# Patient Record
Sex: MALE | Race: BLACK OR AFRICAN AMERICAN | NOT HISPANIC OR LATINO | Employment: UNEMPLOYED | URBAN - METROPOLITAN AREA
[De-identification: names, ages, dates, MRNs, and addresses within clinical notes are randomized per-mention and may not be internally consistent; named-entity substitution may affect disease eponyms.]

---

## 2018-11-25 ENCOUNTER — HOSPITAL ENCOUNTER (EMERGENCY)
Facility: OTHER | Age: 68
Discharge: HOME | End: 2018-11-26
Attending: EMERGENCY MEDICINE
Payer: MEDICARE

## 2018-11-25 DIAGNOSIS — G40.109 PARTIAL SEIZURE, MOTOR: Primary | ICD-10-CM

## 2018-11-25 LAB
BASOPHILS # BLD AUTO: 0.02 K/UL
BASOPHILS NFR BLD: 0.2 %
DIFFERENTIAL METHOD: ABNORMAL
EOSINOPHIL # BLD AUTO: 0.2 K/UL
EOSINOPHIL NFR BLD: 1.5 %
ERYTHROCYTE [DISTWIDTH] IN BLOOD BY AUTOMATED COUNT: 13.2 %
HCT VFR BLD AUTO: 37.7 %
HGB BLD-MCNC: 11.6 G/DL
LYMPHOCYTES # BLD AUTO: 3.6 K/UL
LYMPHOCYTES NFR BLD: 35.6 %
MCH RBC QN AUTO: 25.6 PG
MCHC RBC AUTO-ENTMCNC: 30.8 G/DL
MCV RBC AUTO: 83 FL
MONOCYTES # BLD AUTO: 0.8 K/UL
MONOCYTES NFR BLD: 7.8 %
NEUTROPHILS # BLD AUTO: 5.6 K/UL
NEUTROPHILS NFR BLD: 54.8 %
PLATELET # BLD AUTO: 209 K/UL
PMV BLD AUTO: 10 FL
RBC # BLD AUTO: 4.54 M/UL
WBC # BLD AUTO: 10.17 K/UL

## 2018-11-25 PROCEDURE — 80053 COMPREHEN METABOLIC PANEL: CPT

## 2018-11-25 PROCEDURE — 84100 ASSAY OF PHOSPHORUS: CPT

## 2018-11-25 PROCEDURE — 85025 COMPLETE CBC W/AUTO DIFF WBC: CPT

## 2018-11-25 PROCEDURE — 83735 ASSAY OF MAGNESIUM: CPT

## 2018-11-25 PROCEDURE — 63600175 PHARM REV CODE 636 W HCPCS: Performed by: PHYSICIAN ASSISTANT

## 2018-11-25 PROCEDURE — 80177 DRUG SCRN QUAN LEVETIRACETAM: CPT

## 2018-11-25 PROCEDURE — 96374 THER/PROPH/DIAG INJ IV PUSH: CPT

## 2018-11-25 PROCEDURE — 99284 EMERGENCY DEPT VISIT MOD MDM: CPT | Mod: 25

## 2018-11-25 RX ORDER — LEVETIRACETAM 1000 MG/1
1000 TABLET ORAL 2 TIMES DAILY
Status: ON HOLD | COMMUNITY
End: 2019-09-04 | Stop reason: SDUPTHER

## 2018-11-25 RX ORDER — METFORMIN HYDROCHLORIDE 500 MG/1
500 TABLET, EXTENDED RELEASE ORAL 2 TIMES DAILY WITH MEALS
COMMUNITY

## 2018-11-25 RX ORDER — LOSARTAN POTASSIUM 25 MG/1
25 TABLET ORAL DAILY
COMMUNITY

## 2018-11-25 RX ORDER — ASPIRIN 81 MG/1
81 TABLET ORAL DAILY
COMMUNITY

## 2018-11-25 RX ADMIN — LORAZEPAM 1 MG: 2 INJECTION INTRAMUSCULAR; INTRAVENOUS at 11:11

## 2018-11-26 VITALS
TEMPERATURE: 98 F | SYSTOLIC BLOOD PRESSURE: 161 MMHG | HEART RATE: 64 BPM | WEIGHT: 164 LBS | OXYGEN SATURATION: 100 % | BODY MASS INDEX: 24.29 KG/M2 | HEIGHT: 69 IN | DIASTOLIC BLOOD PRESSURE: 85 MMHG | RESPIRATION RATE: 16 BRPM

## 2018-11-26 LAB
ALBUMIN SERPL BCP-MCNC: 3.8 G/DL
ALP SERPL-CCNC: 69 U/L
ALT SERPL W/O P-5'-P-CCNC: 27 U/L
ANION GAP SERPL CALC-SCNC: 13 MMOL/L
AST SERPL-CCNC: 21 U/L
BILIRUB SERPL-MCNC: 1 MG/DL
BUN SERPL-MCNC: 15 MG/DL
CALCIUM SERPL-MCNC: 9 MG/DL
CHLORIDE SERPL-SCNC: 107 MMOL/L
CO2 SERPL-SCNC: 20 MMOL/L
CREAT SERPL-MCNC: 1.2 MG/DL
EST. GFR  (AFRICAN AMERICAN): >60 ML/MIN/1.73 M^2
EST. GFR  (NON AFRICAN AMERICAN): >60 ML/MIN/1.73 M^2
GLUCOSE SERPL-MCNC: 134 MG/DL
MAGNESIUM SERPL-MCNC: 1.8 MG/DL
PHOSPHATE SERPL-MCNC: 2.5 MG/DL
POCT GLUCOSE: 77 MG/DL (ref 70–110)
POTASSIUM SERPL-SCNC: 4 MMOL/L
PROT SERPL-MCNC: 7.5 G/DL
SODIUM SERPL-SCNC: 140 MMOL/L

## 2018-11-26 PROCEDURE — 63600175 PHARM REV CODE 636 W HCPCS: Performed by: PHYSICIAN ASSISTANT

## 2018-11-26 PROCEDURE — 25000003 PHARM REV CODE 250: Performed by: PHYSICIAN ASSISTANT

## 2018-11-26 PROCEDURE — 96376 TX/PRO/DX INJ SAME DRUG ADON: CPT

## 2018-11-26 PROCEDURE — 82962 GLUCOSE BLOOD TEST: CPT

## 2018-11-26 RX ORDER — SODIUM,POTASSIUM PHOSPHATES 280-250MG
1 POWDER IN PACKET (EA) ORAL
Status: COMPLETED | OUTPATIENT
Start: 2018-11-26 | End: 2018-11-26

## 2018-11-26 RX ADMIN — POTASSIUM & SODIUM PHOSPHATES POWDER PACK 280-160-250 MG 1 PACKET: 280-160-250 PACK at 12:11

## 2018-11-26 RX ADMIN — LORAZEPAM 1 MG: 2 INJECTION INTRAMUSCULAR; INTRAVENOUS at 12:11

## 2018-11-26 NOTE — ED TRIAGE NOTES
Pt to ED via EMS. EMS reports muscle spasm to right side of body. Pt states his symptoms began when he woke up aprox 1 hour PTA. Pt is a poor historian, and unsure of last hospitalization, and previous treatment for symptoms. Convulsions noted to right arm, leg and neck. Pt denies pain, and respirations are even and unlabored.

## 2018-11-26 NOTE — ED NOTES
Pt attempting to get out of bed, assisted to standing position to use bathroom, pt very unsteady on feet and had been incontinent of urine in ED bed. Pt able to use urinal at bedside but was unsteady while standing. Pt assisted to remove soiled personal clothing, changed into gown and brought paper scrub bottoms to put on, fresh chux pad placed on bed. Pt assisted back into ED bed, fresh warm blankets provided. Pt requesting warm food. Pt's phone brought from charging unit at nurses' station, pt now talking to someone on phone trying to arrange . Will notify MD of pt status and food request.

## 2018-11-26 NOTE — ED NOTES
Pt symptoms resolved sitting up in bed with eyes closed arouses to light tactile stimulation, but unable to stay awake without stimulation, respirations even and unlabored, and maintaining airway, bed locked and low rails up Xs 2, call bell in reach , will continue to monitor.

## 2018-11-26 NOTE — ED PROVIDER NOTES
Encounter Date: 11/25/2018       History     Chief Complaint   Patient presents with    Spasms     generalized muscle spasms.      68-year-old male with history of diabetes, closed head injury, CVA, dementia, reported hypertension and seizure/convulsions presents to the emergency department with complaints of shaking.  He states that it woke him up out of his sleep.  He states that this has happened in the past however unable to tell me when the last time it occurred.  He reports that he is compliant with his medications at home.  He denies any pain, chest pain, shortness of breath, numbness or weakness.  He denies any urinary symptoms or loss of bowel bladder function. Patient did arrive via EMS.  No intervention provided in route.  Patient does report that when the symptoms occur, he has difficulty explaining certain things.      The history is provided by the patient and the EMS personnel.     Review of patient's allergies indicates:  No Known Allergies  Past Medical History:   Diagnosis Date    CHI (closed head injury)     Dementia     Diabetes mellitus     Seizures      History reviewed. No pertinent surgical history.  History reviewed. No pertinent family history.  Social History     Tobacco Use    Smoking status: Never Smoker    Smokeless tobacco: Never Used   Substance Use Topics    Alcohol use: No     Frequency: Never    Drug use: No     Review of Systems   Constitutional: Negative for chills and fever.   HENT: Negative for trouble swallowing.    Eyes: Negative for visual disturbance.   Respiratory: Negative for shortness of breath.    Cardiovascular: Negative for chest pain.   Gastrointestinal: Negative for nausea and vomiting.   Genitourinary: Negative for difficulty urinating and dysuria.   Musculoskeletal: Negative for back pain.   Skin: Negative for rash.   Neurological: Positive for tremors and seizures. Negative for weakness, numbness and headaches.   Hematological: Does not bruise/bleed  easily.       Physical Exam     Initial Vitals [11/25/18 2251]   BP Pulse Resp Temp SpO2   (!) 145/97 70 16 98.4 °F (36.9 °C) 99 %      MAP       --         Physical Exam    Nursing note and vitals reviewed.  Constitutional: Vital signs are normal. He appears well-developed and well-nourished.  Non-toxic appearance. No distress.   HENT:   Head: Normocephalic and atraumatic.   Right Ear: External ear normal.   Left Ear: External ear normal.   Nose: Nose normal.   Mouth/Throat: Oropharynx is clear and moist.   Eyes: Conjunctivae, EOM and lids are normal. Pupils are equal, round, and reactive to light. No scleral icterus.   Neck: Normal range of motion and phonation normal. Neck supple.   Cardiovascular: Normal rate, regular rhythm, normal heart sounds and intact distal pulses. Exam reveals no gallop and no friction rub.    No murmur heard.  Pulmonary/Chest: Breath sounds normal. No respiratory distress. He has no wheezes. He has no rhonchi. He has no rales.   Abdominal: Normal appearance.   Musculoskeletal: Normal range of motion.   No obvious deformities, moving all extremities   Neurological: He is alert and oriented to person, place, and time. He has normal strength. He displays no atrophy. No sensory deficit. He exhibits normal muscle tone. GCS eye subscore is 4. GCS verbal subscore is 5. GCS motor subscore is 6.   Involuntary rhythmic truncal movements as well as movements of the right LE. Patient is able to voluntarily move all extremities but unable to stop the involuntary movements of the right LE on exam. Truncal movements have subsided after initial Ativan administration   Skin: Skin is warm, dry and intact. Capillary refill takes less than 2 seconds. No abrasion, no bruising, no ecchymosis, no laceration, no lesion and no rash noted. No erythema.   Psychiatric: He has a normal mood and affect. His speech is normal and behavior is normal. Judgment normal. Cognition and memory are normal.         ED Course    Procedures  Labs Reviewed   COMPREHENSIVE METABOLIC PANEL - Abnormal; Notable for the following components:       Result Value    CO2 20 (*)     Glucose 134 (*)     All other components within normal limits   PHOSPHORUS - Abnormal; Notable for the following components:    Phosphorus 2.5 (*)     All other components within normal limits   CBC W/ AUTO DIFFERENTIAL - Abnormal; Notable for the following components:    RBC 4.54 (*)     Hemoglobin 11.6 (*)     Hematocrit 37.7 (*)     MCH 25.6 (*)     MCHC 30.8 (*)     All other components within normal limits   MAGNESIUM   LEVETIRACETAM  (KEPPRA) LEVEL          Imaging Results    None          Medical Decision Making:   History:   I obtained history from: EMS provider.  Old Medical Records: I decided to obtain old medical records.  Initial Assessment:   68-year-old male with complaints concerning for partial seizure.  Vital signs stable, afebrile, neurovascularly intact.  He is alert and nontoxic appearing.  Patient has involuntary movements of the trunk and right extremities.  Patient has not been seen here in the past.  He does admit that his care is at [facility].  He is alert and oriented and able to answer most questions.  He is unable to answer when was the last time he had similar symptoms. He admits that this happens sometimes when he has these episodes.  He is able to voluntarily move the extremities however unable to stop the involuntary tremor.  He has good strength which is equal bilaterally. He denies any pain or discomfort.  He is resting comfortably.  No loss of bowel bladder function.  Clinical Tests:   Lab Tests: Ordered and Reviewed  ED Management:  After review of patient's chart there is concern that this may be consistent with partial seizure as patient has had these in the past.  There is notes from his neurologist that he does not always lose consciousness when he has the symptoms. Basic labs including magnesium and phosphorus as well as Keppra level  obtained.  He was administered IV Ativan in the emergency department.  12:00 AM patient with improvement in his symptoms. Still with minor movement on the right. Will administer 2nd dose of IV ativan. 12:32 AM patient now resting comfortably. No longer with any signs of tremor/involintary movement. Will replete patient's Phosphorus. H/H stable at 11.6 and 37.7. Electrolytes otherwise benign. I do feel that patient's symptoms are most likely consistent with partial seizure. Patient has history of the same. Keppra level pending.  Will monitor and plan for discharge home with close follow-up with his neurologist.  Discussed with the attending physician who also evaluated him and will resume care from this point.  Other:   I have discussed this case with another health care provider.       <> Summary of the Discussion: Romulo  This note was created using [employer] dictation.  There may be typographical errors secondary to dictation.                        Clinical Impression:     1. Partial seizure, motor                                   Kristen Paul PA-C  11/26/18 0039

## 2018-11-26 NOTE — ED NOTES
Pt ambulated to waiting room to check out. Pt in view of ramp where friend will arrive in white truck to pick pt up. Pt ambulatory with steady gait, no nystagmus or slurred speech present, AAOx4, calm, cooperative, responding appropriately to questions, speaking in full sentences, respirations even and unlabored, skin warm and dry. PT has all personal belongings with him.

## 2018-11-26 NOTE — ED NOTES
Pt arouses to voice, but remains very drowsy, and falls asleep almost immediately. Respirations evenand unlabored, NAD noted, bed locked and low, rails up Xs 2, call bell in reach. Will continue to monitor.

## 2018-11-26 NOTE — ED NOTES
Pt unable to ambulate, gate unsteady, bed sheets changed and pt given warm blankets, respirations even and unlabored NAD noted, bed locked and low rails up Xs 2, will continue to monitor.

## 2018-11-26 NOTE — ED NOTES
Pt states he spoke with someone on the phone and that he is going to catch the bus. Pt encouraged to arrange for someone to pick him up from the hospital. Pt attempting to call someone now.

## 2018-11-26 NOTE — DISCHARGE INSTRUCTIONS
Take your Medication as prescribed. Make sure you are taking your Keppra for your seizures. Follow up with your Neurologist, Dr Torres in the next 48 hours. Return to the ER for any worsening symptoms.

## 2018-11-26 NOTE — ED NOTES
"Pt ambulated in [place] with mostly steady gait, states "I feel like a champ". MD states pt OK for DC. Pt spoke with friend who is coming to pick him up. Pt counseled on need to take prescribed seizure medications, medication reviewed with pt. Pt requested that his care be discussed over the phone with a friend, handed the phone to RN, friend updated on DC instructions per pt request.  "

## 2018-11-26 NOTE — ED NOTES
"Assumed care of pt, received report from ABHINAV Peralta. Pt resting comfortably in stretcher, awakens to voice, states "yeh, I'm feeling OK" then falls back asleep, respirations even and unlabored with equal bilateral chest expansion, skin warm and dry. Resting heart rate 37, elevates to 40s when pt awakens, MD aware. Pt connected to continuous pulse ox and BP monitoring. Bed locked and in lowest position, side rails x 2, call light in reach. WCTM.  "

## 2018-11-27 LAB — LEVETIRACETAM SERPL-MCNC: 11.3 UG/ML (ref 3–60)

## 2019-08-27 ENCOUNTER — HOSPITAL ENCOUNTER (INPATIENT)
Facility: HOSPITAL | Age: 69
LOS: 8 days | Discharge: NURSING, REHAB OR LONG-TERM CARE | DRG: 871 | End: 2019-09-04
Attending: EMERGENCY MEDICINE | Admitting: HOSPITALIST
Payer: MEDICARE

## 2019-08-27 DIAGNOSIS — R41.82 ALTERED MENTAL STATUS: ICD-10-CM

## 2019-08-27 DIAGNOSIS — M25.511 SHOULDER PAIN, RIGHT: ICD-10-CM

## 2019-08-27 DIAGNOSIS — M62.82 NON-TRAUMATIC RHABDOMYOLYSIS: Primary | ICD-10-CM

## 2019-08-27 DIAGNOSIS — N17.9 AKI (ACUTE KIDNEY INJURY): ICD-10-CM

## 2019-08-27 DIAGNOSIS — G40.909 SEIZURE DISORDER: ICD-10-CM

## 2019-08-27 DIAGNOSIS — A41.9 SEPSIS: ICD-10-CM

## 2019-08-27 PROBLEM — R65.20 SEVERE SEPSIS: Status: ACTIVE | Noted: 2019-08-27

## 2019-08-27 PROBLEM — I10 HYPERTENSION: Status: ACTIVE | Noted: 2019-08-27

## 2019-08-27 PROBLEM — E11.9 DM (DIABETES MELLITUS): Status: ACTIVE | Noted: 2019-08-27

## 2019-08-27 PROBLEM — Z91.81 STATUS POST FALL: Status: ACTIVE | Noted: 2019-08-27

## 2019-08-27 PROBLEM — Z86.73 HISTORY OF CVA IN ADULTHOOD: Status: ACTIVE | Noted: 2019-08-27

## 2019-08-27 LAB
ALBUMIN SERPL BCP-MCNC: 4.1 G/DL (ref 3.5–5.2)
ALLENS TEST: ABNORMAL
ALLENS TEST: ABNORMAL
ALP SERPL-CCNC: 83 U/L (ref 55–135)
ALT SERPL W/O P-5'-P-CCNC: 82 U/L (ref 10–44)
AMMONIA PLAS-SCNC: 44 UMOL/L (ref 10–50)
AMPHET+METHAMPHET UR QL: NEGATIVE
ANION GAP SERPL CALC-SCNC: 15 MMOL/L (ref 8–16)
ANION GAP SERPL CALC-SCNC: 16 MMOL/L (ref 8–16)
AST SERPL-CCNC: 275 U/L (ref 10–40)
BACTERIA #/AREA URNS AUTO: ABNORMAL /HPF
BARBITURATES UR QL SCN>200 NG/ML: NEGATIVE
BASOPHILS # BLD AUTO: 0 K/UL (ref 0–0.2)
BASOPHILS # BLD AUTO: 0.02 K/UL (ref 0–0.2)
BASOPHILS NFR BLD: 0 % (ref 0–1.9)
BASOPHILS NFR BLD: 0.2 % (ref 0–1.9)
BENZODIAZ UR QL SCN>200 NG/ML: NEGATIVE
BILIRUB SERPL-MCNC: 1.4 MG/DL (ref 0.1–1)
BILIRUB UR QL STRIP: NEGATIVE
BUN SERPL-MCNC: 25 MG/DL (ref 8–23)
BUN SERPL-MCNC: 27 MG/DL (ref 8–23)
BZE UR QL SCN: NEGATIVE
CALCIUM SERPL-MCNC: 9.4 MG/DL (ref 8.7–10.5)
CALCIUM SERPL-MCNC: 9.5 MG/DL (ref 8.7–10.5)
CANNABINOIDS UR QL SCN: NEGATIVE
CHLORIDE SERPL-SCNC: 109 MMOL/L (ref 95–110)
CHLORIDE SERPL-SCNC: 113 MMOL/L (ref 95–110)
CK SERPL-CCNC: ABNORMAL U/L (ref 20–200)
CLARITY UR REFRACT.AUTO: ABNORMAL
CO2 SERPL-SCNC: 16 MMOL/L (ref 23–29)
CO2 SERPL-SCNC: 21 MMOL/L (ref 23–29)
COLOR UR AUTO: YELLOW
CREAT SERPL-MCNC: 1.4 MG/DL (ref 0.5–1.4)
CREAT SERPL-MCNC: 1.6 MG/DL (ref 0.5–1.4)
CREAT UR-MCNC: 189 MG/DL (ref 23–375)
DELSYS: ABNORMAL
DELSYS: ABNORMAL
DIFFERENTIAL METHOD: ABNORMAL
DIFFERENTIAL METHOD: ABNORMAL
EOSINOPHIL # BLD AUTO: 0 K/UL (ref 0–0.5)
EOSINOPHIL # BLD AUTO: 0 K/UL (ref 0–0.5)
EOSINOPHIL NFR BLD: 0 % (ref 0–8)
EOSINOPHIL NFR BLD: 0 % (ref 0–8)
ERYTHROCYTE [DISTWIDTH] IN BLOOD BY AUTOMATED COUNT: 13.3 % (ref 11.5–14.5)
ERYTHROCYTE [DISTWIDTH] IN BLOOD BY AUTOMATED COUNT: 13.4 % (ref 11.5–14.5)
EST. GFR  (AFRICAN AMERICAN): 50.1 ML/MIN/1.73 M^2
EST. GFR  (AFRICAN AMERICAN): 58.8 ML/MIN/1.73 M^2
EST. GFR  (NON AFRICAN AMERICAN): 43.3 ML/MIN/1.73 M^2
EST. GFR  (NON AFRICAN AMERICAN): 50.9 ML/MIN/1.73 M^2
ESTIMATED AVG GLUCOSE: 137 MG/DL (ref 68–131)
ETHANOL UR-MCNC: <10 MG/DL
FLOW: 2
GLUCOSE SERPL-MCNC: 121 MG/DL (ref 70–110)
GLUCOSE SERPL-MCNC: 128 MG/DL (ref 70–110)
GLUCOSE UR QL STRIP: NEGATIVE
HBA1C MFR BLD HPLC: 6.4 % (ref 4–5.6)
HCO3 UR-SCNC: 17.9 MMOL/L (ref 24–28)
HCO3 UR-SCNC: 18.3 MMOL/L (ref 24–28)
HCT VFR BLD AUTO: 48.9 % (ref 40–54)
HCT VFR BLD AUTO: 49.5 % (ref 40–54)
HGB BLD-MCNC: 15 G/DL (ref 14–18)
HGB BLD-MCNC: 15.1 G/DL (ref 14–18)
HGB UR QL STRIP: ABNORMAL
HIV1+2 IGG SERPL QL IA.RAPID: NEGATIVE
HYALINE CASTS UR QL AUTO: 0 /LPF
IMM GRANULOCYTES # BLD AUTO: 0.01 K/UL (ref 0–0.04)
IMM GRANULOCYTES # BLD AUTO: 0.05 K/UL (ref 0–0.04)
IMM GRANULOCYTES NFR BLD AUTO: 0.3 % (ref 0–0.5)
IMM GRANULOCYTES NFR BLD AUTO: 0.5 % (ref 0–0.5)
KETONES UR QL STRIP: ABNORMAL
LACTATE SERPL-SCNC: 1.6 MMOL/L (ref 0.5–2.2)
LACTATE SERPL-SCNC: 2.1 MMOL/L (ref 0.5–2.2)
LEUKOCYTE ESTERASE UR QL STRIP: ABNORMAL
LYMPHOCYTES # BLD AUTO: 0.5 K/UL (ref 1–4.8)
LYMPHOCYTES # BLD AUTO: 1 K/UL (ref 1–4.8)
LYMPHOCYTES NFR BLD: 15.6 % (ref 18–48)
LYMPHOCYTES NFR BLD: 9.5 % (ref 18–48)
MAGNESIUM SERPL-MCNC: 2 MG/DL (ref 1.6–2.6)
MCH RBC QN AUTO: 25.4 PG (ref 27–31)
MCH RBC QN AUTO: 25.7 PG (ref 27–31)
MCHC RBC AUTO-ENTMCNC: 30.5 G/DL (ref 32–36)
MCHC RBC AUTO-ENTMCNC: 30.7 G/DL (ref 32–36)
MCV RBC AUTO: 83 FL (ref 82–98)
MCV RBC AUTO: 84 FL (ref 82–98)
METHADONE UR QL SCN>300 NG/ML: NEGATIVE
MICROSCOPIC COMMENT: ABNORMAL
MODE: ABNORMAL
MONOCYTES # BLD AUTO: 0.2 K/UL (ref 0.3–1)
MONOCYTES # BLD AUTO: 0.8 K/UL (ref 0.3–1)
MONOCYTES NFR BLD: 5.5 % (ref 4–15)
MONOCYTES NFR BLD: 7.6 % (ref 4–15)
NEUTROPHILS # BLD AUTO: 2.6 K/UL (ref 1.8–7.7)
NEUTROPHILS # BLD AUTO: 8.7 K/UL (ref 1.8–7.7)
NEUTROPHILS NFR BLD: 78.6 % (ref 38–73)
NEUTROPHILS NFR BLD: 82.2 % (ref 38–73)
NITRITE UR QL STRIP: NEGATIVE
NRBC BLD-RTO: 0 /100 WBC
NRBC BLD-RTO: 0 /100 WBC
OPIATES UR QL SCN: NEGATIVE
PCO2 BLDA: 26.4 MMHG (ref 35–45)
PCO2 BLDA: 30.2 MMHG (ref 35–45)
PCP UR QL SCN>25 NG/ML: NEGATIVE
PH SMN: 7.39 [PH] (ref 7.35–7.45)
PH SMN: 7.44 [PH] (ref 7.35–7.45)
PH UR STRIP: 5 [PH] (ref 5–8)
PHOSPHATE SERPL-MCNC: 3.8 MG/DL (ref 2.7–4.5)
PLATELET # BLD AUTO: 186 K/UL (ref 150–350)
PLATELET # BLD AUTO: 219 K/UL (ref 150–350)
PLATELET BLD QL SMEAR: ABNORMAL
PMV BLD AUTO: 11 FL (ref 9.2–12.9)
PMV BLD AUTO: 11.2 FL (ref 9.2–12.9)
PO2 BLDA: 20 MMHG (ref 40–60)
PO2 BLDA: 47 MMHG (ref 40–60)
POC BE: -6 MMOL/L
POC BE: -7 MMOL/L
POC SATURATED O2: 32 % (ref 95–100)
POC SATURATED O2: 85 % (ref 95–100)
POC TCO2: 19 MMOL/L (ref 24–29)
POC TCO2: 19 MMOL/L (ref 24–29)
POCT GLUCOSE: 122 MG/DL (ref 70–110)
POTASSIUM SERPL-SCNC: 4.1 MMOL/L (ref 3.5–5.1)
POTASSIUM SERPL-SCNC: 4.1 MMOL/L (ref 3.5–5.1)
PROCALCITONIN SERPL IA-MCNC: 11.79 NG/ML
PROT SERPL-MCNC: 8.3 G/DL (ref 6–8.4)
PROT UR QL STRIP: ABNORMAL
RBC # BLD AUTO: 5.88 M/UL (ref 4.6–6.2)
RBC # BLD AUTO: 5.91 M/UL (ref 4.6–6.2)
RBC #/AREA URNS AUTO: 19 /HPF (ref 0–4)
SAMPLE: ABNORMAL
SAMPLE: ABNORMAL
SITE: ABNORMAL
SITE: ABNORMAL
SODIUM SERPL-SCNC: 145 MMOL/L (ref 136–145)
SODIUM SERPL-SCNC: 145 MMOL/L (ref 136–145)
SP GR UR STRIP: 1.02 (ref 1–1.03)
TOXICOLOGY INFORMATION: NORMAL
TSH SERPL DL<=0.005 MIU/L-ACNC: 0.88 UIU/ML (ref 0.4–4)
URN SPEC COLLECT METH UR: ABNORMAL
WBC # BLD AUTO: 10.58 K/UL (ref 3.9–12.7)
WBC # BLD AUTO: 3.26 K/UL (ref 3.9–12.7)
WBC #/AREA URNS AUTO: 22 /HPF (ref 0–5)

## 2019-08-27 PROCEDURE — 87088 URINE BACTERIA CULTURE: CPT

## 2019-08-27 PROCEDURE — 80048 BASIC METABOLIC PNL TOTAL CA: CPT

## 2019-08-27 PROCEDURE — 99285 PR EMERGENCY DEPT VISIT,LEVEL V: ICD-10-PCS | Mod: ,,, | Performed by: PHYSICIAN ASSISTANT

## 2019-08-27 PROCEDURE — 80074 ACUTE HEPATITIS PANEL: CPT

## 2019-08-27 PROCEDURE — 99900035 HC TECH TIME PER 15 MIN (STAT)

## 2019-08-27 PROCEDURE — 63600175 PHARM REV CODE 636 W HCPCS: Performed by: STUDENT IN AN ORGANIZED HEALTH CARE EDUCATION/TRAINING PROGRAM

## 2019-08-27 PROCEDURE — 96360 HYDRATION IV INFUSION INIT: CPT

## 2019-08-27 PROCEDURE — 84100 ASSAY OF PHOSPHORUS: CPT

## 2019-08-27 PROCEDURE — 84145 PROCALCITONIN (PCT): CPT

## 2019-08-27 PROCEDURE — 12000002 HC ACUTE/MED SURGE SEMI-PRIVATE ROOM

## 2019-08-27 PROCEDURE — 87077 CULTURE AEROBIC IDENTIFY: CPT | Mod: 59

## 2019-08-27 PROCEDURE — 87086 URINE CULTURE/COLONY COUNT: CPT

## 2019-08-27 PROCEDURE — 25000003 PHARM REV CODE 250: Performed by: PHYSICIAN ASSISTANT

## 2019-08-27 PROCEDURE — 99223 1ST HOSP IP/OBS HIGH 75: CPT | Mod: AI,GC,, | Performed by: HOSPITALIST

## 2019-08-27 PROCEDURE — 83036 HEMOGLOBIN GLYCOSYLATED A1C: CPT

## 2019-08-27 PROCEDURE — 87186 SC STD MICRODIL/AGAR DIL: CPT

## 2019-08-27 PROCEDURE — 93010 ELECTROCARDIOGRAM REPORT: CPT | Mod: ,,, | Performed by: INTERNAL MEDICINE

## 2019-08-27 PROCEDURE — 99223 PR INITIAL HOSPITAL CARE,LEVL III: ICD-10-PCS | Mod: AI,GC,, | Performed by: HOSPITALIST

## 2019-08-27 PROCEDURE — 83605 ASSAY OF LACTIC ACID: CPT

## 2019-08-27 PROCEDURE — 80053 COMPREHEN METABOLIC PANEL: CPT

## 2019-08-27 PROCEDURE — 51702 INSERT TEMP BLADDER CATH: CPT

## 2019-08-27 PROCEDURE — 82550 ASSAY OF CK (CPK): CPT

## 2019-08-27 PROCEDURE — 80307 DRUG TEST PRSMV CHEM ANLYZR: CPT

## 2019-08-27 PROCEDURE — 82140 ASSAY OF AMMONIA: CPT

## 2019-08-27 PROCEDURE — 99285 EMERGENCY DEPT VISIT HI MDM: CPT | Mod: 25

## 2019-08-27 PROCEDURE — 63600175 PHARM REV CODE 636 W HCPCS: Performed by: PHYSICIAN ASSISTANT

## 2019-08-27 PROCEDURE — 81001 URINALYSIS AUTO W/SCOPE: CPT

## 2019-08-27 PROCEDURE — 86703 HIV-1/HIV-2 1 RESULT ANTBDY: CPT

## 2019-08-27 PROCEDURE — 85025 COMPLETE CBC W/AUTO DIFF WBC: CPT | Mod: 91

## 2019-08-27 PROCEDURE — 93010 EKG 12-LEAD: ICD-10-PCS | Mod: ,,, | Performed by: INTERNAL MEDICINE

## 2019-08-27 PROCEDURE — 93005 ELECTROCARDIOGRAM TRACING: CPT

## 2019-08-27 PROCEDURE — 87040 BLOOD CULTURE FOR BACTERIA: CPT

## 2019-08-27 PROCEDURE — 99285 EMERGENCY DEPT VISIT HI MDM: CPT | Mod: ,,, | Performed by: PHYSICIAN ASSISTANT

## 2019-08-27 PROCEDURE — 84443 ASSAY THYROID STIM HORMONE: CPT

## 2019-08-27 PROCEDURE — 63600175 PHARM REV CODE 636 W HCPCS: Performed by: INTERNAL MEDICINE

## 2019-08-27 PROCEDURE — 83605 ASSAY OF LACTIC ACID: CPT | Mod: 91

## 2019-08-27 PROCEDURE — 83735 ASSAY OF MAGNESIUM: CPT

## 2019-08-27 PROCEDURE — 82803 BLOOD GASES ANY COMBINATION: CPT

## 2019-08-27 RX ORDER — LEVETIRACETAM 750 MG/1
1500 TABLET ORAL 2 TIMES DAILY
Status: DISCONTINUED | OUTPATIENT
Start: 2019-08-28 | End: 2019-08-28

## 2019-08-27 RX ORDER — ACETAMINOPHEN 10 MG/ML
1000 INJECTION, SOLUTION INTRAVENOUS
Status: COMPLETED | OUTPATIENT
Start: 2019-08-27 | End: 2019-08-27

## 2019-08-27 RX ORDER — SODIUM CHLORIDE, SODIUM LACTATE, POTASSIUM CHLORIDE, CALCIUM CHLORIDE 600; 310; 30; 20 MG/100ML; MG/100ML; MG/100ML; MG/100ML
INJECTION, SOLUTION INTRAVENOUS CONTINUOUS
Status: DISCONTINUED | OUTPATIENT
Start: 2019-08-27 | End: 2019-08-28

## 2019-08-27 RX ORDER — LOSARTAN POTASSIUM 25 MG/1
25 TABLET ORAL
Status: COMPLETED | OUTPATIENT
Start: 2019-08-27 | End: 2019-08-27

## 2019-08-27 RX ORDER — LORAZEPAM 2 MG/ML
4 INJECTION INTRAMUSCULAR EVERY 10 MIN PRN
Status: DISCONTINUED | OUTPATIENT
Start: 2019-08-27 | End: 2019-08-28

## 2019-08-27 RX ORDER — ONDANSETRON 2 MG/ML
4 INJECTION INTRAMUSCULAR; INTRAVENOUS EVERY 6 HOURS PRN
Status: DISCONTINUED | OUTPATIENT
Start: 2019-08-27 | End: 2019-09-04 | Stop reason: HOSPADM

## 2019-08-27 RX ORDER — HYDRALAZINE HYDROCHLORIDE 25 MG/1
25 TABLET, FILM COATED ORAL EVERY 8 HOURS PRN
Status: DISCONTINUED | OUTPATIENT
Start: 2019-08-27 | End: 2019-08-27

## 2019-08-27 RX ORDER — VANCOMYCIN HCL IN 5 % DEXTROSE 1G/250ML
1000 PLASTIC BAG, INJECTION (ML) INTRAVENOUS
Status: DISCONTINUED | OUTPATIENT
Start: 2019-08-28 | End: 2019-08-28

## 2019-08-27 RX ORDER — ACETAMINOPHEN 325 MG/1
650 TABLET ORAL EVERY 6 HOURS PRN
Status: DISCONTINUED | OUTPATIENT
Start: 2019-08-27 | End: 2019-08-28

## 2019-08-27 RX ORDER — INSULIN ASPART 100 [IU]/ML
0-5 INJECTION, SOLUTION INTRAVENOUS; SUBCUTANEOUS EVERY 6 HOURS PRN
Status: DISCONTINUED | OUTPATIENT
Start: 2019-08-27 | End: 2019-08-29

## 2019-08-27 RX ORDER — LOSARTAN POTASSIUM 25 MG/1
25 TABLET ORAL DAILY
Status: DISCONTINUED | OUTPATIENT
Start: 2019-08-28 | End: 2019-08-27

## 2019-08-27 RX ORDER — LEVETIRACETAM 15 MG/ML
1500 INJECTION INTRAVASCULAR ONCE
Status: COMPLETED | OUTPATIENT
Start: 2019-08-27 | End: 2019-08-27

## 2019-08-27 RX ORDER — HEPARIN SODIUM 5000 [USP'U]/ML
5000 INJECTION, SOLUTION INTRAVENOUS; SUBCUTANEOUS EVERY 8 HOURS
Status: DISCONTINUED | OUTPATIENT
Start: 2019-08-27 | End: 2019-08-29

## 2019-08-27 RX ORDER — GLUCAGON 1 MG
1 KIT INJECTION
Status: DISCONTINUED | OUTPATIENT
Start: 2019-08-27 | End: 2019-08-29

## 2019-08-27 RX ORDER — ASPIRIN 81 MG/1
81 TABLET ORAL DAILY
Status: DISCONTINUED | OUTPATIENT
Start: 2019-08-28 | End: 2019-08-27

## 2019-08-27 RX ADMIN — SODIUM CHLORIDE 1000 ML: 0.9 INJECTION, SOLUTION INTRAVENOUS at 02:08

## 2019-08-27 RX ADMIN — SODIUM CHLORIDE 1000 ML: 0.9 INJECTION, SOLUTION INTRAVENOUS at 12:08

## 2019-08-27 RX ADMIN — ACETAMINOPHEN 1000 MG: 10 INJECTION, SOLUTION INTRAVENOUS at 07:08

## 2019-08-27 RX ADMIN — HEPARIN SODIUM 5000 UNITS: 5000 INJECTION INTRAVENOUS; SUBCUTANEOUS at 11:08

## 2019-08-27 RX ADMIN — SODIUM CHLORIDE, SODIUM LACTATE, POTASSIUM CHLORIDE, AND CALCIUM CHLORIDE 500 ML: .6; .31; .03; .02 INJECTION, SOLUTION INTRAVENOUS at 09:08

## 2019-08-27 RX ADMIN — LOSARTAN POTASSIUM 25 MG: 25 TABLET, FILM COATED ORAL at 03:08

## 2019-08-27 RX ADMIN — PIPERACILLIN AND TAZOBACTAM 4.5 G: 4; .5 INJECTION, POWDER, LYOPHILIZED, FOR SOLUTION INTRAVENOUS; PARENTERAL at 06:08

## 2019-08-27 RX ADMIN — PIPERACILLIN AND TAZOBACTAM 4.5 G: 4; .5 INJECTION, POWDER, LYOPHILIZED, FOR SOLUTION INTRAVENOUS; PARENTERAL at 11:08

## 2019-08-27 RX ADMIN — VANCOMYCIN HYDROCHLORIDE 1250 MG: 1.25 INJECTION, POWDER, LYOPHILIZED, FOR SOLUTION INTRAVENOUS at 06:08

## 2019-08-27 RX ADMIN — LEVETIRACETAM INJECTION 1500 MG: 15 INJECTION INTRAVENOUS at 09:08

## 2019-08-27 RX ADMIN — SODIUM CHLORIDE, SODIUM LACTATE, POTASSIUM CHLORIDE, AND CALCIUM CHLORIDE: .6; .31; .03; .02 INJECTION, SOLUTION INTRAVENOUS at 07:08

## 2019-08-27 NOTE — ED PROVIDER NOTES
Encounter Date: 8/27/2019       History     Chief Complaint   Patient presents with    Weakness     Pt brought in by EMS-found in bathtub-pt reports falling in tub and has been unable to get up x 2 days.  Pt c/o pain to his right side.      69-year-old male with a history of stroke in 2012, seizure disorder, DM, dementia presents to the ED for evaluation of weakness. Per EMS, patient was found in the bathtub.  Patient states that he fell about 3 days ago and has been unable to get up since then due to lack of strength.  History from the patient is limited due to his acute condition and history of dementia.  Patient complains of right-sided shoulder pain which he believes he may have injured in the fall.  Denies chest pain, shortness of breath.  Patient currently nauseated.  Denies neck pain.        Review of patient's allergies indicates:  No Known Allergies  Past Medical History:   Diagnosis Date    CHI (closed head injury)     Dementia     Diabetes mellitus     Hyperlipidemia     Seizures      History reviewed. No pertinent surgical history.  History reviewed. No pertinent family history.  Social History     Tobacco Use    Smoking status: Never Smoker    Smokeless tobacco: Never Used   Substance Use Topics    Alcohol use: No     Frequency: Never    Drug use: No     Review of Systems   Unable to perform ROS: Acuity of condition   Respiratory: Negative for shortness of breath.    Cardiovascular: Negative for chest pain.   Musculoskeletal: Positive for arthralgias (Right shoulder pain).   Neurological: Positive for weakness.       Physical Exam     Initial Vitals [08/27/19 1019]   BP Pulse Resp Temp SpO2   (!) 141/90 90 18 97.7 °F (36.5 °C) 97 %      MAP       --         Physical Exam    Nursing note and vitals reviewed.  Constitutional: He appears well-developed. He appears lethargic. He appears ill. No distress.   HENT:   Head: Normocephalic and atraumatic.   Eyes: Pupils are equal, round, and reactive to  light.   Neck: Normal range of motion. Neck supple. No spinous process tenderness present.   Cardiovascular: Normal rate and regular rhythm. Exam reveals no gallop, no distant heart sounds and no friction rub.    Murmur heard.  No peripheral edema   Pulmonary/Chest: Effort normal and breath sounds normal. No accessory muscle usage. No tachypnea. No respiratory distress. He has no decreased breath sounds. He has no wheezes. He has no rhonchi. He has no rales.   Abdominal: Soft. He exhibits no distension. There is no tenderness.   Musculoskeletal:   Pain with ROM of the R shoulder. No gross deformity, ecchymosis. Radial pulse intact.    Neurological: He appears lethargic.   Patient is significantly generally weak, worse on the right   Skin: No rash noted.         ED Course   Procedures  Labs Reviewed   CBC W/ AUTO DIFFERENTIAL - Abnormal; Notable for the following components:       Result Value    Mean Corpuscular Hemoglobin 25.4 (*)     Mean Corpuscular Hemoglobin Conc 30.7 (*)     Gran # (ANC) 8.7 (*)     Immature Grans (Abs) 0.05 (*)     Gran% 82.2 (*)     Lymph% 9.5 (*)     All other components within normal limits   COMPREHENSIVE METABOLIC PANEL - Abnormal; Notable for the following components:    CO2 21 (*)     Glucose 121 (*)     BUN, Bld 27 (*)     Creatinine 1.6 (*)     Total Bilirubin 1.4 (*)      (*)     ALT 82 (*)     eGFR if  50.1 (*)     eGFR if non  43.3 (*)     All other components within normal limits   CK - Abnormal; Notable for the following components:    CPK 00721 (*)     All other components within normal limits   ISTAT PROCEDURE - Abnormal; Notable for the following components:    POC PCO2 30.2 (*)     POC PO2 20 (*)     POC HCO3 18.3 (*)     POC SATURATED O2 32 (*)     POC TCO2 19 (*)     All other components within normal limits   MAGNESIUM   PHOSPHORUS   LACTIC ACID, PLASMA   LEVETIRACETAM  (KEPPRA) LEVEL   URINALYSIS, REFLEX TO URINE CULTURE           Imaging Results          X-Ray Chest 1 View (Final result)  Result time 08/27/19 15:42:31    Final result by Wilfredo Varma MD (08/27/19 15:42:31)                 Impression:      See above      Electronically signed by: Wilfredo Varma MD  Date:    08/27/2019  Time:    15:42             Narrative:    EXAMINATION:  XR CHEST 1 VIEW    CLINICAL HISTORY:  SOB;    TECHNIQUE:  Single frontal view of the chest was performed.    COMPARISON:  None    FINDINGS:  Heart size normal.  The lungs are clear.  No pleural effusion                               CT Head Without Contrast (Final result)  Result time 08/27/19 13:33:24    Final result by Joss Brooke MD (08/27/19 13:33:24)                 Impression:      No acute large vascular territory infarct or intracranial hemorrhage identified.  Further evaluation/follow-up as warranted.    Parafalcine right frontal lobe encephalomalacia suggesting sequela of remote right RO territory infarct.    Right corona radiata suspected lacunar type infarct, age indeterminate.    Generalized cerebral volume loss and supratentorial white matter chronic small vessel ischemic change.    Partial empty sella configuration, nonspecific but can be seen with benign intracranial hypertension.      Electronically signed by: Joss Brooke MD  Date:    08/27/2019  Time:    13:33             Narrative:    EXAMINATION:  CT HEAD WITHOUT CONTRAST    CLINICAL HISTORY:  Stroke;Focal neuro deficit, new, fixed or worsening, >6 hours;    TECHNIQUE:  Low dose axial CT images obtained throughout the head without intravenous contrast. Sagittal and coronal reconstructions were performed.    COMPARISON:  None.    FINDINGS:  Patient is rotated and tilted within the scanner.    Intracranial compartment:    Mild generalized cerebral volume loss.  Ventricles are midline without distortion by mass effect or acute hydrocephalus.  No extra-axial blood or fluid collections.  Partial empty sella configuration.    Brain  appears normally formed.  Parafalcine right frontal lobe mild encephalomalacia, likely sequela of remote infarction, with superimposed acute ischemia not excluded.  Mild degree of patchy hypoattenuation of the bilateral subcortical and periventricular white matter, likely sequela of chronic small vessel ischemic change in this age group.  More focal area of hypoattenuation at the right corona radiata suggestive of a lacunar type infarct.  No parenchymal mass, hemorrhage, edema or acute major vascular distribution infarct.  Skull base atherosclerotic vascular calcifications noted.    Skull/extracranial contents (limited evaluation): Soft tissue prominence overlying the left frontal calvarium and right parieto-occipital calvarium suggesting scalp swelling/hematoma in the setting of recent trauma.  No fracture. Mastoid air cells and paranasal sinuses are essentially clear.  Imaged portions of the orbits are within normal limits.                               X-Ray Shoulder Complete 2 View Right (Final result)  Result time 08/27/19 12:43:50    Final result by Wilfredo Ortez MD (08/27/19 12:43:50)                 Impression:      As above      Electronically signed by: Wilfredo Ortez MD  Date:    08/27/2019  Time:    12:43             Narrative:    EXAMINATION:  XR SHOULDER COMPLETE 2 OR MORE VIEWS RIGHT    TECHNIQUE:  Three views of the right shoulder were obtained.    COMPARISON:  No relevant comparison examinations are currently available.  Clinical information available in the electronic medical record indicates a history of trauma 2 days previously.    FINDINGS:  Visualized osseous structures appear intact, with no definite evidence of recent fracture or other significant abnormality identified.  No glenohumeral dislocation.  No abnormal soft tissue calcifications.  Some spurring about the right acromioclavicular joint level is observed.                                 Medical Decision Making:   History:   Old Medical  Records: I decided to obtain old medical records.  Differential Diagnosis:   My differential diagnosis includes but is not limited to:  Dehydration, rhabdomyolysis, LISHA, electrolyte derangement, , seizure  Clinical Tests:   Lab Tests: Ordered  Radiological Study: Ordered  ED Management:  69-year-old male presents to the ED for weakness and inability to get out of the bathtub for 3 days.  Vitals on arrival are within normal limits. Patient afebrile.  Patient is lethargic, weak with dry mucous membranes.   Lab studies remarkable for CPK elevated to 18,050. Mild LISHA noted with creatinine of 1.6 elevated from 1.2.  Liver enzymes are also elevated with AST at 275 an ALT at 82.  T bili elevated at 1.4.  Lactic acid within normal limits. Patient given 2 L of IV fluids in the ED.  He will be admitted to Hospital Medicine for further treatment and management of rhabdomyolysis.   I have reviewed the patient's records and discussed this case with my supervising physician.    Other:   I have discussed this case with another health care provider.       <> Summary of the Discussion: Hospital Medicine                      Clinical Impression:       ICD-10-CM ICD-9-CM   1. Non-traumatic rhabdomyolysis M62.82 728.88   2. Shoulder pain, right M25.511 719.41   3. LISHA (acute kidney injury) N17.9 584.9         Disposition:   Disposition: Admitted  Condition: Fair                       Tracy Torres PA-C  08/27/19 0069

## 2019-08-27 NOTE — ED NOTES
Pt placed on cardiac monitor  And continuous pulse ox , remains on O2 3l/nc.Pulse ox 88%  Turned and skin feels very warm/hot. Will take rectal temp.

## 2019-08-27 NOTE — ED TRIAGE NOTES
Pt arrives from his apartment per EMS-  placed EMS call. Pt was recently homeless and -placed in an apartment   And last spoken to by  2 days ago. Maintenance   allowed  Soc  Service in  a found pt lying beside bath tub on restroom floor. . Told her  he fell  two days ago and was unable to get up. . C/O pain in rt shoulder. Mucous membranes are dry , pt is very groggy,  and weak. Frail.

## 2019-08-27 NOTE — ED NOTES
LOC: The patient is awake and groggy   APPEARANCE: Patient resting comfortably, patient was found soiled in a bath tub  SKIN: warm and dry, normal skin turgor & moist mucus membranes, skin intact, no breakdown noted.  MUSCULOSKELETAL: Patient moving all extremities well, no obvious swelling or deformities noted, very weak  RESPIRATORY: Airway is open and patent, ; respirations are spontaneous, normal effort and rate  CARDIAC: Patient has a normal rate, no peripheral edema noted, capillary refill < 3 seconds; No complaints of chest pain   ABDOMEN: Soft and non tender to palpation, no distention noted. Frail.

## 2019-08-27 NOTE — ED NOTES
DR Michelle answers page- informed of rectal temp, pulse ox on 3l/nc and  Respiratory rate. States will be over to see pt soon.

## 2019-08-27 NOTE — ED NOTES
Admit MD at bedside- informed of pt's inability to swallow water and losartan held. And current BP.

## 2019-08-27 NOTE — ED NOTES
Page placed for  IM team admitting pt to inform of rectal temp 103.4, respirations 40/minute , and pulse ox 88% on 3l/nc

## 2019-08-28 LAB
ALBUMIN SERPL BCP-MCNC: 2.7 G/DL (ref 3.5–5.2)
ALBUMIN SERPL BCP-MCNC: 2.7 G/DL (ref 3.5–5.2)
ALLENS TEST: ABNORMAL
ALP SERPL-CCNC: 57 U/L (ref 55–135)
ALP SERPL-CCNC: 57 U/L (ref 55–135)
ALT SERPL W/O P-5'-P-CCNC: 62 U/L (ref 10–44)
ALT SERPL W/O P-5'-P-CCNC: 62 U/L (ref 10–44)
ANION GAP SERPL CALC-SCNC: 14 MMOL/L (ref 8–16)
ANION GAP SERPL CALC-SCNC: 9 MMOL/L (ref 8–16)
ANION GAP SERPL CALC-SCNC: 9 MMOL/L (ref 8–16)
ANISOCYTOSIS BLD QL SMEAR: SLIGHT
AST SERPL-CCNC: 172 U/L (ref 10–40)
AST SERPL-CCNC: 172 U/L (ref 10–40)
BASOPHILS # BLD AUTO: 0.04 K/UL (ref 0–0.2)
BASOPHILS NFR BLD: 0.5 % (ref 0–1.9)
BILIRUB DIRECT SERPL-MCNC: 0.7 MG/DL (ref 0.1–0.3)
BILIRUB SERPL-MCNC: 1.7 MG/DL (ref 0.1–1)
BILIRUB SERPL-MCNC: 1.7 MG/DL (ref 0.1–1)
BUN SERPL-MCNC: 21 MG/DL (ref 8–23)
BUN SERPL-MCNC: 21 MG/DL (ref 8–23)
BUN SERPL-MCNC: 23 MG/DL (ref 8–23)
BURR CELLS BLD QL SMEAR: ABNORMAL
CALCIUM SERPL-MCNC: 8.2 MG/DL (ref 8.7–10.5)
CALCIUM SERPL-MCNC: 8.2 MG/DL (ref 8.7–10.5)
CALCIUM SERPL-MCNC: 8.6 MG/DL (ref 8.7–10.5)
CHLORIDE SERPL-SCNC: 113 MMOL/L (ref 95–110)
CHLORIDE SERPL-SCNC: 113 MMOL/L (ref 95–110)
CHLORIDE SERPL-SCNC: 117 MMOL/L (ref 95–110)
CK SERPL-CCNC: 9070 U/L (ref 20–200)
CO2 SERPL-SCNC: 14 MMOL/L (ref 23–29)
CO2 SERPL-SCNC: 21 MMOL/L (ref 23–29)
CO2 SERPL-SCNC: 21 MMOL/L (ref 23–29)
CREAT SERPL-MCNC: 1.2 MG/DL (ref 0.5–1.4)
CREAT SERPL-MCNC: 1.2 MG/DL (ref 0.5–1.4)
CREAT SERPL-MCNC: 1.4 MG/DL (ref 0.5–1.4)
DELSYS: ABNORMAL
DIFFERENTIAL METHOD: ABNORMAL
EOSINOPHIL # BLD AUTO: 0 K/UL (ref 0–0.5)
EOSINOPHIL NFR BLD: 0 % (ref 0–8)
ERYTHROCYTE [DISTWIDTH] IN BLOOD BY AUTOMATED COUNT: 14.2 % (ref 11.5–14.5)
EST. GFR  (AFRICAN AMERICAN): 58.8 ML/MIN/1.73 M^2
EST. GFR  (AFRICAN AMERICAN): >60 ML/MIN/1.73 M^2
EST. GFR  (AFRICAN AMERICAN): >60 ML/MIN/1.73 M^2
EST. GFR  (NON AFRICAN AMERICAN): 50.9 ML/MIN/1.73 M^2
EST. GFR  (NON AFRICAN AMERICAN): >60 ML/MIN/1.73 M^2
EST. GFR  (NON AFRICAN AMERICAN): >60 ML/MIN/1.73 M^2
GLUCOSE SERPL-MCNC: 123 MG/DL (ref 70–110)
GLUCOSE SERPL-MCNC: 187 MG/DL (ref 70–110)
GLUCOSE SERPL-MCNC: 187 MG/DL (ref 70–110)
HAV IGM SERPL QL IA: NEGATIVE
HBV CORE IGM SERPL QL IA: NEGATIVE
HBV SURFACE AG SERPL QL IA: NEGATIVE
HCO3 UR-SCNC: 22.3 MMOL/L (ref 24–28)
HCT VFR BLD AUTO: 51.4 % (ref 40–54)
HCV AB SERPL QL IA: NEGATIVE
HGB BLD-MCNC: 15.9 G/DL (ref 14–18)
HYPOCHROMIA BLD QL SMEAR: ABNORMAL
IMM GRANULOCYTES # BLD AUTO: 0.04 K/UL (ref 0–0.04)
IMM GRANULOCYTES NFR BLD AUTO: 0.5 % (ref 0–0.5)
LACTATE SERPL-SCNC: 1.5 MMOL/L (ref 0.5–2.2)
LYMPHOCYTES # BLD AUTO: 1.1 K/UL (ref 1–4.8)
LYMPHOCYTES NFR BLD: 14.1 % (ref 18–48)
MAGNESIUM SERPL-MCNC: 1.7 MG/DL (ref 1.6–2.6)
MCH RBC QN AUTO: 25.6 PG (ref 27–31)
MCHC RBC AUTO-ENTMCNC: 30.9 G/DL (ref 32–36)
MCV RBC AUTO: 83 FL (ref 82–98)
MONOCYTES # BLD AUTO: 0.6 K/UL (ref 0.3–1)
MONOCYTES NFR BLD: 7.6 % (ref 4–15)
NEUTROPHILS # BLD AUTO: 6.3 K/UL (ref 1.8–7.7)
NEUTROPHILS NFR BLD: 77.3 % (ref 38–73)
NRBC BLD-RTO: 0 /100 WBC
OVALOCYTES BLD QL SMEAR: ABNORMAL
PCO2 BLDA: 37.2 MMHG (ref 35–45)
PH SMN: 7.38 [PH] (ref 7.35–7.45)
PHOSPHATE SERPL-MCNC: 2.5 MG/DL (ref 2.7–4.5)
PLATELET # BLD AUTO: 132 K/UL (ref 150–350)
PMV BLD AUTO: 11.6 FL (ref 9.2–12.9)
PO2 BLDA: 22 MMHG (ref 40–60)
POC BE: -3 MMOL/L
POC SATURATED O2: 38 % (ref 95–100)
POC TCO2: 23 MMOL/L (ref 24–29)
POCT GLUCOSE: 111 MG/DL (ref 70–110)
POCT GLUCOSE: 120 MG/DL (ref 70–110)
POCT GLUCOSE: 156 MG/DL (ref 70–110)
POIKILOCYTOSIS BLD QL SMEAR: SLIGHT
POLYCHROMASIA BLD QL SMEAR: ABNORMAL
POTASSIUM SERPL-SCNC: 4.2 MMOL/L (ref 3.5–5.1)
POTASSIUM SERPL-SCNC: 4.2 MMOL/L (ref 3.5–5.1)
POTASSIUM SERPL-SCNC: 4.7 MMOL/L (ref 3.5–5.1)
PROT SERPL-MCNC: 6 G/DL (ref 6–8.4)
PROT SERPL-MCNC: 6 G/DL (ref 6–8.4)
RBC # BLD AUTO: 6.2 M/UL (ref 4.6–6.2)
SAMPLE: ABNORMAL
SITE: ABNORMAL
SODIUM SERPL-SCNC: 143 MMOL/L (ref 136–145)
SODIUM SERPL-SCNC: 143 MMOL/L (ref 136–145)
SODIUM SERPL-SCNC: 145 MMOL/L (ref 136–145)
VANCOMYCIN SERPL-MCNC: 8.6 UG/ML
WBC # BLD AUTO: 8.11 K/UL (ref 3.9–12.7)

## 2019-08-28 PROCEDURE — 63600175 PHARM REV CODE 636 W HCPCS: Performed by: STUDENT IN AN ORGANIZED HEALTH CARE EDUCATION/TRAINING PROGRAM

## 2019-08-28 PROCEDURE — 80202 ASSAY OF VANCOMYCIN: CPT

## 2019-08-28 PROCEDURE — 80053 COMPREHEN METABOLIC PANEL: CPT

## 2019-08-28 PROCEDURE — 85025 COMPLETE CBC W/AUTO DIFF WBC: CPT

## 2019-08-28 PROCEDURE — 94761 N-INVAS EAR/PLS OXIMETRY MLT: CPT

## 2019-08-28 PROCEDURE — 63600175 PHARM REV CODE 636 W HCPCS: Performed by: INTERNAL MEDICINE

## 2019-08-28 PROCEDURE — 84100 ASSAY OF PHOSPHORUS: CPT

## 2019-08-28 PROCEDURE — 25000003 PHARM REV CODE 250: Performed by: STUDENT IN AN ORGANIZED HEALTH CARE EDUCATION/TRAINING PROGRAM

## 2019-08-28 PROCEDURE — 95813 PR EEG, EXTENDED, 61-119 MINS: ICD-10-PCS | Mod: 26,,, | Performed by: PSYCHIATRY & NEUROLOGY

## 2019-08-28 PROCEDURE — 82550 ASSAY OF CK (CPK): CPT

## 2019-08-28 PROCEDURE — 99223 PR INITIAL HOSPITAL CARE,LEVL III: ICD-10-PCS | Mod: ,,, | Performed by: INTERNAL MEDICINE

## 2019-08-28 PROCEDURE — 95813 EEG EXTND MNTR 61-119 MIN: CPT | Mod: 26,,, | Performed by: PSYCHIATRY & NEUROLOGY

## 2019-08-28 PROCEDURE — 95813 EEG EXTND MNTR 61-119 MIN: CPT

## 2019-08-28 PROCEDURE — 83605 ASSAY OF LACTIC ACID: CPT

## 2019-08-28 PROCEDURE — 99223 1ST HOSP IP/OBS HIGH 75: CPT | Mod: ,,, | Performed by: INTERNAL MEDICINE

## 2019-08-28 PROCEDURE — 82803 BLOOD GASES ANY COMBINATION: CPT

## 2019-08-28 PROCEDURE — 25000003 PHARM REV CODE 250

## 2019-08-28 PROCEDURE — 83735 ASSAY OF MAGNESIUM: CPT

## 2019-08-28 PROCEDURE — 92610 EVALUATE SWALLOWING FUNCTION: CPT

## 2019-08-28 PROCEDURE — 99900035 HC TECH TIME PER 15 MIN (STAT)

## 2019-08-28 PROCEDURE — 27000221 HC OXYGEN, UP TO 24 HOURS

## 2019-08-28 PROCEDURE — 80048 BASIC METABOLIC PNL TOTAL CA: CPT

## 2019-08-28 PROCEDURE — 11000001 HC ACUTE MED/SURG PRIVATE ROOM

## 2019-08-28 RX ORDER — DEXTROSE MONOHYDRATE 50 MG/ML
INJECTION, SOLUTION INTRAVENOUS CONTINUOUS
Status: ACTIVE | OUTPATIENT
Start: 2019-08-28 | End: 2019-08-29

## 2019-08-28 RX ORDER — NOREPINEPHRINE BITARTRATE/D5W 4MG/250ML
PLASTIC BAG, INJECTION (ML) INTRAVENOUS
Status: COMPLETED
Start: 2019-08-28 | End: 2019-08-28

## 2019-08-28 RX ORDER — ACETAMINOPHEN 650 MG/20.3ML
650 LIQUID ORAL EVERY 6 HOURS PRN
Status: DISCONTINUED | OUTPATIENT
Start: 2019-08-28 | End: 2019-09-03

## 2019-08-28 RX ORDER — ACETAMINOPHEN 650 MG/1
650 SUPPOSITORY RECTAL EVERY 6 HOURS PRN
Status: DISCONTINUED | OUTPATIENT
Start: 2019-08-28 | End: 2019-08-28

## 2019-08-28 RX ORDER — VANCOMYCIN HCL IN 5 % DEXTROSE 1G/250ML
1000 PLASTIC BAG, INJECTION (ML) INTRAVENOUS
Status: DISCONTINUED | OUTPATIENT
Start: 2019-08-29 | End: 2019-08-28

## 2019-08-28 RX ORDER — ACETAMINOPHEN 650 MG/1
SUPPOSITORY RECTAL
Status: COMPLETED
Start: 2019-08-28 | End: 2019-08-28

## 2019-08-28 RX ORDER — HYDRALAZINE HYDROCHLORIDE 20 MG/ML
10 INJECTION INTRAMUSCULAR; INTRAVENOUS EVERY 6 HOURS PRN
Status: DISCONTINUED | OUTPATIENT
Start: 2019-08-28 | End: 2019-08-29

## 2019-08-28 RX ADMIN — SODIUM CHLORIDE, SODIUM LACTATE, POTASSIUM CHLORIDE, AND CALCIUM CHLORIDE: .6; .31; .03; .02 INJECTION, SOLUTION INTRAVENOUS at 05:08

## 2019-08-28 RX ADMIN — ACETAMINOPHEN 650 MG: 650 SOLUTION ORAL at 06:08

## 2019-08-28 RX ADMIN — HEPARIN SODIUM 5000 UNITS: 5000 INJECTION INTRAVENOUS; SUBCUTANEOUS at 06:08

## 2019-08-28 RX ADMIN — PIPERACILLIN AND TAZOBACTAM 4.5 G: 4; .5 INJECTION, POWDER, LYOPHILIZED, FOR SOLUTION INTRAVENOUS; PARENTERAL at 11:08

## 2019-08-28 RX ADMIN — SODIUM CHLORIDE, SODIUM LACTATE, POTASSIUM CHLORIDE, AND CALCIUM CHLORIDE: .6; .31; .03; .02 INJECTION, SOLUTION INTRAVENOUS at 12:08

## 2019-08-28 RX ADMIN — LEVETIRACETAM 750 MG: 100 INJECTION, SOLUTION, CONCENTRATE INTRAVENOUS at 10:08

## 2019-08-28 RX ADMIN — ACETAMINOPHEN 650 MG: 650 SUPPOSITORY RECTAL at 05:08

## 2019-08-28 RX ADMIN — PIPERACILLIN AND TAZOBACTAM 4.5 G: 4; .5 INJECTION, POWDER, LYOPHILIZED, FOR SOLUTION INTRAVENOUS; PARENTERAL at 08:08

## 2019-08-28 RX ADMIN — LEVETIRACETAM 750 MG: 100 INJECTION, SOLUTION, CONCENTRATE INTRAVENOUS at 09:08

## 2019-08-28 RX ADMIN — DEXTROSE: 5 SOLUTION INTRAVENOUS at 09:08

## 2019-08-28 RX ADMIN — PIPERACILLIN AND TAZOBACTAM 4.5 G: 4; .5 INJECTION, POWDER, LYOPHILIZED, FOR SOLUTION INTRAVENOUS; PARENTERAL at 03:08

## 2019-08-28 RX ADMIN — VANCOMYCIN HYDROCHLORIDE 1000 MG: 1 INJECTION, POWDER, LYOPHILIZED, FOR SOLUTION INTRAVENOUS at 06:08

## 2019-08-28 RX ADMIN — HEPARIN SODIUM 5000 UNITS: 5000 INJECTION INTRAVENOUS; SUBCUTANEOUS at 09:08

## 2019-08-28 RX ADMIN — HEPARIN SODIUM 5000 UNITS: 5000 INJECTION INTRAVENOUS; SUBCUTANEOUS at 02:08

## 2019-08-28 NOTE — SUBJECTIVE & OBJECTIVE
Past Medical History:   Diagnosis Date    CHI (closed head injury)     Dementia     Diabetes mellitus     Hyperlipidemia     Seizures        History reviewed. No pertinent surgical history.    Review of patient's allergies indicates:  No Known Allergies    No current facility-administered medications on file prior to encounter.      Current Outpatient Medications on File Prior to Encounter   Medication Sig    aspirin (ECOTRIN) 81 MG EC tablet Take 81 mg by mouth once daily.    levETIRAcetam (KEPPRA) 500 MG Tab Take 1,500 mg by mouth 2 (two) times daily.    losartan (COZAAR) 25 MG tablet Take 25 mg by mouth once daily.    metFORMIN (GLUCOPHAGE-XR) 500 MG 24 hr tablet Take 500 mg by mouth 2 (two) times daily with meals.     Family History     None        Tobacco Use    Smoking status: Never Smoker    Smokeless tobacco: Never Used   Substance and Sexual Activity    Alcohol use: No     Frequency: Never    Drug use: No    Sexual activity: Never     Review of Systems   Constitutional: Negative for chills and fever.   Eyes: Negative for photophobia and visual disturbance.   Respiratory: Negative for cough, chest tightness and shortness of breath.    Cardiovascular: Negative for chest pain and palpitations.   Gastrointestinal: Negative for abdominal pain, constipation and diarrhea.   Genitourinary: Negative for flank pain and hematuria.   Musculoskeletal: Negative for back pain, neck pain and neck stiffness.   Neurological: Negative for seizures, light-headedness and headaches.     Objective:     Vital Signs (Most Recent):  Temp: (!) 100.8 °F (38.2 °C) (08/27/19 2150)  Pulse: 100 (08/27/19 2212)  Resp: (!) 26 (08/27/19 2212)  BP: 111/65 (08/27/19 2131)  SpO2: 95 % (08/27/19 2212) Vital Signs (24h Range):  Temp:  [97.7 °F (36.5 °C)-103.4 °F (39.7 °C)] 100.8 °F (38.2 °C)  Pulse:  [] 100  Resp:  [18-40] 26  SpO2:  [88 %-100 %] 95 %  BP: ()/() 111/65     Weight: 68 kg (150 lb)  Body mass index is  22.81 kg/m².    Physical Exam   Constitutional: He appears cachectic. He appears ill.   HENT:   Head: Normocephalic and atraumatic.   Neck: Full passive range of motion without pain. No muscular tenderness present. No erythema present.   Cardiovascular: Normal rate and regular rhythm.   Pulses:       Radial pulses are 1+ on the right side, and 1+ on the left side.   Pulmonary/Chest: Tachypnea noted. He is in respiratory distress (appears mildly distressed although he denies dyspnea). He has no wheezes. He has rhonchi in the right upper field and the left upper field.   Abdominal: Soft. Bowel sounds are normal. He exhibits no distension. There is no tenderness. There is no rigidity, no rebound, no tenderness at McBurney's point and negative Man's sign.   Musculoskeletal: He exhibits edema. He exhibits no tenderness.   Neurological: He is disoriented.   Patient sleeping but arousable. Strength 1-2/5 in upper and lower extremities bilaterally. Unable to assess cranial nerves because patient was not following commands.    Skin: Skin is warm. No rash noted. He is not diaphoretic. No cyanosis or erythema.           Significant Labs:   CBC:   Recent Labs   Lab 08/27/19  1145 08/27/19  1755   WBC 10.58 3.26*   HGB 15.0 15.1   HCT 48.9 49.5    186     Lactic Acid:   Recent Labs   Lab 08/27/19  1145 08/27/19  1755   LACTATE 1.6 2.1     Urine Studies:   Recent Labs   Lab 08/27/19  1515   COLORU Yellow   APPEARANCEUA Cloudy*   PHUR 5.0   SPECGRAV 1.020   PROTEINUA 1+*   GLUCUA Negative   KETONESU 1+*   BILIRUBINUA Negative   OCCULTUA 3+*   NITRITE Negative   LEUKOCYTESUR 2+*   RBCUA 19*   WBCUA 22*   BACTERIA Many*   HYALINECASTS 0       Significant Imaging: I have reviewed all pertinent imaging results/findings within the past 24 hours.

## 2019-08-28 NOTE — PROGRESS NOTES
Pharmacokinetic Assessment Follow Up: IV Vancomycin    Vancomycin serum concentration assessment(s):    Vancomycin random level resulted at 8.6 mcg/mL approximately 12 hours after the loading dose. Pharmacy reconsulted for bacteremia.    Vancomycin Regimen Plan:    Restart vancomycin 1250 mg IV every 24 hours with next serum trough concentration measured with morning labs on 8/30.    Drug levels (last 3 results):  Recent Labs   Lab Result Units 08/28/19  0606   Vancomycin, Random ug/mL 8.6       Pharmacy will continue to follow and monitor vancomycin.    Please contact pharmacy at extension [phone] for questions regarding this assessment.    Thank you for the consult,   Jennifer Benjamin, PharmD, BCCCP             Patient brief summary:  Edwin Lopez is a 69 y.o. male initiated on antimicrobial therapy with IV vancomycin for treatment of bacteremia    Drug Allergies:   Review of patient's allergies indicates:  No Known Allergies    Actual Body Weight:   68 kg     Renal Function:   Estimated Creatinine Clearance: 55.9 mL/min (based on SCr of 1.2 mg/dL).    CBC (last 72 hours):  Recent Labs   Lab Result Units 08/27/19  1145 08/27/19  1755 08/27/19  2155 08/28/19  0509   WBC K/uL 10.58 3.26*  --  8.11   Hemoglobin g/dL 15.0 15.1  --  15.9   Hemoglobin A1C %  --   --  6.4*  --    Hematocrit % 48.9 49.5  --  51.4   Platelets K/uL 219 186  --  132*   Gran% % 82.2* 78.6*  --  77.3*   Lymph% % 9.5* 15.6*  --  14.1*   Mono% % 7.6 5.5  --  7.6   Eosinophil% % 0.0 0.0  --  0.0   Basophil% % 0.2 0.0  --  0.5   Differential Method  Automated Automated  --  Automated       Metabolic Panel (last 72 hours):  Recent Labs   Lab Result Units 08/27/19  1145 08/27/19  1515 08/27/19  1755 08/28/19  0140 08/28/19  0815   Sodium mmol/L 145  --  145 145 143  143   Potassium mmol/L 4.1  --  4.1 4.7 4.2  4.2   Chloride mmol/L 109  --  113* 117* 113*  113*   CO2 mmol/L 21*  --  16* 14* 21*  21*   Glucose mg/dL 121*  --  128* 123* 187*  187*    Glucose, UA   --  Negative  --   --   --    BUN, Bld mg/dL 27*  --  25* 23 21  21   Creatinine mg/dL 1.6*  --  1.4 1.4 1.2  1.2   Creatinine, Random Ur mg/dL  --  189.0  --   --   --    Albumin g/dL 4.1  --   --   --  2.7*  2.7*   Total Bilirubin mg/dL 1.4*  --   --   --  1.7*  1.7*   Alkaline Phosphatase U/L 83  --   --   --  57  57   AST U/L 275*  --   --   --  172*  172*   ALT U/L 82*  --   --   --  62*  62*   Magnesium mg/dL 2.0  --   --   --  1.7   Phosphorus mg/dL 3.8  --   --   --  2.5*       Vancomycin Administrations:  vancomycin given in the last 96 hours                   vancomycin in dextrose 5 % 1 gram/250 mL IVPB 1,000 mg (mg) 1,000 mg New Bag 08/28/19 0637    vancomycin 1.25 g in dextrose 5% 250 mL IVPB (ready to mix) (mg) 1,250 mg New Bag 08/27/19 1840                Microbiologic Results:  Microbiology Results (last 7 days)     Procedure Component Value Units Date/Time    Blood culture [[id number]] Collected:  08/27/19 2017    Order Status:  Completed Specimen:  Blood from Peripheral, Hand, Right Updated:  08/28/19 0939     Blood Culture, Routine Gram stain aer bottle: Gram positive cocci in chains resembling Strep       Gram stain aer bottle: Gram positive cocci in clusters resembling Staph       Gram stain isael bottle: Gram positive cocci in chains resembling Strep       Results called to and read back by:Joanne Rogel RN  08/28/2019  09:38    Blood culture [[id number]] Collected:  08/27/19 1756    Order Status:  Completed Specimen:  Blood from Peripheral, Forearm, Left Updated:  08/28/19 0515     Blood Culture, Routine No Growth to date    Urine culture [[id number]] Collected:  08/27/19 1515    Order Status:  No result Specimen:  Urine Updated:  08/27/19 1642

## 2019-08-28 NOTE — ASSESSMENT & PLAN NOTE
Since admission, patient has been sleepy but arousable. Niece and sister present at bedside and state that this is not his baseline status. According to them as of last week he was functional and oriented but has acutely declined. Unclear etiology of ams, especially with unclear history of patient's dementia, which has been noted on hospital records but family members have no knowledge of it. Will monitor closely.   Plan:  -neuro checks q4hrs

## 2019-08-28 NOTE — ASSESSMENT & PLAN NOTE
On admission the patient was afebrile and tachypneic. Workup for LISHA and rhabdo was initiated. Additionally, CXR was obtained given that he was tachypneic; it revealed no acute processes or findings suggestive of pneumonia. He was hypertensive initially but improved after administration of home losartan dose. Several hours after arrival, oral temp was normal but a rectal temperature of 103 was obtained. Patient pancultured. U/a with leuk +2, wbc 22, and many bacteria. Patient started on vancomycin and pip-tazo for empiric coverage. Urine and blood cultures ordered and pending. Patient remains tachypneic (RR ranging between 25-34). Will monitor closely for improvement of fever and tachypnea; can consider consulting MICU if he becomes hemodynamically unstable.   Plan:  -follow-up blood and urine cultures  -continue broad spectrum antibiotics and tailor once cultures result or speciate  -MICU consult if the patient becomes hemodynamically unstable

## 2019-08-28 NOTE — PLAN OF CARE
Harini Montenegro MD  [address] / [place] [id number]    [id number] - [place], [place] - [address]  [address]  [place] [address]  Phone: [phone] Fax: [phone]    Payor: [employer] MEDICARE / Plan: [employer] MEDICARE HMO / Product Type: Capitation /     Extended Emergency Contact Information  Primary Emergency Contact: Mariann Valentino   [facility] Phone: [phone]  Relation: Other    No future appointments.       08/28/19 1557   Discharge Assessment   Assessment Type Discharge Planning Assessment   Confirmed/corrected address and phone number on facesheet? Yes   Assessment information obtained from? Caregiver;Medical Record   Communicated expected length of stay with patient/caregiver no   Prior to hospitilization cognitive status: Alert/Oriented   Prior to hospitalization functional status: Needs Assistance   Current cognitive status: Alert/Oriented  (mild confusion noted on assessment of patient )   Current Functional Status: Needs Assistance   Facility Arrived From: ED via EMS   Lives With alone   Able to Return to Prior Arrangements other (see comments)  (Needs TBD)   Is patient able to care for self after discharge? No   Who are your caregiver(s) and their phone number(s)? Harriet (sister) [phone], Garrick (niece) [phone],  Mariann Valentino (/) [phone]   Patient's perception of discharge disposition home or selfcare   Readmission Within the Last 30 Days no previous admission in last 30 days   Patient currently being followed by outpatient case management? No   Patient currently receives any other outside agency services? No   Equipment Currently Used at Home none   Do you have any problems affording any of your prescribed medications? No   Is the patient taking medications as prescribed? yes   Does the patient have transportation home? Yes   Transportation Anticipated family or friend will provide   Does the patient receive  services at the [facility]? No   Discharge Plan A Skilled Nursing Facility   Discharge Plan B Home Health;Home with family   DME Needed Upon Discharge  other (see comments)  (TBD)   Patient/Family in Agreement with Plan yes   Does the patient have transportation to healthcare appointments? Yes     CM met with patient at the bedside to discuss D/C POC needs. Patient is mildly confused and a poor historian. CM noted that the patient has a past medical history of CVA. CM placed call to patient's sister Harriet who states that the patient demographic information is correct and that the patient does not have Home Health services or DME in the home setting.  Per patient's chart, patient has been followed by Mariann Valentino ([facility]) who helped the patient with housing (current apartment) as the patient was homeless prior to her involvement. CM placed call to Mariann and awaiting call back for further information. CM remains available for any further needs or concerns.     Griselda Hunter RN, [facility]  Case Management-Critical Care     [phone]

## 2019-08-28 NOTE — ASSESSMENT & PLAN NOTE
- febrile, tachycardic, and WBC 3.2   - UA: 2+ leukocytes and 22 WBC   - CXR unremarkable   - received 2.5 L bolus in ED  - continue fluids at 200/hr for rhabdo   - f/u blood and urine cx, likely urosepsis    - continue Vancomycin and Zosyn

## 2019-08-28 NOTE — PLAN OF CARE
Problem: Adult Inpatient Plan of Care  Goal: Plan of Care Review  Outcome: Ongoing (interventions implemented as appropriate)  A: Awake    RASS: Goal - 0  alert and calm Actual - -1  awakes to voice (eye opening/contact) > 10 seconds   Restraint necessity: no  B: Breath   SBT: Pass  C: Coordinate A & B, analgesics/sedatives   Pain: managed   SAT: Not intubated  D: Delirium   CAM-ICU: positive  E: Early Mobility   MOVE Screen: Fail   Activity order: bedrest  FAS: Feeding/Nutrition   Diet order: NPO Fluid restriction: None  T: Thrombus   DVT prophylaxis: pharmacologic  H: HOB Elevation   30 degrees: Yes   U: Ulcer Prophylaxis   GI: yes  G: Glucose control   managed  S: Skin   Bundle compliance: yes  B: Bowel Function   no issues  I: Indwelling Catheters   Bess necessity: Yes   CVC necessity: No   IPAD offered: Not appropriate  D: De-escalation Antibx   No  Plan for the day   Seizure precaution; Neurocheck; Maintain normal temperature  Family/Goals of care/Code Status   Code Status: Full Code   GOC: Keep hemodynamically stable    Admitted pt in CMICU at 0315am; Pt is lethargic but able to follow commands; he is disorientedx4; MAURO; Pt is continuously febrile; cooling measures rendered; Tylenol suppository given; latest temperature is 99.5*F; HR 60-90s,NSR; SBP130-160; no complaints of pain; no bowel movement within the shift; UO of 30-50cc/hr via bess catheter; POC discussed with Mr. Edwin Lopez; no relatives available at bedside; will continue to monitor.

## 2019-08-28 NOTE — ASSESSMENT & PLAN NOTE
- hx of seizures on Keppra  - bolus Keppra and check level   - patient likely had seizure at home and may be post-ictal

## 2019-08-28 NOTE — ASSESSMENT & PLAN NOTE
Edwin Lopez is a 69-year-old male with a history of stroke in 2012, seizure disorder, DM, dementia, HTN presents to the ED after being found down and who is now altered.   - hx of seizures on Keppra, bolus Keppra and check level   - CT head unremarkable  - urine tox negative   - AMS could be multifactoral due to sepsis, dementia and sundowning, and/or post-ictal state   - Q4H neuro checks  - consider MRI brain if still altered

## 2019-08-28 NOTE — HPI
Mr. Waters is a 69 year-old male with type 2 DM, seizure disorder, dementia, and a history of CVA in 2012 who was brought in from his home after he was found on the floor two days after he fell. The patient recalls that he went to turn a bathtub faucet on when he fell to the ground and could not get back up. Denies headaches, lightheadedness, chest pain, palpitations, or tremors prior to the fall. Was asked about LOC prior to and following the fall but he was unable to express whether he experienced it or not. Currently denies headaches, shortness of breath, chest pain, palpitations, abdominal pain, or any discomfort.     He was unable to provide much history. His niece was present during the evaluation and was asked regarding his medical conditions, medication use, and family history but she was unsure of these. The last time he was seen in person by his family members was on Friday when, according to them, he was functional and oriented. His niece spoke with him over the phone on Saturday afternoon but did not hear from him since them despite calling several times. She did mention that he lives by himself and is functional, stating that this is not near his baseline. He lives by himself and an aide visits him regularly. He does not drive but is an avid walker and gets around by foot. According to her, he does not consume alcoholic beverages and has never smoked.

## 2019-08-28 NOTE — HPI
Edwin Lopez is a 69-year-old male with a history of stroke in 2012, seizure disorder, DM, dementia, HTN presents to the ED for evaluation of weakness. Per EMS, patient was found in the bathtub.  Per ED, patient states that he fell about 3 days ago and has been unable to get up since then due to lack of strength.  History from the patient is limited due to his acute condition and history of dementia.  Patient complains of right-sided shoulder pain which he believes he may have injured in the fall.  Denies chest pain, shortness of breath.  Patient currently nauseated.  Denies neck pain.    Critical Care Medicine was consulted for change in mental status and drop in blood pressure. Unable to attain history from patient due to altered mental status.

## 2019-08-28 NOTE — ED NOTES
Pt loosely follows commands, pt will squeeze finger with LUE and wiggle LLE toes when asked to do so, will not follow finger with eyes. Pt does not attempt to squeeze finger with RUE, did not move toes on RLE, wiggle LLE toes but will not push against RN hands when told to do so. Pt appear more lethargic, alert to voice. Respirations 36 rate, breathing through mouth, abdominal muscle use. Pt trending to hypotensive BP. Dr. Garcia at bedside for assessment. Dr. Martin with [facility] at bedside assessing pt. Dr. Garcia made aware of pt's BP trending down and pt becoming more lethargic and pursed lip breathing. Plan of care to include critical care consult.

## 2019-08-28 NOTE — RESIDENT HANDOFF
ICU Transfer of Care Note  Critical Care Medicine    Admit Date: 2019  LOS: 1    CC: Altered mental status    Code Status: Full Code     HPI and Hospital Course:     HPI:  Edwin Lopez is a 69-year-old male with a history of stroke in , seizure disorder, DM, dementia, HTN presents to the ED for evaluation of weakness. Per EMS, patient was found in the bathtub.   Per ED, patient states that he fell about 3 days ago and has been unable to get up since then due to lack of strength.  History from the patient is limited due to his acute condition and history of dementia.  Patient complains of right-sided shoulder pain which he believes he may have injured in the fall.  Denies chest pain, shortness of breath.  Patient currently nauseated.  Denies neck pain.    Critical Care Medicine was consulted for change in mental status and drop in blood pressure. Unable to attain history from patient due to altered mental status.     Hospital/ICU Course:  No notes on file      To Follow Up:       Seizures: continue home Keppra  Sepsis: source UTI; follow up urine cultures  Rhabdo: continue to trend CPK 18,000 on admit, continue D5 until  and reevaluate  LISHA: sCr improving continue to trend  HTN: holding home losartan, restart when appropriate      Discharge Plan:     Home     Call with questions.

## 2019-08-28 NOTE — ASSESSMENT & PLAN NOTE
Unclear status of patient's seizure disorder. However, he has presented several times in 2018 and 2019 at [facility] following seizure activity which is described as shaking and muscle spasms. The patient's niece provided his home medications; he is currently on keppra 500mg 3 tablets BID.   Plan:  -continue home dose of keppra

## 2019-08-28 NOTE — H&P
[facility] Hospital Medicine  History & Physical    Patient Name: Edwin Lopez  MRN: [id number]  Admission Date: 8/27/2019  Attending Physician: Dre Engel MD   Primary Care Provider: Harini Montenegro MD    [facility] Medicine Team: [facility] HOSP MED 4 Melissa Sim MD     Patient information was obtained from patient, relative(s), past medical records and ER records.     Subjective:     Principal Problem:Altered mental status    Chief Complaint:   Chief Complaint   Patient presents with    Weakness     Pt brought in by EMS-found in bathtub-pt reports falling in tub and has been unable to get up x 2 days.  Pt c/o pain to his right side.         HPI: Mr. Waters is a 69 year-old male with type 2 DM, seizure disorder, dementia, and a history of CVA in 2012 who was brought in from his home after he was found on the floor two days after he fell. The patient recalls that he went to turn a bathtub faucet on when he fell to the ground and could not get back up. Denies headaches, lightheadedness, chest pain, palpitations, or tremors prior to the fall. Was asked about LOC prior to and following the fall but he was unable to express whether he experienced it or not. Currently denies headaches, shortness of breath, chest pain, palpitations, abdominal pain, or any discomfort.     The patient was unable to provide much history. His niece was present during the evaluation and was asked regarding his medical conditions, medication use, and family history but she was unsure of these. The last time he was seen in person by his family members was on Friday when, according to them, he was functional and oriented. His niece spoke with him over the phone on Saturday afternoon but did not hear from him since them despite calling several times. She did mention that he lives by himself and is functional, stating that this is not near his baseline. He lives by himself and an aide visits him regularly. He does not  drive but is an avid walker and gets around by foot. According to her, he does not consume alcoholic beverages and has never smoked.     Past Medical History:   Diagnosis Date    CHI (closed head injury)     Dementia     Diabetes mellitus     Hyperlipidemia     Seizures        History reviewed. No pertinent surgical history.    Review of patient's allergies indicates:  No Known Allergies    No current facility-administered medications on file prior to encounter.      Current Outpatient Medications on File Prior to Encounter   Medication Sig    aspirin (ECOTRIN) 81 MG EC tablet Take 81 mg by mouth once daily.    levETIRAcetam (KEPPRA) 500 MG Tab Take 1,500 mg by mouth 2 (two) times daily.    losartan (COZAAR) 25 MG tablet Take 25 mg by mouth once daily.    metFORMIN (GLUCOPHAGE-XR) 500 MG 24 hr tablet Take 500 mg by mouth 2 (two) times daily with meals.     Family History     None        Tobacco Use    Smoking status: Never Smoker    Smokeless tobacco: Never Used   Substance and Sexual Activity    Alcohol use: No     Frequency: Never    Drug use: No    Sexual activity: Never     Review of Systems   Constitutional: Negative for chills and fever.   Eyes: Negative for photophobia and visual disturbance.   Respiratory: Negative for cough, chest tightness and shortness of breath.    Cardiovascular: Negative for chest pain and palpitations.   Gastrointestinal: Negative for abdominal pain, constipation and diarrhea.   Genitourinary: Negative for flank pain and hematuria.   Musculoskeletal: Negative for back pain, neck pain and neck stiffness.   Neurological: Negative for seizures, light-headedness and headaches.     Objective:     Vital Signs (Most Recent):  Temp: (!) 100.8 °F (38.2 °C) (08/27/19 2150)  Pulse: 100 (08/27/19 2212)  Resp: (!) 26 (08/27/19 2212)  BP: 111/65 (08/27/19 2131)  SpO2: 95 % (08/27/19 2212) Vital Signs (24h Range):  Temp:  [97.7 °F (36.5 °C)-103.4 °F (39.7 °C)] 100.8 °F (38.2  °C)  Pulse:  [] 100  Resp:  [18-40] 26  SpO2:  [88 %-100 %] 95 %  BP: ()/() 111/65     Weight: 68 kg (150 lb)  Body mass index is 22.81 kg/m².    Physical Exam   Constitutional: He appears cachectic. He appears ill.   HENT:   Head: Normocephalic and atraumatic.   Neck: Full passive range of motion without pain. No muscular tenderness present. No erythema present.   Cardiovascular: Normal rate and regular rhythm.   Pulses:       Radial pulses are 1+ on the right side, and 1+ on the left side.   Pulmonary/Chest: Tachypnea noted. He is in respiratory distress (appears mildly distressed although he denies dyspnea). He has no wheezes. He has rhonchi in the right upper field and the left upper field.   Abdominal: Soft. Bowel sounds are normal. He exhibits no distension. There is no tenderness. There is no rigidity, no rebound, no tenderness at McBurney's point and negative Man's sign.   Musculoskeletal: He exhibits edema. He exhibits no tenderness.   Neurological: He is disoriented.   Patient sleeping but arousable. Strength 1-2/5 in upper and lower extremities bilaterally. Unable to assess cranial nerves because patient was not following commands.    Skin: Skin is warm. No rash noted. He is not diaphoretic. No cyanosis or erythema.           Significant Labs:   CBC:   Recent Labs   Lab 08/27/19  1145 08/27/19  1755   WBC 10.58 3.26*   HGB 15.0 15.1   HCT 48.9 49.5    186     Lactic Acid:   Recent Labs   Lab 08/27/19  1145 08/27/19  1755   LACTATE 1.6 2.1     Urine Studies:   Recent Labs   Lab 08/27/19  1515   COLORU Yellow   APPEARANCEUA Cloudy*   PHUR 5.0   SPECGRAV 1.020   PROTEINUA 1+*   GLUCUA Negative   KETONESU 1+*   BILIRUBINUA Negative   OCCULTUA 3+*   NITRITE Negative   LEUKOCYTESUR 2+*   RBCUA 19*   WBCUA 22*   BACTERIA Many*   HYALINECASTS 0       Significant Imaging: I have reviewed all pertinent imaging results/findings within the past 24 hours.    Assessment/Plan:      Altered  mental status  Since admission, patient has been sleepy but arousable. Niece and sister present at bedside and state that this is not his baseline status. According to them as of last week he was functional and oriented but has acutely declined. Unclear etiology of ams, especially with unclear history of patient's dementia, which has been noted on hospital records but family members have no knowledge of it. Will monitor closely.   Plan:  -neuro checks q4hrs    History of CVA in adulthood  Patient and family members unclear of event, but medical records mention hx of CVA in 2012. At the time he was started on DAPT with aspirin and clopidogrel. However, clopidogrel has been discontinued since.  Plan:  -continue home aspirin    Status post fall  Patient fell around two days ago while attempting to turn a bathtub faucet on. Unclear whether he lost consciousness or not. CT head was performed on arrival to ED. It revealed several findings of suspected old infarcts, generalized cerebral volume loss, and supratentorial white matter chronic small vessel ischemic change, as well as no acute large vascular territory infarct or intracranial hemorrhage identified (but noted that further evaluation/follow-up as warranted).     Additionally a shoulder xray was performed and it revealed intact osseous structures w/o evidence of fracture nor GH joint dislocation.     Severe sepsis  On admission the patient was afebrile and tachypneic. Workup for LISHA and rhabdo was initiated. Additionally, CXR was obtained given that he was tachypneic; it revealed no acute processes or findings suggestive of pneumonia. He was hypertensive initially but improved after administration of home losartan dose. Several hours after arrival, oral temp was normal but a rectal temperature of 103 was obtained. Patient pancultured. U/a with leuk +2, wbc 22, and many bacteria. Patient started on vancomycin and pip-tazo for empiric coverage. Urine and blood  "cultures ordered and pending. Patient remains tachypneic (RR ranging between 25-34). Will monitor closely for improvement of fever and tachypnea; can consider consulting MICU if he becomes hemodynamically unstable.   Plan:  -follow-up blood and urine cultures  -continue broad spectrum antibiotics and tailor once cultures result or speciate  -MICU consult if the patient becomes hemodynamically unstable     DM (diabetes mellitus)  Patient on home metformin 500mg daily. Uncertain whether he was compliant with medication and when was the last time he took it.     Hypertension  Continue home losartan 25mg daily    LISHA (acute kidney injury)  See "non-traumatic rhabdomyolysis"     Non-traumatic rhabdomyolysis  Patient admitted after being found two days after a non-traumatic fall at his home. Labs on admission significant for CPK 88471. LISHA with creatinine at 1.6 (changed from February where it was 0.8). K, Phos, and Mag were wnl. He was bolused with 2L of 0.9 NS at the ED.   Plan  -continue IVF administration at 150ccs/hr   -monitor BMP q8hrs    Seizure disorder  Unclear status of patient's seizure disorder. However, he has [place] in 2018 [place] [facility] [place] which is described as shaking and muscle spasms. The patient's niece provided his home medications; he is currently on keppra 500mg 3 tablets BID.   Plan:  -continue home dose of keppra         VTE Risk Mitigation (From admission, onward)        Ordered     heparin (porcine) injection 5,000 Units  Every 8 hours      08/27/19 2153     IP VTE LOW RISK PATIENT  Once      08/27/19 2153             Melissa Sim MD  Department of Hospital Medicine   [facility]  "

## 2019-08-28 NOTE — PT/OT/SLP PROGRESS
Occupational Therapy      Patient Name:  Edwin Lopez   MRN:  [id number]    Patient not seen today secondary to pt being prepped up to EEG upon OT arrival  . Will follow-up at a later time.    NOHEMI Barnard  8/28/2019

## 2019-08-28 NOTE — ED NOTES
Dr. Catherine with HOSP MED at bedside assessing pt. Plan of care to include administering IV tylenol and Dr. Catherine will reassess pt. Dr. Catherine made aware of tachypnea and change in mental status, pt more lethargic, follows commands with poor execution, and will not move RUE or BLE.

## 2019-08-28 NOTE — PROGRESS NOTES
Pharmacokinetic Initial Assessment: IV Vancomycin    Assessment/Plan:    Intravenous vancomycin 1250 mg was given in the ED.  Continue with a maintenance dose of vancomycin 1000 mg IV every 12 hours  Draw vancomycin trough level 30 min prior to fourth dose on 08/29 at approximately 0615 . A random was also ordered to further guide dosing.   To be drawn on 08/28 @ 0600  Desired empiric serum trough concentration is 15 to 20 mcg/mL    Pharmacy will continue to follow and monitor vancomycin.    Please contact pharmacy at extension [phone] with any questions regarding this assessment.     Thank you for the consult,   Holly Green       Patient brief summary:  Edwin Lopez is a 69 y.o. male initiated on antimicrobial therapy with IV Vancomycin for treatment of suspected urinary tract infection (urosepsis)    Drug Allergies:   Review of patient's allergies indicates:  No Known Allergies    Actual Body Weight:   68 kg    Renal Function:   Estimated Creatinine Clearance: 47.9 mL/min (based on SCr of 1.4 mg/dL).,       CBC (last 72 hours):  Recent Labs   Lab Result Units 08/27/19  1145 08/27/19  1755 08/27/19  2155   WBC K/uL 10.58 3.26*  --    Hemoglobin g/dL 15.0 15.1  --    Hemoglobin A1C %  --   --  6.4*   Hematocrit % 48.9 49.5  --    Platelets K/uL 219 186  --    Gran% % 82.2* 78.6*  --    Lymph% % 9.5* 15.6*  --    Mono% % 7.6 5.5  --    Eosinophil% % 0.0 0.0  --    Basophil% % 0.2 0.0  --    Differential Method  Automated Automated  --        Metabolic Panel (last 72 hours):  Recent Labs   Lab Result Units 08/27/19  1145 08/27/19  1515 08/27/19  1755   Sodium mmol/L 145  --  145   Potassium mmol/L 4.1  --  4.1   Chloride mmol/L 109  --  113*   CO2 mmol/L 21*  --  16*   Glucose mg/dL 121*  --  128*   Glucose, UA   --  Negative  --    BUN, Bld mg/dL 27*  --  25*   Creatinine mg/dL 1.6*  --  1.4   Creatinine, Random Ur mg/dL  --  189.0  --    Albumin g/dL 4.1  --   --    Total Bilirubin mg/dL 1.4*  --   --    Alkaline  Phosphatase U/L 83  --   --    AST U/L 275*  --   --    ALT U/L 82*  --   --    Magnesium mg/dL 2.0  --   --    Phosphorus mg/dL 3.8  --   --        Drug levels (last 3 results):  No results for input(s): VANCOMYCINRA, VANCOMYCINPE, VANCOMYCINTR in the last 72 hours.    Microbiologic Results:  Microbiology Results (last 7 days)     Procedure Component Value Units Date/Time    Blood culture [[id number]] Collected:  08/27/19 1756    Order Status:  Sent Specimen:  Blood from Peripheral, Forearm, Left Updated:  08/27/19 2033    Blood culture [[id number]] Collected:  08/27/19 2017    Order Status:  Sent Specimen:  Blood from Peripheral, Hand, Right Updated:  08/27/19 2032    Urine culture [[id number]] Collected:  08/27/19 1515    Order Status:  No result Specimen:  Urine Updated:  08/27/19 1642

## 2019-08-28 NOTE — PROCEDURES
Extended Routine EEG Report      Edwin Lopez  [id number]  1950    DATE OF SERVICE: 8/28/2019  REASON FOR CONSULT:  69-year-old man with previous stroke, dementia, history of seizures with an episode of decreased responsiveness.  Evaluate for evidence of epileptiform activity.    METHODOLOGY   Electroencephalographic (EEG) recording is with electrodes placed according to the International 10-20 placement system.  Thirty two (32) channels of digital signal (sampling rate of 512/sec) including T1 and T2 was simultaneously recorded from the scalp and may include  EKG, EMG, and/or eye monitors.  Recording band pass was 0.1 to 512 hz.  Digital video recording of the patient is simultaneously recorded with the EEG.  The patient is instructed report clinical symptoms which may occur during the recording session.  EEG and video recording is stored and archived in digital format. Activation procedures which include photic stimulation, hyperventilation and instructing patients to perform simple task are done in selected patients.    The EEG is displayed on a monitor screen and can be reviewed using different montages.  Computer assisted analysis is employed to detect spike and electrographic seizure activity.   The entire record is submitted for computer analysis.  The entire recording is visually reviewed and the times identified by computer analysis as being spikes or seizures are reviewed again.  Compresses spectral analysis (CSA) is also performed on the activity recorded from each individual channel.  This is displayed as a power display of frequencies from 0 to 30 Hz over time.   The CSA is reviewed looking for asymmetries in power between homologous areas of the scalp and then compared with the original EEG recording.     [employer] software is also utilized in the review of this study.  This software suite analyzes the EEG recording in multiple domains.  Coherence and rhythmicity is computed to identify EEG sections  which may contain organized seizures.  Each channel undergoes analysis to detect presence of spike and sharp waves which have special and morphological characteristic of epileptic activity.  The routine EEG recording is converted from spacial into frequency domain.  This is then displayed comparing homologous areas to identify areas of significant asymmetry.  Algorithm to identify non-cortically generated artifact is used to separate eye movement, EMG and other artifact from the EEG.      RECORDING TIMES  Start on 08/28/2019 at 11:28 a.m.  Stop on 08/28/2019 at 14:11 p.m.  A total of 2 hr and 34 min of EEG recording is obtained.    EEG FINDINGS  Background activity:   The waking background is predominantly low-voltage alpha/theta activity with a moderately well-formed 8.5 hz maximal posterior dominant rhythm. There are occasional episodes of diffuse muscle artifact obscuring the record.    Sleep:  The patient spends majority of the record in sleep with the appearance of sleep spindles, K complexes, and vertex waves.    Activation procedures:   Hyperventilation is not performed  Photic stimulation is not performed    Cardiac Monitor:   Heart rate occasionally appears irregular on a single lead EKG.    Impression:   The patient spends the majority of the recording in sleep, however the waking background is within normal limits.  There are no focal findings, no epileptiform discharges, and no electrographic seizures.  It is important to note that a normal EEG does not rule out seizures/epilepsy.  Clinical correlation is recommended.    Corrie Quinones MD PhD  Neurology-Epilepsy  [facility].  [facility]

## 2019-08-28 NOTE — CONSULTS
Patient seen and will be admitted to the MICU for closer monitoring         Kinjal Ma MD, MPH  Internal Medicine PGY3  MICU [id number]

## 2019-08-28 NOTE — SUBJECTIVE & OBJECTIVE
Past Medical History:   Diagnosis Date    CHI (closed head injury)     Dementia     Diabetes mellitus     Hyperlipidemia     Seizures        History reviewed. No pertinent surgical history.    Review of patient's allergies indicates:  No Known Allergies    Family History     None        Tobacco Use    Smoking status: Never Smoker    Smokeless tobacco: Never Used   Substance and Sexual Activity    Alcohol use: No     Frequency: Never    Drug use: No    Sexual activity: Never      Review of Systems   Unable to perform ROS: Mental status change     Objective:     Vital Signs (Most Recent):  Temp: (!) 101.9 °F (38.8 °C) (08/27/19 1953)  Pulse: 108 (08/27/19 2145)  Resp: (!) 31 (08/27/19 2145)  BP: 111/65 (08/27/19 2131)  SpO2: (!) 94 % (08/27/19 2145) Vital Signs (24h Range):  Temp:  [97.7 °F (36.5 °C)-103.4 °F (39.7 °C)] 101.9 °F (38.8 °C)  Pulse:  [] 108  Resp:  [18-40] 31  SpO2:  [88 %-100 %] 94 %  BP: ()/() 111/65   Weight: 68 kg (150 lb)  Body mass index is 22.81 kg/m².      Intake/Output Summary (Last 24 hours) at 8/27/2019 2159  Last data filed at 8/27/2019 2017  Gross per 24 hour   Intake 350 ml   Output 75 ml   Net 275 ml       Physical Exam   Constitutional: He appears listless. No distress.   HENT:   Head: Normocephalic and atraumatic.   Eyes: Pupils are equal, round, and reactive to light. Right eye exhibits no discharge. Left eye exhibits no discharge. No scleral icterus.   Neck: Neck supple. No thyromegaly present.   Cardiovascular: Regular rhythm and normal heart sounds. Tachycardia present.   Pulmonary/Chest: Effort normal and breath sounds normal. No respiratory distress.   Abdominal: Soft. Bowel sounds are normal. He exhibits no distension. There is no tenderness.   Musculoskeletal: He exhibits no edema.   Neurological: He appears listless. GCS eye subscore is 4. GCS verbal subscore is 1. GCS motor subscore is 4.   Withdraws to pain, squeezes left hand only.    Skin: Skin is  warm and dry.   Nursing note and vitals reviewed.      Vents:     Lines/Drains/Airways     Peripheral Intravenous Line                 Peripheral IV - Single Lumen 08/27/19 1755 22 G Left Forearm less than 1 day         Peripheral IV - Single Lumen 08/27/19 20 G Left Antecubital less than 1 day              Significant Labs:    CBC/Anemia Profile:  Recent Labs   Lab 08/27/19  1145 08/27/19  1755   WBC 10.58 3.26*   HGB 15.0 15.1   HCT 48.9 49.5    186   MCV 83 84   RDW 13.3 13.4        Chemistries:  Recent Labs   Lab 08/27/19  1145 08/27/19  1755    145   K 4.1 4.1    113*   CO2 21* 16*   BUN 27* 25*   CREATININE 1.6* 1.4   CALCIUM 9.5 9.4   ALBUMIN 4.1  --    PROT 8.3  --    BILITOT 1.4*  --    ALKPHOS 83  --    ALT 82*  --    *  --    MG 2.0  --    PHOS 3.8  --        All pertinent labs within the past 24 hours have been reviewed.    Significant Imaging: I have reviewed all pertinent imaging results/findings within the past 24 hours.

## 2019-08-28 NOTE — ASSESSMENT & PLAN NOTE
Patient and family members unclear of event, but medical records mention hx of CVA in 2012. At the time he was started on DAPT with aspirin and clopidogrel. However, clopidogrel has been discontinued since.  Plan:  -continue home aspirin

## 2019-08-28 NOTE — PT/OT/SLP EVAL
Speech Language Pathology Evaluation  Bedside Swallow    Patient Name:  Edwin Lopez   MRN:  [id number]  Admitting Diagnosis: Altered mental status    Recommendations:                 General Recommendations:  Dysphagia therapy  Diet recommendations:  Puree, Nectar Thick   Aspiration Precautions: 1 bite/sip at a time, Assistance with meals and Assistance with thickening liquids, Check for pocketing/oral residue, Eliminate distractions, Frequent oral care, Meds crushed in puree and Standard aspiration precautions   General Precautions: Standard,    Communication strategies:  none    History:     Past Medical History:   Diagnosis Date    CHI (closed head injury)     Dementia     Diabetes mellitus     Hyperlipidemia     Seizures        History reviewed. No pertinent surgical history.    Social History: Patient lives alone. Pt poor historian and additional background information     Prior Intubation HX:  N/A    Modified Barium Swallow: none documented at our facility     Prior diet: unknown; suspect regular solids and thin liquids, pt reports he might have had thick liquids following his stroke given pt unreliable historian difficult to truly determine diet and baseline level of swallow function     Subjective     Pt lethargic but did wake with verbal and tactile stimulation     Pain/Comfort:  · Pain Rating 1: (pt did not report )  · Pain Rating Post-Intervention 1: (pt did not report )    Objective:     Oral Musculature Evaluation  · Oral Musculature: facial asymmetry present, left weakness, unable to assess due to poor participation/comprehension(from prior CVA in 2012 )  · Dentition: scattered dentition, teeth in poor condition  · Oral Labial Strength and Mobility: (unable to truly assess)  · Lingual Strength and Mobility: (unable to truly assess )  · Voice Prior to PO Intake: weak and reduced in intensity     Bedside Swallow Eval:   Consistencies Assessed:  · Thin liquids 2oz via open cup and single straw sip  x1  · Nectar thick liquids 3oz via open cup   · Puree 3oz via full tsp      Oral Phase:   · Anterior loss  · Decreased closure around utensil  · Slow oral transit time    Pharyngeal Phase:   · wet vocal quality after swallow appreciated with thin liquids  · Overall pt with delayed swallow initiation and diminished hyolaryngeal rise  · Status improved and pt without overt clinical signs of aspiration with purees and nectar thick liquids.     Compensatory Strategies  · thickened liqiuds and restricted bite size     Treatment:  Education provided to Pt re: SLP role in acute care setting, overall impressions and therapeutic goals. Whiteboard updated.    Assessment:     Edwin Lopez is a 69 y.o. male with an SLP diagnosis of Dysphagia further complicated by underlying dysarthria from prior CVA and lethargy.       Goals:   Multidisciplinary Problems     SLP Goals        Problem: SLP Goal    Goal Priority Disciplines Outcome   SLP Goal     SLP    Description:  Speech Language Pathology Goals  Goals expected to be met by 9/4    1. Pt will tolerate diet of nectar thick liquids and  purees without overt clinical signs of aspiration   2. Pt will participate in trials of  thin liquids and soft solids within speech therapy sessions to help determine least restrictive diet                           Plan:     · Patient to be seen:  4 x/week   · Plan of Care expires:     · Plan of Care reviewed with:      · SLP Follow-Up:  Yes       Discharge recommendations:  (TBD)   Barriers to Discharge:  None    Time Tracking:     SLP Treatment Date:   08/28/19  Speech Start Time:  0959  Speech Stop Time:  1012     Speech Total Time (min):  13 min    Billable Minutes: Eval 13     Rachana Coats CCC-SLP  08/28/2019

## 2019-08-28 NOTE — ASSESSMENT & PLAN NOTE
Patient fell around two days ago while attempting to turn a bathtub faucet on. Unclear whether he lost consciousness or not. CT head was performed on arrival to ED. It revealed several findings of suspected old infarcts, generalized cerebral volume loss, and supratentorial white matter chronic small vessel ischemic change, as well as no acute large vascular territory infarct or intracranial hemorrhage identified (but noted that further evaluation/follow-up as warranted).     Additionally a shoulder xray was performed and it revealed intact osseous structures w/o evidence of fracture nor GH joint dislocation.

## 2019-08-28 NOTE — ED NOTES
Pt care assumed. Report received by ABHINAV Craig. Pt in NAD, stretcher in low and locked position,calllight in reach, family members x2 at bedside, continuous cardiac monitoring, pulse oximetry, and BP in place.

## 2019-08-28 NOTE — H&P
[facility]  Critical Care Medicine  History & Physical    Patient Name: Edwin Lopez  MRN: [id number]  Admission Date: 8/27/2019  Hospital Length of Stay: 1 days  Code Status: Full Code  Attending Physician: Dre Engel MD   Primary Care Provider: Harini Montenegro MD   Principal Problem: Altered mental status    Subjective:     HPI:  Edwin Lopez is a 69-year-old male with a history of stroke in 2012, seizure disorder, DM, dementia, HTN presents to the ED for evaluation of weakness. Per EMS, patient was found in the bathtub.   Per ED, patient states that he fell about 3 days ago and has been unable to get up since then due to lack of strength.  History from the patient is limited due to his acute condition and history of dementia.  Patient complains of right-sided shoulder pain which he believes he may have injured in the fall.  Denies chest pain, shortness of breath.  Patient currently nauseated.  Denies neck pain.    Critical Care Medicine was consulted for change in mental status and drop in blood pressure. Unable to attain history from patient due to altered mental status.     Hospital/ICU Course:  No notes on file     Past Medical History:   Diagnosis Date    CHI (closed head injury)     Dementia     Diabetes mellitus     Hyperlipidemia     Seizures        History reviewed. No pertinent surgical history.    Review of patient's allergies indicates:  No Known Allergies    Family History     None        Tobacco Use    Smoking status: Never Smoker    Smokeless tobacco: Never Used   Substance and Sexual Activity    Alcohol use: No     Frequency: Never    Drug use: No    Sexual activity: Never      Review of Systems   Unable to perform ROS: Mental status change     Objective:     Vital Signs (Most Recent):  Temp: (!) 101.9 °F (38.8 °C) (08/27/19 1953)  Pulse: 108 (08/27/19 2145)  Resp: (!) 31 (08/27/19 2145)  BP: 111/65 (08/27/19 2131)  SpO2: (!) 94 % (08/27/19 2145) Vital Signs  (24h Range):  Temp:  [97.7 °F (36.5 °C)-103.4 °F (39.7 °C)] 101.9 °F (38.8 °C)  Pulse:  [] 108  Resp:  [18-40] 31  SpO2:  [88 %-100 %] 94 %  BP: ()/() 111/65   Weight: 68 kg (150 lb)  Body mass index is 22.81 kg/m².      Intake/Output Summary (Last 24 hours) at 8/27/2019 2159  Last data filed at 8/27/2019 2017  Gross per 24 hour   Intake 350 ml   Output 75 ml   Net 275 ml       Physical Exam   Constitutional: He appears listless. No distress.   HENT:   Head: Normocephalic and atraumatic.   Eyes: Pupils are equal, round, and reactive to light. Right eye exhibits no discharge. Left eye exhibits no discharge. No scleral icterus.   Neck: Neck supple. No thyromegaly present.   Cardiovascular: Regular rhythm and normal heart sounds. Tachycardia present.   Pulmonary/Chest: Effort normal and breath sounds normal. No respiratory distress.   Abdominal: Soft. Bowel sounds are normal. He exhibits no distension. There is no tenderness.   Musculoskeletal: He exhibits no edema.   Neurological: He appears listless. GCS eye subscore is 4. GCS verbal subscore is 1. GCS motor subscore is 4.   Withdraws to pain, squeezes left hand only.    Skin: Skin is warm and dry.   Nursing note and vitals reviewed.      Vents:     Lines/Drains/Airways     Peripheral Intravenous Line                 Peripheral IV - Single Lumen 08/27/19 1755 22 G Left Forearm less than 1 day         Peripheral IV - Single Lumen 08/27/19 20 G Left Antecubital less than 1 day              Significant Labs:    CBC/Anemia Profile:  Recent Labs   Lab 08/27/19  1145 08/27/19  1755   WBC 10.58 3.26*   HGB 15.0 15.1   HCT 48.9 49.5    186   MCV 83 84   RDW 13.3 13.4        Chemistries:  Recent Labs   Lab 08/27/19  1145 08/27/19  1755    145   K 4.1 4.1    113*   CO2 21* 16*   BUN 27* 25*   CREATININE 1.6* 1.4   CALCIUM 9.5 9.4   ALBUMIN 4.1  --    PROT 8.3  --    BILITOT 1.4*  --    ALKPHOS 83  --    ALT 82*  --    *  --    MG 2.0   --    PHOS 3.8  --        All pertinent labs within the past 24 hours have been reviewed.    Significant Imaging: I have reviewed all pertinent imaging results/findings within the past 24 hours.    Assessment/Plan:     Neuro  * Altered mental status  Edwin Lopez is a 69-year-old male with a history of stroke in 2012, seizure disorder, DM, dementia, HTN presents to the ED after being found down and who is now altered.   - hx of seizures on Keppra, bolus Keppra and check level   - CT head unremarkable  - urine tox negative   - AMS could be multifactoral due to sepsis, dementia and sundowning, and/or post-ictal state   - Q4H neuro checks  - consider MRI brain if still altered     Seizure disorder  - hx of seizures on Keppra  - bolus Keppra and check level   - patient likely had seizure at home and may be post-ictal     Cardiac/Vascular  Hypertension  - received losartan in ED while hypertensive  - hold for now     Renal/  LISHA (acute kidney injury)  - Cr 1.6 on arrival and was 1.2 nine months ago  - Cr 1.4 after fluids given     ID  Severe sepsis  - febrile, tachycardic, and WBC 3.2   - UA: 2+ leukocytes and 22 WBC   - CXR unremarkable   - received 2.5 L bolus in ED  - continue fluids at 200/hr for rhabdo   - f/u blood and urine cx, likely urosepsis    - continue Vancomycin and Zosyn      Endocrine  DM (diabetes mellitus)  - On metformin  - A1c 6.4%  - LDSSI if needed     Orthopedic  Non-traumatic rhabdomyolysis  - CPK 18,000  - daily CPK  - 2.5 L IV fluids bolused   - continue fluids at 200/hr           Critical secondary to Patient has a condition that poses threat to life and bodily function: AMS.     Critical care was time spent personally by me on the following activities: development of treatment plan with patient or surrogate and bedside caregivers, discussions with consultants, evaluation of patient's response to treatment, examination of patient, ordering and performing treatments and interventions,  ordering and review of laboratory studies, ordering and review of radiographic studies, pulse oximetry, re-evaluation of patient's condition. This critical care time did not overlap with that of any other provider or involve time for any procedures.     Homero Cantu MD  Critical Care Medicine  [facility]

## 2019-08-28 NOTE — PLAN OF CARE
Problem: SLP Goal  Goal: SLP Goal  Speech Language Pathology Goals  Goals expected to be met by 9/4    1. Pt will tolerate diet of nectar thick liquids and  purees without overt clinical signs of aspiration   2. Pt will participate in trials of  thin liquids and soft solids within speech therapy sessions to help determine least restrictive diet          Pt seen for clinical swallow assessment. Please seen note for details     Rachana Coats MS, CCC-SLP  Speech Language Pathologist  Pager: [phone]  Date 8/28/2019

## 2019-08-28 NOTE — ASSESSMENT & PLAN NOTE
Patient on home metformin 500mg daily. Uncertain whether he was compliant with medication and when was the last time he took it.

## 2019-08-28 NOTE — ASSESSMENT & PLAN NOTE
Patient admitted after being found two days after a non-traumatic fall at his home. Labs on admission significant for CPK 77256. Creatinine at 1.6 (changed from February where it was 0.8). K, Phos, and Mag were wnl. He was bolused with 2L of 0.9 NS at the ED.   Plan  -continue IVF administration at 150ccs/hr   -monitor BMP q8hrs

## 2019-08-29 PROBLEM — R78.81 BACTEREMIA: Status: ACTIVE | Noted: 2019-08-29

## 2019-08-29 PROBLEM — R53.1 WEAKNESS: Status: ACTIVE | Noted: 2019-08-29

## 2019-08-29 PROBLEM — N39.0 UTI (URINARY TRACT INFECTION): Status: ACTIVE | Noted: 2019-08-29

## 2019-08-29 LAB
ALBUMIN SERPL BCP-MCNC: 2.4 G/DL (ref 3.5–5.2)
ALP SERPL-CCNC: 74 U/L (ref 55–135)
ALT SERPL W/O P-5'-P-CCNC: 60 U/L (ref 10–44)
ANION GAP SERPL CALC-SCNC: 11 MMOL/L (ref 8–16)
ANISOCYTOSIS BLD QL SMEAR: SLIGHT
AST SERPL-CCNC: 157 U/L (ref 10–40)
BACTERIA UR CULT: ABNORMAL
BASOPHILS # BLD AUTO: 0.04 K/UL (ref 0–0.2)
BASOPHILS NFR BLD: 0.3 % (ref 0–1.9)
BILIRUB DIRECT SERPL-MCNC: 0.7 MG/DL (ref 0.1–0.3)
BILIRUB SERPL-MCNC: 1.5 MG/DL (ref 0.1–1)
BUN SERPL-MCNC: 12 MG/DL (ref 8–23)
BURR CELLS BLD QL SMEAR: ABNORMAL
CALCIUM SERPL-MCNC: 8 MG/DL (ref 8.7–10.5)
CHLORIDE SERPL-SCNC: 109 MMOL/L (ref 95–110)
CK SERPL-CCNC: 6517 U/L (ref 20–200)
CO2 SERPL-SCNC: 22 MMOL/L (ref 23–29)
CREAT SERPL-MCNC: 0.9 MG/DL (ref 0.5–1.4)
DIFFERENTIAL METHOD: ABNORMAL
EOSINOPHIL # BLD AUTO: 0.1 K/UL (ref 0–0.5)
EOSINOPHIL NFR BLD: 0.5 % (ref 0–8)
ERYTHROCYTE [DISTWIDTH] IN BLOOD BY AUTOMATED COUNT: 13.3 % (ref 11.5–14.5)
EST. GFR  (AFRICAN AMERICAN): >60 ML/MIN/1.73 M^2
EST. GFR  (NON AFRICAN AMERICAN): >60 ML/MIN/1.73 M^2
GLUCOSE SERPL-MCNC: 107 MG/DL (ref 70–110)
HCT VFR BLD AUTO: 42.1 % (ref 40–54)
HGB BLD-MCNC: 12.4 G/DL (ref 14–18)
IMM GRANULOCYTES # BLD AUTO: 0.06 K/UL (ref 0–0.04)
IMM GRANULOCYTES NFR BLD AUTO: 0.5 % (ref 0–0.5)
LYMPHOCYTES # BLD AUTO: 2 K/UL (ref 1–4.8)
LYMPHOCYTES NFR BLD: 16 % (ref 18–48)
MAGNESIUM SERPL-MCNC: 1.6 MG/DL (ref 1.6–2.6)
MCH RBC QN AUTO: 25.8 PG (ref 27–31)
MCHC RBC AUTO-ENTMCNC: 29.5 G/DL (ref 32–36)
MCV RBC AUTO: 88 FL (ref 82–98)
MONOCYTES # BLD AUTO: 1 K/UL (ref 0.3–1)
MONOCYTES NFR BLD: 7.8 % (ref 4–15)
NEUTROPHILS # BLD AUTO: 9.4 K/UL (ref 1.8–7.7)
NEUTROPHILS NFR BLD: 74.9 % (ref 38–73)
NRBC BLD-RTO: 0 /100 WBC
OVALOCYTES BLD QL SMEAR: ABNORMAL
PHOSPHATE SERPL-MCNC: 1.3 MG/DL (ref 2.7–4.5)
PLATELET # BLD AUTO: 125 K/UL (ref 150–350)
PLATELET BLD QL SMEAR: ABNORMAL
PMV BLD AUTO: 11.6 FL (ref 9.2–12.9)
POCT GLUCOSE: 104 MG/DL (ref 70–110)
POCT GLUCOSE: 129 MG/DL (ref 70–110)
POCT GLUCOSE: 187 MG/DL (ref 70–110)
POCT GLUCOSE: 94 MG/DL (ref 70–110)
POIKILOCYTOSIS BLD QL SMEAR: SLIGHT
POTASSIUM SERPL-SCNC: 3.9 MMOL/L (ref 3.5–5.1)
PROT SERPL-MCNC: 5.8 G/DL (ref 6–8.4)
RBC # BLD AUTO: 4.8 M/UL (ref 4.6–6.2)
SODIUM SERPL-SCNC: 142 MMOL/L (ref 136–145)
WBC # BLD AUTO: 12.59 K/UL (ref 3.9–12.7)

## 2019-08-29 PROCEDURE — 87040 BLOOD CULTURE FOR BACTERIA: CPT

## 2019-08-29 PROCEDURE — 97161 PT EVAL LOW COMPLEX 20 MIN: CPT

## 2019-08-29 PROCEDURE — 63600175 PHARM REV CODE 636 W HCPCS: Performed by: STUDENT IN AN ORGANIZED HEALTH CARE EDUCATION/TRAINING PROGRAM

## 2019-08-29 PROCEDURE — 82550 ASSAY OF CK (CPK): CPT

## 2019-08-29 PROCEDURE — 99232 SBSQ HOSP IP/OBS MODERATE 35: CPT | Mod: GC,,, | Performed by: HOSPITALIST

## 2019-08-29 PROCEDURE — 83735 ASSAY OF MAGNESIUM: CPT

## 2019-08-29 PROCEDURE — 80076 HEPATIC FUNCTION PANEL: CPT

## 2019-08-29 PROCEDURE — 36415 COLL VENOUS BLD VENIPUNCTURE: CPT

## 2019-08-29 PROCEDURE — 25000003 PHARM REV CODE 250: Performed by: STUDENT IN AN ORGANIZED HEALTH CARE EDUCATION/TRAINING PROGRAM

## 2019-08-29 PROCEDURE — 84100 ASSAY OF PHOSPHORUS: CPT

## 2019-08-29 PROCEDURE — 63600175 PHARM REV CODE 636 W HCPCS: Performed by: INTERNAL MEDICINE

## 2019-08-29 PROCEDURE — 80048 BASIC METABOLIC PNL TOTAL CA: CPT

## 2019-08-29 PROCEDURE — 85025 COMPLETE CBC W/AUTO DIFF WBC: CPT

## 2019-08-29 PROCEDURE — 92526 ORAL FUNCTION THERAPY: CPT

## 2019-08-29 PROCEDURE — 11000001 HC ACUTE MED/SURG PRIVATE ROOM

## 2019-08-29 PROCEDURE — 99232 PR SUBSEQUENT HOSPITAL CARE,LEVL II: ICD-10-PCS | Mod: GC,,, | Performed by: HOSPITALIST

## 2019-08-29 PROCEDURE — 97166 OT EVAL MOD COMPLEX 45 MIN: CPT

## 2019-08-29 RX ORDER — GLUCAGON 1 MG
1 KIT INJECTION
Status: DISCONTINUED | OUTPATIENT
Start: 2019-08-29 | End: 2019-09-04 | Stop reason: HOSPADM

## 2019-08-29 RX ORDER — LEVETIRACETAM 500 MG/1
1000 TABLET ORAL 2 TIMES DAILY
Status: DISCONTINUED | OUTPATIENT
Start: 2019-08-29 | End: 2019-09-04 | Stop reason: HOSPADM

## 2019-08-29 RX ORDER — IBUPROFEN 200 MG
24 TABLET ORAL
Status: DISCONTINUED | OUTPATIENT
Start: 2019-08-29 | End: 2019-09-04 | Stop reason: HOSPADM

## 2019-08-29 RX ORDER — INSULIN ASPART 100 [IU]/ML
0-5 INJECTION, SOLUTION INTRAVENOUS; SUBCUTANEOUS
Status: DISCONTINUED | OUTPATIENT
Start: 2019-08-29 | End: 2019-09-04 | Stop reason: HOSPADM

## 2019-08-29 RX ORDER — ENOXAPARIN SODIUM 100 MG/ML
40 INJECTION SUBCUTANEOUS EVERY 24 HOURS
Status: DISCONTINUED | OUTPATIENT
Start: 2019-08-29 | End: 2019-09-04 | Stop reason: HOSPADM

## 2019-08-29 RX ORDER — IBUPROFEN 200 MG
16 TABLET ORAL
Status: DISCONTINUED | OUTPATIENT
Start: 2019-08-29 | End: 2019-09-04 | Stop reason: HOSPADM

## 2019-08-29 RX ADMIN — PIPERACILLIN AND TAZOBACTAM 4.5 G: 4; .5 INJECTION, POWDER, LYOPHILIZED, FOR SOLUTION INTRAVENOUS; PARENTERAL at 08:08

## 2019-08-29 RX ADMIN — LEVETIRACETAM 1000 MG: 500 TABLET, FILM COATED ORAL at 08:08

## 2019-08-29 RX ADMIN — ENOXAPARIN SODIUM 40 MG: 100 INJECTION SUBCUTANEOUS at 05:08

## 2019-08-29 RX ADMIN — LEVETIRACETAM 750 MG: 100 INJECTION, SOLUTION, CONCENTRATE INTRAVENOUS at 10:08

## 2019-08-29 RX ADMIN — PIPERACILLIN AND TAZOBACTAM 4.5 G: 4; .5 INJECTION, POWDER, LYOPHILIZED, FOR SOLUTION INTRAVENOUS; PARENTERAL at 12:08

## 2019-08-29 RX ADMIN — HEPARIN SODIUM 5000 UNITS: 5000 INJECTION INTRAVENOUS; SUBCUTANEOUS at 05:08

## 2019-08-29 RX ADMIN — VANCOMYCIN HYDROCHLORIDE 1250 MG: 1.25 INJECTION, POWDER, LYOPHILIZED, FOR SOLUTION INTRAVENOUS at 05:08

## 2019-08-29 NOTE — ASSESSMENT & PLAN NOTE
On admission the patient was afebrile and tachypneic. Workup for LISHA and rhabdo was initiated. Additionally, CXR was obtained given that he was tachypneic; it revealed no acute processes or findings suggestive of pneumonia. He was hypertensive initially but improved after administration of home losartan dose. Several hours after arrival, oral temp was normal but a rectal temperature of 103 was obtained. Patient pancultured. U/a with leuk +2, wbc 22, and many bacteria. Patient started on vancomycin and pip-tazo for empiric coverage. Urine and blood cultures ordered and pending. Patient remains tachypneic (RR ranging between 25-34). Will monitor closely for improvement of fever and tachypnea. Admitted to ICU for further management. Stepped down to hospital medicine 8/29    Plan:  - Likely 2/2 to UTI & Bacteremia  - Urine cultures with gram negative rods, pending susceptibilities  - Blood cultures with Enterococcus Faecalis & coagulase negative staph   - Continue Vancomycin and Zosyn, plan to DC vancomycin.  - WBC stable, Blood pressure, stable

## 2019-08-29 NOTE — PROGRESS NOTES
Pharmacokinetic Assessment Follow Up: IV Vancomycin    Vancomycin Regimen Plan:    · Adjusting vancomycin trough time on 08/30 to 05:30 to 30 minutes prior to the 06:00h dose.    Drug levels (last 3 results):  Recent Labs   Lab Result Units 08/28/19  0606   Vancomycin, Random ug/mL 8.6       Pharmacy will continue to follow and monitor vancomycin.    Please contact pharmacy at extension [phone] for questions regarding this assessment.    Thank you for the consult,   Maria Del Rosario Healy, PharmD, BCPS, Internal Medicine Clinical Pharmacy Specialist  EXT [phone]         Patient brief summary:  Edwin Lopez is a 69 y.o. male initiated on antimicrobial therapy with IV Vancomycin for treatment of bacteremia    Drug Allergies:   Review of patient's allergies indicates:  No Known Allergies    Actual Body Weight:   68kg    Renal Function:   Estimated Creatinine Clearance: 74.5 mL/min (based on SCr of 0.9 mg/dL).,     Dialysis Method (if applicable):  No dialysis    CBC (last 72 hours):  Recent Labs   Lab Result Units 08/27/19  1145 08/27/19  1755 08/27/19  2155 08/28/19  0509 08/29/19  0804   WBC K/uL 10.58 3.26*  --  8.11 12.59   Hemoglobin g/dL 15.0 15.1  --  15.9 12.4*   Hemoglobin A1C %  --   --  6.4*  --   --    Hematocrit % 48.9 49.5  --  51.4 42.1   Platelets K/uL 219 186  --  132* 125*   Gran% % 82.2* 78.6*  --  77.3* 74.9*   Lymph% % 9.5* 15.6*  --  14.1* 16.0*   Mono% % 7.6 5.5  --  7.6 7.8   Eosinophil% % 0.0 0.0  --  0.0 0.5   Basophil% % 0.2 0.0  --  0.5 0.3   Differential Method  Automated Automated  --  Automated Automated       Metabolic Panel (last 72 hours):  Recent Labs   Lab Result Units 08/27/19  1145 08/27/19  1515 08/27/19  1755 08/28/19  0140 08/28/19  0815 08/29/19  0422   Sodium mmol/L 145  --  145 145 143  143 142   Potassium mmol/L 4.1  --  4.1 4.7 4.2  4.2 3.9   Chloride mmol/L 109  --  113* 117* 113*  113* 109   CO2 mmol/L 21*  --  16* 14* 21*  21* 22*   Glucose mg/dL 121*  --  128* 123*  187*  187* 107   Glucose, UA   --  Negative  --   --   --   --    BUN, Bld mg/dL 27*  --  25* 23 21  21 12   Creatinine mg/dL 1.6*  --  1.4 1.4 1.2  1.2 0.9   Creatinine, Random Ur mg/dL  --  189.0  --   --   --   --    Albumin g/dL 4.1  --   --   --  2.7*  2.7* 2.4*   Total Bilirubin mg/dL 1.4*  --   --   --  1.7*  1.7* 1.5*   Alkaline Phosphatase U/L 83  --   --   --  57  57 74   AST U/L 275*  --   --   --  172*  172* 157*   ALT U/L 82*  --   --   --  62*  62* 60*   Magnesium mg/dL 2.0  --   --   --  1.7 1.6   Phosphorus mg/dL 3.8  --   --   --  2.5* 1.3*       Vancomycin Administrations:  vancomycin given in the last 96 hours                   vancomycin 1.25 g in dextrose 5% 250 mL IVPB (ready to mix) (mg) 1,250 mg New Bag 08/29/19 0544    vancomycin in dextrose 5 % 1 gram/250 mL IVPB 1,000 mg (mg) 1,000 mg New Bag 08/28/19 0637    vancomycin 1.25 g in dextrose 5% 250 mL IVPB (ready to mix) (mg) 1,250 mg New Bag 08/27/19 1840                Microbiologic Results:  Microbiology Results (last 7 days)     Procedure Component Value Units Date/Time    Blood culture [[id number]]  (Abnormal) Collected:  08/27/19 2017    Order Status:  Completed Specimen:  Blood from Peripheral, Hand, Right Updated:  08/29/19 1329     Blood Culture, Routine Gram stain aer bottle: Gram positive cocci in chains resembling Strep       Gram stain aer bottle: Gram positive cocci in clusters resembling Staph       Gram stain isael bottle: Gram positive cocci in chains resembling Strep       Results called to and read back by:Joanne Rogel RN  08/28/2019  09:38      ENTEROCOCCUS FAECALIS  Susceptibility pending        COAGULASE-NEGATIVE STAPHYLOCOCCUS SPECIES  Susceptibility testing not routinely performed.      Blood culture [[id number]] Collected:  08/29/19 1000    Order Status:  Sent Specimen:  Blood Updated:  08/29/19 1022    Blood culture [[id number]] Collected:  08/29/19 1000    Order Status:  Sent Specimen:  Blood Updated:  08/29/19  1022    Blood culture [[id number]] Collected:  08/27/19 1756    Order Status:  Completed Specimen:  Blood from Peripheral, Forearm, Left Updated:  08/28/19 2212     Blood Culture, Routine No Growth to date      No Growth to date    Urine culture [[id number]]  (Abnormal) Collected:  08/27/19 1515    Order Status:  Completed Specimen:  Urine Updated:  08/28/19 1742     Urine Culture, Routine GRAM NEGATIVE TANA  >100,000 cfu/ml  Identification and susceptibility pending      Narrative:       Preferred Collection Type->Urine, Clean Catch  add on [facility] #[id number] per Edison Osuna MD @ 21:16  08/27/2019

## 2019-08-29 NOTE — ASSESSMENT & PLAN NOTE
Likely 2/2 to rhadbo. However unilateral R upper and lower extremity weakness noted from admission.  - MRI brain to rule out stroke  - PT/OT ordered- reccs for SNF

## 2019-08-29 NOTE — ASSESSMENT & PLAN NOTE
Patient and family members unclear of event, but medical records mention hx of CVA in 2012. At the time he was started on DAPT with aspirin and clopidogrel. However, clopidogrel has been discontinued since.  Plan:  - CT head in ED, negative for acute infarct  -continue home aspirin  - Patient with upper and lower extremity weakness since admission, likely 2/2 to rhabdo and fall but will rule out stroke with MRI.

## 2019-08-29 NOTE — PT/OT/SLP EVAL
"Physical Therapy Evaluation    Patient Name:  Edwin Lopez   MRN:  [id number]    Recommendations:     Discharge Recommendations:  nursing facility, skilled   Discharge Equipment Recommendations: (TBD)   Barriers to discharge: Inaccessible home and Decreased caregiver support    Assessment:     Edwin Lopez is a 69 y.o. male admitted with a medical diagnosis of Altered mental status.  He presents with the following impairments/functional limitations:  weakness, impaired functional mobilty, impaired endurance, impaired self care skills, gait instability, impaired balance, decreased coordination, decreased lower extremity function, decreased upper extremity function, decreased safety awareness, impaired cardiopulmonary response to activity.  Tolerated session c c/o fatigue and weakness.  Pt demo R hemiparesis - UE>LE.  Performed mobility c mod A.  Pt able to take 2 steps c HHAx2 - difficulty c RLE advancement and unsteady.  Pt safe to transfer to/from bedside chair c assistance of 1x person (stand pivot).  Pt would benefit from continued skilled acute PT 3x/wk to improve functional mobility.  Recommending pt receive PT services in SNF setting following d/c from hospital once medically cleared.      Rehab Prognosis: Good; patient would benefit from acute skilled PT services to address these deficits and reach maximum level of function.    Recent Surgery: * No surgery found *      Plan:     During this hospitalization, patient to be seen 3 x/week to address the identified rehab impairments via gait training, therapeutic activities, therapeutic exercises, neuromuscular re-education and progress toward the following goals:    · Plan of Care Expires:  09/23/19    Subjective     Chief Complaint: weakness  Patient/Family Comments/goals: "I think I'll stay in the chair for lunch."  Pain/Comfort:  · Pain Rating 1: 0/10    Patients cultural, spiritual, Tenriism conflicts given the current situation: no    Living " Environment:  Pt lives alone in [facility] c [place].  PTA not driving and reports 2x fall d/t tripping.  Prior to admission, patients level of function was independent and using walls/furniture for mobility.  Equipment used at home: none.  DME owned (not currently used): none.  Upon discharge, patient will have assistance from family.    Objective:     Communicated with RN and OT prior to session.  Patient found up in chair with peripheral IV, telemetry, Condom Catheter  upon PT entry to room.    General Precautions: Standard, aspiration, fall, seizure   Orthopedic Precautions:N/A   Braces: N/A     Exams:  · Cognitive Exam:  Patient is oriented to Person, Place, Time and Situation  · RLE ROM: WFL  · RLE Strength: grossly 3/5  · LLE ROM: WFL  · LLE Strength: WFL    Functional Mobility:  · Transfers:     · Sit to Stand:  moderate assistance with hand-held assist  · Gait: 2 steps c HHAx2 mod A  · Balance: sitting (CGA); standing (mod A)      Therapeutic Activities and Exercises:  Pt educated on: PT role/POC; safety c mobility; benefits of OOB activities; performing therex; d/c recs - v/u      AM-PAC 6 CLICK MOBILITY  Total Score:11     Patient left up in chair with all lines intact, call button in reach, RN notified and family present.    GOALS:   Multidisciplinary Problems     Physical Therapy Goals        Problem: Physical Therapy Goal    Goal Priority Disciplines Outcome Goal Variances Interventions   Physical Therapy Goal     PT, PT/OT Ongoing (interventions implemented as appropriate)     Description:  Goals to be met by: 2019     Patient will increase functional independence with mobility by performin. Supine to sit with Stand-by Assistance  2. Sit to supine with Set-up [place]  3. Sit to stand transfer with Contact Guard Assistance  4. Bed to chair transfer with Contact Guard Assistance using LRAD  5. Gait  x 50 feet with Minimal Assistance using LRAD                      History:     Past Medical  History:   Diagnosis Date    CHI (closed head injury)     Dementia     Diabetes mellitus     Hyperlipidemia     Seizures        History reviewed. No pertinent surgical history.    Time Tracking:     PT Received On: 08/29/19  PT Start Time: 1157     PT Stop Time: 1207  PT Total Time (min): 10 min     Billable Minutes: Evaluation 10 min      Micky Broussard, PT  08/29/2019

## 2019-08-29 NOTE — PROGRESS NOTES
[facility] Hospital Medicine  Progress Note    Patient Name: Edwin Lopez  MRN: [id number]  Patient Class: IP- Inpatient   Admission Date: 8/27/2019  Length of Stay: 2 days  Attending Physician: Chelo Michelle*  Primary Care Provider: Harini Montenegro MD    [facility] Medicine Team: Networked reference to record PCT  Marcela Catherine DO    Subjective:     Principal Problem:Altered mental status        HPI:  Mr. Waters is a 69 year-old male with type 2 DM, seizure disorder, dementia, and a history of CVA in 2012 who was brought in from his home after he was found on the floor two days after he fell. The patient recalls that he went to turn a bathtub faucet on when he fell to the ground and could not get back up. Denies headaches, lightheadedness, chest pain, palpitations, or tremors prior to the fall. Was asked about LOC prior to and following the fall but he was unable to express whether he experienced it or not. Currently denies headaches, shortness of breath, chest pain, palpitations, abdominal pain, or any discomfort.     He was unable to provide much history. His niece was present during the evaluation and was asked regarding his medical conditions, medication use, and family history but she was unsure of these. The last time he was seen in person by his family members was on Friday when, according to them, he was functional and oriented. His niece spoke with him over the phone on Saturday afternoon but did not hear from him since them despite calling several times. She did mention that he lives by himself and is functional, stating that this is not near his baseline. He lives by himself and an aide visits him regularly. He does not drive but is an avid walker and gets around by foot. According to her, he does not consume alcoholic beverages and has never smoked.     Overview/Hospital Course:  Patient originally admitted to hospital medicine team 4 for Rhabdomylosis after been found down  at home for 3 days. UA was consistent with UTI, patient subsequently became septic with altered mental status and hypotension. He was stepped up to the MICU for higher level of care and monitoring. EEG done in MICU without any seizure activity. Patient was treated with Vancomycin and Zosyn given persistent fevers and AMS. Blood cultures with Enterococcus Faecalis and coagulase negative staph. Patient with gram negative rods in urine pending susceptibilities. Patient stepped down to hospital medicine 8/29, plan for MRI brain due to weaknes of R.upper and lower extremity.     Interval History: Patient stepped down to hospital medicine today. Complains of weakness in lower extremity.    Review of Systems   Constitutional: Positive for fatigue. Negative for chills and fever.   Eyes: Negative for photophobia and visual disturbance.   Respiratory: Negative for cough, chest tightness and shortness of breath.    Cardiovascular: Negative for chest pain and palpitations.   Gastrointestinal: Negative for abdominal pain, constipation and diarrhea.   Genitourinary: Negative for flank pain and hematuria.   Musculoskeletal: Negative for back pain, neck pain and neck stiffness.   Neurological: Positive for weakness (R. upper and lower ex.). Negative for seizures, light-headedness and headaches.   Psychiatric/Behavioral: Negative for behavioral problems and confusion.     Objective:     Vital Signs (Most Recent):  Temp: 98.1 °F (36.7 °C) (08/29/19 1031)  Pulse: (!) 59 (08/29/19 1056)  Resp: (!) 24 (08/29/19 1031)  BP: 133/87 (08/29/19 1031)  SpO2: 98 % (08/29/19 1031) Vital Signs (24h Range):  Temp:  [98.1 °F (36.7 °C)-100.9 °F (38.3 °C)] 98.1 °F (36.7 °C)  Pulse:  [56-87] 59  Resp:  [18-24] 24  SpO2:  [90 %-99 %] 98 %  BP: (103-156)/(75-93) 133/87     Weight: 68 kg (150 lb)  Body mass index is 22.81 kg/m².    Intake/Output Summary (Last 24 hours) at 8/29/2019 1622  Last data filed at 8/29/2019 0544  Gross per 24 hour   Intake 5820  ml   Output 300 ml   Net 2110 ml      Physical Exam   Constitutional: No distress.   HENT:   Head: Normocephalic and atraumatic.   Eyes: Pupils are equal, round, and reactive to light. Right eye exhibits no discharge. Left eye exhibits no discharge. No scleral icterus.   Neck: Neck supple. No thyromegaly present.   Cardiovascular: Regular rhythm and normal heart sounds. Tachycardia present.   Pulmonary/Chest: Effort normal and breath sounds normal. No respiratory distress.   Abdominal: Soft. Bowel sounds are normal. He exhibits no distension. There is no tenderness.   Musculoskeletal: He exhibits no edema.   Neurological: He is alert. He exhibits abnormal muscle tone (weakness in upper and lower R. extremity).   Skin: Skin is warm and dry. He is not diaphoretic.   Nursing note and vitals reviewed.      Significant Labs:   Blood Culture:   Recent Labs   Lab 08/27/19  1756 08/27/19  2017   LABBLOO No Growth to date  No Growth to date Gram stain aer bottle: Gram positive cocci in chains resembling Strep   Gram stain aer bottle: Gram positive cocci in clusters resembling Staph   Gram stain isael bottle: Gram positive cocci in chains resembling Strep   Results called to and read back by:Joanne Rogel RN  08/28/2019  09:38  ENTEROCOCCUS FAECALIS  Susceptibility pending  *  COAGULASE-NEGATIVE STAPHYLOCOCCUS SPECIES  Susceptibility testing not routinely performed.  *     CBC:   Recent Labs   Lab 08/27/19  1755 08/28/19  0509 08/29/19  0804   WBC 3.26* 8.11 12.59   HGB 15.1 15.9 12.4*   HCT 49.5 51.4 42.1    132* 125*     CMP:   Recent Labs   Lab 08/28/19  0140 08/28/19  0815 08/29/19  0422    143  143 142   K 4.7 4.2  4.2 3.9   * 113*  113* 109   CO2 14* 21*  21* 22*   * 187*  187* 107   BUN 23 21  21 12   CREATININE 1.4 1.2  1.2 0.9   CALCIUM 8.6* 8.2*  8.2* 8.0*   PROT  --  6.0  6.0 5.8*   ALBUMIN  --  2.7*  2.7* 2.4*   BILITOT  --  1.7*  1.7* 1.5*   ALKPHOS  --  57  57 74   AST   --  172*  172* 157*   ALT  --  62*  62* 60*   ANIONGAP 14 9  9 11   EGFRNONAA 50.9* >60.0  >60.0 >60.0       Significant Imaging: I have reviewed and interpreted all pertinent imaging results/findings within the past 24 hours.      Assessment/Plan:      * Altered mental status  With AMS on admission. Admitted to ICU for further monitoring. Likely 2/2 to Sepsis  Plan:  - Now resolved  -neuro checks q4hrs    Weakness  Likely 2/2 to rhadbo. However unilateral R upper and lower extremity weakness noted from admission.  - MRI brain to rule out stroke  - PT/OT ordered- reccs for SNF      Bacteremia  Blood cultures 8/27 with Enterococcus Faecalis and Coagulase negative staph  - Follow up repeat blood cultures  - Continue Vancomycin and Zosyn. Deescalate pending susceptibilities        UTI (urinary tract infection)  UA with 2+ leucks, many bacteria, 22WBC, 19 RBC\  Ucx with gram negative rods, follow susceptibilities  Continue Zosyn, pending cultures        History of CVA in adulthood  Patient and family members unclear of event, but medical records mention hx of CVA in 2012. At the time he was started on DAPT with aspirin and clopidogrel. However, clopidogrel has been discontinued since.  Plan:  - CT head in ED, negative for acute infarct  -continue home aspirin  - Patient with upper and lower extremity weakness since admission, likely 2/2 to rhabdo and fall but will rule out stroke with MRI.    Status post fall  Patient fell around two days ago while attempting to turn a bathtub faucet on. Unclear whether he lost consciousness or not. CT head was performed on arrival to ED. It revealed several findings of suspected old infarcts, generalized cerebral volume loss, and supratentorial white matter chronic small vessel ischemic change, as well as no acute large vascular territory infarct or intracranial hemorrhage identified (but noted that further evaluation/follow-up as warranted).     Additionally a shoulder xray was  "performed and it revealed intact osseous structures w/o evidence of fracture nor GH joint dislocation.         Severe sepsis  On admission the patient was afebrile and tachypneic. Workup for LISHA and rhabdo was initiated. Additionally, CXR was obtained given that he was tachypneic; it revealed no acute processes or findings suggestive of pneumonia. He was hypertensive initially but improved after administration of home losartan dose. Several hours after arrival, oral temp was normal but a rectal temperature of 103 was obtained. Patient pancultured. U/a with leuk +2, wbc 22, and many bacteria. Patient started on vancomycin and pip-tazo for empiric coverage. Urine and blood cultures ordered and pending. Patient remains tachypneic (RR ranging between 25-34). Will monitor closely for improvement of fever and tachypnea. Admitted to ICU for further management. Stepped down to hospital medicine 8/29    Plan:  - Likely 2/2 to UTI & Bacteremia  - Urine cultures with gram negative rods, pending susceptibilities  - Blood cultures with Enterococcus Faecalis & coagulase negative staph   - Continue Vancomycin and Zosyn, plan to DC vancomycin.  - WBC stable, Blood pressure, stable    DM (diabetes mellitus)  Patient on home metformin 500mg daily. Uncertain whether he was compliant with medication and when was the last time he took it.   - HGBAIC 6.4  - Low dose sliding scale  - Diabetic diet      Hypertension  Continue home losartan 25mg daily      LISHA (acute kidney injury)  See "non-traumatic rhabdo"   - Improving  - Cr 0.9, baseline        Non-traumatic rhabdomyolysis  Patient admitted after being found two days after a non-traumatic fall at his home. Labs on admission significant for CPK 87123. Creatinine at 1.6 (changed from February where it was 0.8). K, Phos, and Mag were wnl. He was bolused with 2L of 0.9 NS at the ED.   Plan  - CPK down trending since admission, 6500 today  - monitor for kidney injury      Seizure " disorder  Unclear status of patient's seizure disorder. However, he has presented several times in 2018 and 2019 at [facility] following seizure activity which is described as shaking and muscle spasms. The patient's niece provided his home medications; he is currently on keppra 500mg 3 tablets BID.   Plan:  - EEG with no seizure activity  - Start Keppra at 1000mg BID, will up titrate to home does of 1500 BID        VTE Risk Mitigation (From admission, onward)        Ordered     enoxaparin injection 40 mg  Daily      08/29/19 0908     IP VTE LOW RISK PATIENT  Once      08/27/19 4132                Marcela Catherine DO  Department of Hospital Medicine   [facility]

## 2019-08-29 NOTE — PT/OT/SLP PROGRESS
"Speech Language Pathology Treatment    Patient Name:  Edwin Lopez   MRN:  [id number]  Admitting Diagnosis: Altered mental status    Recommendations:                 General Recommendations:  Dysphagia therapy  Diet recommendations:  Puree, Nectar Thick   Aspiration Precautions: 1 bite/sip at a time, Assistance with meals and Assistance with thickening liquids, Check for pocketing/oral residue, Eliminate distractions, Frequent oral care, Meds crushed in puree and Standard aspiration precautions   General Precautions: Standard,    Communication strategies:  none    Subjective     SLP reviewed Pt with RN  Pt presents calm  He explains, "I don't want to bother anyone, there are some sick people up here they need to be able to help with"  He denies pain    Pain/Comfort:  · Pain Rating 1: 0/10  · Pain Rating Post-Intervention 2: 0/10    Objective:     Has the patient been evaluated by SLP for swallowing?   Yes  Keep patient NPO? No   Current Respiratory Status: room air      Pt see for dysphagia therapy. He was found in room, sitting in chair, with peripheral IVs, catheter in place, noticably leaning Left with reclined posture in chair. Pt with partially completed lunch meal tray for pureed diet with nectar-thickened liquids in front of him on bedside table.  SLP repositioned Pt in chair, called for RN and notified PCT of Pt repositioning in chair and aspiration precautions.Pt denied difficulty with meals or thickened liquids; however, seemingly unaware of left-leaning position or need for thickened liquids. SLP reviewed diet modifications, thickener precautions and safe swallow strategies including safe posture for PO.   Pt declined additional bites of lunch meal tray but accepted trials of nectar-thickened liquids.  Pt seen with self-fed sips nectar-thickened liquids x2. Pt with audible swallows with mildly delayed initiation of swallow each attempt. No coughing or change in vocal quality noted with nectar-thickened " liquids.  SLP noted call light not working upon attempts to use remote/call light.  PCT entered room and repositioned Pt from chair to bed.    RN entered room during end of session. Findings of inoperapble call light, aspiration precautions, and safety/fall precautions reviewed with RN and PCT. RN and PCT in room with Pt upon SLP exit from room. Whiteboard current.     Assessment:     Edwin Lopez is a 69 y.o. male with an SLP diagnosis of SLP diagnosis of Dysphagia.  He requires ongoing good oral care, assistance with meals and thickening of all liquids for safety. ST to continue to follow.   Goals:   Multidisciplinary Problems     SLP Goals        Problem: SLP Goal    Goal Priority Disciplines Outcome   SLP Goal     SLP Ongoing (interventions implemented as appropriate)   Description:  Speech Language Pathology Goals  Goals expected to be met by 9/4    1. Pt will tolerate diet of nectar thick liquids and  purees without overt clinical signs of aspiration   2. Pt will participate in trials of  thin liquids and soft solids within speech therapy sessions to help determine least restrictive diet                           Plan:     · Patient to be seen:  4 x/week   · Plan of Care expires:  09/27/19  · Plan of Care reviewed with:  patient   · SLP Follow-Up:  Yes       Discharge recommendations:  Skilled Nursing Facility     Time Tracking:     SLP Treatment Date:   08/29/19  Speech Start Time:  1435  Speech Stop Time:  1455     Speech Total Time (min):  20 min    Billable Minutes: Treatment Swallowing Dysfunction 20    HERRERA De Souza, [facility]-SLP  Speech-Language Pathology  Pager: [phone]      08/29/2019

## 2019-08-29 NOTE — NURSING
Pt transferred from CCU via stretcher and placed into room 1154A. Awake and alert. IV of D5W infusing at 125/hr to right forearm. IVPB infusing to left arm. Chux between legs for voiding purposes. Visi monitor placed on pt and calibrated. V/S wnl - temp 99.1. Tele showing NSR. Will continue to monitor.

## 2019-08-29 NOTE — PLAN OF CARE
Problem: SLP Goal  Goal: SLP Goal  Speech Language Pathology Goals  Goals expected to be met by 9/4    1. Pt will tolerate diet of nectar thick liquids and  purees without overt clinical signs of aspiration   2. Pt will participate in trials of  thin liquids and soft solids within speech therapy sessions to help determine least restrictive diet          Outcome: Ongoing (interventions implemented as appropriate)  Pt seen for dysphagia therapy. Pt call light/TV volume/remote control not working, PCT and RN notified and at bedside to review upon SLP exit from room.    BARRETT De Souza., [facility]-SLP  Speech-Language Pathology  Pager: [phone]  8/29/2019

## 2019-08-29 NOTE — ASSESSMENT & PLAN NOTE
Unclear status of patient's seizure disorder. However, he has presented several times in 2018 and 2019 at [facility] following seizure activity which is described as shaking and muscle spasms. The patient's niece provided his home medications; he is currently on keppra 500mg 3 tablets BID.   Plan:  - EEG with no seizure activity  - Start Keppra at 1000mg BID, will up titrate to home does of 1500 BID

## 2019-08-29 NOTE — HOSPITAL COURSE
Patient originally admitted to hospital medicine team 4 for Rhabdomylosis after been found down at home for 3 days. UA was consistent with UTI, patient subsequently became septic with altered mental status and hypotension. He was stepped up to the MICU for higher level of care and monitoring. EEG done in MICU without any seizure activity. Patient was treated with Vancomycin and Zosyn given persistent fevers and AMS. Blood cultures with Enterococcus Faecalis and coagulase negative staph. Patient with Klebsiella pneumoniae in urine. Patient stepped down to hospital medicine 8/29. Once sensitivities returned, broad spectrum antibiotics were de-escalated to ciprofloxacin and ampicillin; ciprofloxacin was administered for 3 days total to treat the UTI and ampicillin will be administered for a total of 2 weeks (until September 12, 2019) for E. fecalis bacteremia. He had markedly notable R-sided weakness so there were concerns for stroke/acute vascular insult. However, MRI performed revealed no acute processes, only generalized volume loss reflective of chronic microvascular changes. PT/OT recommended SNF placement, to which the patient agreed to and is looking forward to. His weakness has improved significantly. Patient was started on Atorvastatin 40mg on discharge due to history of CVA.

## 2019-08-29 NOTE — PT/OT/SLP EVAL
Occupational Therapy   Evaluation    Name: Edwin Lopez  MRN: [id number]  Admitting Diagnosis:  Altered mental status      Recommendations:     Discharge Recommendations: nursing facility, skilled  Discharge Equipment Recommendations:  (TBD at next level of care; with progression in care)  Barriers to discharge:  Decreased caregiver support(lives alone; level of skilled assistance required)    Assessment:     Edwin Lopez is a 69 y.o. male with a medical diagnosis of Altered mental status.  He presents with increased R side weakness requiring added assistance for bed mobility, unsupported sitting balance, transfers and mobility-- these factors are greatly affecting his functional indep and safety with PLOF tasks/activities/ADLs. He demo improvement in mentation; however, demo wax/wane of overall insight into situation and safety at this time. Pt with good family support for d/c planning. Performance deficits affecting function: weakness, impaired endurance, impaired self care skills, impaired functional mobilty, gait instability, impaired balance, impaired cognition, decreased upper extremity function, decreased lower extremity function, decreased coordination, decreased safety awareness, impaired cardiopulmonary response to activity.      Rehab Prognosis: Good; patient would benefit from acute skilled OT services to address these deficits and reach maximum level of function.       Plan:     Patient to be seen 3 x/week to address the above listed problems via self-care/home management, therapeutic activities, therapeutic exercises, neuromuscular re-education, cognitive retraining  · Plan of Care Expires: 09/27/19  · Plan of Care Reviewed with: patient, family    Subjective     Chief Complaint: R side weakness  Patient/Family Comments/goals: to get better    Occupational Profile:  Living Environment: Pt lives alone in [facility]. Tub/shower combo.   Previous level of function: R hemiparesis (> for UE). Mod(I) with ADLs/self  care/mobility- requiring no additional assist/DME to complete. Does not drive. Enjoys walking- walking is his main form of transportation.  Roles and Routines: uncle, brother, care taker to self. Home and community dweller  Equipment Used at Home:  none  Assistance upon Discharge: yes, good family support; niece at bedside     Pain/Comfort:  · Pain Rating 1: 0/10  · Pain Rating Post-Intervention 1: 0/10    Patients cultural, spiritual, [employer]ybeterian conflicts given the current situation: no    Objective:     Communicated with: RN prior to session.  Patient found HOB elevated with telemetry, peripheral IV, bed alarm, bess catheter upon OT entry to room.    General Precautions: Standard, aspiration, fall, nectar thick, pureed diet, seizure   Orthopedic Precautions:N/A   Braces: N/A     Occupational Performance:  Bed Mobility:    · Patient completed Rolling/Turning to Right with moderate assistance  · Patient completed Supine to Sit with moderate assistance    Functional Mobility/Transfers:  · Patient completed Sit <> Stand Transfer with moderate assistance  with  no assistive device   · x2 samaria  · Patient completed Bed <> Chair Transfer using Step Transfer technique with maximal assistance with no assistive device  · To R side  · Functional Mobility: max(A) to take 2 steps to the R to chair    Activities of Daily Living:  · Grooming: unable to complete in standing 2/2 level of assistance required; set up and CGA while seated EOB    · Upper Body Dressing: minimum assistance seated EOB  · Lower Body Dressing: maximal assistance to don B socks    Cognitive/Visual Perceptual:  Cognitive/Psychosocial Skills:     -       Oriented to: Person, Place and general situation   -       Follows Commands/attention:Easily distracted and Follows two-step commands  -       Communication: clear/fluent  -       Memory: Poor immediate recall  -       Safety awareness/insight to disability: intact   -       Mood/Affect/Coping skills/emotional  control: Cooperative  Visual/Perceptual:      -Intact  tracking /scanning for functional task    Physical Exam:  Postural examination/scapula alignment:    -       Rounded shoulders  -       Forward head  -       Posterior pelvic tilt  Skin integrity: Dry  Edema:  None noted  Sensation:    -       Impaired  general proprioception for R UELE  Motor Planning:    -       Impaired R UE/LE  Dominant hand:    -       R; using L as dominant since CVA in 2012  Upper Extremity Range of Motion:     -       Right Upper Extremity: WFL  -       Left Upper Extremity: WFL  Upper Extremity Strength:    -       Right Upper Extremity: 3+/5 (grossly)  -       Left Upper Extremity: WFL   Strength:    -       Right Upper Extremity: 3+/5  -       Left Upper Extremity: WFL  Fine Motor Coordination:    -       Impaired  dexterity and isolation R   Gross motor coordination:   R hemiplegia/paresis    AMPAC 6 Click ADL:  AMPAC Total Score: 15   Body mass index is 22.81 kg/m².  Vitals:    08/29/19 1056   BP:    Pulse: (!) 59   Resp:    Temp:        Treatment & Education:  -Pt alert and agreeable to therapy eval; edu pt/family on OT role in care  -discussed importance of activity and mobility to chair for overall endurance mgmt; edu on call light assistance  -Communication board updated; questions/concerns addressed within OT scope of practice    Education:    Patient left up in chair with all lines intact, call button in reach, RN and PT notified and niece present    GOALS:   Multidisciplinary Problems     Occupational Therapy Goals        Problem: Occupational Therapy Goal    Goal Priority Disciplines Outcome Interventions   Occupational Therapy Goal     OT, PT/OT Ongoing (interventions implemented as appropriate)    Description:  Goals to be met by: 9/12     Patient will increase functional independence with ADLs by performing:    Supine<>Sit with bed rail and CGA.  UE Dressing while seated EOB with Set-up Assistance and Contact Guard  Assistance.  LE Dressing (pants, brief) with Minimal Assistance.  Grooming while standing with Minimal Assistance.  Toileting from bedside commode with Minimal Assistance for hygiene and clothing management.   Toilet transfer to bedside commode with Minimal Assistance.                      History:     Past Medical History:   Diagnosis Date    CHI (closed head injury)     Dementia     Diabetes mellitus     Hyperlipidemia     Seizures        History reviewed. No pertinent surgical history.    Time Tracking:     OT Date of Treatment: 08/29/19  OT Start Time: 1004  OT Stop Time: 1025  OT Total Time (min): 21 min    Billable Minutes:Evaluation 20    NOHEMI Reveles  8/29/2019

## 2019-08-29 NOTE — ASSESSMENT & PLAN NOTE
Patient on home metformin 500mg daily. Uncertain whether he was compliant with medication and when was the last time he took it.   - HGBAIC 6.4  - Low dose sliding scale  - Diabetic diet

## 2019-08-29 NOTE — ASSESSMENT & PLAN NOTE
Patient admitted after being found two days after a non-traumatic fall at his home. Labs on admission significant for CPK 04865. Creatinine at 1.6 (changed from February where it was 0.8). K, Phos, and Mag were wnl. He was bolused with 2L of 0.9 NS at the ED.   Plan  - CPK down trending since admission, 6500 today  - monitor for kidney injury

## 2019-08-29 NOTE — PLAN OF CARE
Problem: Physical Therapy Goal  Goal: Physical Therapy Goal  Goals to be met by: 2019     Patient will increase functional independence with mobility by performin. Supine to sit with Stand-by Assistance  2. Sit to supine with Set-up Hood  3. Sit to stand transfer with Contact Guard Assistance  4. Bed to chair transfer with Contact Guard Assistance using LRAD  5. Gait  x 50 feet with Minimal Assistance using LRAD    Outcome: Ongoing (interventions implemented as appropriate)  Eval completed and POC established    Micky Broussard, PT,DPT  2019

## 2019-08-29 NOTE — PLAN OF CARE
Problem: Adult Inpatient Plan of Care  Goal: Plan of Care Review  Outcome: Ongoing (interventions implemented as appropriate)     08/29/19 0664   Plan of Care Review   Plan of Care Reviewed With patient   Progress improving   Outcome Summary Mr. Pineda's AMS improving until he is AAOX4.   Mr. Pineda arrived to floor about 9:30 pm. He slept for most of the night but did start talking as soon as he arrived. This am he moved all of his extremities, swallowed his thickened water well without difficulty or choking. He is also voiding well into his external bess. One IV site lost and 2 are left. Will continue to monitor.

## 2019-08-29 NOTE — ASSESSMENT & PLAN NOTE
UA with 2+ leucks, many bacteria, 22WBC, 19 RBC\  Ucx with gram negative rods, follow susceptibilities  Continue Zosyn, pending cultures

## 2019-08-29 NOTE — PLAN OF CARE
Problem: Adult Inpatient Plan of Care  Goal: Plan of Care Review  Outcome: Ongoing (interventions implemented as appropriate)  No acute events throughout shift, VS and assessment per flow sheet, patient progressing towards goals as tolerated, plan of care reviewed with Edwin Lopez and family, all concerns addressed, will continue to monitor. See flowsheets for vital signs and assessments. See below for updates on progress made.       Integumentary/other: HAPI prevention bundle in place; Last CHG bath: today 1500    POC: stepdown, continue with abx and fluids     Patient progressing towards goals as tolerated, plan of care communicated and reviewed with Edwin Lopez. All concerns addressed. Will continue to monitor.

## 2019-08-29 NOTE — SUBJECTIVE & OBJECTIVE
Interval History: Patient stepped down to hospital medicine today. Complains of weakness in lower extremity.    Review of Systems   Constitutional: Positive for fatigue. Negative for chills and fever.   Eyes: Negative for photophobia and visual disturbance.   Respiratory: Negative for cough, chest tightness and shortness of breath.    Cardiovascular: Negative for chest pain and palpitations.   Gastrointestinal: Negative for abdominal pain, constipation and diarrhea.   Genitourinary: Negative for flank pain and hematuria.   Musculoskeletal: Negative for back pain, neck pain and neck stiffness.   Neurological: Positive for weakness (R. upper and lower ex.). Negative for seizures, light-headedness and headaches.   Psychiatric/Behavioral: Negative for behavioral problems and confusion.     Objective:     Vital Signs (Most Recent):  Temp: 98.1 °F (36.7 °C) (08/29/19 1031)  Pulse: (!) 59 (08/29/19 1056)  Resp: (!) 24 (08/29/19 1031)  BP: 133/87 (08/29/19 1031)  SpO2: 98 % (08/29/19 1031) Vital Signs (24h Range):  Temp:  [98.1 °F (36.7 °C)-100.9 °F (38.3 °C)] 98.1 °F (36.7 °C)  Pulse:  [56-87] 59  Resp:  [18-24] 24  SpO2:  [90 %-99 %] 98 %  BP: (103-156)/(75-93) 133/87     Weight: 68 kg (150 lb)  Body mass index is 22.81 kg/m².    Intake/Output Summary (Last 24 hours) at 8/29/2019 1622  Last data filed at 8/29/2019 0544  Gross per 24 hour   Intake 2410 ml   Output 300 ml   Net 2110 ml      Physical Exam   Constitutional: No distress.   HENT:   Head: Normocephalic and atraumatic.   Eyes: Pupils are equal, round, and reactive to light. Right eye exhibits no discharge. Left eye exhibits no discharge. No scleral icterus.   Neck: Neck supple. No thyromegaly present.   Cardiovascular: Regular rhythm and normal heart sounds. Tachycardia present.   Pulmonary/Chest: Effort normal and breath sounds normal. No respiratory distress.   Abdominal: Soft. Bowel sounds are normal. He exhibits no distension. There is no tenderness.    Musculoskeletal: He exhibits no edema.   Neurological: He is alert. He exhibits abnormal muscle tone (weakness in upper and lower R. extremity).   Skin: Skin is warm and dry. He is not diaphoretic.   Nursing note and vitals reviewed.      Significant Labs:   Blood Culture:   Recent Labs   Lab 08/27/19  1756 08/27/19  2017   LABBLOO No Growth to date  No Growth to date Gram stain aer bottle: Gram positive cocci in chains resembling Strep   Gram stain aer bottle: Gram positive cocci in clusters resembling Staph   Gram stain isael bottle: Gram positive cocci in chains resembling Strep   Results called to and read back by:Joanne Rogel RN  08/28/2019  09:38  ENTEROCOCCUS FAECALIS  Susceptibility pending  *  COAGULASE-NEGATIVE STAPHYLOCOCCUS SPECIES  Susceptibility testing not routinely performed.  *     CBC:   Recent Labs   Lab 08/27/19  1755 08/28/19  0509 08/29/19  0804   WBC 3.26* 8.11 12.59   HGB 15.1 15.9 12.4*   HCT 49.5 51.4 42.1    132* 125*     CMP:   Recent Labs   Lab 08/28/19  0140 08/28/19  0815 08/29/19  0422    143  143 142   K 4.7 4.2  4.2 3.9   * 113*  113* 109   CO2 14* 21*  21* 22*   * 187*  187* 107   BUN 23 21  21 12   CREATININE 1.4 1.2  1.2 0.9   CALCIUM 8.6* 8.2*  8.2* 8.0*   PROT  --  6.0  6.0 5.8*   ALBUMIN  --  2.7*  2.7* 2.4*   BILITOT  --  1.7*  1.7* 1.5*   ALKPHOS  --  57  57 74   AST  --  172*  172* 157*   ALT  --  62*  62* 60*   ANIONGAP 14 9  9 11   EGFRNONAA 50.9* >60.0  >60.0 >60.0       Significant Imaging: I have reviewed and interpreted all pertinent imaging results/findings within the past 24 hours.

## 2019-08-29 NOTE — PLAN OF CARE
Problem: Occupational Therapy Goal  Goal: Occupational Therapy Goal  Goals to be met by: 9/12     Patient will increase functional independence with ADLs by performing:    Supine<>Sit with bed rail and CGA.  UE Dressing while seated EOB with Set-up Assistance and Contact Guard Assistance.  LE Dressing (pants, brief) with Minimal Assistance.  Grooming while standing with Minimal Assistance.  Toileting from bedside commode with Minimal Assistance for hygiene and clothing management.   Toilet transfer to bedside commode with Minimal Assistance.    Outcome: Ongoing (interventions implemented as appropriate)  OT eval completed. rec SNF at this time pending progression in care. Pt demo > R LE weakness and need for assist with bed mobility, dressing, ADLs, transfers & mobility from his baseline. Notified RN. Will follow up 3x/w in acute setting to address stated goals. NOHEMI Reveles 8/29/2019

## 2019-08-29 NOTE — ASSESSMENT & PLAN NOTE
With AMS on admission. Admitted to ICU for further monitoring. Likely 2/2 to Sepsis  Plan:  - Now resolved  -neuro checks q4hrs

## 2019-08-29 NOTE — ASSESSMENT & PLAN NOTE
Blood cultures 8/27 with Enterococcus Faecalis and Coagulase negative staph  - Follow up repeat blood cultures  - Continue Vancomycin and Zosyn. Deescalate pending susceptibilities

## 2019-08-30 LAB
ALBUMIN SERPL BCP-MCNC: 2.3 G/DL (ref 3.5–5.2)
ALP SERPL-CCNC: 66 U/L (ref 55–135)
ALT SERPL W/O P-5'-P-CCNC: 52 U/L (ref 10–44)
ANION GAP SERPL CALC-SCNC: 8 MMOL/L (ref 8–16)
AST SERPL-CCNC: 109 U/L (ref 10–40)
BASOPHILS # BLD AUTO: 0.04 K/UL (ref 0–0.2)
BASOPHILS NFR BLD: 0.4 % (ref 0–1.9)
BILIRUB DIRECT SERPL-MCNC: 0.5 MG/DL (ref 0.1–0.3)
BILIRUB SERPL-MCNC: 1 MG/DL (ref 0.1–1)
BUN SERPL-MCNC: 10 MG/DL (ref 8–23)
CALCIUM SERPL-MCNC: 8.1 MG/DL (ref 8.7–10.5)
CHLORIDE SERPL-SCNC: 109 MMOL/L (ref 95–110)
CK SERPL-CCNC: 3538 U/L (ref 20–200)
CO2 SERPL-SCNC: 24 MMOL/L (ref 23–29)
CREAT SERPL-MCNC: 0.9 MG/DL (ref 0.5–1.4)
DIFFERENTIAL METHOD: ABNORMAL
EOSINOPHIL # BLD AUTO: 0.2 K/UL (ref 0–0.5)
EOSINOPHIL NFR BLD: 1.3 % (ref 0–8)
ERYTHROCYTE [DISTWIDTH] IN BLOOD BY AUTOMATED COUNT: 13.2 % (ref 11.5–14.5)
EST. GFR  (AFRICAN AMERICAN): >60 ML/MIN/1.73 M^2
EST. GFR  (NON AFRICAN AMERICAN): >60 ML/MIN/1.73 M^2
GLUCOSE SERPL-MCNC: 111 MG/DL (ref 70–110)
HCT VFR BLD AUTO: 35.6 % (ref 40–54)
HGB BLD-MCNC: 11.2 G/DL (ref 14–18)
IMM GRANULOCYTES # BLD AUTO: 0.09 K/UL (ref 0–0.04)
IMM GRANULOCYTES NFR BLD AUTO: 0.8 % (ref 0–0.5)
LYMPHOCYTES # BLD AUTO: 2.1 K/UL (ref 1–4.8)
LYMPHOCYTES NFR BLD: 18.3 % (ref 18–48)
MAGNESIUM SERPL-MCNC: 1.5 MG/DL (ref 1.6–2.6)
MCH RBC QN AUTO: 26 PG (ref 27–31)
MCHC RBC AUTO-ENTMCNC: 31.5 G/DL (ref 32–36)
MCV RBC AUTO: 83 FL (ref 82–98)
MONOCYTES # BLD AUTO: 0.8 K/UL (ref 0.3–1)
MONOCYTES NFR BLD: 7 % (ref 4–15)
NEUTROPHILS # BLD AUTO: 8.2 K/UL (ref 1.8–7.7)
NEUTROPHILS NFR BLD: 72.2 % (ref 38–73)
NRBC BLD-RTO: 0 /100 WBC
PHOSPHATE SERPL-MCNC: 1.9 MG/DL (ref 2.7–4.5)
PLATELET # BLD AUTO: 158 K/UL (ref 150–350)
PMV BLD AUTO: 11.5 FL (ref 9.2–12.9)
POCT GLUCOSE: 150 MG/DL (ref 70–110)
POCT GLUCOSE: 151 MG/DL (ref 70–110)
POCT GLUCOSE: 190 MG/DL (ref 70–110)
POCT GLUCOSE: 97 MG/DL (ref 70–110)
POTASSIUM SERPL-SCNC: 3.4 MMOL/L (ref 3.5–5.1)
PROT SERPL-MCNC: 5.7 G/DL (ref 6–8.4)
RBC # BLD AUTO: 4.31 M/UL (ref 4.6–6.2)
SODIUM SERPL-SCNC: 141 MMOL/L (ref 136–145)
VANCOMYCIN TROUGH SERPL-MCNC: 6.4 UG/ML (ref 10–22)
WBC # BLD AUTO: 11.38 K/UL (ref 3.9–12.7)

## 2019-08-30 PROCEDURE — 80202 ASSAY OF VANCOMYCIN: CPT

## 2019-08-30 PROCEDURE — 92526 ORAL FUNCTION THERAPY: CPT

## 2019-08-30 PROCEDURE — 25000003 PHARM REV CODE 250: Performed by: STUDENT IN AN ORGANIZED HEALTH CARE EDUCATION/TRAINING PROGRAM

## 2019-08-30 PROCEDURE — 63600175 PHARM REV CODE 636 W HCPCS: Performed by: INTERNAL MEDICINE

## 2019-08-30 PROCEDURE — 30200315 PPD INTRADERMAL TEST REV CODE 302: Performed by: HOSPITALIST

## 2019-08-30 PROCEDURE — 82550 ASSAY OF CK (CPK): CPT

## 2019-08-30 PROCEDURE — 80076 HEPATIC FUNCTION PANEL: CPT

## 2019-08-30 PROCEDURE — 83735 ASSAY OF MAGNESIUM: CPT

## 2019-08-30 PROCEDURE — 11000001 HC ACUTE MED/SURG PRIVATE ROOM

## 2019-08-30 PROCEDURE — 25500020 PHARM REV CODE 255: Performed by: HOSPITALIST

## 2019-08-30 PROCEDURE — 84100 ASSAY OF PHOSPHORUS: CPT

## 2019-08-30 PROCEDURE — 63600175 PHARM REV CODE 636 W HCPCS: Performed by: HOSPITALIST

## 2019-08-30 PROCEDURE — 85025 COMPLETE CBC W/AUTO DIFF WBC: CPT

## 2019-08-30 PROCEDURE — 86580 TB INTRADERMAL TEST: CPT | Performed by: HOSPITALIST

## 2019-08-30 PROCEDURE — A9585 GADOBUTROL INJECTION: HCPCS | Performed by: HOSPITALIST

## 2019-08-30 PROCEDURE — 80048 BASIC METABOLIC PNL TOTAL CA: CPT

## 2019-08-30 PROCEDURE — 63600175 PHARM REV CODE 636 W HCPCS: Performed by: STUDENT IN AN ORGANIZED HEALTH CARE EDUCATION/TRAINING PROGRAM

## 2019-08-30 PROCEDURE — 99223 PR INITIAL HOSPITAL CARE,LEVL III: ICD-10-PCS | Mod: AI,GC,, | Performed by: HOSPITALIST

## 2019-08-30 PROCEDURE — 36415 COLL VENOUS BLD VENIPUNCTURE: CPT

## 2019-08-30 PROCEDURE — 97535 SELF CARE MNGMENT TRAINING: CPT

## 2019-08-30 PROCEDURE — 99223 1ST HOSP IP/OBS HIGH 75: CPT | Mod: AI,GC,, | Performed by: HOSPITALIST

## 2019-08-30 RX ORDER — MULTIVIT WITH MINERALS/HERBS
1 TABLET ORAL DAILY
COMMUNITY

## 2019-08-30 RX ORDER — MAGNESIUM SULFATE HEPTAHYDRATE 40 MG/ML
2 INJECTION, SOLUTION INTRAVENOUS ONCE
Status: COMPLETED | OUTPATIENT
Start: 2019-08-30 | End: 2019-08-30

## 2019-08-30 RX ORDER — POTASSIUM CHLORIDE 20 MEQ/1
40 TABLET, EXTENDED RELEASE ORAL ONCE
Status: COMPLETED | OUTPATIENT
Start: 2019-08-30 | End: 2019-08-30

## 2019-08-30 RX ORDER — SODIUM,POTASSIUM PHOSPHATES 280-250MG
2 POWDER IN PACKET (EA) ORAL
Status: COMPLETED | OUTPATIENT
Start: 2019-08-30 | End: 2019-08-30

## 2019-08-30 RX ORDER — CLOPIDOGREL BISULFATE 75 MG/1
75 TABLET ORAL DAILY
Status: ON HOLD | COMMUNITY
End: 2019-09-04 | Stop reason: HOSPADM

## 2019-08-30 RX ORDER — VANCOMYCIN HCL IN 5 % DEXTROSE 1G/250ML
1000 PLASTIC BAG, INJECTION (ML) INTRAVENOUS
Status: DISCONTINUED | OUTPATIENT
Start: 2019-08-30 | End: 2019-08-31

## 2019-08-30 RX ORDER — GADOBUTROL 604.72 MG/ML
7 INJECTION INTRAVENOUS
Status: COMPLETED | OUTPATIENT
Start: 2019-08-30 | End: 2019-08-30

## 2019-08-30 RX ADMIN — POTASSIUM & SODIUM PHOSPHATES POWDER PACK 280-160-250 MG 2 PACKET: 280-160-250 PACK at 11:08

## 2019-08-30 RX ADMIN — PIPERACILLIN AND TAZOBACTAM 4.5 G: 4; .5 INJECTION, POWDER, LYOPHILIZED, FOR SOLUTION INTRAVENOUS; PARENTERAL at 11:08

## 2019-08-30 RX ADMIN — GADOBUTROL 7 ML: 604.72 INJECTION INTRAVENOUS at 12:08

## 2019-08-30 RX ADMIN — POTASSIUM & SODIUM PHOSPHATES POWDER PACK 280-160-250 MG 2 PACKET: 280-160-250 PACK at 04:08

## 2019-08-30 RX ADMIN — LEVETIRACETAM 1000 MG: 500 TABLET, FILM COATED ORAL at 08:08

## 2019-08-30 RX ADMIN — ENOXAPARIN SODIUM 40 MG: 100 INJECTION SUBCUTANEOUS at 04:08

## 2019-08-30 RX ADMIN — POTASSIUM CHLORIDE 40 MEQ: 20 TABLET, EXTENDED RELEASE ORAL at 10:08

## 2019-08-30 RX ADMIN — POTASSIUM & SODIUM PHOSPHATES POWDER PACK 280-160-250 MG 2 PACKET: 280-160-250 PACK at 08:08

## 2019-08-30 RX ADMIN — PIPERACILLIN AND TAZOBACTAM 4.5 G: 4; .5 INJECTION, POWDER, LYOPHILIZED, FOR SOLUTION INTRAVENOUS; PARENTERAL at 08:08

## 2019-08-30 RX ADMIN — VANCOMYCIN HYDROCHLORIDE 1000 MG: 1 INJECTION, POWDER, LYOPHILIZED, FOR SOLUTION INTRAVENOUS at 04:08

## 2019-08-30 RX ADMIN — LEVETIRACETAM 1000 MG: 500 TABLET, FILM COATED ORAL at 10:08

## 2019-08-30 RX ADMIN — MAGNESIUM SULFATE IN WATER 2 G: 40 INJECTION, SOLUTION INTRAVENOUS at 10:08

## 2019-08-30 RX ADMIN — VANCOMYCIN HYDROCHLORIDE 1250 MG: 1.25 INJECTION, POWDER, LYOPHILIZED, FOR SOLUTION INTRAVENOUS at 05:08

## 2019-08-30 RX ADMIN — PIPERACILLIN AND TAZOBACTAM 4.5 G: 4; .5 INJECTION, POWDER, LYOPHILIZED, FOR SOLUTION INTRAVENOUS; PARENTERAL at 03:08

## 2019-08-30 RX ADMIN — TUBERCULIN PURIFIED PROTEIN DERIVATIVE 5 UNITS: 5 INJECTION, SOLUTION INTRADERMAL at 01:08

## 2019-08-30 NOTE — PROGRESS NOTES
MRI complete, patient tolerated MRI well, patient transferred back to hospital bed without difficulty, telemetry monitor reapplied, patient awaiting transport back to his room.

## 2019-08-30 NOTE — PLAN OF CARE
08/30/19 1444   Post-Acute Status   Post-Acute Authorization Placement   Post-Acute Placement Status Referrals Sent   Discharge Delays None known at this time   Referrals sent.  PASRR and LOCET complete and sent in for 142.  PASRR loaded into Sverve.

## 2019-08-30 NOTE — PROGRESS NOTES
Pharmacokinetic Assessment Follow Up: IV Vancomycin    Vancomycin serum concentration assessment(s):    · The trough level of 6.4mcg/mL was drawn correctly and can be used to guide therapy at this time.   · The measurement is below the desired definitive target range of  15-20mcg/mL.  · Renal function has been improving from a SCr of 1.4 on 08/27 to 0.9 on 08/29, which likely led to increased vancomycin clearance.     Vancomycin Regimen Plan:    · Increase regimen to Vancomycin 1000mg IV every 12 hours with next serum trough concentration measured at 16:30h prior to the fourth dose of the new regimen on 08/31/19 (including this morning's 1250mg dose).    Drug levels (last 3 results):  Recent Labs   Lab Result Units 08/28/19  0606 08/30/19  0507   Vancomycin, Random ug/mL 8.6  --    Vancomycin-Trough ug/mL  --  6.4*       Pharmacy will continue to follow and monitor vancomycin.    Please contact pharmacy at extension [phone] for questions regarding this assessment.    Thank you for the consult,   Maria Del Rosario Healy, PharmD, BCPS, Internal Medicine Clinical Pharmacy Specialist  EXT [phone]       Patient brief summary:  Edwin Lopez is a 69 y.o. male initiated on antimicrobial therapy with IV Vancomycin for treatment of bacteremia    Drug Allergies:   Review of patient's allergies indicates:  No Known Allergies    Actual Body Weight:   68    Renal Function:   Estimated Creatinine Clearance: 74.5 mL/min (based on SCr of 0.9 mg/dL).,     Dialysis Method (if applicable):  No dialysis     CBC (last 72 hours):  Recent Labs   Lab Result Units 08/27/19  1755 08/27/19  2155 08/28/19  0509 08/29/19  0804 08/30/19  0508   WBC K/uL 3.26*  --  8.11 12.59 11.38   Hemoglobin g/dL 15.1  --  15.9 12.4* 11.2*   Hemoglobin A1C %  --  6.4*  --   --   --    Hematocrit % 49.5  --  51.4 42.1 35.6*   Platelets K/uL 186  --  132* 125* 158   Gran% % 78.6*  --  77.3* 74.9* 72.2   Lymph% % 15.6*  --  14.1* 16.0* 18.3   Mono% % 5.5  --  7.6 7.8  7.0   Eosinophil% % 0.0  --  0.0 0.5 1.3   Basophil% % 0.0  --  0.5 0.3 0.4   Differential Method  Automated  --  Automated Automated Automated       Metabolic Panel (last 72 hours):  Recent Labs   Lab Result Units 08/27/19  1755 08/28/19  0140 08/28/19  0815 08/29/19  0422 08/30/19  0507 08/30/19  0508   Sodium mmol/L 145 145 143  143 142 141  --    Potassium mmol/L 4.1 4.7 4.2  4.2 3.9 3.4*  --    Chloride mmol/L 113* 117* 113*  113* 109 109  --    CO2 mmol/L 16* 14* 21*  21* 22* 24  --    Glucose mg/dL 128* 123* 187*  187* 107 111*  --    BUN, Bld mg/dL 25* 23 21  21 12 10  --    Creatinine mg/dL 1.4 1.4 1.2  1.2 0.9 0.9  --    Albumin g/dL  --   --  2.7*  2.7* 2.4*  --  2.3*   Total Bilirubin mg/dL  --   --  1.7*  1.7* 1.5*  --  1.0   Alkaline Phosphatase U/L  --   --  57  57 74  --  66   AST U/L  --   --  172*  172* 157*  --  109*   ALT U/L  --   --  62*  62* 60*  --  52*   Magnesium mg/dL  --   --  1.7 1.6 1.5*  --    Phosphorus mg/dL  --   --  2.5* 1.3* 1.9*  --        Vancomycin Administrations:  vancomycin given in the last 96 hours                   vancomycin 1.25 g in dextrose 5% 250 mL IVPB (ready to mix) (mg) 1,250 mg New Bag 08/30/19 0513     1,250 mg New Bag 08/29/19 0544                Microbiologic Results:  Microbiology Results (last 7 days)     Procedure Component Value Units Date/Time    Blood culture [[id number]] Collected:  08/29/19 1000    Order Status:  Completed Specimen:  Blood Updated:  08/30/19 1212     Blood Culture, Routine No Growth to date      No Growth to date    Blood culture [[id number]] Collected:  08/29/19 1000    Order Status:  Completed Specimen:  Blood Updated:  08/30/19 1212     Blood Culture, Routine No Growth to date      No Growth to date    Blood culture [[id number]]  (Abnormal) Collected:  08/27/19 2017    Order Status:  Completed Specimen:  Blood from Peripheral, Hand, Right Updated:  08/30/19 1103     Blood Culture, Routine Gram stain aer bottle: Gram  positive cocci in chains resembling Strep       Gram stain aer bottle: Gram positive cocci in clusters resembling Staph       Gram stain isael bottle: Gram positive cocci in chains resembling Strep       Results called to and read back by:Joanne Rogel RN  08/28/2019  09:38      ENTEROCOCCUS FAECALIS  Susceptibility pending        COAGULASE-NEGATIVE STAPHYLOCOCCUS SPECIES  Susceptibility testing not routinely performed.      Blood culture [[id number]] Collected:  08/27/19 1756    Order Status:  Completed Specimen:  Blood from Peripheral, Forearm, Left Updated:  08/29/19 2212     Blood Culture, Routine No Growth to date      No Growth to date      No Growth to date    Urine culture [[id number]]  (Abnormal)  (Susceptibility) Collected:  08/27/19 1515    Order Status:  Completed Specimen:  Urine Updated:  08/29/19 2033     Urine Culture, Routine KLEBSIELLA PNEUMONIAE  >100,000 cfu/ml      Narrative:       Preferred Collection Type->Urine, Clean Catch  add on [facility] #[id number] per Edison Osuna MD @ 21:16  08/27/2019

## 2019-08-30 NOTE — PLAN OF CARE
Problem: Occupational Therapy Goal  Goal: Occupational Therapy Goal  Goals to be met by: 9/12     Patient will increase functional independence with ADLs by performing:    Supine<>Sit with bed rail and CGA.  UE Dressing while seated EOB with Set-up Assistance and Contact Guard Assistance.  LE Dressing (pants, brief) with Minimal Assistance.  Grooming while standing with Minimal Assistance.  Toileting from bedside commode with Minimal Assistance for hygiene and clothing management.   Toilet transfer to bedside commode with Minimal Assistance.     Outcome: Ongoing (interventions implemented as appropriate)  Pt goals remain appropriate, continue w/ OT POC.

## 2019-08-30 NOTE — PROGRESS NOTES
patient here for MRI, patient transferred to MRI table without difficulty, MRI safe cardiac monitor and O2 monitor applied, NAD noted at this time, will continue to monitor.

## 2019-08-30 NOTE — PLAN OF CARE
08/30/19 1654   Discharge Reassessment   Assessment Type Discharge Planning Reassessment   Provided patient/caregiver education on the expected discharge date and the discharge plan Yes   Do you have any problems affording any of your prescribed medications? No   Discharge Plan A Skilled Nursing Facility   Discharge Plan B Home Health   DME Needed Upon Discharge  other (see comments)  (TBD)   Anticipated Discharge Disposition SNF   Post-Acute Status   Post-Acute Authorization Placement   Post-Acute Placement Status Referrals Sent   Discharge Delays None known at this time

## 2019-08-30 NOTE — PT/OT/SLP PROGRESS
Speech Language Pathology Treatment    Patient Name:  Edwin Lopez   MRN:  [id number]  Admitting Diagnosis: Altered mental status    Recommendations:                 General Recommendations:  Dysphagia therapy  Diet recommendations:  Mechanical soft, Liquid Diet Level: Nectar Thick   Aspiration Precautions: 1 bite/sip at a time, Assistance with thickening liquids, Meds crushed in puree and Standard aspiration precautions   General Precautions: Standard, seizure, fall  Communication strategies:  none    Subjective     Pt awake alert     Pain/Comfort:  · Pain Rating 1: 0/10  · Pain Rating Post-Intervention 1: 0/10    Objective:     Has the patient been evaluated by SLP for swallowing?   Yes  Keep patient NPO? No   Current Respiratory Status: room air      Upon arrival pt upright in bed and observed to consume AM meal. Liquids appropriately thickened at the bedside and pt managing single cup sips of nectar thick liquids and purees without overt clinical signs of aspiration. Pt also observed to manage trials of regular solids. Pt with prolonged yet functional chewing patterns. Pt appearing safe to advance to mechanical soft solids and continue with nectar thickened liquids. SLP discussed with pt diet advancement and pt very appreciative. SLP updated whiteboard and discussed with medcial team to place correct diet order in the system. All parties demonstrating understanding and agreement and SLP to continue to monitor.     Assessment:     Edwin Lopez is a 69 y.o. male with an SLP diagnosis of Dysphagia.     Goals:   Multidisciplinary Problems     SLP Goals        Problem: SLP Goal    Goal Priority Disciplines Outcome   SLP Goal     SLP Ongoing (interventions implemented as appropriate)   Description:  Speech Language Pathology Goals  Goals expected to be met by 9/6    1. Pt will tolerate diet of nectar thick liquids and mechanical soft solids without overt clinical signs of aspiration   2. Pt will participate in trials  of  thin liquids within speech therapy sessions to help determine least restrictive diet                            Plan:     · Patient to be seen:  3 x/week   · Plan of Care expires:  09/27/19  · Plan of Care reviewed with:  patient   · SLP Follow-Up:  Yes       Discharge recommendations:  nursing facility, skilled   Barriers to Discharge:  None per SLP     Time Tracking:     SLP Treatment Date:   08/30/19  Speech Start Time:  0845  Speech Stop Time:  0857     Speech Total Time (min):  12 min    Billable Minutes: Treatment Swallowing Dysfunction 12    Rachana Coats CCC-SLP  08/30/2019

## 2019-08-30 NOTE — PLAN OF CARE
Problem: SLP Goal  Goal: SLP Goal  Speech Language Pathology Goals  Goals expected to be met by 9/6    1. Pt will tolerate diet of nectar thick liquids and mechanical soft solids without overt clinical signs of aspiration   2. Pt will participate in trials of  thin liquids within speech therapy sessions to help determine least restrictive diet            Pt appearing safe for diet upgrade to mechanical soft solids and nectar thick liquids    Rachana Coats MS, CCC-SLP  Speech Language Pathologist  Pager: [phone]  Date 8/30/2019

## 2019-08-30 NOTE — PHARMACY MED REC
"Admission Medication Reconciliation - Pharmacy Consult Note    The home medication history was taken by Janett Colón, Pharmacy Technician.  Based on information gathered and subsequent review by the clinical pharmacist, the items below may need attention.     You may go to "Admission" then "Reconcile Home Medications" tabs to review and/or act upon these items.     Potentially problematic discrepancies with current MAR  o Patient IS taking the following which was not ordered upon admit  o Clopidogrel 75mg by mouth daily   - Unclear indication for DAPT (stroke in 2012) may need to be reassed  o Losartan 25mg by mouth daily     Please address this information as you see fit.  Feel free to contact us if you have any questions or require assistance.    Maria Del Rosario Healy, PharmD, BCPS, Internal Medicine Clinical Pharmacy Specialist  EXT [phone]                    .    .            "

## 2019-08-30 NOTE — PT/OT/SLP PROGRESS
Occupational Therapy   Treatment    Name: Edwin Lopez  MRN: [id number]  Admitting Diagnosis:  Altered mental status       Recommendations:     Discharge Recommendations: nursing facility, skilled  Discharge Equipment Recommendations:  (TBD)  Barriers to discharge:  Decreased caregiver support    Assessment:     Edwin Lopez is a 69 y.o. male with a medical diagnosis of Altered mental status.  He presents with RS edgardo-paresis that limits his functional performance, but his orientation has improved today. Performance deficits affecting function are weakness, impaired endurance, impaired self care skills, impaired functional mobilty, impaired balance, decreased upper extremity function, decreased lower extremity function, impaired coordination, impaired fine motor, impaired skin, edema, impaired cardiopulmonary response to activity.     Rehab Prognosis:  Fair; patient would benefit from acute skilled OT services to address these deficits and reach maximum level of function.       Plan:     Patient to be seen 3 x/week to address the above listed problems via self-care/home management, therapeutic activities, therapeutic exercises, neuromuscular re-education, cognitive retraining  · Plan of Care Expires: 09/27/19  · Plan of Care Reviewed with: patient    Subjective     Pain/Comfort:  · Pain Rating 1: 0/10  · Pain Rating Post-Intervention 1: 0/10    Objective:     Communicated with: Rn prior to session.  Patient found HOB elevated with Condom Catheter, telemetry, pulse ox (continuous), peripheral IV upon OT entry to room.    General Precautions: Standard, seizure, fall   Orthopedic Precautions:N/A   Braces: N/A     Occupational Performance:     Bed Mobility:    · Patient completed Rolling/Turning to Left with  maximal assistance  · Patient completed Rolling/Turning to Right with maximal assistance  · Patient completed Scooting/Bridging with maximal assistance  · Patient completed Supine to Sit with maximal  assistance  · Patient completed Sit to Supine with maximal assistance     Functional Mobility/Transfers:  · Patient completed Sit <> Stand Transfer with total assistance and of 1 persons  with  hand-held assist   · Patient completed Toilet Transfer Squat Pivot technique with total assistance and of 1 persons with  hand-held assist  · Functional Mobility: Able to sit up on BSC w/ supervision.     Activities of Daily Living:  · Lower Body Dressing: maximal assistance doning sock at EOB  · Toileting: moderate assistance seated on BSC w/ assistance w/ weight shift to RS to wipe and setup.       [facility] 6 Click ADL: 15    Treatment & Education:  -Pt edu on OT role/POC  -Importance of OOB activity with staff assistance  -Safety during functional t/f and mobility  - White board updated  - Multiple self care tasks/functional mobility completed-- assistance level noted above  - All questions/concerns answered within OT scope of practice      Patient left HOB elevated with all lines intact, call button in reach and Rn notifiedEducation:      GOALS:   Multidisciplinary Problems     Occupational Therapy Goals        Problem: Occupational Therapy Goal    Goal Priority Disciplines Outcome Interventions   Occupational Therapy Goal     OT, PT/OT Ongoing (interventions implemented as appropriate)    Description:  Goals to be met by: 9/12     Patient will increase functional independence with ADLs by performing:    Supine<>Sit with bed rail and CGA.  UE Dressing while seated EOB with Set-up Assistance and Contact Guard Assistance.  LE Dressing (pants, brief) with Minimal Assistance.  Grooming while standing with Minimal Assistance.  Toileting from bedside commode with Minimal Assistance for hygiene and clothing management.   Toilet transfer to bedside commode with Minimal Assistance.                      Time Tracking:     OT Date of Treatment: 08/30/19  OT Start Time: 1512  OT Stop Time: 1546  OT Total Time (min): 34 min    Billable  Minutes:Self Care/Home Management 34 mins    Sukhwinder Richardson, OT  8/30/2019

## 2019-08-30 NOTE — PLAN OF CARE
Problem: Adult Inpatient Plan of Care  Goal: Plan of Care Review  Outcome: Ongoing (interventions implemented as appropriate)  Pt AAOx4 and VSS. Pt is progressing with plan of care. Free of skin breakdown as the pt positioned/repositioned per MD order. Condom catheter in place. No complain of pain at this time. Frequent rounds made to assess pain and safety and no complaints at this time noted. Side rails up x 2. Bed locked. Call light within reach. No falls noted. Will continue to monitor.

## 2019-08-30 NOTE — PLAN OF CARE
CM met with Mr. Lopez to discuss his discharge. PT/OT are recommending SNF at this time. Patient is agreeable to SNF placement at this time. He would like referrals sent to NH/SNF's near his home. IM4 team and SW notified of above conversation. Will continue to follow.    Malena Tanner RN  Ext [phone]

## 2019-08-31 LAB
ALBUMIN SERPL BCP-MCNC: 2.2 G/DL (ref 3.5–5.2)
ALP SERPL-CCNC: 56 U/L (ref 55–135)
ALT SERPL W/O P-5'-P-CCNC: 53 U/L (ref 10–44)
ANION GAP SERPL CALC-SCNC: 8 MMOL/L (ref 8–16)
AST SERPL-CCNC: 75 U/L (ref 10–40)
BACTERIA BLD CULT: ABNORMAL
BASOPHILS # BLD AUTO: 0.04 K/UL (ref 0–0.2)
BASOPHILS NFR BLD: 0.4 % (ref 0–1.9)
BILIRUB DIRECT SERPL-MCNC: 0.3 MG/DL (ref 0.1–0.3)
BILIRUB SERPL-MCNC: 0.6 MG/DL (ref 0.1–1)
BILIRUB SERPL-MCNC: 0.7 MG/DL (ref 0.1–1)
BUN SERPL-MCNC: 9 MG/DL (ref 8–23)
CALCIUM SERPL-MCNC: 8.3 MG/DL (ref 8.7–10.5)
CHLORIDE SERPL-SCNC: 104 MMOL/L (ref 95–110)
CHOLEST SERPL-MCNC: 138 MG/DL (ref 120–199)
CHOLEST/HDLC SERPL: 3.1 {RATIO} (ref 2–5)
CK SERPL-CCNC: 2263 U/L (ref 20–200)
CO2 SERPL-SCNC: 28 MMOL/L (ref 23–29)
CREAT SERPL-MCNC: 0.9 MG/DL (ref 0.5–1.4)
DIFFERENTIAL METHOD: ABNORMAL
EOSINOPHIL # BLD AUTO: 0.2 K/UL (ref 0–0.5)
EOSINOPHIL NFR BLD: 2 % (ref 0–8)
ERYTHROCYTE [DISTWIDTH] IN BLOOD BY AUTOMATED COUNT: 13.2 % (ref 11.5–14.5)
EST. GFR  (AFRICAN AMERICAN): >60 ML/MIN/1.73 M^2
EST. GFR  (NON AFRICAN AMERICAN): >60 ML/MIN/1.73 M^2
GLUCOSE SERPL-MCNC: 149 MG/DL (ref 70–110)
HAPTOGLOB SERPL-MCNC: 309 MG/DL (ref 30–250)
HCT VFR BLD AUTO: 35.7 % (ref 40–54)
HDLC SERPL-MCNC: 44 MG/DL (ref 40–75)
HDLC SERPL: 31.9 % (ref 20–50)
HGB BLD-MCNC: 10.5 G/DL (ref 14–18)
IMM GRANULOCYTES # BLD AUTO: 0.11 K/UL (ref 0–0.04)
IMM GRANULOCYTES NFR BLD AUTO: 1.1 % (ref 0–0.5)
IRON SERPL-MCNC: 19 UG/DL (ref 45–160)
LDH SERPL L TO P-CCNC: 393 U/L (ref 110–260)
LDLC SERPL CALC-MCNC: 77.8 MG/DL (ref 63–159)
LYMPHOCYTES # BLD AUTO: 2.1 K/UL (ref 1–4.8)
LYMPHOCYTES NFR BLD: 21.2 % (ref 18–48)
MAGNESIUM SERPL-MCNC: 1.8 MG/DL (ref 1.6–2.6)
MCH RBC QN AUTO: 25.4 PG (ref 27–31)
MCHC RBC AUTO-ENTMCNC: 29.4 G/DL (ref 32–36)
MCV RBC AUTO: 86 FL (ref 82–98)
MONOCYTES # BLD AUTO: 1.1 K/UL (ref 0.3–1)
MONOCYTES NFR BLD: 11.3 % (ref 4–15)
NEUTROPHILS # BLD AUTO: 6.3 K/UL (ref 1.8–7.7)
NEUTROPHILS NFR BLD: 64 % (ref 38–73)
NONHDLC SERPL-MCNC: 94 MG/DL
NRBC BLD-RTO: 0 /100 WBC
PHOSPHATE SERPL-MCNC: 3 MG/DL (ref 2.7–4.5)
PLATELET # BLD AUTO: 168 K/UL (ref 150–350)
PMV BLD AUTO: 11 FL (ref 9.2–12.9)
POCT GLUCOSE: 121 MG/DL (ref 70–110)
POCT GLUCOSE: 126 MG/DL (ref 70–110)
POCT GLUCOSE: 159 MG/DL (ref 70–110)
POCT GLUCOSE: 195 MG/DL (ref 70–110)
POTASSIUM SERPL-SCNC: 4 MMOL/L (ref 3.5–5.1)
PROT SERPL-MCNC: 5.8 G/DL (ref 6–8.4)
RBC # BLD AUTO: 4.14 M/UL (ref 4.6–6.2)
RETICS/RBC NFR AUTO: 0.8 % (ref 0.4–2)
SATURATED IRON: 7 % (ref 20–50)
SODIUM SERPL-SCNC: 140 MMOL/L (ref 136–145)
TOTAL IRON BINDING CAPACITY: 262 UG/DL (ref 250–450)
TRANSFERRIN SERPL-MCNC: 177 MG/DL (ref 200–375)
TRANSFERRIN SERPL-MCNC: 177 MG/DL (ref 200–375)
TRIGL SERPL-MCNC: 81 MG/DL (ref 30–150)
VANCOMYCIN TROUGH SERPL-MCNC: 11.5 UG/ML (ref 10–22)
WBC # BLD AUTO: 9.78 K/UL (ref 3.9–12.7)

## 2019-08-31 PROCEDURE — 82550 ASSAY OF CK (CPK): CPT

## 2019-08-31 PROCEDURE — 25000003 PHARM REV CODE 250: Performed by: HOSPITALIST

## 2019-08-31 PROCEDURE — 83735 ASSAY OF MAGNESIUM: CPT

## 2019-08-31 PROCEDURE — 85025 COMPLETE CBC W/AUTO DIFF WBC: CPT

## 2019-08-31 PROCEDURE — 36415 COLL VENOUS BLD VENIPUNCTURE: CPT

## 2019-08-31 PROCEDURE — 97530 THERAPEUTIC ACTIVITIES: CPT

## 2019-08-31 PROCEDURE — 80076 HEPATIC FUNCTION PANEL: CPT

## 2019-08-31 PROCEDURE — 25000003 PHARM REV CODE 250: Performed by: STUDENT IN AN ORGANIZED HEALTH CARE EDUCATION/TRAINING PROGRAM

## 2019-08-31 PROCEDURE — 99233 PR SUBSEQUENT HOSPITAL CARE,LEVL III: ICD-10-PCS | Mod: GC,,, | Performed by: HOSPITALIST

## 2019-08-31 PROCEDURE — 99233 SBSQ HOSP IP/OBS HIGH 50: CPT | Mod: GC,,, | Performed by: HOSPITALIST

## 2019-08-31 PROCEDURE — 11000001 HC ACUTE MED/SURG PRIVATE ROOM

## 2019-08-31 PROCEDURE — 83010 ASSAY OF HAPTOGLOBIN QUANT: CPT

## 2019-08-31 PROCEDURE — 80061 LIPID PANEL: CPT

## 2019-08-31 PROCEDURE — 82247 BILIRUBIN TOTAL: CPT

## 2019-08-31 PROCEDURE — 80202 ASSAY OF VANCOMYCIN: CPT

## 2019-08-31 PROCEDURE — 63600175 PHARM REV CODE 636 W HCPCS: Performed by: HOSPITALIST

## 2019-08-31 PROCEDURE — 83540 ASSAY OF IRON: CPT

## 2019-08-31 PROCEDURE — 84100 ASSAY OF PHOSPHORUS: CPT

## 2019-08-31 PROCEDURE — 85045 AUTOMATED RETICULOCYTE COUNT: CPT

## 2019-08-31 PROCEDURE — 63600175 PHARM REV CODE 636 W HCPCS: Performed by: STUDENT IN AN ORGANIZED HEALTH CARE EDUCATION/TRAINING PROGRAM

## 2019-08-31 PROCEDURE — 83615 LACTATE (LD) (LDH) ENZYME: CPT

## 2019-08-31 PROCEDURE — 80048 BASIC METABOLIC PNL TOTAL CA: CPT

## 2019-08-31 RX ORDER — LOSARTAN POTASSIUM 25 MG/1
25 TABLET ORAL DAILY
Status: DISCONTINUED | OUTPATIENT
Start: 2019-08-31 | End: 2019-09-04 | Stop reason: HOSPADM

## 2019-08-31 RX ORDER — ASPIRIN 81 MG/1
81 TABLET ORAL DAILY
Status: DISCONTINUED | OUTPATIENT
Start: 2019-08-31 | End: 2019-09-04 | Stop reason: HOSPADM

## 2019-08-31 RX ADMIN — PIPERACILLIN AND TAZOBACTAM 4.5 G: 4; .5 INJECTION, POWDER, LYOPHILIZED, FOR SOLUTION INTRAVENOUS; PARENTERAL at 03:08

## 2019-08-31 RX ADMIN — ASPIRIN 81 MG: 81 TABLET, COATED ORAL at 12:08

## 2019-08-31 RX ADMIN — VANCOMYCIN HYDROCHLORIDE 1250 MG: 1.25 INJECTION, POWDER, LYOPHILIZED, FOR SOLUTION INTRAVENOUS at 10:08

## 2019-08-31 RX ADMIN — VANCOMYCIN HYDROCHLORIDE 1000 MG: 1 INJECTION, POWDER, LYOPHILIZED, FOR SOLUTION INTRAVENOUS at 05:08

## 2019-08-31 RX ADMIN — ENOXAPARIN SODIUM 40 MG: 100 INJECTION SUBCUTANEOUS at 06:08

## 2019-08-31 RX ADMIN — LEVETIRACETAM 1000 MG: 500 TABLET, FILM COATED ORAL at 08:08

## 2019-08-31 RX ADMIN — PIPERACILLIN AND TAZOBACTAM 4.5 G: 4; .5 INJECTION, POWDER, LYOPHILIZED, FOR SOLUTION INTRAVENOUS; PARENTERAL at 08:08

## 2019-08-31 RX ADMIN — LEVETIRACETAM 1000 MG: 500 TABLET, FILM COATED ORAL at 12:08

## 2019-08-31 RX ADMIN — LOSARTAN POTASSIUM 25 MG: 25 TABLET, FILM COATED ORAL at 03:08

## 2019-08-31 RX ADMIN — ACETAMINOPHEN 650 MG: 650 SOLUTION ORAL at 03:08

## 2019-08-31 NOTE — ASSESSMENT & PLAN NOTE
Patient admitted after being found two days after a non-traumatic fall at his home. Labs on admission significant for CPK 23232. Creatinine at 1.6 (changed from February where it was 0.8). K, Phos, and Mag were wnl. He was bolused with 2L of 0.9 NS at the ED.   Plan  - CPK down trending since admission, 3538 today (from 6500)  - monitor for kidney injury

## 2019-08-31 NOTE — SUBJECTIVE & OBJECTIVE
Interval History: No acute overnight events. Patient remains hemodynamically stable. Today he has no complaints; he is looking forward to going to SNF and receiving care. Denies headaches, n/v, chest pain, dyspnea.     Review of Systems   Constitutional: Positive for fatigue. Negative for chills and fever.   HENT: Negative for congestion and sore throat.    Eyes: Negative for photophobia and visual disturbance.   Respiratory: Negative for cough, chest tightness and shortness of breath.    Cardiovascular: Negative for chest pain and palpitations.   Gastrointestinal: Negative for abdominal pain, constipation, diarrhea, nausea and vomiting.   Genitourinary: Negative for flank pain and hematuria.   Musculoskeletal: Negative for back pain, neck pain and neck stiffness.   Skin: Negative for color change and rash.   Neurological: Positive for weakness (R upper and R lower extremities, improved from yesterday. States he has better control of his RLE. ). Negative for seizures, light-headedness and headaches.   Psychiatric/Behavioral: Negative for behavioral problems and confusion.     Objective:     Vital Signs (Most Recent):  Temp: 98.9 °F (37.2 °C) (08/31/19 0500)  Pulse: (!) 59 (08/31/19 0725)  Resp: 18 (08/31/19 0441)  BP: (!) 164/74 (08/31/19 0441)  SpO2: 95 % (08/31/19 0441) Vital Signs (24h Range):  Temp:  [98.3 °F (36.8 °C)-99 °F (37.2 °C)] 98.9 °F (37.2 °C)  Pulse:  [59-81] 59  Resp:  [16-20] 18  SpO2:  [95 %-99 %] 95 %  BP: (127-167)/() 164/74     Weight: 68 kg (150 lb)  Body mass index is 22.81 kg/m².    Intake/Output Summary (Last 24 hours) at 8/31/2019 1149  Last data filed at 8/31/2019 0700  Gross per 24 hour   Intake 560 ml   Output 1650 ml   Net -1090 ml      Physical Exam   Constitutional: He is oriented to person, place, and time. Vital signs are normal. He is cooperative. No distress.   HENT:   Head: Normocephalic and atraumatic.   Eyes: Right eye exhibits no discharge. Left eye exhibits no  discharge. No scleral icterus.   Neck: Neck supple. No thyromegaly present.   Cardiovascular: Regular rhythm and normal heart sounds. Tachycardia present.   Pulmonary/Chest: Effort normal and breath sounds normal. No respiratory distress.   Abdominal: Soft. Bowel sounds are normal. He exhibits no distension. There is no tenderness.   Musculoskeletal: He exhibits no edema.   Neurological: He is alert and oriented to person, place, and time. He displays no seizure activity.   Strength still reduced on R upper and R lower extremities but improved from yesterday.   Skin: Skin is warm and dry. He is not diaphoretic.   Nursing note and vitals reviewed.      Significant Labs:   Blood Culture:   No results for input(s): LABBLOO in the last 48 hours.  CBC:   Recent Labs   Lab 08/30/19  0508 08/31/19  0826   WBC 11.38 9.78   HGB 11.2* 10.5*   HCT 35.6* 35.7*    168     CMP:   Recent Labs   Lab 08/30/19  0507 08/30/19  0508 08/31/19  0826 08/31/19  1036     --  140  --    K 3.4*  --  4.0  --      --  104  --    CO2 24  --  28  --    *  --  149*  --    BUN 10  --  9  --    CREATININE 0.9  --  0.9  --    CALCIUM 8.1*  --  8.3*  --    PROT  --  5.7* 5.8*  --    ALBUMIN  --  2.3* 2.2*  --    BILITOT  --  1.0 0.7 0.6   ALKPHOS  --  66 56  --    AST  --  109* 75*  --    ALT  --  52* 53*  --    ANIONGAP 8  --  8  --    EGFRNONAA >60.0  --  >60.0  --        Significant Imaging: I have reviewed and interpreted all pertinent imaging results/findings within the past 24 hours.

## 2019-08-31 NOTE — ASSESSMENT & PLAN NOTE
With AMS on admission. Admitted to ICU for further monitoring. Likely 2/2 to Sepsis. Now significantly better than on admission.   Plan:  - Now resolved

## 2019-08-31 NOTE — PROGRESS NOTES
[facility] Hospital Medicine  Progress Note    Patient Name: Edwin Lopez  MRN: [id number]  Patient Class: IP- Inpatient   Admission Date: 8/27/2019  Length of Stay: 4 days  Attending Physician: Chelo Michelle*  Primary Care Provider: Harini Montenegro MD    [facility] Medicine Team: [facility]OSP MED 4 Melissa Sim MD    Subjective:     Principal Problem:Altered mental status    HPI:  Mr. Waters is a 69 year-old male with type 2 DM, seizure disorder, dementia, and a history of CVA in 2012 who was brought in from his home after he was found on the floor two days after he fell. The patient recalls that he went to turn a bathtub faucet on when he fell to the ground and could not get back up. Denies headaches, lightheadedness, chest pain, palpitations, or tremors prior to the fall. Was asked about LOC prior to and following the fall but he was unable to express whether he experienced it or not. Currently denies headaches, shortness of breath, chest pain, palpitations, abdominal pain, or any discomfort.     He was unable to provide much history. His niece was present during the evaluation and was asked regarding his medical conditions, medication use, and family history but she was unsure of these. The last time he was seen in person by his family members was on Friday when, according to them, he was functional and oriented. His niece spoke with him over the phone on Saturday afternoon but did not hear from him since them despite calling several times. She did mention that he lives by himself and is functional, stating that this is not near his baseline. He lives by himself and an aide visits him regularly. He does not drive but is an avid walker and gets around by foot. According to her, he does not consume alcoholic beverages and has never smoked.     Overview/Hospital Course:  Patient originally admitted to hospital medicine team 4 for Rhabdomylosis after been found down at home for 3 days.  UA was consistent with UTI, patient subsequently became septic with altered mental status and hypotension. He was stepped up to the MICU for higher level of care and monitoring. EEG done in MICU without any seizure activity. Patient was treated with Vancomycin and Zosyn given persistent fevers and AMS. Blood cultures with Enterococcus Faecalis and coagulase negative staph. Patient with gram negative rods in urine pending susceptibilities. Patient stepped down to hospital medicine 8/29. Had markedly notable R-sided weakness so there were concerns for stroke/acute vascular insult. However, MRI performed revealed no acute processes, only generalized volume loss reflective of chronic microvascular changes. PT/OT followed patient and recommended SNF placement, to which the patient agreed to and is looking forward to. His weakness improved significantly.     Interval History: No acute overnight events. Patient remains hemodynamically stable. Today he has no complaints; he is looking forward to going to SNF and receiving care. Denies headaches, n/v, chest pain, dyspnea.     Review of Systems   Constitutional: Positive for fatigue. Negative for chills and fever.   HENT: Negative for congestion and sore throat.    Eyes: Negative for photophobia and visual disturbance.   Respiratory: Negative for cough, chest tightness and shortness of breath.    Cardiovascular: Negative for chest pain and palpitations.   Gastrointestinal: Negative for abdominal pain, constipation, diarrhea, nausea and vomiting.   Genitourinary: Negative for flank pain and hematuria.   Musculoskeletal: Negative for back pain, neck pain and neck stiffness.   Skin: Negative for color change and rash.   Neurological: Positive for weakness (R upper and R lower extremities, improved from yesterday. States he has better control of his RLE. ). Negative for seizures, light-headedness and headaches.   Psychiatric/Behavioral: Negative for behavioral problems and  confusion.     Objective:     Vital Signs (Most Recent):  Temp: 98.9 °F (37.2 °C) (08/31/19 0500)  Pulse: (!) 59 (08/31/19 0725)  Resp: 18 (08/31/19 0441)  BP: (!) 164/74 (08/31/19 0441)  SpO2: 95 % (08/31/19 0441) Vital Signs (24h Range):  Temp:  [98.3 °F (36.8 °C)-99 °F (37.2 °C)] 98.9 °F (37.2 °C)  Pulse:  [59-81] 59  Resp:  [16-20] 18  SpO2:  [95 %-99 %] 95 %  BP: (127-167)/() 164/74     Weight: 68 kg (150 lb)  Body mass index is 22.81 kg/m².    Intake/Output Summary (Last 24 hours) at 8/31/2019 1149  Last data filed at 8/31/2019 0700  Gross per 24 hour   Intake 560 ml   Output 1650 ml   Net -1090 ml      Physical Exam   Constitutional: He is oriented to person, place, and time. Vital signs are normal. He is cooperative. No distress.   HENT:   Head: Normocephalic and atraumatic.   Eyes: Right eye exhibits no discharge. Left eye exhibits no discharge. No scleral icterus.   Neck: Neck supple. No thyromegaly present.   Cardiovascular: Regular rhythm and normal heart sounds. Tachycardia present.   Pulmonary/Chest: Effort normal and breath sounds normal. No respiratory distress.   Abdominal: Soft. Bowel sounds are normal. He exhibits no distension. There is no tenderness.   Musculoskeletal: He exhibits no edema.   Neurological: He is alert and oriented to person, place, and time. He displays no seizure activity.   Strength still reduced on R upper and R lower extremities but improved from yesterday.   Skin: Skin is warm and dry. He is not diaphoretic.   Nursing note and vitals reviewed.      Significant Labs:   Blood Culture:   No results for input(s): LABBLOO in the last 48 hours.  CBC:   Recent Labs   Lab 08/30/19  0508 08/31/19  0826   WBC 11.38 9.78   HGB 11.2* 10.5*   HCT 35.6* 35.7*    168     CMP:   Recent Labs   Lab 08/30/19  0507 08/30/19  0508 08/31/19  0826 08/31/19  1036     --  140  --    K 3.4*  --  4.0  --      --  104  --    CO2 24  --  28  --    *  --  149*  --    BUN  10  --  9  --    CREATININE 0.9  --  0.9  --    CALCIUM 8.1*  --  8.3*  --    PROT  --  5.7* 5.8*  --    ALBUMIN  --  2.3* 2.2*  --    BILITOT  --  1.0 0.7 0.6   ALKPHOS  --  66 56  --    AST  --  109* 75*  --    ALT  --  52* 53*  --    ANIONGAP 8  --  8  --    EGFRNONAA >60.0  --  >60.0  --        Significant Imaging: I have reviewed and interpreted all pertinent imaging results/findings within the past 24 hours.      Assessment/Plan:      * Altered mental status  With AMS on admission. Admitted to ICU for further monitoring. Likely 2/2 to Sepsis. Now significantly better than on admission.   Plan:  - Now resolved    Weakness  Likely 2/2 to rhadbo. However unilateral R upper and lower extremity weakness noted from admission. MRI revealed no acute lesions. PT/OT consulted and both recommend SNF placement.     Plan:  -coordinate SNF placement     Bacteremia  Blood cultures 8/27 with Enterococcus Faecalis and Coagulase negative staph  - Follow up repeat blood cultures  - Continue Vancomycin and Zosyn. Deescalate pending susceptibilities    UTI (urinary tract infection)  UA with 2+ leucks, many bacteria, 22WBC, 19 RBC\  Ucx with gram negative rods. Susceptibilities given; pending blood cx susceptibilities to decide optimal abx management.   Plan:  -continue vancomycin and pip-tazo    History of CVA in adulthood  Patient and family members unclear of event, but medical records mention hx of CVA in 2012. At the time he was started on DAPT with aspirin and clopidogrel. However, clopidogrel has been discontinued since.    Significant R upper and R lower extremity weakness prompted obtaining an MRI due to concerns for stroke. However, it revealed no acute findings, only generalized volume loss consistent with chronic ischemic changes and old infarcts.     Plan:  - CT head in ED, negative for acute infarct  -continue home aspirin    Status post fall  Patient fell around two days ago while attempting to turn a bathtub faucet  on. Unclear whether he lost consciousness or not. CT head was performed on arrival to ED. It revealed several findings of suspected old infarcts, generalized cerebral volume loss, and supratentorial white matter chronic small vessel ischemic change, as well as no acute large vascular territory infarct or intracranial hemorrhage identified (but noted that further evaluation/follow-up as warranted).     Additionally a shoulder xray was performed and it revealed intact osseous structures w/o evidence of fracture nor GH joint dislocation.     Severe sepsis  On admission the patient was afebrile and tachypneic. Workup for LISHA and rhabdo was initiated. Additionally, CXR was obtained given that he was tachypneic; it revealed no acute processes or findings suggestive of pneumonia. He was hypertensive initially but improved after administration of home losartan dose. Several hours after arrival, oral temp was normal but a rectal temperature of 103 was obtained. Patient pancultured. U/a with leuk +2, wbc 22, and many bacteria. Patient started on vancomycin and pip-tazo for empiric coverage. Urine and blood cultures ordered and pending. Patient remains tachypneic (RR ranging between 25-34). Will monitor closely for improvement of fever and tachypnea. Admitted to ICU for further management. Stepped down to hospital medicine 8/29. Has remained afebrile and hemodynamically stable since step-down.     Plan:  - Likely 2/2 to UTI & Bacteremia  - Urine cultures with gram negative rods  - Blood cultures with Enterococcus Faecalis & coagulase negative staph   - Continue Vancomycin and Zosyn. Pending sensitivities to determine optimal management. For now will continue covering broadly.   - WBC stable, Blood pressure, stable    DM (diabetes mellitus)  Patient on home metformin 500mg daily. Uncertain whether he was compliant with medication and when was the last time he took it.   - HGBAIC 6.4  - Low dose sliding scale  - Diabetic  "diet    Hypertension  Continue home losartan 25mg daily    LISHA (acute kidney injury)  See "non-traumatic rhabdo"   - Resolved  - Cr 0.9, baseline    Non-traumatic rhabdomyolysis  Patient admitted after being found two days after a non-traumatic fall at his home. Labs on admission significant for CPK 83029. Creatinine at 1.6 (changed from February where it was 0.8). K, Phos, and Mag were wnl. He was bolused with 2L of 0.9 NS at the ED.   Plan:  - CPK down trending since admission, 2200 today (from 3500 yesterday)  - monitor for kidney injury with daily BMP    Seizure disorder  Unclear status of patient's seizure disorder. However, he has presented several times in 2018 and 2019 at [facility] following seizure activity which is described as shaking and muscle spasms. The patient's niece provided his home medications; he is currently on keppra 500mg 3 tablets BID.   Plan:  - EEG with no seizure activity  - Start Keppra at 1000mg BID, will up titrate to home does of 1500 BID        VTE Risk Mitigation (From admission, onward)        Ordered     enoxaparin injection 40 mg  Daily      08/29/19 0908     IP VTE LOW RISK PATIENT  Once      08/27/19 6840          Melissa Sim MD  Department of Hospital Medicine   [facility]  "

## 2019-08-31 NOTE — PLAN OF CARE
Problem: Adult Inpatient Plan of Care  Goal: Plan of Care Review  Outcome: Ongoing (interventions implemented as appropriate)  Pt remains free of falls throughtout shiftPt AAO X 3; able to express needs.  No c/o pain during this shift.  IV Zosyn and Vanc. given. Safety maintained.  Bed in low position,  call  light in reach.    Will continue to monitor

## 2019-08-31 NOTE — ASSESSMENT & PLAN NOTE
Patient admitted after being found two days after a non-traumatic fall at his home. Labs on admission significant for CPK 63597. Creatinine at 1.6 (changed from February where it was 0.8). K, Phos, and Mag were wnl. He was bolused with 2L of 0.9 NS at the ED.   Plan:  - CPK down trending since admission, 2200 today (from 3500 yesterday)  - monitor for kidney injury with daily BMP

## 2019-08-31 NOTE — ASSESSMENT & PLAN NOTE
UA with 2+ leucks, many bacteria, 22WBC, 19 RBC\  Ucx with gram negative rods. Susceptibilities given; pending blood cx susceptibilities to decide optimal abx management.   Plan:  -continue vancomycin and pip-tazo

## 2019-08-31 NOTE — ASSESSMENT & PLAN NOTE
Patient and family members unclear of event, but medical records mention hx of CVA in 2012. At the time he was started on DAPT with aspirin and clopidogrel. However, clopidogrel has been discontinued since.    Significant R upper and R lower extremity weakness prompted obtaining an MRI due to concerns for stroke. However, it revealed no acute findings, only generalized volume loss consistent with chronic ischemic changes and old infarcts.     Plan:  - CT head in ED, negative for acute infarct  -continue home aspirin

## 2019-08-31 NOTE — PLAN OF CARE
Weekend SW spoke with patient and nijason Mccauley at bedside regarding discharge plan. SW explained that pt per [facility], pt has been accepted to [facility] for SNF placement. Pt is in agreement with this plan. [facility] will have to obtain insurance authorization prior to pt's discharge. Primary SW will follow up.    Alejandra Martinez LMSW  [facility]

## 2019-08-31 NOTE — PLAN OF CARE
Problem: Skin Injury Risk Increased  Goal: Skin Health and Integrity    Intervention: Optimize Skin Protection  No acute events throughout night. Pt AAO X 4; able to express needs.  No c/o pain during this shift.  IV Zosyn and Vanc. given. Safety maintained.  Bed in low position,  call  light in reach.    Will continue to monitor

## 2019-08-31 NOTE — ASSESSMENT & PLAN NOTE
On admission the patient was afebrile and tachypneic. Workup for LISHA and rhabdo was initiated. Additionally, CXR was obtained given that he was tachypneic; it revealed no acute processes or findings suggestive of pneumonia. He was hypertensive initially but improved after administration of home losartan dose. Several hours after arrival, oral temp was normal but a rectal temperature of 103 was obtained. Patient pancultured. U/a with leuk +2, wbc 22, and many bacteria. Patient started on vancomycin and pip-tazo for empiric coverage. Urine and blood cultures ordered and pending. Patient remains tachypneic (RR ranging between 25-34). Will monitor closely for improvement of fever and tachypnea. Admitted to ICU for further management. Stepped down to hospital medicine 8/29. Has remained afebrile and hemodynamically stable since step-down.     Plan:  - Likely 2/2 to UTI & Bacteremia  - Urine cultures with gram negative rods  - Blood cultures with Enterococcus Faecalis & coagulase negative staph   - Continue Vancomycin and Zosyn. Pending sensitivities to determine optimal management. For now will continue covering broadly.   - WBC stable, Blood pressure, stable

## 2019-08-31 NOTE — SUBJECTIVE & OBJECTIVE
Interval History: No acute overnight events. Patient remains hemodynamically stable. Has no complaints; stated that his R-sided weakness today was better than yesterday. LISHA resolved; CPK downtrending, now in the 3500s.    Review of Systems   Constitutional: Positive for fatigue. Negative for chills and fever.   HENT: Negative for congestion and sore throat.    Eyes: Negative for photophobia and visual disturbance.   Respiratory: Negative for cough, chest tightness and shortness of breath.    Cardiovascular: Negative for chest pain and palpitations.   Gastrointestinal: Negative for abdominal pain, constipation, diarrhea, nausea and vomiting.   Genitourinary: Negative for flank pain and hematuria.   Musculoskeletal: Negative for back pain, neck pain and neck stiffness.   Neurological: Positive for weakness (R upper and R lower extremities, improved from yesterday). Negative for seizures, light-headedness and headaches.   Psychiatric/Behavioral: Negative for behavioral problems and confusion.     Objective:     Vital Signs (Most Recent):  Temp: 98.5 °F (36.9 °C) (08/30/19 1614)  Pulse: 71 (08/30/19 1614)  Resp: 16 (08/30/19 1614)  BP: 127/79 (08/30/19 1614)  SpO2: 96 % (08/30/19 1614) Vital Signs (24h Range):  Temp:  [97.9 °F (36.6 °C)-100 °F (37.8 °C)] 98.5 °F (36.9 °C)  Pulse:  [57-85] 71  Resp:  [16-26] 16  SpO2:  [95 %-100 %] 96 %  BP: (127-141)/(79-80) 127/79     Weight: 68 kg (150 lb)  Body mass index is 22.81 kg/m².    Intake/Output Summary (Last 24 hours) at 8/30/2019 1913  Last data filed at 8/30/2019 1842  Gross per 24 hour   Intake 720 ml   Output 1600 ml   Net -880 ml      Physical Exam   Constitutional: No distress.   HENT:   Head: Normocephalic and atraumatic.   Eyes: Right eye exhibits no discharge. Left eye exhibits no discharge. No scleral icterus.   Neck: Neck supple. No thyromegaly present.   Cardiovascular: Regular rhythm and normal heart sounds. Tachycardia present.   Pulmonary/Chest: Effort  normal and breath sounds normal. No respiratory distress.   Abdominal: Soft. Bowel sounds are normal. He exhibits no distension. There is no tenderness.   Musculoskeletal: He exhibits no edema.   Neurological: He is alert. He exhibits abnormal muscle tone (weakness noted in R upper and R lower extremities, but improved from yesterday).   Skin: Skin is warm and dry. He is not diaphoretic.   Nursing note and vitals reviewed.      Significant Labs:   Blood Culture:   Recent Labs   Lab 08/29/19  1000   LABBLOO No Growth to date  No Growth to date  No Growth to date  No Growth to date     CBC:   Recent Labs   Lab 08/29/19  0804 08/30/19  0508   WBC 12.59 11.38   HGB 12.4* 11.2*   HCT 42.1 35.6*   * 158     CMP:   Recent Labs   Lab 08/29/19  0422 08/30/19  0507 08/30/19  0508    141  --    K 3.9 3.4*  --     109  --    CO2 22* 24  --     111*  --    BUN 12 10  --    CREATININE 0.9 0.9  --    CALCIUM 8.0* 8.1*  --    PROT 5.8*  --  5.7*   ALBUMIN 2.4*  --  2.3*   BILITOT 1.5*  --  1.0   ALKPHOS 74  --  66   *  --  109*   ALT 60*  --  52*   ANIONGAP 11 8  --    EGFRNONAA >60.0 >60.0  --        Significant Imaging: I have reviewed and interpreted all pertinent imaging results/findings within the past 24 hours.

## 2019-08-31 NOTE — ASSESSMENT & PLAN NOTE
Unclear status of patient's seizure disorder. However, he has presented several times in 2018 and [place] at [facility] following seizure activity which is described as shaking and muscle spasms. The patient's niece provided his home medications; he is currently on keppra 500mg 3 tablets BID.   Plan:  - EEG with no seizure activity  - Start Keppra at 1000mg BID, will up titrate to home does of 1500 BID

## 2019-08-31 NOTE — ASSESSMENT & PLAN NOTE
Unclear status of patient's seizure disorder. However, he has presented several times in 2018 and 2019 at [facility] following seizure activity which is described as shaking and muscle spasms. The patient's niece provided his home medications; he is currently on keppra 500mg 3 tablets BID.   Plan:  - EEG with no seizure activity  - Start Keppra at 1000mg BID, will up titrate to home does of 1500 BID

## 2019-08-31 NOTE — PLAN OF CARE
Problem: Physical Therapy Goal  Goal: Physical Therapy Goal  Goals to be met by: 2019     Patient will increase functional independence with mobility by performin. Supine to sit with Stand-by Assistance  2. Sit to supine with Set-up [place]  3. Sit to stand transfer with Contact Guard Assistance  4. Bed to chair transfer with Contact Guard Assistance using LRAD  5. Gait  x 50 feet with Minimal Assistance using LRAD     Outcome: Ongoing (interventions implemented as appropriate)  Goals remain appropriate.

## 2019-08-31 NOTE — ASSESSMENT & PLAN NOTE
On admission the patient was afebrile and tachypneic. Workup for LISHA and rhabdo was initiated. Additionally, CXR was obtained given that he was tachypneic; it revealed no acute processes or findings suggestive of pneumonia. He was hypertensive initially but improved after administration of home losartan dose. Several hours after arrival, oral temp was normal but a rectal temperature of 103 was obtained. Patient pancultured. U/a with leuk +2, wbc 22, and many bacteria. Patient started on vancomycin and pip-tazo for empiric coverage. Urine and blood cultures ordered and pending. Patient remains tachypneic (RR ranging between 25-34). Will monitor closely for improvement of fever and tachypnea. Admitted to ICU for further management. Stepped down to hospital medicine 8/29    Plan:  - Likely 2/2 to UTI & Bacteremia  - Urine cultures with gram negative rods  - Blood cultures with Enterococcus Faecalis & coagulase negative staph   - Continue Vancomycin and Zosyn. Pending sensitivities to determine optimal management. For now will cover broadly.   - WBC stable, Blood pressure, stable

## 2019-08-31 NOTE — ASSESSMENT & PLAN NOTE
Likely 2/2 to rhadbo. However unilateral R upper and lower extremity weakness noted from admission. MRI revealed no acute lesions. PT/OT consulted and both recommend SNF placement.     Plan:  -coordinate SNF placement

## 2019-08-31 NOTE — PT/OT/SLP PROGRESS
Physical Therapy Treatment    Patient Name:  Edwin Lopez   MRN:  [id number]    Recommendations:     Discharge Recommendations:  nursing facility, skilled   Discharge Equipment Recommendations: (TBD)   Barriers to discharge: Inaccessible home and Decreased caregiver support    Assessment:     Edwin Lopez is a 69 y.o. male admitted with a medical diagnosis of Altered mental status.  He presents with the following impairments/functional limitations:  weakness, impaired endurance, impaired self care skills, impaired functional mobilty, gait instability, impaired balance, impaired cognition, decreased upper extremity function, decreased lower extremity function, impaired coordination, decreased safety awareness, impaired fine motor, impaired skin, edema, impaired cardiopulmonary response to activity.    Rehab Prognosis: Good; patient would benefit from acute skilled PT services to address these deficits and reach maximum level of function.    Recent Surgery: * No surgery found *      Plan:     During this hospitalization, patient to be seen 3 x/week to address the identified rehab impairments via gait training, therapeutic activities, therapeutic exercises, neuromuscular re-education and progress toward the following goals:    · Plan of Care Expires:  09/23/19    Subjective     Chief Complaint: no c/o  Pain/Comfort:  · Pain Rating 1: 0/10  · Pain Rating Post-Intervention 1: 0/10      Objective:     Communicated with NSG prior to session.  Patient found HOB elevated with Condom Catheter, telemetry, pulse ox (continuous), peripheral IV upon PTA entry to room.     General Precautions: Standard, fall, seizure   Orthopedic Precautions:N/A   Braces: N/A     Functional Mobility:  · Bed Mobility:     · Rolling Right: minimum assistance  · Scooting: minimum assistance  · Supine to Sit: moderate assistance  · Transfers:     · Sit to Stand:  moderate assistance with rolling walker  · Bed to Chair: moderate assistance with  rolling  walker  using  Stand Pivot      AM-PAC 6 CLICK MOBILITY  Turning over in bed (including adjusting bedclothes, sheets and blankets)?: 3  Sitting down on and standing up from a chair with arms (e.g., wheelchair, bedside commode, etc.): 2  Moving from lying on back to sitting on the side of the bed?: 2  Moving to and from a bed to a chair (including a wheelchair)?: 2  Need to walk in hospital room?: 1  Climbing 3-5 steps with a railing?: 1  Basic Mobility Total Score: 11       Therapeutic Activities and Exercises:   Pt assisted with functional mobility as noted above.   Pt with delayed response for command following requiring increased time to perform all functional mobility.     Patient left up in chair with all lines intact, call button in reach, chair alarm on and NSG notified..    GOALS:   Multidisciplinary Problems     Physical Therapy Goals        Problem: Physical Therapy Goal    Goal Priority Disciplines Outcome Goal Variances Interventions   Physical Therapy Goal     PT, PT/OT Ongoing (interventions implemented as appropriate)     Description:  Goals to be met by: 2019     Patient will increase functional independence with mobility by performin. Supine to sit with Stand-by Assistance  2. Sit to supine with Set-up [place]  3. Sit to stand transfer with Contact Guard Assistance  4. Bed to chair transfer with Contact Guard Assistance using LRAD  5. Gait  x 50 feet with Minimal Assistance using LRAD                      Time Tracking:     PT Received On: 19  PT Start Time: 928     PT Stop Time: 951  PT Total Time (min): 23 min     Billable Minutes: Therapeutic Activity 23    Treatment Type: Treatment  PT/PTA: PTA     PTA Visit Number: 1     Carl Smith PTA  2019

## 2019-08-31 NOTE — PROGRESS NOTES
[facility] Hospital Medicine  Progress Note    Patient Name: Edwin Lopez  MRN: [id number]  Patient Class: IP- Inpatient   Admission Date: 8/27/2019  Length of Stay: 3 days  Attending Physician: Chelo Michelle*  Primary Care Provider: Harini Montenegro MD    [facility] Medicine Team: [facility] MED 4 Melissa Sim MD    Subjective:     Principal Problem:Altered mental status        HPI:  Mr. Waters is a 69 year-old male with type 2 DM, seizure disorder, dementia, and a history of CVA in 2012 who was brought in from his home after he was found on the floor two days after he fell. The patient recalls that he went to turn a bathtub faucet on when he fell to the ground and could not get back up. Denies headaches, lightheadedness, chest pain, palpitations, or tremors prior to the fall. Was asked about LOC prior to and following the fall but he was unable to express whether he experienced it or not. Currently denies headaches, shortness of breath, chest pain, palpitations, abdominal pain, or any discomfort.     He was unable to provide much history. His niece was present during the evaluation and was asked regarding his medical conditions, medication use, and family history but she was unsure of these. The last time he was seen in person by his family members was on Friday when, according to them, he was functional and oriented. His niece spoke with him over the phone on Saturday afternoon but did not hear from him since them despite calling several times. She did mention that he lives by himself and is functional, stating that this is not near his baseline. He lives by himself and an aide visits him regularly. He does not drive but is an avid walker and gets around by foot. According to her, he does not consume alcoholic beverages and has never smoked.     Overview/Hospital Course:  Patient originally admitted to hospital medicine team 4 for Rhabdomylosis after been found down at home for 3  days. UA was consistent with UTI, patient subsequently became septic with altered mental status and hypotension. He was stepped up to the MICU for higher level of care and monitoring. EEG done in MICU without any seizure activity. Patient was treated with Vancomycin and Zosyn given persistent fevers and AMS. Blood cultures with Enterococcus Faecalis and coagulase negative staph. Patient with gram negative rods in urine pending susceptibilities. Patient stepped down to hospital medicine 8/29. Had markedly notable R-sided weakness so there were concerns for stroke/acute vascular insult. However, MRI performed revealed no acute processes, only generalized volume loss reflective of chronic microvascular changes.     Interval History: No acute overnight events. Patient remains hemodynamically stable. Has no complaints; stated that his R-sided weakness today was better than yesterday. LISHA resolved; CPK downtrending, now in the 3500s.    Review of Systems   Constitutional: Positive for fatigue. Negative for chills and fever.   HENT: Negative for congestion and sore throat.    Eyes: Negative for photophobia and visual disturbance.   Respiratory: Negative for cough, chest tightness and shortness of breath.    Cardiovascular: Negative for chest pain and palpitations.   Gastrointestinal: Negative for abdominal pain, constipation, diarrhea, nausea and vomiting.   Genitourinary: Negative for flank pain and hematuria.   Musculoskeletal: Negative for back pain, neck pain and neck stiffness.   Neurological: Positive for weakness (R upper and R lower extremities, improved from yesterday). Negative for seizures, light-headedness and headaches.   Psychiatric/Behavioral: Negative for behavioral problems and confusion.     Objective:     Vital Signs (Most Recent):  Temp: 98.5 °F (36.9 °C) (08/30/19 1614)  Pulse: 71 (08/30/19 1614)  Resp: 16 (08/30/19 1614)  BP: 127/79 (08/30/19 1614)  SpO2: 96 % (08/30/19 1614) Vital Signs (24h  Range):  Temp:  [97.9 °F (36.6 °C)-100 °F (37.8 °C)] 98.5 °F (36.9 °C)  Pulse:  [57-85] 71  Resp:  [16-26] 16  SpO2:  [95 %-100 %] 96 %  BP: (127-141)/(79-80) 127/79     Weight: 68 kg (150 lb)  Body mass index is 22.81 kg/m².    Intake/Output Summary (Last 24 hours) at 8/30/2019 1913  Last data filed at 8/30/2019 1842  Gross per 24 hour   Intake 720 ml   Output 1600 ml   Net -880 ml      Physical Exam   Constitutional: No distress.   HENT:   Head: Normocephalic and atraumatic.   Eyes: Right eye exhibits no discharge. Left eye exhibits no discharge. No scleral icterus.   Neck: Neck supple. No thyromegaly present.   Cardiovascular: Regular rhythm and normal heart sounds. Tachycardia present.   Pulmonary/Chest: Effort normal and breath sounds normal. No respiratory distress.   Abdominal: Soft. Bowel sounds are normal. He exhibits no distension. There is no tenderness.   Musculoskeletal: He exhibits no edema.   Neurological: He is alert. He exhibits abnormal muscle tone (weakness noted in R upper and R lower extremities, but improved from yesterday).   Skin: Skin is warm and dry. He is not diaphoretic.   Nursing note and vitals reviewed.      Significant Labs:   Blood Culture:   Recent Labs   Lab 08/29/19  1000   LABBLOO No Growth to date  No Growth to date  No Growth to date  No Growth to date     CBC:   Recent Labs   Lab 08/29/19  0804 08/30/19  0508   WBC 12.59 11.38   HGB 12.4* 11.2*   HCT 42.1 35.6*   * 158     CMP:   Recent Labs   Lab 08/29/19  0422 08/30/19  0507 08/30/19  0508    141  --    K 3.9 3.4*  --     109  --    CO2 22* 24  --     111*  --    BUN 12 10  --    CREATININE 0.9 0.9  --    CALCIUM 8.0* 8.1*  --    PROT 5.8*  --  5.7*   ALBUMIN 2.4*  --  2.3*   BILITOT 1.5*  --  1.0   ALKPHOS 74  --  66   *  --  109*   ALT 60*  --  52*   ANIONGAP 11 8  --    EGFRNONAA >60.0 >60.0  --        Significant Imaging: I have reviewed and interpreted all pertinent imaging  results/findings within the past 24 hours.      Assessment/Plan:      * Altered mental status  With AMS on admission. Admitted to ICU for further monitoring. Likely 2/2 to Sepsis. Now significantly better than on admission.   Plan:  - Now resolved  -neuro checks q4hrs    Weakness  Likely 2/2 to rhadbo. However unilateral R upper and lower extremity weakness noted from admission. MRI revealed no acute lesions. PT/OT consulted and both recommend SNF placement.     Plan:  -coordinate SNF placement       Bacteremia  Blood cultures 8/27 with Enterococcus Faecalis and Coagulase negative staph  - Follow up repeat blood cultures  - Continue Vancomycin and Zosyn. Deescalate pending susceptibilities        UTI (urinary tract infection)  UA with 2+ leucks, many bacteria, 22WBC, 19 RBC\  Ucx with gram negative rods. Susceptibilities given; pending blood cx susceptibilities to decide optimal abx management.   Plan:  -continue vancomycin and pip-tazo        History of CVA in adulthood  Patient and family members unclear of event, but medical records mention hx of CVA in 2012. At the time he was started on DAPT with aspirin and clopidogrel. However, clopidogrel has been discontinued since.    Significant R upper and R lower extremity weakness prompted obtaining an MRI due to concerns for stroke. However, it revealed no acute findings, only generalized volume loss consistent with chronic ischemic changes and old infarcts.     Plan:  - CT head in ED, negative for acute infarct  -continue home aspirin    Status post fall  Patient fell around two days ago while attempting to turn a bathtub faucet on. Unclear whether he lost consciousness or not. CT head was performed on arrival to ED. It revealed several findings of suspected old infarcts, generalized cerebral volume loss, and supratentorial white matter chronic small vessel ischemic change, as well as no acute large vascular territory infarct or intracranial hemorrhage identified  "(but noted that further evaluation/follow-up as warranted).     Additionally a shoulder xray was performed and it revealed intact osseous structures w/o evidence of fracture nor GH joint dislocation.         Severe sepsis  On admission the patient was afebrile and tachypneic. Workup for LISHA and rhabdo was initiated. Additionally, CXR was obtained given that he was tachypneic; it revealed no acute processes or findings suggestive of pneumonia. He was hypertensive initially but improved after administration of home losartan dose. Several hours after arrival, oral temp was normal but a rectal temperature of 103 was obtained. Patient pancultured. U/a with leuk +2, wbc 22, and many bacteria. Patient started on vancomycin and pip-tazo for empiric coverage. Urine and blood cultures ordered and pending. Patient remains tachypneic (RR ranging between 25-34). Will monitor closely for improvement of fever and tachypnea. Admitted to ICU for further management. Stepped down to hospital medicine 8/29    Plan:  - Likely 2/2 to UTI & Bacteremia  - Urine cultures with gram negative rods  - Blood cultures with Enterococcus Faecalis & coagulase negative staph   - Continue Vancomycin and Zosyn. Pending sensitivities to determine optimal management. For now will cover broadly.   - WBC stable, Blood pressure, stable    DM (diabetes mellitus)  Patient on home metformin 500mg daily. Uncertain whether he was compliant with medication and when was the last time he took it.   - HGBAIC 6.4  - Low dose sliding scale  - Diabetic diet      Hypertension  Continue home losartan 25mg daily      LISHA (acute kidney injury)  See "non-traumatic rhabdo"   - Improving  - Cr 0.9, baseline        Non-traumatic rhabdomyolysis  Patient admitted after being found two days after a non-traumatic fall at his home. Labs on admission significant for CPK 84340. Creatinine at 1.6 (changed from February where it was 0.8). K, Phos, and Mag were wnl. He was bolused with " 2L of 0.9 NS at the ED.   Plan  - CPK down trending since admission, 3538 today (from 6500)  - monitor for kidney injury      Seizure disorder  Unclear status of patient's seizure disorder. However, he has presented several times in 2018 and 2019 at [facility] following seizure activity which is described as shaking and muscle spasms. The patient's niece provided his home medications; he is currently on keppra 500mg 3 tablets BID.   Plan:  - EEG with no seizure activity  - Start Keppra at 1000mg BID, will up titrate to home does of 1500 BID        VTE Risk Mitigation (From admission, onward)        Ordered     enoxaparin injection 40 mg  Daily      08/29/19 0908     IP VTE LOW RISK PATIENT  Once      08/27/19 9088          Melissa Sim MD  Department of Hospital Medicine   [facility]

## 2019-09-01 PROBLEM — D64.9 ANEMIA: Status: ACTIVE | Noted: 2019-09-01

## 2019-09-01 LAB
ANION GAP SERPL CALC-SCNC: 7 MMOL/L (ref 8–16)
BACTERIA BLD CULT: NORMAL
BASOPHILS # BLD AUTO: 0.05 K/UL (ref 0–0.2)
BASOPHILS NFR BLD: 0.6 % (ref 0–1.9)
BUN SERPL-MCNC: 8 MG/DL (ref 8–23)
CALCIUM SERPL-MCNC: 8.3 MG/DL (ref 8.7–10.5)
CHLORIDE SERPL-SCNC: 106 MMOL/L (ref 95–110)
CO2 SERPL-SCNC: 28 MMOL/L (ref 23–29)
CREAT SERPL-MCNC: 0.9 MG/DL (ref 0.5–1.4)
DIFFERENTIAL METHOD: ABNORMAL
EOSINOPHIL # BLD AUTO: 0.2 K/UL (ref 0–0.5)
EOSINOPHIL NFR BLD: 2.8 % (ref 0–8)
ERYTHROCYTE [DISTWIDTH] IN BLOOD BY AUTOMATED COUNT: 13.2 % (ref 11.5–14.5)
EST. GFR  (AFRICAN AMERICAN): >60 ML/MIN/1.73 M^2
EST. GFR  (NON AFRICAN AMERICAN): >60 ML/MIN/1.73 M^2
GLUCOSE SERPL-MCNC: 110 MG/DL (ref 70–110)
HCT VFR BLD AUTO: 36.2 % (ref 40–54)
HGB BLD-MCNC: 10.9 G/DL (ref 14–18)
IMM GRANULOCYTES # BLD AUTO: 0.18 K/UL (ref 0–0.04)
IMM GRANULOCYTES NFR BLD AUTO: 2.1 % (ref 0–0.5)
LYMPHOCYTES # BLD AUTO: 1.8 K/UL (ref 1–4.8)
LYMPHOCYTES NFR BLD: 21.3 % (ref 18–48)
MCH RBC QN AUTO: 25.4 PG (ref 27–31)
MCHC RBC AUTO-ENTMCNC: 30.1 G/DL (ref 32–36)
MCV RBC AUTO: 84 FL (ref 82–98)
MONOCYTES # BLD AUTO: 1.2 K/UL (ref 0.3–1)
MONOCYTES NFR BLD: 14 % (ref 4–15)
NEUTROPHILS # BLD AUTO: 5 K/UL (ref 1.8–7.7)
NEUTROPHILS NFR BLD: 59.2 % (ref 38–73)
NRBC BLD-RTO: 0 /100 WBC
PLATELET # BLD AUTO: 200 K/UL (ref 150–350)
PMV BLD AUTO: 10.6 FL (ref 9.2–12.9)
POCT GLUCOSE: 108 MG/DL (ref 70–110)
POCT GLUCOSE: 126 MG/DL (ref 70–110)
POCT GLUCOSE: 127 MG/DL (ref 70–110)
POCT GLUCOSE: 148 MG/DL (ref 70–110)
POTASSIUM SERPL-SCNC: 3.9 MMOL/L (ref 3.5–5.1)
RBC # BLD AUTO: 4.29 M/UL (ref 4.6–6.2)
SODIUM SERPL-SCNC: 141 MMOL/L (ref 136–145)
TB INDURATION 48 - 72 HR READ: 0 MM
WBC # BLD AUTO: 8.47 K/UL (ref 3.9–12.7)

## 2019-09-01 PROCEDURE — 99231 SBSQ HOSP IP/OBS SF/LOW 25: CPT | Mod: GC,,, | Performed by: HOSPITALIST

## 2019-09-01 PROCEDURE — 25000003 PHARM REV CODE 250: Performed by: HOSPITALIST

## 2019-09-01 PROCEDURE — 25000003 PHARM REV CODE 250: Performed by: STUDENT IN AN ORGANIZED HEALTH CARE EDUCATION/TRAINING PROGRAM

## 2019-09-01 PROCEDURE — 11000001 HC ACUTE MED/SURG PRIVATE ROOM

## 2019-09-01 PROCEDURE — 80048 BASIC METABOLIC PNL TOTAL CA: CPT

## 2019-09-01 PROCEDURE — 36415 COLL VENOUS BLD VENIPUNCTURE: CPT

## 2019-09-01 PROCEDURE — 63600175 PHARM REV CODE 636 W HCPCS: Performed by: STUDENT IN AN ORGANIZED HEALTH CARE EDUCATION/TRAINING PROGRAM

## 2019-09-01 PROCEDURE — 99231 PR SUBSEQUENT HOSPITAL CARE,LEVL I: ICD-10-PCS | Mod: GC,,, | Performed by: HOSPITALIST

## 2019-09-01 PROCEDURE — 85025 COMPLETE CBC W/AUTO DIFF WBC: CPT

## 2019-09-01 RX ORDER — CIPROFLOXACIN 500 MG/1
500 TABLET ORAL EVERY 12 HOURS
Status: COMPLETED | OUTPATIENT
Start: 2019-09-01 | End: 2019-09-02

## 2019-09-01 RX ORDER — CIPROFLOXACIN 500 MG/1
500 TABLET ORAL EVERY 12 HOURS
Status: DISCONTINUED | OUTPATIENT
Start: 2019-09-01 | End: 2019-09-01

## 2019-09-01 RX ORDER — CIPROFLOXACIN 500 MG/1
500 TABLET ORAL EVERY 12 HOURS
Status: CANCELLED | OUTPATIENT
Start: 2019-09-01

## 2019-09-01 RX ADMIN — ENOXAPARIN SODIUM 40 MG: 100 INJECTION SUBCUTANEOUS at 06:09

## 2019-09-01 RX ADMIN — LOSARTAN POTASSIUM 25 MG: 25 TABLET, FILM COATED ORAL at 08:09

## 2019-09-01 RX ADMIN — CIPROFLOXACIN HYDROCHLORIDE 500 MG: 500 TABLET, FILM COATED ORAL at 08:09

## 2019-09-01 RX ADMIN — AMPICILLIN SODIUM 4000 MG: 2 INJECTION, POWDER, FOR SOLUTION INTRAMUSCULAR; INTRAVENOUS at 11:09

## 2019-09-01 RX ADMIN — CIPROFLOXACIN HYDROCHLORIDE 500 MG: 500 TABLET, FILM COATED ORAL at 09:09

## 2019-09-01 RX ADMIN — PIPERACILLIN AND TAZOBACTAM 4.5 G: 4; .5 INJECTION, POWDER, LYOPHILIZED, FOR SOLUTION INTRAVENOUS; PARENTERAL at 04:09

## 2019-09-01 RX ADMIN — LEVETIRACETAM 1000 MG: 500 TABLET, FILM COATED ORAL at 08:09

## 2019-09-01 RX ADMIN — CIPROFLOXACIN HYDROCHLORIDE 500 MG: 500 TABLET, FILM COATED ORAL at 11:09

## 2019-09-01 RX ADMIN — LEVETIRACETAM 1000 MG: 500 TABLET, FILM COATED ORAL at 09:09

## 2019-09-01 RX ADMIN — ASPIRIN 81 MG: 81 TABLET, COATED ORAL at 08:09

## 2019-09-01 NOTE — SUBJECTIVE & OBJECTIVE
Interval History: No overnight events. The patient has remained hemodynamically stable. He has no complaints, states he feels better and stronger than yesterday. Today, pending sensitivities returned so he will be de-escalated to amoxicillin and cipro for antimicrobial therapy.     Review of Systems   Constitutional: Positive for fatigue (chronic). Negative for chills and fever.   HENT: Negative for congestion and sore throat.    Eyes: Negative for photophobia and visual disturbance.   Respiratory: Negative for cough, chest tightness and shortness of breath.    Cardiovascular: Negative for chest pain and palpitations.   Gastrointestinal: Negative for abdominal pain, constipation, diarrhea, nausea and vomiting.   Genitourinary: Negative for flank pain and hematuria.   Musculoskeletal: Negative for back pain, neck pain and neck stiffness.   Skin: Negative for color change and rash.   Neurological: Positive for weakness (R upper and R lower extremities, improved from yesterday. States he has better control of his RLE. ). Negative for seizures, light-headedness and headaches.   Psychiatric/Behavioral: Negative for behavioral problems and confusion.     Objective:     Vital Signs (Most Recent):  Temp: 98.1 °F (36.7 °C) (09/01/19 0834)  Pulse: 77 (09/01/19 0834)  Resp: 18 (09/01/19 0834)  BP: (!) 142/85 (09/01/19 0834)  SpO2: 98 % (09/01/19 0834) Vital Signs (24h Range):  Temp:  [98.1 °F (36.7 °C)-100.2 °F (37.9 °C)] 98.1 °F (36.7 °C)  Pulse:  [65-80] 77  Resp:  [17-20] 18  SpO2:  [95 %-100 %] 98 %  BP: (140-182)/(82-92) 142/85     Weight: 68 kg (150 lb)  Body mass index is 22.81 kg/m².    Intake/Output Summary (Last 24 hours) at 9/1/2019 0909  Last data filed at 9/1/2019 0600  Gross per 24 hour   Intake 670 ml   Output 2550 ml   Net -1880 ml      Physical Exam   Constitutional: He is oriented to person, place, and time. Vital signs are normal. He is cooperative. No distress.   HENT:   Head: Normocephalic and atraumatic.    Eyes: Right eye exhibits no discharge. Left eye exhibits no discharge. No scleral icterus.   Neck: Neck supple. No thyromegaly present.   Cardiovascular: Normal rate, regular rhythm and normal heart sounds.   Pulmonary/Chest: Effort normal and breath sounds normal. No respiratory distress.   Abdominal: Soft. Bowel sounds are normal. He exhibits no distension. There is no tenderness.   Musculoskeletal: He exhibits no edema.   Neurological: He is alert and oriented to person, place, and time. He displays no seizure activity.   Strength still reduced on R upper and R lower extremities but improved from yesterday.   Skin: Skin is warm and dry. He is not diaphoretic.   Nursing note and vitals reviewed.      Significant Labs:   Blood Culture:   No results for input(s): LABBLOO in the last 48 hours.  CBC:   Recent Labs   Lab 08/31/19  0826 09/01/19  0542   WBC 9.78 8.47   HGB 10.5* 10.9*   HCT 35.7* 36.2*    200     CMP:   Recent Labs   Lab 08/31/19  0826 08/31/19  1036 09/01/19  0542     --  141   K 4.0  --  3.9     --  106   CO2 28  --  28   *  --  110   BUN 9  --  8   CREATININE 0.9  --  0.9   CALCIUM 8.3*  --  8.3*   PROT 5.8*  --   --    ALBUMIN 2.2*  --   --    BILITOT 0.7 0.6  --    ALKPHOS 56  --   --    AST 75*  --   --    ALT 53*  --   --    ANIONGAP 8  --  7*   EGFRNONAA >60.0  --  >60.0       Significant Imaging: I have reviewed and interpreted all pertinent imaging results/findings within the past 24 hours.

## 2019-09-01 NOTE — PROGRESS NOTES
[facility] Hospital Medicine  Progress Note    Patient Name: Edwin Lopez  MRN: [id number]  Patient Class: IP- Inpatient   Admission Date: 8/27/2019  Length of Stay: 5 days  Attending Physician: Chelo Michelle*  Primary Care Provider: Harini Montenegro MD    [facility] Medicine Team: [facility] HOSP MED 4 Melissa Sim MD    Subjective:     Principal Problem:Altered mental status    HPI:  Mr. Waters is a 69 year-old male with type 2 DM, seizure disorder, dementia, and a history of CVA in 2012 who was brought in from his home after he was found on the floor two days after he fell. The patient recalls that he went to turn a bathtub faucet on when he fell to the ground and could not get back up. Denies headaches, lightheadedness, chest pain, palpitations, or tremors prior to the fall. Was asked about LOC prior to and following the fall but he was unable to express whether he experienced it or not. Currently denies headaches, shortness of breath, chest pain, palpitations, abdominal pain, or any discomfort.     He was unable to provide much history. His niece was present during the evaluation and was asked regarding his medical conditions, medication use, and family history but she was unsure of these. The last time he was seen in person by his family members was on Friday when, according to them, he was functional and oriented. His niece spoke with him over the phone on Saturday afternoon but did not hear from him since them despite calling several times. She did mention that he lives by himself and is functional, stating that this is not near his baseline. He lives by himself and an aide visits him regularly. He does not drive but is an avid walker and gets around by foot. According to her, he does not consume alcoholic beverages and has never smoked.     Overview/Hospital Course:  Patient originally admitted to hospital medicine team 4 for Rhabdomylosis after been found down at home for 3 days.  UA was consistent with UTI, patient subsequently became septic with altered mental status and hypotension. He was stepped up to the MICU for higher level of care and monitoring. EEG done in MICU without any seizure activity. Patient was treated with Vancomycin and Zosyn given persistent fevers and AMS. Blood cultures with Enterococcus Faecalis and coagulase negative staph. Patient with Klebsiella pneumoniae in urine. Patient stepped down to hospital medicine 8/29. Once sensitivities returned, broad spectrum antibiotics were de-escalated to ciprofloxacin and ampicillin. He had markedly notable R-sided weakness so there were concerns for stroke/acute vascular insult. However, MRI performed revealed no acute processes, only generalized volume loss reflective of chronic microvascular changes. PT/OT followed patient and recommended SNF placement, to which the patient agreed to and is looking forward to. His weakness improved significantly.     Interval History: No overnight events. The patient has remained hemodynamically stable. He has no complaints, states he feels better and stronger than yesterday. Today, pending sensitivities returned so he will be de-escalated to amoxicillin and cipro for antimicrobial therapy.     Review of Systems   Constitutional: Positive for fatigue (chronic). Negative for chills and fever.   HENT: Negative for congestion and sore throat.    Eyes: Negative for photophobia and visual disturbance.   Respiratory: Negative for cough, chest tightness and shortness of breath.    Cardiovascular: Negative for chest pain and palpitations.   Gastrointestinal: Negative for abdominal pain, constipation, diarrhea, nausea and vomiting.   Genitourinary: Negative for flank pain and hematuria.   Musculoskeletal: Negative for back pain, neck pain and neck stiffness.   Skin: Negative for color change and rash.   Neurological: Positive for weakness (R upper and R lower extremities, improved from yesterday. States  he has better control of his RLE. ). Negative for seizures, light-headedness and headaches.   Psychiatric/Behavioral: Negative for behavioral problems and confusion.     Objective:     Vital Signs (Most Recent):  Temp: 98.1 °F (36.7 °C) (09/01/19 0834)  Pulse: 77 (09/01/19 0834)  Resp: 18 (09/01/19 0834)  BP: (!) 142/85 (09/01/19 0834)  SpO2: 98 % (09/01/19 0834) Vital Signs (24h Range):  Temp:  [98.1 °F (36.7 °C)-100.2 °F (37.9 °C)] 98.1 °F (36.7 °C)  Pulse:  [65-80] 77  Resp:  [17-20] 18  SpO2:  [95 %-100 %] 98 %  BP: (140-182)/(82-92) 142/85     Weight: 68 kg (150 lb)  Body mass index is 22.81 kg/m².    Intake/Output Summary (Last 24 hours) at 9/1/2019 0909  Last data filed at 9/1/2019 0600  Gross per 24 hour   Intake 670 ml   Output 2550 ml   Net -1880 ml      Physical Exam   Constitutional: He is oriented to person, place, and time. Vital signs are normal. He is cooperative. No distress.   HENT:   Head: Normocephalic and atraumatic.   Eyes: Right eye exhibits no discharge. Left eye exhibits no discharge. No scleral icterus.   Neck: Neck supple. No thyromegaly present.   Cardiovascular: Normal rate, regular rhythm and normal heart sounds.   Pulmonary/Chest: Effort normal and breath sounds normal. No respiratory distress.   Abdominal: Soft. Bowel sounds are normal. He exhibits no distension. There is no tenderness.   Musculoskeletal: He exhibits no edema.   Neurological: He is alert and oriented to person, place, and time. He displays no seizure activity.   Strength still reduced on R upper and R lower extremities but improved from yesterday.   Skin: Skin is warm and dry. He is not diaphoretic.   Nursing note and vitals reviewed.      Significant Labs:   Blood Culture:   No results for input(s): LABBLOO in the last 48 hours.  CBC:   Recent Labs   Lab 08/31/19  0826 09/01/19  0542   WBC 9.78 8.47   HGB 10.5* 10.9*   HCT 35.7* 36.2*    200     CMP:   Recent Labs   Lab 08/31/19  0826 08/31/19  103  09/01/19  0542     --  141   K 4.0  --  3.9     --  106   CO2 28  --  28   *  --  110   BUN 9  --  8   CREATININE 0.9  --  0.9   CALCIUM 8.3*  --  8.3*   PROT 5.8*  --   --    ALBUMIN 2.2*  --   --    BILITOT 0.7 0.6  --    ALKPHOS 56  --   --    AST 75*  --   --    ALT 53*  --   --    ANIONGAP 8  --  7*   EGFRNONAA >60.0  --  >60.0       Significant Imaging: I have reviewed and interpreted all pertinent imaging results/findings within the past 24 hours.      Assessment/Plan:      * Altered mental status  With AMS on admission. Admitted to ICU for further monitoring. Likely 2/2 to Sepsis. Now significantly better than on admission. Since being stepped down, the patient has been awake, alert, and oriented to person, place, and time.     Plan:  - Now resolved    Weakness  Likely 2/2 to rhadbo. However unilateral R upper and lower extremity weakness noted from admission. MRI revealed no acute lesions. PT/OT consulted and both recommend SNF placement.     Plan:  -coordinate SNF placement     Severe sepsis  On admission the patient was afebrile and tachypneic. Workup for LISHA and rhabdo was initiated. Additionally, CXR was obtained given that he was tachypneic; it revealed no acute processes or findings suggestive of pneumonia. He was hypertensive initially but improved after administration of home losartan dose. Several hours after arrival, oral temp was normal but a rectal temperature of 103 was obtained. Patient pancultured. U/a with leuk +2, wbc 22, and many bacteria. Patient started on vancomycin and pip-tazo for empiric coverage. Admitted to ICU for further management. Stepped down to [facility] medicine 8/29. Has remained afebrile and hemodynamically stable since step-down. Sepsis likely secondary to UTI plus bacteremia. BCs grew E. Fecalis and urine culture grew Klebsiella pneumoniae. Sensitivities have returned and it is reasonable at this moment to de-escalate therapy to ciprofloxacin and  ampicillin.     Plan:  - De-escalate from vancomycin and pip-tazo to cipro and ampicillin based on cultures' sensitivities. Plan for a total of 7 days of therapy with ampicillin (currently on antimicrobial therapy day #5; continue therapy until 09/03/2019).    Bacteremia  Blood cultures 8/27 with Enterococcus Faecalis and Coagulase negative staph. He had already been started on broad abx therapy with vancomycin and pip-tazo and were pending culture sensitivities to determine further management. E. Fecalis resulted sensitive to ampicillin and vancomycin and resistant to tetracycline. The decision was made to de-escalate him from vancomycin to ampicillin to cover for E. Fecalis.     Plan:  -De-escalate therapy from vancomycin to ampicillin    UTI (urinary tract infection)  UA with 2+ leucks, many bacteria, 22WBC, 19 RBC\  Ucx with gram negative rods, >100,000 CFU Klebsiella pneumoniae   Sensitivities:   -resistant: amp/sulbactam, tetracycline  -intermediate: augmentin, nitrofurantoin  -sensitive: cefazolin, cefepime, ceftriaxone, ciprofloxacin, gentamicin, levofloxacin, pip/tazo, tobramycin, bactrim    Plan:  -de-escalate from pip-tazo to ciprofloxacin based on sensitivities    History of CVA in adulthood  Patient and family members unclear of event, but medical records mention hx of CVA in 2012. At the time he was started on DAPT with aspirin and clopidogrel. However, clopidogrel has been discontinued since.    Significant R upper and R lower extremity weakness prompted obtaining an MRI due to concerns for stroke. However, it revealed no acute findings, only generalized volume loss consistent with chronic ischemic changes and old infarcts.     Plan:  - CT head in ED, negative for acute infarct  -continue home aspirin    Status post fall  Patient fell around two days ago while attempting to turn a bathtub faucet on. Unclear whether he lost consciousness or not. CT head was performed on arrival to ED. It revealed  "several findings of suspected old infarcts, generalized cerebral volume loss, and supratentorial white matter chronic small vessel ischemic change, as well as no acute large vascular territory infarct or intracranial hemorrhage identified (but noted that further evaluation/follow-up as warranted).     Additionally a shoulder xray was performed and it revealed intact osseous structures w/o evidence of fracture nor GH joint dislocation.     Since he was stepped down from the ICU, PT/OT have been following him and he has made significant progress. They both recommend he goes to a SNF for optimal rehabilitation prior to going home. This was discussed with the patient and he expressed great interest in going. Will plan with case management to discharge patient to a SNF.     DM (diabetes mellitus)  Patient on home metformin 500mg daily. Uncertain whether he was compliant with medication and when was the last time he took it.   - HGBAIC 6.4  - Low dose sliding scale  - Diabetic diet    Hypertension  Continue home losartan 25mg daily    LISHA (acute kidney injury)  See "non-traumatic rhabdo"   - Resolved  - Cr 0.9, baseline    Non-traumatic rhabdomyolysis  Patient admitted after being found two days after a non-traumatic fall at his home. Labs on admission significant for CPK 66483. Creatinine at 1.6 (changed from February where it was 0.8). K, Phos, and Mag were wnl. He was bolused with 2L of 0.9 NS at the ED. CPK significantly improved from admission; last one 8/31 was 2200.    Plan:  - monitor for kidney injury with daily BMP    Seizure disorder  Unclear status of patient's seizure disorder. However, [place] [facility] [place] which is described as shaking and muscle spasms. The patient's niece provided his home medications; he is currently on keppra 500mg 3 tablets BID.     Plan:  - EEG with no seizure activity  - Start Keppra at 1000mg BID, will up titrate to home " does of 1500 BID      VTE Risk Mitigation (From admission, onward)        Ordered     enoxaparin injection 40 mg  Daily      08/29/19 0908     IP VTE LOW RISK PATIENT  Once      08/27/19 8363          Melissa Sim MD  Department of Hospital Medicine   [facility]

## 2019-09-01 NOTE — PLAN OF CARE
Problem: Skin Injury Risk Increased  Goal: Skin Health and Integrity  Outcome: Ongoing (interventions implemented as appropriate)  No acute events throughout night. Pt AAO X 4; able to express needs.  No c/o pain .  IV ABX given as ordered.   Safety maintained.  Bed in low position,  call  light in reach.    Will continue to monitor

## 2019-09-01 NOTE — ASSESSMENT & PLAN NOTE
With AMS on admission. Admitted to ICU for further monitoring. Likely 2/2 to Sepsis. Now significantly better than on admission. Since being stepped down, the patient has been awake, alert, and oriented to person, place, and time.     Plan:  - Now resolved

## 2019-09-01 NOTE — ASSESSMENT & PLAN NOTE
Patient admitted after being found two days after a non-traumatic fall at his home. Labs on admission significant for CPK 60163. Creatinine at 1.6 (changed from February where it was 0.8). K, Phos, and Mag were wnl. He was bolused with 2L of 0.9 NS at the ED. CPK significantly improved from admission; last one 8/31 was 2200.    Plan:  - monitor for kidney injury with daily BMP

## 2019-09-01 NOTE — ASSESSMENT & PLAN NOTE
UA with 2+ leucks, many bacteria, 22WBC, 19 RBC\  Ucx with gram negative rods, >100,000 CFU Klebsiella pneumoniae   Sensitivities:   -resistant: amp/sulbactam, tetracycline  -intermediate: augmentin, nitrofurantoin  -sensitive: cefazolin, cefepime, ceftriaxone, ciprofloxacin, gentamicin, levofloxacin, pip/tazo, tobramycin, bactrim    Plan:  -de-escalate from pip-tazo to ciprofloxacin based on sensitivities

## 2019-09-01 NOTE — CONSULTS
Therapy with IV Vancomycin complete and/or consult discontinued by provider.  Pharmacy will sign off, please re-consult as needed.    Jennifer Jimenez, PharmD, BCPS  [phone]

## 2019-09-01 NOTE — ASSESSMENT & PLAN NOTE
Patient fell around two days ago while attempting to turn a bathtub faucet on. Unclear whether he lost consciousness or not. CT head was performed on arrival to ED. It revealed several findings of suspected old infarcts, generalized cerebral volume loss, and supratentorial white matter chronic small vessel ischemic change, as well as no acute large vascular territory infarct or intracranial hemorrhage identified (but noted that further evaluation/follow-up as warranted).     Additionally a shoulder xray was performed and it revealed intact osseous structures w/o evidence of fracture nor GH joint dislocation.     Since he was stepped down from the ICU, PT/OT have been following him and he has made significant progress. They both recommend he goes to a SNF for optimal rehabilitation prior to going home. This was discussed with the patient and he expressed great interest in going. Will plan with case management to discharge patient to a SNF.

## 2019-09-01 NOTE — ASSESSMENT & PLAN NOTE
Unclear status of patient's seizure disorder. However, [place] [facility] [place] which is described as shaking and muscle spasms. The patient's niece provided his home medications; he is currently on keppra 500mg 3 tablets BID.     Plan:  - EEG with no seizure activity  - Start Keppra at 1000mg BID, will up titrate to home does of 1500 BID

## 2019-09-01 NOTE — PROGRESS NOTES
Pharmacokinetic Assessment Follow Up: IV Vancomycin   Trough drawn 08/31 @ 18:17   Day shift RN held 1700 dose because the lab had not resulted   Spoke to night shift RN who confirmed that the 1700 dose was never given, re-educated RN on appropriate vancomycin protocol    Vancomycin serum concentration assessment(s):   Trough level 11.5 mg/mL-- below goal of 15-20 mcg/mL    Vancomycin Regimen Plan:    Change regimen to Vancomycin 1250 mg IV every 12 hours with next serum trough concentration measured at 10:00 prior to 4th dose on 09/02    Drug levels (last 3 results):  Recent Labs   Lab Result Units 08/30/19  0507 08/31/19  1817   Vancomycin-Trough ug/mL 6.4* 11.5       Pharmacy will continue to follow and monitor vancomycin.    Please contact pharmacy at extension [phone] for questions regarding this assessment.    Thank you for the consult,   Celine Gomez       Patient brief summary:  Edwin Lopez is a 69 y.o. male initiated on antimicrobial therapy with IV Vancomycin for treatment of bacteremia    The patient's current regimen is 68 kg    Drug Allergies:   Review of patient's allergies indicates:  No Known Allergies    Actual Body Weight:   68 kg    Renal Function:   Estimated Creatinine Clearance: 74.5 mL/min (based on SCr of 0.9 mg/dL).,     CBC (last 72 hours):  Recent Labs   Lab Result Units 08/29/19  0804 08/30/19  0508 08/31/19  0826   WBC K/uL 12.59 11.38 9.78   Hemoglobin g/dL 12.4* 11.2* 10.5*   Hematocrit % 42.1 35.6* 35.7*   Platelets K/uL 125* 158 168   Gran% % 74.9* 72.2 64.0   Lymph% % 16.0* 18.3 21.2   Mono% % 7.8 7.0 11.3   Eosinophil% % 0.5 1.3 2.0   Basophil% % 0.3 0.4 0.4   Differential Method  Automated Automated Automated       Metabolic Panel (last 72 hours):  Recent Labs   Lab Result Units 08/29/19  0422 08/30/19  0507 08/30/19  0508 08/31/19  0826 08/31/19  1036   Sodium mmol/L 142 141  --  140  --    Potassium mmol/L 3.9 3.4*  --  4.0  --    Chloride mmol/L 109 109  --  104  --    CO2  mmol/L 22* 24  --  28  --    Glucose mg/dL 107 111*  --  149*  --    BUN, Bld mg/dL 12 10  --  9  --    Creatinine mg/dL 0.9 0.9  --  0.9  --    Albumin g/dL 2.4*  --  2.3* 2.2*  --    Total Bilirubin mg/dL 1.5*  --  1.0 0.7 0.6   Alkaline Phosphatase U/L 74  --  66 56  --    AST U/L 157*  --  109* 75*  --    ALT U/L 60*  --  52* 53*  --    Magnesium mg/dL 1.6 1.5*  --  1.8  --    Phosphorus mg/dL 1.3* 1.9*  --  3.0  --        Vancomycin Administrations:  vancomycin given in the last 96 hours                   vancomycin in dextrose 5 % 1 gram/250 mL IVPB 1,000 mg (mg) 1,000 mg New Bag 08/31/19 0524     1,000 mg New Bag 08/30/19 1615    vancomycin 1.25 g in dextrose 5% 250 mL IVPB (ready to mix) (mg) 1,250 mg New Bag 08/30/19 0513     1,250 mg New Bag 08/29/19 0544                Microbiologic Results:  Microbiology Results (last 7 days)     Procedure Component Value Units Date/Time    Blood culture [[id number]] Collected:  08/29/19 1000    Order Status:  Completed Specimen:  Blood Updated:  08/31/19 1212     Blood Culture, Routine No Growth to date      No Growth to date      No Growth to date    Blood culture [[id number]] Collected:  08/29/19 1000    Order Status:  Completed Specimen:  Blood Updated:  08/31/19 1212     Blood Culture, Routine No Growth to date      No Growth to date      No Growth to date    Blood culture [[id number]]  (Abnormal)  (Susceptibility) Collected:  08/27/19 2017    Order Status:  Completed Specimen:  Blood from Peripheral, Hand, Right Updated:  08/31/19 1100     Blood Culture, Routine Gram stain aer bottle: Gram positive cocci in chains resembling Strep       Gram stain aer bottle: Gram positive cocci in clusters resembling Staph       Gram stain isael bottle: Gram positive cocci in chains resembling Strep       Results called to and read back by:Joanne Rogel RN  08/28/2019  09:38      ENTEROCOCCUS FAECALIS      COAGULASE-NEGATIVE STAPHYLOCOCCUS SPECIES  Susceptibility testing not routinely  performed.      Blood culture [[id number]] Collected:  08/27/19 1756    Order Status:  Completed Specimen:  Blood from Peripheral, Forearm, Left Updated:  08/30/19 2212     Blood Culture, Routine No Growth to date      No Growth to date      No Growth to date      No Growth to date    Urine culture [[id number]]  (Abnormal)  (Susceptibility) Collected:  08/27/19 1515    Order Status:  Completed Specimen:  Urine Updated:  08/29/19 2033     Urine Culture, Routine KLEBSIELLA PNEUMONIAE  >100,000 cfu/ml      Narrative:       Preferred Collection Type->Urine, Clean Catch  add on [facility] #[id number] per Edison Osuna MD @ 21:16  08/27/2019

## 2019-09-01 NOTE — ASSESSMENT & PLAN NOTE
Care everywhere review shows baseline hgb usually around 11-12. Hgb on admission at [facility] [place] was 15 and noted to be around 10-11 after several days of being hospitalized. Workup

## 2019-09-01 NOTE — ASSESSMENT & PLAN NOTE
Blood cultures 8/27 with Enterococcus Faecalis and Coagulase negative staph. He had already been started on broad abx therapy with vancomycin and pip-tazo and were pending culture sensitivities to determine further management. E. Fecalis resulted sensitive to ampicillin and vancomycin and resistant to tetracycline. The decision was made to de-escalate him from vancomycin to ampicillin to cover for E. Fecalis.     Plan:  -De-escalate therapy from vancomycin to ampicillin

## 2019-09-02 LAB
POCT GLUCOSE: 156 MG/DL (ref 70–110)
POCT GLUCOSE: 193 MG/DL (ref 70–110)
POCT GLUCOSE: 97 MG/DL (ref 70–110)

## 2019-09-02 PROCEDURE — 99231 PR SUBSEQUENT HOSPITAL CARE,LEVL I: ICD-10-PCS | Mod: GC,,, | Performed by: HOSPITALIST

## 2019-09-02 PROCEDURE — 25000003 PHARM REV CODE 250: Performed by: STUDENT IN AN ORGANIZED HEALTH CARE EDUCATION/TRAINING PROGRAM

## 2019-09-02 PROCEDURE — 11000001 HC ACUTE MED/SURG PRIVATE ROOM

## 2019-09-02 PROCEDURE — 63600175 PHARM REV CODE 636 W HCPCS: Performed by: STUDENT IN AN ORGANIZED HEALTH CARE EDUCATION/TRAINING PROGRAM

## 2019-09-02 PROCEDURE — 99231 SBSQ HOSP IP/OBS SF/LOW 25: CPT | Mod: GC,,, | Performed by: HOSPITALIST

## 2019-09-02 PROCEDURE — 25000003 PHARM REV CODE 250: Performed by: HOSPITALIST

## 2019-09-02 RX ADMIN — LEVETIRACETAM 1000 MG: 500 TABLET, FILM COATED ORAL at 09:09

## 2019-09-02 RX ADMIN — LOSARTAN POTASSIUM 25 MG: 25 TABLET, FILM COATED ORAL at 09:09

## 2019-09-02 RX ADMIN — AMPICILLIN SODIUM 4000 MG: 2 INJECTION, POWDER, FOR SOLUTION INTRAMUSCULAR; INTRAVENOUS at 05:09

## 2019-09-02 RX ADMIN — ENOXAPARIN SODIUM 40 MG: 100 INJECTION SUBCUTANEOUS at 05:09

## 2019-09-02 RX ADMIN — CIPROFLOXACIN HYDROCHLORIDE 500 MG: 500 TABLET, FILM COATED ORAL at 09:09

## 2019-09-02 RX ADMIN — AMPICILLIN SODIUM 4000 MG: 2 INJECTION, POWDER, FOR SOLUTION INTRAMUSCULAR; INTRAVENOUS at 01:09

## 2019-09-02 RX ADMIN — ASPIRIN 81 MG: 81 TABLET, COATED ORAL at 09:09

## 2019-09-02 RX ADMIN — AMPICILLIN SODIUM 4000 MG: 2 INJECTION, POWDER, FOR SOLUTION INTRAMUSCULAR; INTRAVENOUS at 09:09

## 2019-09-02 NOTE — SUBJECTIVE & OBJECTIVE
Interval History: No overnight events. The patient has remained hemodynamically stable. He has no complaints, states he feels great. Currently pending insurance approval for SNF transfer.     Review of Systems   Constitutional: Positive for fatigue (chronic). Negative for chills and fever.   HENT: Negative for congestion and sore throat.    Eyes: Negative for photophobia and visual disturbance.   Respiratory: Negative for cough, chest tightness and shortness of breath.    Cardiovascular: Negative for chest pain and palpitations.   Gastrointestinal: Negative for abdominal pain, constipation, diarrhea, nausea and vomiting.   Genitourinary: Negative for flank pain and hematuria.   Musculoskeletal: Negative for back pain, neck pain and neck stiffness.   Skin: Negative for color change and rash.   Neurological: Positive for weakness (R upper and R lower extremities, improved from yesterday. States he has better control of his RLE. ). Negative for seizures, light-headedness and headaches.   Psychiatric/Behavioral: Negative for behavioral problems and confusion.     Objective:     Vital Signs (Most Recent):  Temp: 97.7 °F (36.5 °C) (09/02/19 0818)  Pulse: 65 (09/02/19 1130)  Resp: (!) 5 (09/02/19 0402)  BP: 136/74 (09/02/19 0818)  SpO2: 97 % (09/02/19 0402) Vital Signs (24h Range):  Temp:  [97.7 °F (36.5 °C)-98.9 °F (37.2 °C)] 97.7 °F (36.5 °C)  Pulse:  [60-81] 65  Resp:  [5-24] 5  SpO2:  [97 %-98 %] 97 %  BP: (128-136)/(74-94) 136/74     Weight: 68 kg (150 lb)  Body mass index is 22.81 kg/m².    Intake/Output Summary (Last 24 hours) at 9/2/2019 1457  Last data filed at 9/2/2019 0600  Gross per 24 hour   Intake 730 ml   Output 2750 ml   Net -2020 ml      Physical Exam   Constitutional: He is oriented to person, place, and time. Vital signs are normal. He is cooperative. No distress.   HENT:   Head: Normocephalic and atraumatic.   Eyes: Right eye exhibits no discharge. Left eye exhibits no discharge. No scleral icterus.    Neck: Neck supple. No thyromegaly present.   Cardiovascular: Normal rate, regular rhythm and normal heart sounds.   Pulmonary/Chest: Effort normal and breath sounds normal. No respiratory distress.   Abdominal: Soft. Bowel sounds are normal. He exhibits no distension. There is no tenderness.   Musculoskeletal: He exhibits no edema.   Neurological: He is alert and oriented to person, place, and time. He displays no seizure activity.   Strength still reduced on R upper and R lower extremities but improved from yesterday.   Skin: Skin is warm and dry. He is not diaphoretic.   Nursing note and vitals reviewed.      Significant Labs:   Blood Culture:   No results for input(s): LABBLOO in the last 48 hours.  CBC:   Recent Labs   Lab 09/01/19  0542   WBC 8.47   HGB 10.9*   HCT 36.2*        CMP:   Recent Labs   Lab 09/01/19  0542      K 3.9      CO2 28      BUN 8   CREATININE 0.9   CALCIUM 8.3*   ANIONGAP 7*   EGFRNONAA >60.0       Significant Imaging: I have reviewed and interpreted all pertinent imaging results/findings within the past 24 hours.

## 2019-09-02 NOTE — PLAN OF CARE
Problem: Diabetes Comorbidity  Goal: Blood Glucose Level Within Desired Range    Intervention: Maintain Glycemic Control  No acute events throughout night. Pt AAO X 4; able to express needs.  NO c/o pain.   Safety maintained. IV and PO antibiotics given as ordered.  Heel protectors on /feet elevated.   Bed in low position,  call  light in reach.    Will continue to monitor

## 2019-09-02 NOTE — PROGRESS NOTES
[facility] Hospital Medicine  Progress Note    Patient Name: Edwin Lopez  MRN: [id number]  Patient Class: IP- Inpatient   Admission Date: 8/27/2019  Length of Stay: 6 days  Attending Physician: Chelo Michelle*  Primary Care Provider: Harini Montenegro MD    [facility] Medicine Team: [facility] MED 4 Melissa Sim MD    Subjective:     Principal Problem:Altered mental status    HPI:  Mr. Waters is a 69 year-old male with type 2 DM, seizure disorder, dementia, and a history of CVA in 2012 who was brought in from his home after he was found on the floor two days after he fell. The patient recalls that he went to turn a bathtub faucet on when he fell to the ground and could not get back up. Denies headaches, lightheadedness, chest pain, palpitations, or tremors prior to the fall. Was asked about LOC prior to and following the fall but he was unable to express whether he experienced it or not. Currently denies headaches, shortness of breath, chest pain, palpitations, abdominal pain, or any discomfort.     He was unable to provide much history. His niece was present during the evaluation and was asked regarding his medical conditions, medication use, and family history but she was unsure of these. The last time he was seen in person by his family members was on Friday when, according to them, he was functional and oriented. His niece spoke with him over the phone on Saturday afternoon but did not hear from him since them despite calling several times. She did mention that he lives by himself and is functional, stating that this is not near his baseline. He lives by himself and an aide visits him regularly. He does not drive but is an avid walker and gets around by foot. According to her, he does not consume alcoholic beverages and has never smoked.     Overview/Hospital Course:  Patient originally admitted to hospital medicine team 4 for Rhabdomylosis after been found down at home for 3 days.  UA was consistent with UTI, patient subsequently became septic with altered mental status and hypotension. He was stepped up to the MICU for higher level of care and monitoring. EEG done in MICU without any seizure activity. Patient was treated with Vancomycin and Zosyn given persistent fevers and AMS. Blood cultures with Enterococcus Faecalis and coagulase negative staph. Patient with Klebsiella pneumoniae in urine. Patient stepped down to hospital medicine 8/29. Once sensitivities returned, broad spectrum antibiotics were de-escalated to ciprofloxacin and ampicillin. He had markedly notable R-sided weakness so there were concerns for stroke/acute vascular insult. However, MRI performed revealed no acute processes, only generalized volume loss reflective of chronic microvascular changes. PT/OT followed patient and recommended SNF placement, to which the patient agreed to and is looking forward to. His weakness has improved significantly.     Interval History: No overnight events. The patient has remained hemodynamically stable. He has no complaints, states he feels great. Currently pending insurance approval for SNF transfer.     Review of Systems   Constitutional: Positive for fatigue (chronic). Negative for chills and fever.   HENT: Negative for congestion and sore throat.    Eyes: Negative for photophobia and visual disturbance.   Respiratory: Negative for cough, chest tightness and shortness of breath.    Cardiovascular: Negative for chest pain and palpitations.   Gastrointestinal: Negative for abdominal pain, constipation, diarrhea, nausea and vomiting.   Genitourinary: Negative for flank pain and hematuria.   Musculoskeletal: Negative for back pain, neck pain and neck stiffness.   Skin: Negative for color change and rash.   Neurological: Positive for weakness (R upper and R lower extremities, improved from yesterday. States he has better control of his RLE. ). Negative for seizures, light-headedness and  headaches.   Psychiatric/Behavioral: Negative for behavioral problems and confusion.     Objective:     Vital Signs (Most Recent):  Temp: 97.7 °F (36.5 °C) (09/02/19 0818)  Pulse: 65 (09/02/19 1130)  Resp: (!) 5 (09/02/19 0402)  BP: 136/74 (09/02/19 0818)  SpO2: 97 % (09/02/19 0402) Vital Signs (24h Range):  Temp:  [97.7 °F (36.5 °C)-98.9 °F (37.2 °C)] 97.7 °F (36.5 °C)  Pulse:  [60-81] 65  Resp:  [5-24] 5  SpO2:  [97 %-98 %] 97 %  BP: (128-136)/(74-94) 136/74     Weight: 68 kg (150 lb)  Body mass index is 22.81 kg/m².    Intake/Output Summary (Last 24 hours) at 9/2/2019 1457  Last data filed at 9/2/2019 0600  Gross per 24 hour   Intake 730 ml   Output 2750 ml   Net -2020 ml      Physical Exam   Constitutional: He is oriented to person, place, and time. Vital signs are normal. He is cooperative. No distress.   HENT:   Head: Normocephalic and atraumatic.   Eyes: Right eye exhibits no discharge. Left eye exhibits no discharge. No scleral icterus.   Neck: Neck supple. No thyromegaly present.   Cardiovascular: Normal rate, regular rhythm and normal heart sounds.   Pulmonary/Chest: Effort normal and breath sounds normal. No respiratory distress.   Abdominal: Soft. Bowel sounds are normal. He exhibits no distension. There is no tenderness.   Musculoskeletal: He exhibits no edema.   Neurological: He is alert and oriented to person, place, and time. He displays no seizure activity.   Strength still reduced on R upper and R lower extremities but improved from yesterday.   Skin: Skin is warm and dry. He is not diaphoretic.   Nursing note and vitals reviewed.      Significant Labs:   Blood Culture:   No results for input(s): LABBLOO in the last 48 hours.  CBC:   Recent Labs   Lab 09/01/19  0542   WBC 8.47   HGB 10.9*   HCT 36.2*        CMP:   Recent Labs   Lab 09/01/19  0542      K 3.9      CO2 28      BUN 8   CREATININE 0.9   CALCIUM 8.3*   ANIONGAP 7*   EGFRNONAA >60.0       Significant Imaging: I  "have reviewed and interpreted all pertinent imaging results/findings within the past 24 hours.      Assessment/Plan:      * Altered mental status  With AMS on admission. Admitted to ICU for further monitoring. Likely 2/2 to Sepsis. Now significantly better than on admission. Since being stepped down, the patient has been awake, alert, and oriented to person, place, and time.     Plan:  - Now resolved    Non-traumatic rhabdomyolysis  Patient admitted after being found two days after a non-traumatic fall at his home. Labs on admission significant for CPK 52189. Creatinine at 1.6 (changed from February where it was 0.8). K, Phos, and Mag were wnl. He was bolused with 2L of 0.9 NS at the ED. CPK significantly improved from admission; last one 8/31 was 2200.    Plan:  - monitor for kidney injury with daily BMP    LISHA (acute kidney injury)  See "non-traumatic rhabdo"   - Resolved  - Cr 0.9, baseline    Bacteremia  Blood cultures 8/27 with Enterococcus Faecalis and Coagulase negative staph. He had already been started on broad abx therapy with vancomycin and pip-tazo and were pending culture sensitivities to determine further management. E. Fecalis resulted sensitive to ampicillin and vancomycin and resistant to tetracycline. The decision was made to de-escalate him from vancomycin to ampicillin to cover for E. Fecalis.     Plan:  -De-escalate therapy from vancomycin to ampicillin    Severe sepsis  On admission the patient was afebrile and tachypneic. Workup for LISHA and rhabdo was initiated. Additionally, CXR was obtained given that he was tachypneic; it revealed no acute processes or findings suggestive of pneumonia. He was hypertensive initially but improved after administration of home losartan dose. Several hours after arrival, oral temp was normal but a rectal temperature of 103 was obtained. Patient pancultured. U/a with leuk +2, wbc 22, and many bacteria. Patient started on vancomycin and pip-tazo for empiric " coverage. Admitted to ICU for further management. Stepped down to hospital medicine 8/29. Has remained afebrile and hemodynamically stable since step-down. Sepsis likely secondary to UTI plus bacteremia. BCs grew E. Fecalis and urine culture grew Klebsiella pneumoniae. Sensitivities have returned and it is reasonable at this moment to de-escalate therapy to ciprofloxacin and ampicillin.     Plan:  - De-escalate from vancomycin and pip-tazo to cipro and ampicillin based on cultures' sensitivities    UTI (urinary tract infection)  UA with 2+ leucks, many bacteria, 22WBC, 19 RBC\  Ucx with gram negative rods, >100,000 CFU Klebsiella pneumoniae   Sensitivities:   -resistant: amp/sulbactam, tetracycline  -intermediate: augmentin, nitrofurantoin  -sensitive: cefazolin, cefepime, ceftriaxone, ciprofloxacin, gentamicin, levofloxacin, pip/tazo, tobramycin, bactrim    Plan:  -de-escalate from pip-tazo to ciprofloxacin based on sensitivities    Weakness  Likely 2/2 to rhadbo. However unilateral R upper and lower extremity weakness noted from admission. MRI revealed no acute lesions. PT/OT consulted and both recommend SNF placement.     Plan:  -coordinate SNF placement     Seizure disorder  Unclear status of patient's seizure disorder. However, he has presented several times in 2018 and 2019 at [facility] following seizure activity which is described as shaking and muscle spasms. The patient's niece provided his home medications; he is currently on keppra 500mg 3 tablets BID.     Plan:  - EEG with no seizure activity  - Start Keppra at 1000mg BID, will up titrate to home does of 1500 BID    Status post fall  Patient fell around two days ago while attempting to turn a bathtub faucet on. Unclear whether he lost consciousness or not. CT head was performed on arrival to ED. It revealed several findings of suspected old infarcts, generalized cerebral volume loss, and supratentorial white matter chronic small vessel ischemic change, as  well as no acute large vascular territory infarct or intracranial hemorrhage identified (but noted that further evaluation/follow-up as warranted).     Additionally a shoulder xray was performed and it revealed intact osseous structures w/o evidence of fracture nor GH joint dislocation.     Since he was stepped down from the ICU, PT/OT have been following him and he has made significant progress. They both recommend he goes to a SNF for optimal rehabilitation prior to going home. This was discussed with the patient and he expressed great interest in going. Will plan with case management to discharge patient to a SNF.     DM (diabetes mellitus)  Patient on home metformin 500mg daily. Uncertain whether he was compliant with medication and when was the last time he took it.   - HGBAIC 6.4  - Low dose sliding scale  - Diabetic diet    Anemia  Care everywhere review shows baseline hgb usually around 11-12. Hgb on admission at [facility] was 15 and noted to be around 10-11 after several days of being hospitalized. Workup     History of CVA in adulthood  Patient and family members unclear of event, but medical records mention hx of CVA in 2012. At the time he was started on DAPT with aspirin and clopidogrel. However, clopidogrel has been discontinued since.    Significant R upper and R lower extremity weakness prompted obtaining an MRI due to concerns for stroke. However, it revealed no acute findings, only generalized volume loss consistent with chronic ischemic changes and old infarcts.     Plan:  - CT head in ED, negative for acute infarct  -continue home aspirin    Hypertension  Continue home losartan 25mg daily        VTE Risk Mitigation (From admission, onward)        Ordered     enoxaparin injection 40 mg  Daily      08/29/19 0908     IP VTE LOW RISK PATIENT  Once      08/27/19 2660          Melissa Sim MD  Department of Hospital Medicine   [facility]

## 2019-09-03 LAB
BACTERIA BLD CULT: NORMAL
BACTERIA BLD CULT: NORMAL
POCT GLUCOSE: 100 MG/DL (ref 70–110)
POCT GLUCOSE: 113 MG/DL (ref 70–110)
POCT GLUCOSE: 128 MG/DL (ref 70–110)

## 2019-09-03 PROCEDURE — 11000001 HC ACUTE MED/SURG PRIVATE ROOM

## 2019-09-03 PROCEDURE — 25000003 PHARM REV CODE 250: Performed by: STUDENT IN AN ORGANIZED HEALTH CARE EDUCATION/TRAINING PROGRAM

## 2019-09-03 PROCEDURE — 99231 SBSQ HOSP IP/OBS SF/LOW 25: CPT | Mod: GC,,, | Performed by: HOSPITALIST

## 2019-09-03 PROCEDURE — 99231 PR SUBSEQUENT HOSPITAL CARE,LEVL I: ICD-10-PCS | Mod: GC,,, | Performed by: HOSPITALIST

## 2019-09-03 PROCEDURE — 25000003 PHARM REV CODE 250: Performed by: HOSPITALIST

## 2019-09-03 PROCEDURE — 63600175 PHARM REV CODE 636 W HCPCS: Performed by: STUDENT IN AN ORGANIZED HEALTH CARE EDUCATION/TRAINING PROGRAM

## 2019-09-03 PROCEDURE — 92526 ORAL FUNCTION THERAPY: CPT

## 2019-09-03 RX ORDER — ACETAMINOPHEN 650 MG/20.3ML
650 LIQUID ORAL EVERY 6 HOURS PRN
Status: DISCONTINUED | OUTPATIENT
Start: 2019-09-03 | End: 2019-09-04 | Stop reason: HOSPADM

## 2019-09-03 RX ADMIN — LOSARTAN POTASSIUM 25 MG: 25 TABLET, FILM COATED ORAL at 10:09

## 2019-09-03 RX ADMIN — AMPICILLIN SODIUM 4000 MG: 2 INJECTION, POWDER, FOR SOLUTION INTRAMUSCULAR; INTRAVENOUS at 12:09

## 2019-09-03 RX ADMIN — LEVETIRACETAM 1000 MG: 500 TABLET, FILM COATED ORAL at 10:09

## 2019-09-03 RX ADMIN — LEVETIRACETAM 1000 MG: 500 TABLET, FILM COATED ORAL at 09:09

## 2019-09-03 RX ADMIN — AMPICILLIN SODIUM 4000 MG: 2 INJECTION, POWDER, FOR SOLUTION INTRAMUSCULAR; INTRAVENOUS at 10:09

## 2019-09-03 RX ADMIN — ASPIRIN 81 MG: 81 TABLET, COATED ORAL at 10:09

## 2019-09-03 RX ADMIN — ENOXAPARIN SODIUM 40 MG: 100 INJECTION SUBCUTANEOUS at 04:09

## 2019-09-03 NOTE — ASSESSMENT & PLAN NOTE
Had markedly increased weakness in RUE and RLE. Likely secondary to rhadbo. Initially there were concerns for stroke but MRI revealed no acute lesions. PT/OT consulted and both recommend SNF placement.     Plan:  -Coordinate SNF placement   -Continue PT/OT while inpatient

## 2019-09-03 NOTE — PLAN OF CARE
Problem: Adult Inpatient Plan of Care  Goal: Plan of Care Review  Outcome: Ongoing (interventions implemented as appropriate)     09/03/19 0592   Plan of Care Review   Plan of Care Reviewed With patient   Pt remains free from falls and injuries during shift. Awake, alert, and oriented. Intermittent confusion noted, easily redirected. Vital signs stable. Continues IV antibiotics. No signs of acute distress noted at this time. Bed in lowest position, wheels locked, and bed alarm on. Call light in reach. Will continue to monitor, safety maintained.

## 2019-09-03 NOTE — ASSESSMENT & PLAN NOTE
Care everywhere review shows baseline hgb usually around 11-12. Hgb on admission at [facility] was 15 and noted to be around 10-11 after several days of being hospitalized. Patient has remained hemodynamically stable and H/H have stayed within normal range.

## 2019-09-03 NOTE — PLAN OF CARE
09/03/19 1644   Discharge Reassessment   Assessment Type Discharge Planning Reassessment   Provided patient/caregiver education on the expected discharge date and the discharge plan Yes   Do you have any problems affording any of your prescribed medications? No   Discharge Plan A Skilled Nursing Facility   Discharge Plan B Home Health   DME Needed Upon Discharge  other (see comments)  (TBD)   Anticipated Discharge Disposition SNF   Post-Acute Status   Post-Acute Authorization Placement   Post-Acute Placement Status Pending Payor Review  ([facility]/SNF)   Discharge Delays None known at this time

## 2019-09-03 NOTE — MEDICAL/APP STUDENT
[facility] Hospital Medicine  Progress Note    Patient Name: Edwin Lopez  MRN: [id number]  Patient Class: IP- Inpatient   Admission Date: 8/27/2019  Length of Stay: 7 days  Attending Physician: Renata Yepez MD  Primary Care Provider: Harini Montenegro MD    [facility] Medicine Team: [facility] HOSP MED 4 Genesis Turner    Subjective:     Principal Problem: Rhabdomyolysis     Hospital Course:  69 year old AA male with T2DM, seizure disorder, dementia and CVA in 2012 presented to ED for a fall and found on the floor 2 days later. On admission to ED patient was stepped up to ICU for sepsis and altered mental status. UTI positive for Klebsiella and blood cultures were positive for Enterococcus faecalis. He was stepped down from MICU on 8/29/19 for further management of Rhabdomyolysis, UTI and bacteremia. Antibiotics were de-escalated from Piperacillin-tazobactam and Vancomycin to Ampicillin for 2 week duration (end date 9/9/19). UTI was treated with ciprofloxacin for 3 days. Patient had right sided weakness, MRI no acute infarction, has been improving.     Interval History:   Patient states he had 3x episodes of diarrhea in the morning, described as watery in consistency, but no change in color. No abdominal pain, no dysuria, and no fever or chills. Otherwise he is feeling well. Looking forward to SNF placement.     Review of Systems   Constitutional: Negative for chills, diaphoresis and fever.   Eyes: Negative for photophobia and visual disturbance.   Respiratory: Negative for cough and shortness of breath.    Gastrointestinal: Positive for diarrhea. Negative for abdominal pain, nausea and vomiting.   Genitourinary: Negative for dysuria and hematuria.   Neurological: Negative for dizziness, weakness and light-headedness.     Objective:     Vital Signs (Most Recent):  Temp: 97.9 °F (36.6 °C) (09/03/19 0800)  Pulse: 76 (09/03/19 1131)  Resp: 20 (09/03/19 0800)  BP: (!) 149/96 (09/03/19 0800)  SpO2: 96 %  (09/03/19 0800) Vital Signs (24h Range):  Temp:  [97.9 °F (36.6 °C)-99.1 °F (37.3 °C)] 97.9 °F (36.6 °C)  Pulse:  [64-78] 76  Resp:  [12-21] 20  SpO2:  [96 %-100 %] 96 %  BP: (132-149)/(74-96) 149/96     Weight: 68 kg (150 lb)  Body mass index is 22.81 kg/m².    Intake/Output Summary (Last 24 hours) at 9/3/2019 1322  Last data filed at 9/3/2019 0800  Gross per 24 hour   Intake 1080 ml   Output 3850 ml   Net -2770 ml      Physical Exam   Constitutional: He is oriented to person, place, and time. He appears well-developed.   HENT:   Head: Normocephalic.   Eyes: EOM are normal.   Neck: Normal range of motion.   Cardiovascular: Normal rate, regular rhythm, normal heart sounds and intact distal pulses.   Pulmonary/Chest: Effort normal and breath sounds normal. No respiratory distress. He has no wheezes. He has no rales.   Abdominal: Soft. Bowel sounds are normal. He exhibits no distension. There is no tenderness. There is no guarding.   Musculoskeletal: Normal range of motion. He exhibits no edema or tenderness.   Neurological: He is alert and oriented to person, place, and time. No cranial nerve deficit.   Skin: Skin is warm.       Significant Labs:   No new labs     Significant Imaging:  No new imaging     Assessment/Plan:   Rhabdomyolysis - resolving   CPK on admission 18,050 and has been trending down 2263 (8/31/19)  Plan   1. Encourage PO fluid intake     Altered mental status - resolved   Acute change in mental status that has improved. UTI and Bacteremia is actively being treated  Plan   1. Neuro checks q4 hours     UTI (Klebsiella) - resolved   No dysuria today, completed 3 day course of antibiotics     Bacteremia 2/2 Enterococcus faecalis - resolving   Blood cultures positive  Plan   1. PICC consult for IV antibiotics   2. Ampicillin 4g IV q8 hours (end date 9/9/19)    T2DM - chronic      Plan   1. Monitor     VTE Risk Mitigation (From admission, onward)        Ordered     enoxaparin injection 40 mg   Daily      08/29/19 0908     IP VTE LOW RISK PATIENT  Once      08/27/19 2153             Genesis Turner, MS4   Department of Hospital Medicine   [facility]

## 2019-09-03 NOTE — PT/OT/SLP PROGRESS
Speech Language Pathology Treatment    Patient Name:  Edwin Lopez   MRN:  [id number]  Admitting Diagnosis: Altered mental status    Recommendations:                 General Recommendations:  Dysphagia therapy  Diet recommendations:  Mechanical soft, Liquid Diet Level: Thin   Aspiration Precautions: 1 bite/sip at a time, Avoid talking while eating, Feed only when awake/alert, HOB to 90 degrees, No straws and Strict aspiration precautions   General Precautions: Standard, fall, seizure  Communication strategies:  go to room if call light pushed    Subjective     Patient awake and alert during session  Patient confused, but cooperative during session  Communicated with nurse prior to session     Pain/Comfort:  · Pain Rating 1: 0/10  · Pain Rating Post-Intervention 1: 0/10    Objective:     Has the patient been evaluated by SLP for swallowing?   Yes  Keep patient NPO? No   Current Respiratory Status: room air      Patient seen for ongoing swallow assessment. He was sitting up in bed during session. Patient reported no difficulties with current diet. His vocal quality was good, but he continued to exhibit decreased intensity. During PO trials, he tolerated thin liquids x7 (via cup-edge and straw) and solids x3 (ivette crackers) with no overt signs of aspiration. He exhibited mildly prolonged mastication as well as mild lingual residue following solid trials. During thin liquid trials, patient exhibited difficulty adhering to single sip precautions. While he did not exhibit any signs of aspiration across trials, SLP recommending no straws in order to limit likelihood of premature spillage as well as reduce consecutive sips. SLP educated patient regarding diet recs, but he would benefit from ongoing education. Whiteboard updated. Patient left in room with call light within reach. Diet recs communicated to treatment team.    Assessment:     Edwin Lopez is a 69 y.o. male with an SLP diagnosis of Dysphagia.     Goals:    Multidisciplinary Problems     SLP Goals        Problem: SLP Goal    Goal Priority Disciplines Outcome   SLP Goal     SLP Ongoing (interventions implemented as appropriate)   Description:  Speech Language Pathology Goals  Goals expected to be met by 9/6    1. Pt will tolerate diet of thin liquids and mechanical soft solids without overt clinical signs of aspiration   2. Pt will participate in ongoing swallow assessment in order to determine appropriateness of diet upgrade                         Plan:     · Patient to be seen:  3 x/week   · Plan of Care expires:  09/27/19  · Plan of Care reviewed with:  patient   · SLP Follow-Up:  Yes       Discharge recommendations:  nursing facility, skilled   Barriers to Discharge:  None    Time Tracking:     SLP Treatment Date:   09/03/19  Speech Start Time:  1200  Speech Stop Time:  1213     Speech Total Time (min):  13 min    Billable Minutes: Treatment Swallowing Dysfunction 13    Fadi Andre CCC-SLP  Speech-Language Pathology  Pager: [phone]   09/03/2019

## 2019-09-03 NOTE — ASSESSMENT & PLAN NOTE
On admission the patient was afebrile and tachypneic. Workup for LISHA and rhabdo was initiated. Additionally, CXR was obtained given that he was tachypneic; it revealed no acute processes or findings suggestive of pneumonia. He was hypertensive initially but improved after administration of home losartan dose. Several hours after arrival, oral temp was normal but a rectal temperature of 103 was obtained. Patient pancultured. U/a with leuk +2, wbc 22, and many bacteria. Patient started on vancomycin and pip-tazo for empiric coverage. Admitted to ICU for further management. Stepped down to hospital medicine 8/29. Has remained afebrile and hemodynamically stable since step-down. Sepsis likely secondary to UTI plus bacteremia. BCs grew E. Fecalis and urine culture grew Klebsiella pneumoniae. Sensitivities have returned and it is reasonable at this moment to de-escalate therapy to ciprofloxacin and ampicillin.     Plan:  - De-escalate from vancomycin and pip-tazo to cipro and ampicillin based on cultures' sensitivities  - Administer ciprofloxacin until 09/02  - Administer ampicillin until 09/12

## 2019-09-03 NOTE — ASSESSMENT & PLAN NOTE
Blood cultures 8/27 with Enterococcus Faecalis and Coagulase negative staph. He had already been started on broad abx therapy with vancomycin and pip-tazo and were pending culture sensitivities to determine further management. E. Fecalis resulted sensitive to ampicillin and vancomycin and resistant to tetracycline. The decision was made to de-escalate him from vancomycin to ampicillin to cover for E. Fecalis, based on sensitivities. PICC line will be needed for completion of 14 day course; PICC team consulted.      Plan:  -De-escalate therapy from vancomycin to ampicillin  -Continue ampicillin therapy until 09/12  -PICC team consulted for line placement for IV abx administration

## 2019-09-03 NOTE — ASSESSMENT & PLAN NOTE
UA with 2+ leucks, many bacteria, 22WBC, 19 RBC.  Ucx with gram negative rods, >100,000 CFU Klebsiella pneumoniae   Sensitivities:   -resistant: amp/sulbactam, tetracycline  -intermediate: augmentin, nitrofurantoin  -sensitive: cefazolin, cefepime, ceftriaxone, ciprofloxacin, gentamicin, levofloxacin, pip/tazo, tobramycin, bactrim    Plan:  -de-escalate from pip-tazo to ciprofloxacin based on sensitivities  -administer ciprofloxacin for 3 days for treatment of UTI (until 09/02)

## 2019-09-03 NOTE — PROGRESS NOTES
[facility] Hospital Medicine  Progress Note    Patient Name: Edwin Lopez  MRN: [id number]  Patient Class: IP- Inpatient   Admission Date: 8/27/2019  Length of Stay: 7 days  Attending Physician: Renata Yepez MD  Primary Care Provider: Harini Montenegro MD    [facility] Medicine Team: [facility] MED 4 Melissa Sim MD    Subjective:     Principal Problem:Altered mental status    HPI:  Mr. Waters is a 69 year-old male with type 2 DM, seizure disorder, dementia, and a history of CVA in 2012 who was brought in from his home after he was found on the floor two days after he fell. The patient recalls that he went to turn a bathtub faucet on when he fell to the ground and could not get back up. Denies headaches, lightheadedness, chest pain, palpitations, or tremors prior to the fall. Was asked about LOC prior to and following the fall but he was unable to express whether he experienced it or not. Currently denies headaches, shortness of breath, chest pain, palpitations, abdominal pain, or any discomfort.     He was unable to provide much history. His niece was present during the evaluation and was asked regarding his medical conditions, medication use, and family history but she was unsure of these. The last time he was seen in person by his family members was on Friday when, according to them, he was functional and oriented. His niece spoke with him over the phone on Saturday afternoon but did not hear from him since them despite calling several times. She did mention that he lives by himself and is functional, stating that this is not near his baseline. He lives by himself and an aide visits him regularly. He does not drive but is an avid walker and gets around by foot. According to her, he does not consume alcoholic beverages and has never smoked.     Overview/Hospital Course:  Patient originally admitted to hospital medicine team 4 for Rhabdomylosis after been found down at home for 3 days. UA  was consistent with UTI, patient subsequently became septic with altered mental status and hypotension. He was stepped up to the MICU for higher level of care and monitoring. EEG done in MICU without any seizure activity. Patient was treated with Vancomycin and Zosyn given persistent fevers and AMS. Blood cultures with Enterococcus Faecalis and coagulase negative staph. Patient with Klebsiella pneumoniae in urine. Patient stepped down to hospital medicine 8/29. Once sensitivities returned, broad spectrum antibiotics were de-escalated to ciprofloxacin and ampicillin; ciprofloxacin was administered for 3 days total to treat the UTI and ampicillin will be administered for a total of 2 weeks (until September 12, 2019) for E. fecalis bacteremia. He had markedly notable R-sided weakness so there were concerns for stroke/acute vascular insult. However, MRI performed revealed no acute processes, only generalized volume loss reflective of chronic microvascular changes. PT/OT recommended SNF placement, to which the patient agreed to and is looking forward to. His weakness has improved significantly.     Interval History: No acute overnight events. The patient had three loose stools this morning but none during the afternoon. He is otherwise feeling well; denies fevers, chills, headaches, dyspnea, abdominal pain, nausea, and vomiting.     Review of Systems   Constitutional: Positive for fatigue (chronic). Negative for chills and fever.   HENT: Negative for congestion and sore throat.    Eyes: Negative for photophobia and visual disturbance.   Respiratory: Negative for cough and shortness of breath.    Cardiovascular: Negative for chest pain, palpitations and leg swelling.   Gastrointestinal: Positive for diarrhea (3 nonbloody bowel movements this morning). Negative for abdominal distention, abdominal pain, constipation, nausea and vomiting.   Genitourinary: Negative for flank pain and frequency.   Musculoskeletal: Negative for  back pain, neck pain and neck stiffness.   Skin: Negative for color change and rash.   Neurological: Positive for weakness (R upper and R lower extremities, improved from previous exam. ). Negative for seizures, light-headedness and headaches.   Psychiatric/Behavioral: Negative for behavioral problems and confusion.     Objective:     Vital Signs (Most Recent):  Temp: 97.9 °F (36.6 °C) (09/03/19 0800)  Pulse: 80 (09/03/19 1519)  Resp: 20 (09/03/19 0800)  BP: (!) 149/96 (09/03/19 0800)  SpO2: 96 % (09/03/19 0800) Vital Signs (24h Range):  Temp:  [97.9 °F (36.6 °C)-98.3 °F (36.8 °C)] 97.9 °F (36.6 °C)  Pulse:  [66-80] 80  Resp:  [12-21] 20  SpO2:  [96 %-100 %] 96 %  BP: (132-149)/(74-96) 149/96     Weight: 68 kg (150 lb)  Body mass index is 22.81 kg/m².    Intake/Output Summary (Last 24 hours) at 9/3/2019 1610  Last data filed at 9/3/2019 0800  Gross per 24 hour   Intake 840 ml   Output 3850 ml   Net -3010 ml      Physical Exam   Constitutional: He is oriented to person, place, and time. Vital signs are normal. He is cooperative. No distress.   HENT:   Head: Normocephalic and atraumatic.   Eyes: Right eye exhibits no discharge. Left eye exhibits no discharge. No scleral icterus.   Neck: Neck supple. No thyromegaly present.   Cardiovascular: Normal rate, regular rhythm and normal heart sounds.   Pulmonary/Chest: Effort normal and breath sounds normal. No respiratory distress.   Abdominal: Soft. Bowel sounds are normal. He exhibits no distension. There is no tenderness.   Musculoskeletal: He exhibits no edema.   Neurological: He is alert and oriented to person, place, and time. He displays no seizure activity.   Strength still reduced on R upper and R lower extremities but improved from yesterday.   Skin: Skin is warm and dry. He is not diaphoretic.   Nursing note and vitals reviewed.      Assessment/Plan:      * Altered mental status  With AMS on admission. Admitted to ICU for further monitoring. Likely 2/2 to Sepsis.  "Now significantly better than on admission. Since being stepped down, the patient has been awake, alert, and oriented to person, place, and time.     Plan:  - Now resolved      Non-traumatic rhabdomyolysis  Patient admitted after being found two days after a non-traumatic fall at his home. Labs on admission significant for CPK 71161. Creatinine at 1.6 (changed from February where it was 0.8). K, Phos, and Mag were wnl. He was bolused with 2L of 0.9 NS at the ED. CPK significantly improved from admission; last one 8/31 was 2200.    Plan:  - monitor for kidney injury with daily BMP      LISHA (acute kidney injury)  See "non-traumatic rhabdo"   - Resolved  - Cr 0.9, baseline        Bacteremia  Blood cultures 8/27 with Enterococcus Faecalis and Coagulase negative staph. He had already been started on broad abx therapy with vancomycin and pip-tazo and were pending culture sensitivities to determine further management. E. Fecalis resulted sensitive to ampicillin and vancomycin and resistant to tetracycline. The decision was made to de-escalate him from vancomycin to ampicillin to cover for E. Fecalis, based on sensitivities. PICC line will be needed for completion of 14 day course; PICC team consulted.      Plan:  -De-escalate therapy from vancomycin to ampicillin  -Continue ampicillin therapy until 09/12  -PICC team consulted for line placement for IV abx administration         Severe sepsis  On admission the patient was afebrile and tachypneic. Workup for LISHA and rhabdo was initiated. Additionally, CXR was obtained given that he was tachypneic; it revealed no acute processes or findings suggestive of pneumonia. He was hypertensive initially but improved after administration of home losartan dose. Several hours after arrival, oral temp was normal but a rectal temperature of 103 was obtained. Patient pancultured. U/a with leuk +2, wbc 22, and many bacteria. Patient started on vancomycin and pip-tazo for empiric coverage. " Admitted to ICU for further management. Stepped down to hospital medicine 8/29. Has remained afebrile and hemodynamically stable since step-down. Sepsis likely secondary to UTI plus bacteremia. BCs grew E. Fecalis and urine culture grew Klebsiella pneumoniae. Sensitivities have returned and it is reasonable at this moment to de-escalate therapy to ciprofloxacin and ampicillin.     Plan:  - De-escalate from vancomycin and pip-tazo to cipro and ampicillin based on cultures' sensitivities  - Administer ciprofloxacin until 09/02  - Administer ampicillin until 09/12    UTI (urinary tract infection)  UA with 2+ leucks, many bacteria, 22WBC, 19 RBC.  Ucx with gram negative rods, >100,000 CFU Klebsiella pneumoniae   Sensitivities:   -resistant: amp/sulbactam, tetracycline  -intermediate: augmentin, nitrofurantoin  -sensitive: cefazolin, cefepime, ceftriaxone, ciprofloxacin, gentamicin, levofloxacin, pip/tazo, tobramycin, bactrim    Plan:  -de-escalate from pip-tazo to ciprofloxacin based on sensitivities  -administer ciprofloxacin for 3 days for treatment of UTI (until 09/02)    Weakness  Had markedly increased weakness in RUE and RLE. Likely secondary to rhadbo. Initially there were concerns for stroke but MRI revealed no acute lesions. PT/OT consulted and both recommend SNF placement.     Plan:  -Coordinate SNF placement   -Continue PT/OT while inpatient       Seizure disorder  Unclear status of patient's seizure disorder. However, he has presented several times in 2018 and 2019 at [facility] following seizure activity which is described as shaking and muscle spasms. The patient's niece provided his home medications; he is currently on keppra 500mg 3 tablets BID.     Plan:  - EEG with no seizure activity  - Start Keppra at 1000mg BID, will up titrate to home does of 1500 BID      Status post fall  Patient fell around two days ago while attempting to turn a bathtub faucet on. Unclear whether he lost consciousness or not. CT head  was performed on arrival to ED. It revealed several findings of suspected old infarcts, generalized cerebral volume loss, and supratentorial white matter chronic small vessel ischemic change, as well as no acute large vascular territory infarct or intracranial hemorrhage identified (but noted that further evaluation/follow-up as warranted).     Additionally a shoulder xray was performed and it revealed intact osseous structures w/o evidence of fracture nor GH joint dislocation.     Since he was stepped down from the ICU, PT/OT have been following him and he has made significant progress. They both recommend he goes to a SNF for optimal rehabilitation prior to going back to his home. This was discussed with the patient and he expressed great interest in going. Will plan with case management to discharge patient to a SNF.         DM (diabetes mellitus)  Patient on home metformin 500mg daily. Uncertain whether he was compliant with medication and when was the last time he took it.   - HGBAIC 6.4  - Low dose sliding scale  - Diabetic diet      Anemia  Care everywhere review shows baseline hgb usually around 11-12. Hgb on admission at [facility] was 15 and noted to be around 10-11 after several days of being hospitalized. Patient has remained hemodynamically stable and H/H have stayed within normal range.      History of CVA in adulthood  Patient and family members unclear of event, but medical records mention hx of CVA in 2012. At the time he was started on DAPT with aspirin and clopidogrel. However, clopidogrel has been discontinued since.    Significant R upper and R lower extremity weakness prompted obtaining an MRI due to concerns for stroke. However, it revealed no acute findings, only generalized volume loss consistent with chronic ischemic changes and old infarcts.     Plan:  - CT head in ED, negative for acute infarct  -continue home aspirin    Hypertension  Continue home losartan 25mg daily        VTE Risk Mitigation  (From admission, onward)        Ordered     enoxaparin injection 40 mg  Daily      08/29/19 0908     IP VTE LOW RISK PATIENT  Once      08/27/19 3700            Melissa Sim MD  Department of Hospital Medicine   [facility]

## 2019-09-03 NOTE — SUBJECTIVE & OBJECTIVE
Interval History: No acute overnight events. The patient had three loose stools this morning but none during the afternoon. He is otherwise feeling well; denies fevers, chills, headaches, dyspnea, abdominal pain, nausea, and vomiting.     Review of Systems   Constitutional: Positive for fatigue (chronic). Negative for chills and fever.   HENT: Negative for congestion and sore throat.    Eyes: Negative for photophobia and visual disturbance.   Respiratory: Negative for cough and shortness of breath.    Cardiovascular: Negative for chest pain, palpitations and leg swelling.   Gastrointestinal: Positive for diarrhea (3 nonbloody bowel movements this morning). Negative for abdominal distention, abdominal pain, constipation, nausea and vomiting.   Genitourinary: Negative for flank pain and frequency.   Musculoskeletal: Negative for back pain, neck pain and neck stiffness.   Skin: Negative for color change and rash.   Neurological: Positive for weakness (R upper and R lower extremities, improved from previous exam. ). Negative for seizures, light-headedness and headaches.   Psychiatric/Behavioral: Negative for behavioral problems and confusion.     Objective:     Vital Signs (Most Recent):  Temp: 97.9 °F (36.6 °C) (09/03/19 0800)  Pulse: 80 (09/03/19 1519)  Resp: 20 (09/03/19 0800)  BP: (!) 149/96 (09/03/19 0800)  SpO2: 96 % (09/03/19 0800) Vital Signs (24h Range):  Temp:  [97.9 °F (36.6 °C)-98.3 °F (36.8 °C)] 97.9 °F (36.6 °C)  Pulse:  [66-80] 80  Resp:  [12-21] 20  SpO2:  [96 %-100 %] 96 %  BP: (132-149)/(74-96) 149/96     Weight: 68 kg (150 lb)  Body mass index is 22.81 kg/m².    Intake/Output Summary (Last 24 hours) at 9/3/2019 1610  Last data filed at 9/3/2019 0800  Gross per 24 hour   Intake 840 ml   Output 3850 ml   Net -3010 ml      Physical Exam   Constitutional: He is oriented to person, place, and time. Vital signs are normal. He is cooperative. No distress.   HENT:   Head: Normocephalic and atraumatic.   Eyes:  Right eye exhibits no discharge. Left eye exhibits no discharge. No scleral icterus.   Neck: Neck supple. No thyromegaly present.   Cardiovascular: Normal rate, regular rhythm and normal heart sounds.   Pulmonary/Chest: Effort normal and breath sounds normal. No respiratory distress.   Abdominal: Soft. Bowel sounds are normal. He exhibits no distension. There is no tenderness.   Musculoskeletal: He exhibits no edema.   Neurological: He is alert and oriented to person, place, and time. He displays no seizure activity.   Strength still reduced on R upper and R lower extremities but improved from yesterday.   Skin: Skin is warm and dry. He is not diaphoretic.   Nursing note and vitals reviewed.

## 2019-09-03 NOTE — ASSESSMENT & PLAN NOTE
Patient fell around two days ago while attempting to turn a bathtub faucet on. Unclear whether he lost consciousness or not. CT head was performed on arrival to ED. It revealed several findings of suspected old infarcts, generalized cerebral volume loss, and supratentorial white matter chronic small vessel ischemic change, as well as no acute large vascular territory infarct or intracranial hemorrhage identified (but noted that further evaluation/follow-up as warranted).     Additionally a shoulder xray was performed and it revealed intact osseous structures w/o evidence of fracture nor GH joint dislocation.     Since he was stepped down from the ICU, PT/OT have been following him and he has made significant progress. They both recommend he goes to a SNF for optimal rehabilitation prior to going back to his home. This was discussed with the patient and he expressed great interest in going. Will plan with case management to discharge patient to a SNF.

## 2019-09-03 NOTE — PLAN OF CARE
Problem: SLP Goal  Goal: SLP Goal  Speech Language Pathology Goals  Goals expected to be met by 9/6    1. Pt will tolerate diet of thin liquids and mechanical soft solids without overt clinical signs of aspiration   2. Pt will participate in ongoing swallow assessment in order to determine appropriateness of diet upgrade       Patient seen for ongoing swallow assessment. SLP recommending an upgrade to a MECHANICAL SOFT DIET/THIN LIQUIDS.     Fadi Andre CCC-SLP  Speech-Language Pathology  Pager: [phone]

## 2019-09-04 VITALS
HEART RATE: 81 BPM | DIASTOLIC BLOOD PRESSURE: 79 MMHG | TEMPERATURE: 98 F | HEIGHT: 68 IN | RESPIRATION RATE: 14 BRPM | WEIGHT: 150 LBS | SYSTOLIC BLOOD PRESSURE: 116 MMHG | BODY MASS INDEX: 22.73 KG/M2 | OXYGEN SATURATION: 96 %

## 2019-09-04 LAB
POCT GLUCOSE: 109 MG/DL (ref 70–110)
POCT GLUCOSE: 126 MG/DL (ref 70–110)

## 2019-09-04 PROCEDURE — 36573 INSJ PICC RS&I 5 YR+: CPT

## 2019-09-04 PROCEDURE — 63600175 PHARM REV CODE 636 W HCPCS: Performed by: STUDENT IN AN ORGANIZED HEALTH CARE EDUCATION/TRAINING PROGRAM

## 2019-09-04 PROCEDURE — 92526 ORAL FUNCTION THERAPY: CPT

## 2019-09-04 PROCEDURE — C1751 CATH, INF, PER/CENT/MIDLINE: HCPCS

## 2019-09-04 PROCEDURE — 99239 HOSP IP/OBS DSCHRG MGMT >30: CPT | Mod: GC,,, | Performed by: HOSPITALIST

## 2019-09-04 PROCEDURE — 25000003 PHARM REV CODE 250: Performed by: STUDENT IN AN ORGANIZED HEALTH CARE EDUCATION/TRAINING PROGRAM

## 2019-09-04 PROCEDURE — 97535 SELF CARE MNGMENT TRAINING: CPT

## 2019-09-04 PROCEDURE — 76937 US GUIDE VASCULAR ACCESS: CPT

## 2019-09-04 PROCEDURE — 25000003 PHARM REV CODE 250: Performed by: HOSPITALIST

## 2019-09-04 PROCEDURE — 99239 PR HOSPITAL DISCHARGE DAY,>30 MIN: ICD-10-PCS | Mod: GC,,, | Performed by: HOSPITALIST

## 2019-09-04 RX ORDER — LEVETIRACETAM 1000 MG/1
1500 TABLET ORAL 2 TIMES DAILY
Qty: 30 TABLET | Refills: 2 | Status: ON HOLD
Start: 2019-09-04 | End: 2019-11-25 | Stop reason: SDUPTHER

## 2019-09-04 RX ORDER — SODIUM CHLORIDE 0.9 % (FLUSH) 0.9 %
10 SYRINGE (ML) INJECTION
Status: DISCONTINUED | OUTPATIENT
Start: 2019-09-04 | End: 2019-09-04 | Stop reason: HOSPADM

## 2019-09-04 RX ORDER — ATORVASTATIN CALCIUM 40 MG/1
40 TABLET, FILM COATED ORAL DAILY
Qty: 90 TABLET | Refills: 3 | Status: SHIPPED | OUTPATIENT
Start: 2019-09-05 | End: 2020-09-04

## 2019-09-04 RX ORDER — ATORVASTATIN CALCIUM 20 MG/1
40 TABLET, FILM COATED ORAL DAILY
Status: DISCONTINUED | OUTPATIENT
Start: 2019-09-04 | End: 2019-09-04 | Stop reason: HOSPADM

## 2019-09-04 RX ORDER — SODIUM CHLORIDE 0.9 % (FLUSH) 0.9 %
10 SYRINGE (ML) INJECTION EVERY 6 HOURS
Status: DISCONTINUED | OUTPATIENT
Start: 2019-09-04 | End: 2019-09-04 | Stop reason: HOSPADM

## 2019-09-04 RX ADMIN — AMPICILLIN SODIUM 4000 MG: 2 INJECTION, POWDER, FOR SOLUTION INTRAMUSCULAR; INTRAVENOUS at 09:09

## 2019-09-04 RX ADMIN — LEVETIRACETAM 1000 MG: 500 TABLET, FILM COATED ORAL at 09:09

## 2019-09-04 RX ADMIN — LOSARTAN POTASSIUM 25 MG: 25 TABLET, FILM COATED ORAL at 09:09

## 2019-09-04 RX ADMIN — AMPICILLIN SODIUM 4000 MG: 2 INJECTION, POWDER, FOR SOLUTION INTRAMUSCULAR; INTRAVENOUS at 12:09

## 2019-09-04 RX ADMIN — ATORVASTATIN CALCIUM 40 MG: 20 TABLET, FILM COATED ORAL at 12:09

## 2019-09-04 RX ADMIN — ASPIRIN 81 MG: 81 TABLET, COATED ORAL at 09:09

## 2019-09-04 NOTE — PLAN OF CARE
[facility]     Department of Hospital Medicine     [address] [place] [address]     [phone] after hours  [phone] fax       NURSING HOME ORDERS    09/04/2019    Admit to Nursing Home:  Skilled Bed         Diagnoses:  Active Hospital Problems    Diagnosis  POA    *Altered mental status [R41.82]  Yes    Anemia [D64.9]  Yes    UTI (urinary tract infection) [N39.0]  Yes    Bacteremia [R78.81]  Yes    Weakness [R53.1]  Yes    Non-traumatic rhabdomyolysis [M62.82]  Yes    LIHSA (acute kidney injury) [N17.9]  Yes    Hypertension [I10]  Yes    DM (diabetes mellitus) [E11.9]  Yes    Severe sepsis [A41.9, R65.20]  Yes    Status post fall [Z91.81]  Not Applicable    History of CVA in adulthood [Z86.73]  Not Applicable    Seizure disorder [G40.909]  Yes      Resolved Hospital Problems   No resolved problems to display.       Patient is homebound due to:  Altered mental status    Allergies:Review of patient's allergies indicates:  No Known Allergies    Vitals:     Routine    Diet: Diabetic diet-mechanical soft, thin liquids    Acitivities:    - Up in a chair each morning as tolerated   - Ambulate with assistance to bathroom   - Scheduled walks once each shift (every 8 hours)   - May use walker, cane     LABS:  Per facility protocol  Nursing Precautions: -         -  Upright 90 degrees befor during and after meals    - Fall precautions per nursing home protocol   - Seizure precaution per [facility] protocol   - Decubitus precautions:        -  for positioning   - Pressure reducing foam mattress   -  CONSULTS:  Physical Therapy to evaluate and treat     Occupational Therapy to evaluate and treat     Speech Therapy evaluate and treat       MISCELLANEOUS CARE:  Infusion Therapy:   SN to perform Infusion Therapy/Central Line Care.  Review Central Line Care & Central Line Flush with patient.    Administer (drug and dose): ampicillin 4000mg EXTENDED  INFUSION (over 8hrs) Q8 hours   Last dose given: 9/4 9:15                  Next dose due: 9/4 17:15    Last dose 9/12/2019    Scrub the Hub: Prior to accessing the line, always perform a 30 second alcohol scrub  Each lumen of the central line is to be flushed at least daily with 10 mL Normal Saline and 3 mL Heparin flush (10 units/mL)  Skilled Nurse (SN) may draw blood from IV access  Blood Draw Procedure:   - Aspirate at least 5 mL of blood   - Discard   - Obtain specimen   - Change injection cap   - Flush with 20 mL Normal Saline followed by a                 3-5 mL Heparin flush (10 units/mL)  Central :   - Sterile dressing changes are done weekly and as needed.   - Use chlor-hexadine scrub to cleanse site, apply Biopatch to insertion site,       apply securement device dressing   - Injection caps are changed weekly and after EVERY lab draw.   - If sterile gauze is under dressing to control oozing,                 dressing change must be performed every 24 hours until gauze is not needed.    Discontinue IV after antibiotics are completed.        DIABETES CARE:      Check blood sugar:       Fingerstick blood sugar AC and HS   Fingerstick blood sugar every 6 hours if unable to eat      Report CBG < 60 or > 400 to physician.                                          Insulin Sliding Scale          Glucose  Novolog Insulin Subcutaneous        0 - 60   Orange juice or glucose tablet, hold insulin      No insulin   201-250  2 units   251-300  4 units   301-350  6 units   351-400  8 units   >400   10 units then call physician      Medications: Discontinue all previous medication orders, if any. See new list below.     sodium chloride 0.9% SolP 250 mL with ampicillin 2 gram SolR 4,000 mg  4000mg EXTENDED INFUSION  Refills: 0   Dose: 4000 mg   Indications: Bacteria in the Blood  Instructions: Inject 4,000 mg   EXTENDED INFUSION (over 8hrs)  into the vein every 8 (eight) hours . for 8 days     STOP  9/12/2019          Edwin Lopez   Home Medication Instructions IZABEL:[id number]    Printed on:09/04/19 1207   Medication Information                      aspirin (ECOTRIN) 81 MG EC tablet  Take 81 mg by mouth once daily.             b complex vitamins tablet  Take 1 tablet by mouth once daily.                        levETIRAcetam (KEPPRA) 1000 MG tablet  Take 1.5 tablets (1,500 mg total) by mouth 2 (two) times daily.             losartan (COZAAR) 25 MG tablet  Take 25 mg by mouth once daily.             metFORMIN (GLUCOPHAGE-XR) 500 MG 24 hr tablet  Take 500 mg by mouth 2 (two) times daily with meals.             sodium chloride 0.9% SolP 250 mL with ampicillin 2 gram SolR 4,000 mg  Inject 4,000 mg EXTENDED INFUSION (over 8hrs) into the vein every 8 (eight) hours. for 8 days      Atorvastatin 40mg PO daily   Stop 9/12/2019                    _________________________________  Marcela Catherine DO  09/04/2019

## 2019-09-04 NOTE — PROGRESS NOTES
This nurse called report to [facility] and spoke with nurse Marie. All questions answered; nurse provided with telephone number of floor for further questions. PT to be transported via wheelchair van to SNF. This nurse notified niece of discharge by leaving a voice message.

## 2019-09-04 NOTE — PT/OT/SLP PROGRESS
"Speech Language Pathology Treatment & Discharge Summary    Patient Name:  Edwin Lopez   MRN:  [id number]  Admitting Diagnosis: Altered mental status    Recommendations:                 General Recommendations:  Follow-up not indicated  Diet recommendations:  Mechanical soft, Liquid Diet Level: Thin   Aspiration Precautions: 1 bite/sip at a time, Feed only when awake/alert, HOB to 90 degrees, Meds crushed in puree, Small bites/sips and Standard aspiration precautions   General Precautions: Standard, fall, seizure  Communication strategies:  go to room if call light pushed    Subjective     Patient awake and alert during session  "Your prayers and an orange juice." Patient's response when asked if he needed anything  Communicated with nurse prior to session    Pain/Comfort:  · Pain Rating 1: 0/10  · Pain Rating Post-Intervention 1: 0/10    Objective:     Has the patient been evaluated by SLP for swallowing?   Yes  Keep patient NPO? No   Current Respiratory Status: room air      Patient seen for ongoing swallow assessment. He was sitting up in bed eating lunch during session. Patient reported no difficulties with current diet and stated that he was enjoying the food. During PO trials, he tolerated thin liquids x7 (via cup-edge) and solids x6 (sweet potato and chicken) with no overt signs of aspiration. He continued to exhibit mildly prolonged mastication as well as difficulty operating utensils; therefore, SLP recommending continued mechanical soft texture. SLP educated patient regarding safe swallowing precautions. Patient left in room with call light within reach.    Assessment:     Edwin Lopez is a 69 y.o. male with a safe oropharyngeal swallow. He is appropriate for discharge from  in the acute setting at this time.     Goals:   Multidisciplinary Problems     SLP Goals        Problem: SLP Goal    Goal Priority Disciplines Outcome   SLP Goal     SLP Ongoing (interventions implemented as appropriate) "   Description:  Speech Language Pathology Goals  Goals expected to be met by 9/6    1. Pt will tolerate diet of thin liquids and mechanical soft solids without overt clinical signs of aspiration   2. Pt will participate in ongoing swallow assessment in order to determine appropriateness of diet upgrade                         Plan:     · Patient to be seen:  3 x/week   · Plan of Care expires:  09/27/19  · Plan of Care reviewed with:  patient   · SLP Follow-Up:  No       Discharge recommendations:  nursing facility, skilled   Barriers to Discharge:  None    Time Tracking:     SLP Treatment Date:   09/04/19  Speech Start Time:  1238  Speech Stop Time:  1248     Speech Total Time (min):  10 min    Billable Minutes: Treatment Swallowing Dysfunction 10    Fadi Andre CCC-SLP  Speech-Language Pathology  Pager: [phone]   09/04/2019

## 2019-09-04 NOTE — DISCHARGE SUMMARY
[facility] Hospital Medicine  Discharge Summary      Patient Name: Edwin Lopez  MRN: [id number]  Admission Date: 8/27/2019  Hospital Length of Stay: 8 days  Discharge Date and Time:  09/04/2019 5:36 PM  Attending Physician: Renata Yepez MD   Discharging Provider: Marcela Catherine DO  Primary Care Provider: Harini Montenegro MD  [facility] Medicine Team: [facility] 4 Marcela Catherine DO    HPI:   Mr. Waters is a 69 year-old male with type 2 DM, seizure disorder, dementia, and a history of CVA in 2012 who was brought in from his home after he was found on the floor two days after he fell. The patient recalls that he went to turn a bathtub faucet on when he fell to the ground and could not get back up. Denies headaches, lightheadedness, chest pain, palpitations, or tremors prior to the fall. Was asked about LOC prior to and following the fall but he was unable to express whether he experienced it or not. Currently denies headaches, shortness of breath, chest pain, palpitations, abdominal pain, or any discomfort.     He was unable to provide much history. His niece was present during the evaluation and was asked regarding his medical conditions, medication use, and family history but she was unsure of these. The last time he was seen in person by his family members was on Friday when, according to them, he was functional and oriented. His niece spoke with him over the phone on Saturday afternoon but did not hear from him since them despite calling several times. She did mention that he lives by himself and is functional, stating that this is not near his baseline. He lives by himself and an aide visits him regularly. He does not drive but is an avid walker and gets around by foot. According to her, he does not consume alcoholic beverages and has never smoked.     * No surgery found *      Hospital Course:   Patient originally admitted to hospital medicine team 4 for Rhabdomylosis after been found down  at home for 3 days. UA was consistent with UTI, patient subsequently became septic with altered mental status and hypotension. He was stepped up to the MICU for higher level of care and monitoring. EEG done in MICU without any seizure activity. Patient was treated with Vancomycin and Zosyn given persistent fevers and AMS. Blood cultures with Enterococcus Faecalis and coagulase negative staph. Patient with Klebsiella pneumoniae in urine. Patient stepped down to hospital medicine 8/29. Once sensitivities returned, broad spectrum antibiotics were de-escalated to ciprofloxacin and ampicillin; ciprofloxacin was administered for 3 days total to treat the UTI and ampicillin will be administered for a total of 2 weeks (until September 12, 2019) for E. fecalis bacteremia. He had markedly notable R-sided weakness so there were concerns for stroke/acute vascular insult. However, MRI performed revealed no acute processes, only generalized volume loss reflective of chronic microvascular changes. PT/OT recommended SNF placement, to which the patient agreed to and is looking forward to. His weakness has improved significantly. Patient was started on Atorvastatin 40mg on discharge due to history of CVA.       Review of Systems   Constitutional: . Negative for chills and fever.   HENT: Negative for congestion and sore throat.    Eyes: Negative for photophobia and visual disturbance.   Respiratory: Negative for cough and shortness of breath.    Cardiovascular: Negative for chest pain, palpitations and leg swelling.   Gastrointestinal: Negative for abdominal distention, abdominal pain, constipation, nausea and vomiting.   Genitourinary: Negative for flank pain and frequency.   Musculoskeletal: Negative for back pain, neck pain and neck stiffness.   Skin: Negative for color change and rash.   Neurological: Positive for weakness (R upper and R lower extremities, improving from previous exam. ). Negative for seizures, light-headedness  and headaches.   Psychiatric/Behavioral: Negative for behavioral problems and confusion.     Vitals:    09/04/19 0745 09/04/19 1100 09/04/19 1112 09/04/19 1500   BP:  126/87  116/79   Pulse: (!) 56 84 86 81   Resp:  18  14   Temp:       TempSrc:       SpO2:  96%  96%   Weight:       Height:         Physical Exam   Constitutional: He is oriented to person, place, and time. Vital signs are normal. He is cooperative. No distress.   HENT:   Head: Normocephalic and atraumatic.   Eyes: Right eye exhibits no discharge. Left eye exhibits no discharge. No scleral icterus.   Neck: Neck supple. No thyromegaly present.   Cardiovascular: Normal rate, regular rhythm and normal heart sounds.   Pulmonary/Chest: Effort normal and breath sounds normal. No respiratory distress.   Abdominal: Soft. Bowel sounds are normal. He exhibits no distension. There is no tenderness.   Musculoskeletal: He exhibits no edema.   Neurological: He is alert and oriented to person, place, and time. He displays no seizure activity.   Strength still reduced on R upper and R lower extremities but improved from yesterday.   Skin: Skin is warm and dry. He is not diaphoretic    Consults:   Consults (From admission, onward)        Status Ordering Provider     Inpatient consult to Critical Care Medicine  Once     Provider:  (Not yet assigned)    Completed CHOCO MENDOZA     Inpatient consult to PICC team ([facility])  Once     Provider:  (Not yet assigned)    Completed PA DURAN     Inpatient consult to Registered Dietitian/Nutritionist  Once     Provider:  (Not yet assigned)    Completed MURPHY MEIER     Pharmacy to dose Vancomycin consult  Once     Provider:  (Not yet assigned)    Completed GEN MARCOS          No new Assessment & Plan notes have been filed under this hospital service since the last note was generated.  Service: Hospital Medicine    Final Active Diagnoses:    Diagnosis Date Noted POA    PRINCIPAL PROBLEM:  Altered mental status [R41.82]  08/27/2019 Yes    Anemia [D64.9] 09/01/2019 Yes    UTI (urinary tract infection) [N39.0] 08/29/2019 Yes    Bacteremia [R78.81] 08/29/2019 Yes    Weakness [R53.1] 08/29/2019 Yes    Non-traumatic rhabdomyolysis [M62.82] 08/27/2019 Yes    LISHA (acute kidney injury) [N17.9] 08/27/2019 Yes    Hypertension [I10] 08/27/2019 Yes    DM (diabetes mellitus) [E11.9] 08/27/2019 Yes    Severe sepsis [A41.9, R65.20] 08/27/2019 Yes    Status post fall [Z91.81] 08/27/2019 Not Applicable    History of CVA in adulthood [Z86.73] 08/27/2019 Not Applicable    Seizure disorder [G40.909]  Yes      Problems Resolved During this Admission:       Discharged Condition: good    Disposition: Skilled Nursing Facility    Follow Up:    Patient Instructions:   No discharge procedures on file.    Significant Diagnostic Studies: Labs: CMP No results for input(s): NA, K, CL, CO2, GLU, BUN, CREATININE, CALCIUM, PROT, ALBUMIN, BILITOT, ALKPHOS, AST, ALT, ANIONGAP, ESTGFRAFRICA, EGFRNONAA in the last 48 hours. and CBC No results for input(s): WBC, HGB, HCT, PLT in the last 48 hours.  Microbiology:   Blood Culture   Lab Results   Component Value Date    LABBLOO No growth after 5 days. 08/29/2019    LABBLOO No growth after 5 days. 08/29/2019       Pending Diagnostic Studies:     Procedure Component Value Units Date/Time    APTT [[id number]] Collected:  08/28/19 0509    Order Status:  Sent Lab Status:  In process Updated:  08/28/19 0510    Specimen:  Blood     Levetiracetam level [[id number]] Collected:  08/27/19 1145    Order Status:  Sent Lab Status:  In process Updated:  08/27/19 1153    Specimen:  Blood     X-Ray Chest 1 View for PICC_Central line [[id number]]     Order Status:  Sent Lab Status:  No result          Medications:  Reconciled Home Medications:      Medication List      START taking these medications    atorvastatin 40 MG tablet  Commonly known as:  LIPITOR  Take 1 tablet (40 mg total) by mouth once daily.  Start taking on:   9/5/2019     sodium chloride 0.9% SolP 250 mL with ampicillin 2 gram SolR 4,000 mg  Inject 4,000 mg into the vein every 8 (eight) hours. for 8 days        CHANGE how you take these medications    levETIRAcetam 1000 MG tablet  Commonly known as:  KEPPRA  Take 1.5 tablets (1,500 mg total) by mouth 2 (two) times daily.  What changed:  how much to take        CONTINUE taking these medications    aspirin 81 MG EC tablet  Commonly known as:  ECOTRIN  Take 81 mg by mouth once daily.     b complex vitamins tablet  Take 1 tablet by mouth once daily.     COZAAR 25 MG tablet  Generic drug:  losartan  Take 25 mg by mouth once daily.     metFORMIN 500 MG 24 hr tablet  Commonly known as:  GLUCOPHAGE-XR  Take 500 mg by mouth 2 (two) times daily with meals.        STOP taking these medications    clopidogrel 75 mg tablet  Commonly known as:  PLAVIX            Indwelling Lines/Drains at time of discharge:   Lines/Drains/Airways     Peripherally Inserted Central Catheter Line                 PICC Double Lumen 09/04/19 1731 right brachial less than 1 day          Drain            Male External Urinary Catheter 08/28/19 1100 7 days                Time spent on the discharge of patient: 60 minutes  Patient was seen and examined on the date of discharge and determined to be suitable for discharge.         Marcela Catherine DO  Department of Hospital Medicine  [facility]

## 2019-09-04 NOTE — PLAN OF CARE
Problem: SLP Goal  Goal: SLP Goal  Speech Language Pathology Goals  Goals expected to be met by 9/6    1. Pt will tolerate diet of thin liquids and mechanical soft solids without overt clinical signs of aspiration   2. Pt will participate in ongoing swallow assessment in order to determine appropriateness of diet upgrade        Patient seen for ongoing swallow assessment. SLP recommending continued mechanical soft/thin liquids at this time. Patient appears to be at baseline for swallowing and is appropriate for discharge from [facility]     Fadi Andre CCC-SLP  Speech-Language Pathology  Pager: [phone]

## 2019-09-04 NOTE — PLAN OF CARE
Problem: Adult Inpatient Plan of Care  Goal: Plan of Care Review  Outcome: Ongoing (interventions implemented as appropriate)  AAOx4. Speech clear. Continues with generalized weakness.  IV atx in progress. No Signs and symptoms of diabetic distress. Denies discomfort.

## 2019-09-04 NOTE — PROCEDURES
"Edwin Lopez is a 69 y.o. male patient.    Temp: 97.7 °F (36.5 °C) (09/04/19 0451)  Pulse: 81 (09/04/19 1500)  Resp: 14 (09/04/19 1500)  BP: 116/79 (09/04/19 1500)  SpO2: 96 % (09/04/19 1500)  Weight: 68 kg (150 lb) (08/27/19 1019)  Height: 5' 8" (172.7 cm) (08/28/19 0411)    PICC  Date/Time: 9/4/2019 5:33 PM  Performed by: Micki Schofield, RN  Assisting provider: Milla Venegas LPN  Consent Done: Yes  Time out: Immediately prior to procedure a time out was called to verify the correct patient, procedure, equipment, support staff and site/side marked as required  Indications: med administration and vascular access  Anesthesia: local infiltration  Local anesthetic: lidocaine 1% without epinephrine  Anesthetic Total (mL): 3  Preparation: skin prepped with ChloraPrep  Skin prep agent dried: skin prep agent completely dried prior to procedure  Sterile barriers: all five maximum sterile barriers used - cap, mask, sterile gown, sterile gloves, and large sterile sheet  Hand hygiene: hand hygiene performed prior to central venous catheter insertion  Location details: right brachial  Catheter type: double lumen  Catheter size: 5 Fr  Catheter Length: 37cm    Ultrasound guidance: yes  Vessel Caliber: medium and patent, compressibility normal  Vascular Doppler: not done  Needle advanced into vessel with real time Ultrasound guidance.  Guidewire confirmed in vessel.  Image recorded and saved.  Sterile sheath used.  Number of attempts: 1  Post-procedure: blood return through all ports, chlorhexidine patch and sterile dressing applied  Technical procedures used: 3cg  Specimens: No  Implants: No  Assessment: placement verified by x-ray  Complications: none          Milla Venegas  9/4/2019    "

## 2019-09-04 NOTE — PT/OT/SLP PROGRESS
Physical Therapy      Patient Name:  Edwin Lopez   MRN:  [id number]    Patient not seen today secondary to Other (Comment)(Pt set up for d/c to SNF at 530. PT to follow up with pt per POC as appropriate if pt fails to d/c this day. ).    Carl Smith, PTA

## 2019-09-04 NOTE — PT/OT/SLP PROGRESS
Occupational Therapy   Treatment    Name: Edwin Lopez  MRN: [id number]  Admitting Diagnosis:  Altered mental status       Recommendations:     Discharge Recommendations: nursing facility, skilled  Discharge Equipment Recommendations:  none  Barriers to discharge:  Decreased caregiver support    Assessment:     Edwin Lopez is a 69 y.o. male with a medical diagnosis of Altered mental status.  He presents with decline in ADLs and functional mobility. Pt would benefit from skilled OT services in order to maximize independence with ADLs and facilitate safe discharge.. Performance deficits affecting function are weakness, impaired endurance, impaired self care skills, impaired functional mobilty, gait instability, decreased upper extremity function, decreased lower extremity function, orthopedic precautions, impaired cardiopulmonary response to activity, pain.     Rehab Prognosis:  Good; patient would benefit from acute skilled OT services to address these deficits and reach maximum level of function.       Plan:     Patient to be seen 3 x/week to address the above listed problems via self-care/home management, therapeutic activities, therapeutic exercises  · Plan of Care Expires: 09/27/19  · Plan of Care Reviewed with: patient    Subjective     Pain/Comfort:  · Pain Rating 1: 0/10  · Pain Rating Post-Intervention 1: 0/10  · Pain Rating Post-Intervention 2: 0/10    Objective:     Communicated with: RN prior to session.  Patient found supine with Condom Catheter, telemetry, pulse ox (continuous), peripheral IV upon OT entry to room.    General Precautions: Standard, fall, seizure   Orthopedic Precautions:N/A   Braces: N/A     Occupational Performance:     Bed Mobility:    · Patient completed Supine to Sit with moderate assistance     Functional Mobility/Transfers:  · Patient completed Sit <> Stand Transfer with moderate assistance  with  no assistive device   · Patient completed Bed <> Chair Transfer using Step Transfer  technique with moderate assistance with no assistive device  · Functional Mobility: Pt transferred to bedside chair (towards his right side) with moderate A stand pivot transfer.     Activities of Daily Living:  · Feeding:  minimum assistance (for eating pudding) Pt spills. Discussed importance of positioning and compensatory elbow support to improve performance.       [facility] 6 Click ADL: 15    Treatment & Education:  · Pt educated on role of OT in acute care setting.   · Assisted with ADLs and functional mobility with assist levels noted above    Patient left up in chair with all lines intact and call button in reachEducation:      GOALS:   Multidisciplinary Problems     Occupational Therapy Goals        Problem: Occupational Therapy Goal    Goal Priority Disciplines Outcome Interventions   Occupational Therapy Goal     OT, PT/OT Ongoing (interventions implemented as appropriate)    Description:  Goals to be met by: 9/12     Patient will increase functional independence with ADLs by performing:    Supine<>Sit with bed rail and CGA.  UE Dressing while seated EOB with Set-up Assistance and Contact Guard Assistance.  LE Dressing (pants, brief) with Minimal Assistance.  Grooming while standing with Minimal Assistance.  Toileting from bedside commode with Minimal Assistance for hygiene and clothing management.   Toilet transfer to bedside commode with Minimal Assistance.                      Time Tracking:     OT Date of Treatment: 09/04/19  OT Start Time: 1334  OT Stop Time: 1400  OT Total Time (min): 26 min    Billable Minutes:Self Care/Home Management 26    NOHEMI Lau  9/4/2019

## 2019-09-04 NOTE — CONSULTS
Double lumen PICC placed in right brachial vein of ANIYAH, 37cm in length with 0cm exposed and 26cm arm circumference. Lot#[id number]

## 2019-09-04 NOTE — PLAN OF CARE
Problem: Occupational Therapy Goal  Goal: Occupational Therapy Goal  Goals to be met by: 9/12     Patient will increase functional independence with ADLs by performing:    Supine<>Sit with bed rail and CGA.  UE Dressing while seated EOB with Set-up Assistance and Contact Guard Assistance.  LE Dressing (pants, brief) with Minimal Assistance.  Grooming while standing with Minimal Assistance.  Toileting from bedside commode with Minimal Assistance for hygiene and clothing management.   Toilet transfer to bedside commode with Minimal Assistance.     Outcome: Ongoing (interventions implemented as appropriate)  Goals remain appropriate, continue with OT POC.    NOHEMI Marin  9/4/2019  Rehab Services

## 2019-09-04 NOTE — PLAN OF CARE
Pt accepted to [facility] SNF as per Micki in admissions.  [facility] RN should call report to nurse for room 18A at [phone]  Transportation order placed in pt chart for WC van for 530pm and all questions regarding transfer should be directed to extension [phone], option 5.  Transport packet printed and sent to nurses's station desk on 11th floor.              Kailee Alexander, RAHAT  Ext [phone]

## 2019-09-04 NOTE — PLAN OF CARE
Problem: Adult Inpatient Plan of Care  Goal: Plan of Care Review  Outcome: Ongoing (interventions implemented as appropriate)     09/04/19 0602   Plan of Care Review   Plan of Care Reviewed With patient   Pt remains free from falls and injuries during shift. Awake, alert, and oriented x4. Vital signs stable. Continues IV antibiotics. Blood sugar WNL. No signs of acute distress noted at this time. Bed in lowest position, wheels locked, and bed alarm on. Call light in reach. Will continue to monitor, safety maintained.

## 2019-09-04 NOTE — PLAN OF CARE
09/04/19 1543   Post-Acute Status   Post-Acute Authorization Placement  ([facility])   Post-Acute Placement Status Set-up Complete   Discharge Delays None known at this time

## 2019-09-05 NOTE — PLAN OF CARE
09/05/19 1220   Final Note   Assessment Type Final Discharge Note   Anticipated Discharge Disposition SNF   What phone number can be called within the next 1-3 days to see how you are doing after discharge? [phone]   Right Care Referral Info   Post Acute Recommendation SNF / Sub-Acute Rehab   Facility Name [facility]

## 2019-09-06 ENCOUNTER — DOCUMENTATION ONLY (OUTPATIENT)
Dept: HEPATOLOGY | Facility: HOSPITAL | Age: 69
End: 2019-09-06

## 2019-09-06 NOTE — PROGRESS NOTES
[facility]                                 Skilled Nursing Facility                   Progress Note     Admit Date: 9/4/19  NAI   Principal Problem:  Debility r/t rhabdo, sepsis, UTI   HPI obtained from patient interview and chart review     Visit Date: 9/6/2019    Chief Complaint: Establish Care/ Initial Visit     HPI:   Mr. Waters is a 69 year-old male with type 2 DM, seizure disorder, dementia, and a history of CVA in 2012. He initially presented to the ED after being found down at home for 3 days, diagnosed with rhabdo, UTI, and sepsis.  Patient was treated with Vancomycin and Zosyn given persistent fevers and AMS. Blood cultures grew Enterococcus Faecalis and coagulase negative staph. Patient had Klebsiella pneumoniae in urine. Once sensitivities returned, broad spectrum antibiotics were de-escalated to ciprofloxacin and ampicillin; ciprofloxacin was administered for 3 days total to treat the UTI and ampicillin will be administered for a total of 2 weeks (until September 12, 2019) for E. fecalis bacteremia. He had markedly notable R-sided weakness so there were concerns for stroke/acute vascular insult. However, MRI performed revealed no acute processes, only generalized volume loss reflective of chronic microvascular changes. Patient was started on Atorvastatin 40mg on discharge due to history of CVA.     Patient will be treated at [facility] with PT and OT to improve functional status and ability to perform ADLs.     Past Medical History: Patient has a past medical history of CHI (closed head injury), Dementia, Diabetes mellitus, Hyperlipidemia, and Seizures.    Past Surgical History: Patient has no past surgical history on file.    Social History: Patient reports that he has never smoked. He has never used smokeless tobacco. He reports that he does not drink alcohol or use drugs.    Family History: no pertinent  family history reported.    Allergies: Patient has No Known Allergies.    ROS  Constitutional: Negative for fever or fatigue.   Eyes: Negative for blurred vision, double vision and discharge.   Respiratory: Negative for cough, shortness of breath and wheezing.    Cardiovascular: Negative for chest pain, palpitations, and leg swelling.   Gastrointestinal: Negative for abdominal pain, constipation, diarrhea, nausea and vomiting.   Genitourinary: Negative for dysuria, frequency and urgency.   Musculoskeletal:  + generalized weakness. Negative for back pain and myalgias.   Skin: Negative for itching and rash.   Neurological: Negative for dizziness, speech change, and headaches.   Psychiatric/Behavioral: Negative for depression. The patient is not nervous/anxious.      PEx  BP:  154/95     Constitutional: Patient appears well-developed and in no distress    Head: Normocephalic and atraumatic.   Eyes: Pupils are equal, round, and reactive to light.   Neck: Normal range of motion. Neck supple.   Cardiovascular: Normal rate, regular rhythm and normal heart sounds.    Pulmonary/Chest: Effort normal and breath sounds are clear  Abdominal: Soft. Bowel sounds are normal.   Musculoskeletal: Decreased range of motion.   Neurological: Alert and oriented to person, place, and Not to time.   Psychiatric: Normal mood and affect. Behavior is normal.   Skin: Skin is warm and dry. Full skin assessment completed by NP during visit, sacral wound noted      Assessment and Plan:     Aftercare of sepsis, ongoing  + Enterococcus Faecalis BC  -Initially treated with vancomycin and pip-tazo for empiric coverage.  -Sepsis likely secondary to UTI plus bacteremia.   - Tx de escalated to treatment with Ampicillin-extended infusion (over 8H) every 8 hours via PICC- end date 9/12    Status post fall, resolved  Weakness, ongoing  - shoulder xray was performed and it revealed intact osseous structures w/o evidence of fracture nor GH joint  dislocation   -increased weakness in RUE and RLE. Likely secondary to rhadbo.    - PT/OT while inpatient     Debility. ongoing  - Continue with PT/OT for gait training and strengthening and restoration of ADL's   - Encourage mobility, OOB in chair, and early ambulation as appropriate  - Fall precautions   - Monitor for bowel and bladder dysfunction  - Monitor for and prevent skin breakdown and pressure ulcers  - Continue DVT prophylaxis with ASA     Seizure disorder, controlled  - EEG with no seizure activity  -Continue  keppra 500mg 3 tablets BID.       Hypertension, controlled   -Continue home losartan 25mg daily     DM (diabetes mellitus), controlled  -HGBAIC 6.4  -home metformin XR 500mg BID  - Diabetic diet     Hx of Anemia, stable  -Continue B complex vitamin  -monitor weekly labs while on skilled      History of CVA  -Significant R upper and R lower extremity weakness>MRI revealed no acute findings, only generalized volume loss consistent with chronic ischemic changes and old infarcts.   -continue home aspirin, started on Lipitor    UTI (urinary tract infection), resolved  -+100,000 CFU Klebsiella pneumoniae   -Treated with ciprofloxacin for 3 days for treatment of UTI (until 09/02)    LISHA (acute kidney injury) 2/2 rhabdo, resolved  - Cr 0.9, baseline    Non-traumatic rhabdomyolysis, resolved  - found two days after a non-traumatic fall at his home>CPK 27269. Creatinine at 1.6 (0.8)       No future appointments.      I certify that SNF services are required to be given on an inpatient basis because Edwin Lopez needs for skilled nursing care and/or skilled rehabilitation are required on a daily basis and such services can only practically be provided in a skilled nursing facility setting and are for an ongoing condition for which she received inpatient care in the hospital.     Total time of the visit 113 minutes    Non physical exam/ non charting time: 85 minutes   Start Time: 1300  Stop Time:  1447  Description of non physical exam/non charting time: counseling patient on clinical conditions and therapies provided regarding plan of care, treatment of sepsis aftercare, therapy goals.  Extensive chart review completed including all consultation notes.  All pertinent laboratory and radiographical images reviewed.        Harleen Travis, NP

## 2019-09-09 ENCOUNTER — DOCUMENTATION ONLY (OUTPATIENT)
Dept: HEPATOLOGY | Facility: HOSPITAL | Age: 69
End: 2019-09-09

## 2019-09-09 NOTE — PROGRESS NOTES
[facility]                                   Skilled Nursing Facility                    Progress Note     Admit Date: 9/4/19  NAI   Principal Problem:  Debility r/t rhabdo, sepsis, UTI   HPI obtained from patient interview and chart review     Visit Date: 9/9/2019    Chief Complaint:  Weekly wound evaluation, BP/BS monitoring, PT/OT progression    HPI:   Mr. Waters is a 69 year-old male with type 2 DM, seizure disorder, dementia, and a history of CVA in 2012. He initially presented to the ED after being found down at home for 3 days, diagnosed with rhabdo, UTI, and sepsis.  Patient was treated with Vancomycin and Zosyn given persistent fevers and AMS. Blood cultures grew Enterococcus Faecalis and coagulase negative staph. Patient had Klebsiella pneumoniae in urine. Once sensitivities returned, broad spectrum antibiotics were de-escalated to ciprofloxacin and ampicillin; ciprofloxacin was administered for 3 days total to treat the UTI and ampicillin will be administered for a total of 2 weeks (until September 12, 2019) for E. fecalis bacteremia. He had markedly notable R-sided weakness so there were concerns for stroke/acute vascular insult. However, MRI performed revealed no acute processes, only generalized volume loss reflective of chronic microvascular changes. Patient was started on Atorvastatin 40mg on discharge due to history of CVA.     Patient will be treated at [facility] with PT and OT to improve functional status and ability to perform ADLs.     Interval history:  Patient adjusting well since admitted for skilled stay.  Wound care assessment completed today to monitor stage II sacral pressure ulcer.  Continuing with wound care orders of cleanse with wound cleanser, pat dry, apply zinc oxide and cover with DCD dressing daily.  BP ranging 140s over 80s, HR 70s, O2 sats 96% on room air, stable.  Continuing with  ampicillin infusions that are extended over 8 hrs, 3 times daily to PICC line.  Will repeat blood cultures after treatment ended on 09/12 to ensure resolution of infection.  BS ranging , stable.  Continue metformin.  No complaints during visit.  Progressing well with PT/OT.  Will continue to monitor and treat all chronic conditions.    Past Medical History: Patient has a past medical history of CHI (closed head injury), Dementia, Diabetes mellitus, Hyperlipidemia, and Seizures.    Past Surgical History: Patient has no past surgical history on file.    Social History: Patient reports that he has never smoked. He has never used smokeless tobacco. He reports that he does not drink alcohol or use drugs.    Family History: no pertinent family history reported.    Allergies: Patient has No Known Allergies.    ROS  Constitutional: Negative for fever or fatigue.   Eyes: Negative for blurred vision, double vision and discharge.   Respiratory: Negative for cough, shortness of breath and wheezing.    Cardiovascular: Negative for chest pain, palpitations, and leg swelling.   Gastrointestinal: Negative for abdominal pain, constipation, diarrhea, nausea and vomiting.   Genitourinary: Negative for dysuria, frequency and urgency.   Musculoskeletal:  + generalized weakness. Negative for back pain and myalgias.   Skin: Negative for itching and rash, + sacral wound.   Neurological: Negative for dizziness, speech change, and headaches.   Psychiatric/Behavioral: Negative for depression. The patient is not nervous/anxious.      PEx  BP:  148/82  HR:  70  O2 sats:  96%     Constitutional: Patient appears well-developed and in no distress    Head: Normocephalic and atraumatic.   Eyes: Pupils are equal, round, and reactive to light.   Neck: Normal range of motion. Neck supple.   Cardiovascular: Normal rate, regular rhythm and normal heart sounds.    Pulmonary/Chest: Effort normal and breath sounds are clear  Abdominal: Soft. Bowel  sounds are normal.   Musculoskeletal: Decreased range of motion.   Neurological: Alert and oriented to person, place, and Not to time.   Psychiatric: Normal mood and affect. Behavior is normal.   Skin: Skin is warm and dry, + stage II sacral pressure ulcer      Assessment and Plan:    Sacral pressure ulcer stage II, ongoing  Date Identified: 9/5, present on admit  -Continue multi vitamin, protein shake, ProStat for wound healing  -continue pressure reducing mattress, turn q.2 hours, wheelchair cushion  -Continue with  orders of Cleanse with WC pat dry, apply zinc oxide, cover with DCD dressing daily  -9/9 wound evaluation completed today, continue with current WC orders.     Aftercare of sepsis, ongoing  + Enterococcus Faecalis BC  -Initially treated with vancomycin and pip-tazo for empiric coverage.  -Sepsis likely secondary to UTI plus bacteremia.   - Tx de escalated to treatment with Ampicillin-extended infusion (over 8H) every 8 hours via PICC- end date 9/12  -9/9 continue ampicillin infusions via PICC line-end date 09/12.  Will repeat blood cultures after treatment completed to ensure resolution of infection.    Status post fall, resolved  Weakness, ongoing  - shoulder xray was performed and it revealed intact osseous structures w/o evidence of fracture nor GH joint dislocation   -increased weakness in RUE and RLE. Likely secondary to rhadbo.    - PT/OT  -9/9 continue PT/OT    Hypertension, controlled   -Continue home losartan 25mg daily  -9/9 BP 140s/80s     DM (diabetes mellitus), controlled  -HGBAIC 6.4  -home metformin XR 500mg BID  - Diabetic diet  -9/9 BS ranging , controlled. Continue Metformin 500 mg BID    Continued    Debility, ongoing  - Continue with PT/OT for gait training and strengthening and restoration of ADL's   - Encourage mobility, OOB in chair, and early ambulation as appropriate  - Fall precautions   - Monitor for bowel and bladder dysfunction  - Monitor for and prevent skin  breakdown and pressure ulcers  - Continue DVT prophylaxis with ASA     Seizure disorder, controlled  - EEG with no seizure activity  -Continue keppra 500mg 3 tablets BID.      Hx of Anemia, stable  -Continue B complex vitamin  -monitor weekly labs while on skilled      History of CVA  -Significant R upper and R lower extremity weakness>MRI revealed no acute findings, only generalized volume loss consistent with chronic ischemic changes and old infarcts.   -continue home aspirin, started on Lipitor    UTI (urinary tract infection), resolved  -+100,000 CFU Klebsiella pneumoniae   -Treated with ciprofloxacin for 3 days for treatment of UTI (until 09/02)    LISHA (acute kidney injury) 2/2 rhabdo, resolved  - Cr 0.9, baseline    Non-traumatic rhabdomyolysis, resolved  - found two days after a non-traumatic fall at his home>CPK 07514. Creatinine at 1.6 (0.8)       No future appointments.        Harleen Travis, NP

## 2019-09-11 ENCOUNTER — DOCUMENTATION ONLY (OUTPATIENT)
Dept: HEPATOLOGY | Facility: HOSPITAL | Age: 69
End: 2019-09-11

## 2019-09-11 NOTE — PROGRESS NOTES
[facility]                                   Skilled Nursing Facility                    Progress Note     Admit Date: 9/4/19  NAI   Principal Problem:  Debility r/t rhabdo, sepsis, UTI   HPI obtained from patient interview and chart review     Visit Date: 9/11/2019      Chief Complaint:  Infection monitoring, BP/BS monitoring, PT/OT progression    HPI:   Mr. Waters is a 69 year-old male with type 2 DM, seizure disorder, dementia, and a history of CVA in 2012. He initially presented to the ED after being found down at home for 3 days, diagnosed with rhabdo, UTI, and sepsis.  Patient was treated with Vancomycin and Zosyn given persistent fevers and AMS. Blood cultures grew Enterococcus Faecalis and coagulase negative staph. Patient had Klebsiella pneumoniae in urine. Once sensitivities returned, broad spectrum antibiotics were de-escalated to ciprofloxacin and ampicillin; ciprofloxacin was administered for 3 days total to treat the UTI and ampicillin will be administered for a total of 2 weeks (until September 12, 2019) for E. fecalis bacteremia. He had markedly notable R-sided weakness so there were concerns for stroke/acute vascular insult. However, MRI performed revealed no acute processes, only generalized volume loss reflective of chronic microvascular changes. Patient was started on Atorvastatin 40mg on discharge due to history of CVA.     Patient will be treated at [facility] with PT and OT to improve functional status and ability to perform ADLs.     Interval history: BP ranging 130s/70s, HR 90, stable Continuing with ampicillin infusions that are extended over 8 hrs, 3 times daily to PICC line.  Treatment ending tomorrow, 09/12. Need to repeat BC to ensure resolution of infection. BS ranging 109-138, stable.  Continue on metformin.  No complaints during visit.  Progressing well with PT/OT.  Will continue to monitor  and treat all chronic conditions.    Past Medical History: Patient has a past medical history of CHI (closed head injury), Dementia, Diabetes mellitus, Hyperlipidemia, and Seizures.    Past Surgical History: Patient has no past surgical history on file.    Social History: Patient reports that he has never smoked. He has never used smokeless tobacco. He reports that he does not drink alcohol or use drugs.    Family History: no pertinent family history reported.    Allergies: Patient has No Known Allergies.    ROS  Constitutional: Negative for fever or fatigue.   Eyes: Negative for blurred vision, double vision and discharge.   Respiratory: Negative for cough, shortness of breath and wheezing.    Cardiovascular: Negative for chest pain, palpitations, and leg swelling.   Gastrointestinal: Negative for abdominal pain, constipation, diarrhea, nausea and vomiting.   Genitourinary: Negative for dysuria, frequency and urgency.   Musculoskeletal:  + generalized weakness. Negative for back pain and myalgias.   Skin: Negative for itching and rash, + sacral wound.   Neurological: Negative for dizziness, speech change, and headaches.   Psychiatric/Behavioral: Negative for depression. The patient is not nervous/anxious.      PEx  BP:  130/76  HR:  90     Constitutional: Patient appears well-developed and in no distress    Head: Normocephalic and atraumatic.   Eyes: Pupils are equal, round, and reactive to light.   Neck: Normal range of motion. Neck supple.   Cardiovascular: Normal rate, regular rhythm and normal heart sounds.    Pulmonary/Chest: Effort normal and breath sounds are clear  Abdominal: Soft. Bowel sounds are normal.   Musculoskeletal: Decreased range of motion.   Neurological: Alert and oriented to person, place, and Not to time.   Psychiatric: Normal mood and affect. Behavior is normal.   Skin: Skin is warm and dry, + stage II sacral pressure ulcer, unchanged      Assessment and Plan:     Aftercare of sepsis,  ongoing  + Enterococcus Faecalis BC  -Initially treated with vancomycin and pip-tazo for empiric coverage.  -Sepsis likely secondary to UTI plus bacteremia.   - Tx de escalated to treatment with Ampicillin-extended infusion (over 8H) every 8 hours via PICC- end date 9/12  -9/11 continue ampicillin infusions via PICC line-end date 09/12.  Initiate repeat BC next week on 9/17 to ensure resolution of infection.    Status post fall, resolved  Weakness, ongoing  - shoulder xray was performed and it revealed intact osseous structures w/o evidence of fracture nor GH joint dislocation   -increased weakness in RUE and RLE. Likely secondary to rhadbo.    - PT/OT  -9/11 continue PT/OT    Hypertension, controlled   -Continue home losartan 25mg daily  -9/11 BP 130s/70s, controlled      DM (diabetes mellitus), controlled  -HGBAIC 6.4  -home metformin XR 500mg BID  - Diabetic diet  -9/11 BS ranging 109-138, controlled. Continue Metformin 500 mg BID    Continued    Sacral pressure ulcer stage II, ongoing  Date Identified: 9/5, present on admit  -Continue multi vitamin, protein shake, ProStat for wound healing  -continue pressure reducing mattress, turn q.2 hours, wheelchair cushion  -Continue with WC orders of Cleanse with WC pat dry, apply zinc oxide, cover with DCD dressing daily  -9/9 wound evaluation completed today, continue with current WC orders.    Debility, ongoing  - Continue with PT/OT for gait training and strengthening and restoration of ADL's   - Encourage mobility, OOB in chair, and early ambulation as appropriate  - Fall precautions   - Monitor for bowel and bladder dysfunction  - Monitor for and prevent skin breakdown and pressure ulcers  - Continue DVT prophylaxis with ASA     Seizure disorder, controlled  - EEG with no seizure activity  -Continue keppra 500mg 3 tablets BID.      Hx of Anemia, stable  -Continue B complex vitamin  -monitor weekly labs while on skilled      History of CVA  -Significant R upper and R  lower extremity weakness>MRI revealed no acute findings, only generalized volume loss consistent with chronic ischemic changes and old infarcts.   -continue home aspirin, started on Lipitor    UTI (urinary tract infection), resolved  -+100,000 CFU Klebsiella pneumoniae   -Treated with ciprofloxacin for 3 days for treatment of UTI (until 09/02)    LISHA (acute kidney injury) 2/2 rhabdo, resolved  - Cr 0.9, baseline    Non-traumatic rhabdomyolysis, resolved  - found two days after a non-traumatic fall at his home>CPK 63153. Creatinine at 1.6 (0.8)       No future appointments.        Harleen Travis, NP

## 2019-09-16 ENCOUNTER — DOCUMENTATION ONLY (OUTPATIENT)
Dept: HEPATOLOGY | Facility: HOSPITAL | Age: 69
End: 2019-09-16

## 2019-09-16 NOTE — PROGRESS NOTES
[facility]                                   Skilled Nursing Facility                    Progress Note     Admit Date: 9/4/19  NAI   Principal Problem:  Debility r/t rhabdo, sepsis, UTI   HPI obtained from patient interview and chart review     Visit Date: 9/16/2019      Chief Complaint:  Lab review, hypomagnesemia, BP/BS monitoring, PT/OT progression, wound evaluation    HPI:   Mr. Waters is a 69 year-old male with type 2 DM, seizure disorder, dementia, and a history of CVA in 2012. He initially presented to the ED after being found down at home for 3 days, diagnosed with rhabdo, UTI, and sepsis.  Patient was treated with Vancomycin and Zosyn given persistent fevers and AMS. Blood cultures grew Enterococcus Faecalis and coagulase negative staph. Patient had Klebsiella pneumoniae in urine. Once sensitivities returned, broad spectrum antibiotics were de-escalated to ciprofloxacin and ampicillin; ciprofloxacin was administered for 3 days total to treat the UTI and ampicillin will be administered for a total of 2 weeks (until September 12, 2019) for E. fecalis bacteremia. He had markedly notable R-sided weakness so there were concerns for stroke/acute vascular insult. However, MRI performed revealed no acute processes, only generalized volume loss reflective of chronic microvascular changes. Patient was started on Atorvastatin 40mg on discharge due to history of CVA.     Patient will be treated at [facility] with PT and OT to improve functional status and ability to perform ADLs.     Interval history:  Wound evaluation completed today, stage II buttock pressure ulcer is improving with wound care.  RD recently rounded, discontinued protein shakes and added house supplement for wound healing.  Patient has completed ampicillin infusions for sepsis,he was receiving them through PICC line.  Blood cultures previously ordered during  last visit to be repeated tomorrow to ensure resolution of infection.  Lab work from 09/12 reviewed today, Mg 1.5-hypomagnesemia, currently not on any supplements, Keppra level 16.2, stable on current dose of Keppra.  BP ranging 140/80s, HR 70s, stable. BS ranging , stable.  Continuing on metformin.  No complaints during visit.  Progressing well with PT/OT.  Will continue to monitor and treat all chronic conditions.    Past Medical History: Patient has a past medical history of CHI (closed head injury), Dementia, Diabetes mellitus, Hyperlipidemia, and Seizures.    Past Surgical History: Patient has no past surgical history on file.    Social History: Patient reports that he has never smoked. He has never used smokeless tobacco. He reports that he does not drink alcohol or use drugs.    Family History: no pertinent family history reported.    Allergies: Patient has No Known Allergies.    ROS  Constitutional: Negative for fever or fatigue.   Eyes: Negative for blurred vision, double vision and discharge.   Respiratory: Negative for cough, shortness of breath and wheezing.    Cardiovascular: Negative for chest pain, palpitations, and leg swelling.   Gastrointestinal: Negative for abdominal pain, constipation, diarrhea, nausea and vomiting.   Genitourinary: Negative for dysuria, frequency and urgency.   Musculoskeletal:  + generalized weakness. Negative for back pain and myalgias.   Skin: Negative for itching and rash, + sacral wound, improving.   Neurological: Negative for dizziness, speech change, and headaches.   Psychiatric/Behavioral: Negative for depression. The patient is not nervous/anxious.      PEx  BP:  144/84  HR:  70s     Constitutional: Patient appears well-developed and in no distress    Head: Normocephalic and atraumatic.   Eyes: Pupils are equal, round, and reactive to light.   Neck: Normal range of motion. Neck supple.   Cardiovascular: Normal rate, regular rhythm and normal heart sounds.     Pulmonary/Chest: Effort normal and breath sounds are clear  Abdominal: Soft. Bowel sounds are normal.   Musculoskeletal: Decreased range of motion.   Neurological: Alert and oriented to person, place, and Not to time.   Psychiatric: Normal mood and affect. Behavior is normal.   Skin: Skin is warm and dry, + stage II sacral pressure ulcer, improving    Wound location:  Stage II left buttock pressure ulcer, improving  Date identified:  09/05/2019  Present on admit:  Yes  Appearance of wound:  100% epithelial  Drainage characteristic: None  Wound length:  2 cm   Wound width:  3 cm  Wound depth:  0.1 cm   Alisa wound area:  Normal/ edges:  Well-defined, normal, healthy skin  Following: This NP weekly- last observed on (09/16/2019), wound care RN weekly- last observed on (09/16/2019)      Assessment and Plan:    Hypomagnesemia, new  -9/16 initiate Mag-Ox 800 mg p.o. x1 dose today    Aftercare of sepsis, ongoing  + Enterococcus Faecalis BC  -Initially treated with vancomycin and pip-tazo for empiric coverage.  -Sepsis likely secondary to UTI plus bacteremia.   - Tx de escalated to treatment with Ampicillin-extended infusion (over 8H) every 8 hours via PICC- end date 9/12  -9/11 continue ampicillin infusions via PICC line-end date 09/12.  Initiate repeat BC next week on 9/17 to ensure resolution of infection.  -9/16 Ampicillin infusions completed, initiate order to discontinue PICC line, blood cultures ordered for 9/17, will await results    Status post fall, resolved  Weakness, ongoing  - shoulder xray was performed and it revealed intact osseous structures w/o evidence of fracture nor GH joint dislocation   -increased weakness in RUE and RLE. Likely secondary to rhadbo.    - PT/OT  -9/16 continue PT/OT    Hypertension, controlled   -Continue home losartan 25mg daily  -9/16 /80s, controlled      DM (diabetes mellitus), controlled  -HGBAIC 6.4  -home metformin XR 500mg BID  - Diabetic diet  -9/16 BS ranging ,  controlled. Continue Metformin 500 mg BID    Left Buttock pressure ulcer stage II, ongoing, improving  Date Identified: 9/5, present on admit  -Continue multi vitamin, protein shake ( dced by RD), ProStat for wound healing  -continue pressure reducing mattress, turn q.2 hours, wheelchair cushion  -Continue with WC orders of Cleanse with WC pat dry, apply zinc oxide, cover with DCD dressing daily  -9/16 wound evaluation completed today, continue with current WC orders.  RD recently visited, added house supplement for wound healing    Seizure disorder, controlled  - EEG with no seizure activity  -Continue keppra 500mg 3 tablets BID.   -9/16 Keppra level 16.2, stable.  Continue on current dose of Keppra.    Continued    Debility, ongoing  - Continue with PT/OT for gait training and strengthening and restoration of ADL's   - Encourage mobility, OOB in chair, and early ambulation as appropriate  - Fall precautions   - Monitor for bowel and bladder dysfunction  - Monitor for and prevent skin breakdown and pressure ulcers  - Continue DVT prophylaxis with ASA     Hx of Anemia, stable  -Continue B complex vitamin  -monitor weekly labs while on skilled      History of CVA  -Significant R upper and R lower extremity weakness>MRI revealed no acute findings, only generalized volume loss consistent with chronic ischemic changes and old infarcts.   -continue home aspirin, started on Lipitor    UTI (urinary tract infection), resolved  -+100,000 CFU Klebsiella pneumoniae   -Treated with ciprofloxacin for 3 days for treatment of UTI (until 09/02)    LISHA (acute kidney injury) 2/2 rhabdo, resolved  - Cr 0.9, baseline    Non-traumatic rhabdomyolysis, resolved  - found two days after a non-traumatic fall at his home>CPK 30125. Creatinine at 1.6 (0.8)       No future appointments.        Harleen Travis, NP

## 2019-09-18 ENCOUNTER — DOCUMENTATION ONLY (OUTPATIENT)
Dept: HEPATOLOGY | Facility: HOSPITAL | Age: 69
End: 2019-09-18

## 2019-09-18 NOTE — PROGRESS NOTES
[facility]                                   Skilled Nursing Facility                    Progress Note     Admit Date: 9/4/19  NAI   Principal Problem:  Debility r/t rhabdo, sepsis, UTI   HPI obtained from patient interview and chart review     Visit Date: 9/18/2019      Chief Complaint:  BP/BS monitoring, PT/OT progression    HPI:   Mr. Waters is a 69 year-old male with type 2 DM, seizure disorder, dementia, and a history of CVA in 2012. He initially presented to the ED after being found down at home for 3 days, diagnosed with rhabdo, UTI, and sepsis.  Patient was treated with Vancomycin and Zosyn given persistent fevers and AMS. Blood cultures grew Enterococcus Faecalis and coagulase negative staph. Patient had Klebsiella pneumoniae in urine. Once sensitivities returned, broad spectrum antibiotics were de-escalated to ciprofloxacin and ampicillin; ciprofloxacin was administered for 3 days total to treat the UTI and ampicillin will be administered for a total of 2 weeks (until September 12, 2019) for E. fecalis bacteremia. He had markedly notable R-sided weakness so there were concerns for stroke/acute vascular insult. However, MRI performed revealed no acute processes, only generalized volume loss reflective of chronic microvascular changes. Patient was started on Atorvastatin 40mg on discharge due to history of CVA.     Patient will be treated at [facility] with PT and OT to improve functional status and ability to perform ADLs.     Interval history:   Blood cultures drawn 9/17, awaiting results. BP ranging 140/80s, HR 70s, stable. BS 99, stable.  Continuing on metformin.  No complaints during visit.  Progressing well with PT/OT.  Will continue to monitor and treat all chronic conditions.    Past Medical History: Patient has a past medical history of CHI (closed head injury), Dementia, Diabetes mellitus, Hyperlipidemia,  and Seizures.    Past Surgical History: Patient has no past surgical history on file.    Social History: Patient reports that he has never smoked. He has never used smokeless tobacco. He reports that he does not drink alcohol or use drugs.    Family History: no pertinent family history reported.    Allergies: Patient has No Known Allergies.    ROS  Constitutional: Negative for fever or fatigue.   Eyes: Negative for blurred vision, double vision and discharge.   Respiratory: Negative for cough, shortness of breath and wheezing.    Cardiovascular: Negative for chest pain, palpitations, and leg swelling.   Gastrointestinal: Negative for abdominal pain, constipation, diarrhea, nausea and vomiting.   Genitourinary: Negative for dysuria, frequency and urgency.   Musculoskeletal:  + generalized weakness. Negative for back pain and myalgias.   Skin: Negative for itching and rash, + sacral wound, improving.   Neurological: Negative for dizziness, speech change, and headaches.   Psychiatric/Behavioral: Negative for depression. The patient is not nervous/anxious.      PEx  BP:  145/87  HR:  70s     Constitutional: Patient appears well-developed and in no distress    Head: Normocephalic and atraumatic.   Eyes: Pupils are equal, round, and reactive to light.   Neck: Normal range of motion. Neck supple.   Cardiovascular: Normal rate, regular rhythm and normal heart sounds.    Pulmonary/Chest: Effort normal and breath sounds are clear  Abdominal: Soft. Bowel sounds are normal.   Musculoskeletal: Decreased range of motion.   Neurological: Alert and oriented to person, place, and Not to time.   Psychiatric: Normal mood and affect. Behavior is normal.   Skin: Skin is warm and dry, + stage II sacral pressure ulcer, improving    Wound location:  Stage II left buttock pressure ulcer, improving  Date identified:  09/05/2019  Present on admit:  Yes  Appearance of wound:  100% epithelial  Drainage characteristic: None  Wound length:  2 cm    Wound width:  3 cm  Wound depth:  0.1 cm   Alisa wound area:  Normal/ edges:  Well-defined, normal, healthy skin  Following: This NP weekly- last observed on (09/16/2019), wound care RN weekly- last observed on (09/16/2019)      Assessment and Plan:    Aftercare of sepsis, ongoing  + Enterococcus Faecalis BC  -Initially treated with vancomycin and pip-tazo for empiric coverage.  -Sepsis likely secondary to UTI plus bacteremia.   - Tx de escalated to treatment with Ampicillin-extended infusion (over 8H) every 8 hours via PICC- end date 9/12  -9/11 continue ampicillin infusions via PICC line-end date 09/12.  Initiate repeat BC next week on 9/17 to ensure resolution of infection.  -9/16 Ampicillin infusions completed, initiate order to discontinue PICC line, blood cultures ordered for 9/17, will await results  -9/18 BC drawn 9/17, awaiting results. Initiate D/C PICC line    Status post fall, resolved  Weakness, ongoing  - shoulder xray was performed and it revealed intact osseous structures w/o evidence of fracture nor GH joint dislocation   -increased weakness in RUE and RLE. Likely secondary to rhadbo.    - PT/OT  -9/18 continue PT/OT    Hypertension, controlled   -Continue home losartan 25mg daily  -9/18 /87, controlled      DM (diabetes mellitus), controlled  -HGBAIC 6.4  -home metformin XR 500mg BID  - Diabetic diet  -9/18 BS ranging 99, controlled. Continue Metformin 500 mg BID    Continued    Hypomagnesemia, new  -9/16 initiate Mag-Ox 800 mg p.o. x1 dose today    Left Buttock pressure ulcer stage II, ongoing, improving  Date Identified: 9/5, present on admit  -Continue multi vitamin, protein shake ( dced by RD), ProStat for wound healing  -continue pressure reducing mattress, turn q.2 hours, wheelchair cushion  -Continue with WC orders of Cleanse with WC pat dry, apply zinc oxide, cover with DCD dressing daily  -9/16 wound evaluation completed today, continue with current WC orders.  RD recently visited,  added house supplement for wound healing    Seizure disorder, controlled  - EEG with no seizure activity  -Continue keppra 500mg 3 tablets BID.   -9/16 Keppra level 16.2, stable.  Continue on current dose of Keppra.    Debility, ongoing  - Continue with PT/OT for gait training and strengthening and restoration of ADL's   - Encourage mobility, OOB in chair, and early ambulation as appropriate  - Fall precautions   - Monitor for bowel and bladder dysfunction  - Monitor for and prevent skin breakdown and pressure ulcers  - Continue DVT prophylaxis with ASA     Hx of Anemia, stable  -Continue B complex vitamin  -monitor weekly labs while on skilled      History of CVA  -Significant R upper and R lower extremity weakness>MRI revealed no acute findings, only generalized volume loss consistent with chronic ischemic changes and old infarcts.   -continue home aspirin, started on Lipitor    UTI (urinary tract infection), resolved  -+100,000 CFU Klebsiella pneumoniae   -Treated with ciprofloxacin for 3 days for treatment of UTI (until 09/02)    LISHA (acute kidney injury) 2/2 rhabdo, resolved  - Cr 0.9, baseline    Non-traumatic rhabdomyolysis, resolved  - found two days after a non-traumatic fall at his home>CPK 07577. Creatinine at 1.6 (0.8)       No future appointments.        Harleen Travis, NP

## 2019-09-23 ENCOUNTER — DOCUMENTATION ONLY (OUTPATIENT)
Dept: HEPATOLOGY | Facility: HOSPITAL | Age: 69
End: 2019-09-23

## 2019-09-23 NOTE — PROGRESS NOTES
[facility]                                   Skilled Nursing Facility                    Progress Note     Admit Date: 9/4/19  NAI   Principal Problem:  Debility r/t rhabdo, sepsis, UTI   HPI obtained from patient interview and chart review     Visit Date: 9/23/2019      Chief Complaint:  Discharge planning, blood culture review, BP/BS monitoring, PT/OT progression    HPI:   Mr. Waters is a 69 year-old male with type 2 DM, seizure disorder, dementia, and a history of CVA in 2012. He initially presented to the ED after being found down at home for 3 days, diagnosed with rhabdo, UTI, and sepsis.  Patient was treated with Vancomycin and Zosyn given persistent fevers and AMS. Blood cultures grew Enterococcus Faecalis and coagulase negative staph. Patient had Klebsiella pneumoniae in urine. Once sensitivities returned, broad spectrum antibiotics were de-escalated to ciprofloxacin and ampicillin; ciprofloxacin was administered for 3 days total to treat the UTI and ampicillin will be administered for a total of 2 weeks (until September 12, 2019) for E. fecalis bacteremia. He had markedly notable R-sided weakness so there were concerns for stroke/acute vascular insult. However, MRI performed revealed no acute processes, only generalized volume loss reflective of chronic microvascular changes. Patient was started on Atorvastatin 40mg on discharge due to history of CVA.     Patient will be treated at [facility] with PT and OT to improve functional status and ability to perform ADLs.     Interval history:  No complaints during visit today.  Discharge planning completed for upcoming discharge scheduled for 9/24.  Discussed with social work and nursing as well as patient.  Coordination of care completed to ensure safe discharge.  Blood cultures drawn 9/17 resulted, no growth today-final. BP ranging 140/70s, HR 70s, stable. -180,  stable.  Continuing on metformin. Progressing well with PT/OT.  Will continue to monitor and treat all chronic conditions.    Past Medical History: Patient has a past medical history of CHI (closed head injury), Dementia, Diabetes mellitus, Hyperlipidemia, and Seizures.    Past Surgical History: Patient has no past surgical history on file.    Social History: Patient reports that he has never smoked. He has never used smokeless tobacco. He reports that he does not drink alcohol or use drugs.    Family History: no pertinent family history reported.    Allergies: Patient has No Known Allergies.    ROS  Constitutional: Negative for fever or fatigue.   Eyes: Negative for blurred vision, double vision and discharge.   Respiratory: Negative for cough, shortness of breath and wheezing.    Cardiovascular: Negative for chest pain, palpitations, and leg swelling.   Gastrointestinal: Negative for abdominal pain, constipation, diarrhea, nausea and vomiting.   Genitourinary: Negative for dysuria, frequency and urgency.   Musculoskeletal:  + generalized weakness. Negative for back pain and myalgias.   Skin: Negative for itching and rash, + sacral wound, healed   Neurological: Negative for dizziness, speech change, and headaches.   Psychiatric/Behavioral: Negative for depression. The patient is not nervous/anxious.      PEx  BP:  148/74  HR:  70s     Constitutional: Patient appears well-developed and in no distress    Head: Normocephalic and atraumatic.   Eyes: Pupils are equal, round, and reactive to light.   Neck: Normal range of motion. Neck supple.   Cardiovascular: Normal rate, regular rhythm and normal heart sounds.    Pulmonary/Chest: Effort normal and breath sounds are clear  Abdominal: Soft. Bowel sounds are normal.   Musculoskeletal: Decreased range of motion.   Neurological: Alert and oriented to person, place, and Not to time.   Psychiatric: Normal mood and affect. Behavior is normal.   Skin: Skin is warm and dry, +  stage II sacral pressure ulcer, resolved    Wound location:  Stage II left buttock pressure ulcer, resolved  Date identified:  09/05/2019  Present on admit:  Yes  Appearance of wound:  100% epithelial  Drainage characteristic: None  Wound length:  0 cm   Wound width:  0 cm  Wound depth:  0 cm   Alisa wound area:  Normal/ edges:  Well-defined, normal, healthy skin  Following: This NP weekly- last observed on (09/23/2019), wound care RN weekly- last observed on (09/23/2019)      Assessment and Plan:    Discharge planning, new  -9/23 initiate DME for discharge pending tomorrow, HH, PT/OT, medication and plan of care review    Aftercare of sepsis, resolved  + Enterococcus Faecalis BC  -Initially treated with vancomycin and pip-tazo for empiric coverage.  -Sepsis likely secondary to UTI plus bacteremia.   - Tx de escalated to treatment with Ampicillin-extended infusion (over 8H) every 8 hours via PICC- end date 9/12  -9/11 continue ampicillin infusions via PICC line-end date 09/12.  Initiate repeat BC next week on 9/17 to ensure resolution of infection.  -9/16 Ampicillin infusions completed, initiate order to discontinue PICC line, blood cultures ordered for 9/17, will await results  -9/18 BC drawn 9/17, awaiting results. Initiate D/C PICC line  -9/23 blood cultures no growth today-final.  No further intervention needed    Status post fall, resolved  Weakness, ongoing  - shoulder xray was performed and it revealed intact osseous structures w/o evidence of fracture nor GH joint dislocation   -increased weakness in RUE and RLE. Likely secondary to rhadbo.    - PT/OT  -9/23 continue PT/OT    Hypertension, controlled   -Continue home losartan 25mg daily  -9/23 /74, controlled      DM (diabetes mellitus), controlled  -HGBAIC 6.4  -home metformin XR 500mg BID  - Diabetic diet  -9/23 BS ranging 124-180, controlled. Continue Metformin 500 mg BID    Left Buttock pressure ulcer stage II, resolved  Date Identified: 9/5,  present on admit  -Continue multi vitamin, protein shake ( dced by RD), ProStat for wound healing  -continue pressure reducing mattress, turn q.2 hours, wheelchair cushion  -Continue with WC orders of Cleanse with WC pat dry, apply zinc oxide, cover with DCD dressing daily  -9/23 wound evaluation completed today, wound healed, no further interventions needed    Continued    Hypomagnesemia, new  -9/16 initiate Mag-Ox 800 mg p.o. x1 dose today    Seizure disorder, controlled  - EEG with no seizure activity  -Continue keppra 500mg 3 tablets BID.   -9/16 Keppra level 16.2, stable.  Continue on current dose of Keppra.    Debility, ongoing  - Continue with PT/OT for gait training and strengthening and restoration of ADL's   - Encourage mobility, OOB in chair, and early ambulation as appropriate  - Fall precautions   - Monitor for bowel and bladder dysfunction  - Monitor for and prevent skin breakdown and pressure ulcers  - Continue DVT prophylaxis with ASA     Hx of Anemia, stable  -Continue B complex vitamin  -monitor weekly labs while on skilled      History of CVA  -Significant R upper and R lower extremity weakness>MRI revealed no acute findings, only generalized volume loss consistent with chronic ischemic changes and old infarcts.   -continue home aspirin, started on Lipitor    UTI (urinary tract infection), resolved  -+100,000 CFU Klebsiella pneumoniae   -Treated with ciprofloxacin for 3 days for treatment of UTI (until 09/02)    LISHA (acute kidney injury) 2/2 rhabdo, resolved  - Cr 0.9, baseline    Non-traumatic rhabdomyolysis, resolved  - found two days after a non-traumatic fall at his home>CPK 62115. Creatinine at 1.6 (0.8)       No future appointments.    Extended time visit:  Total time:46 minutes  Start Time:0900  End Time:0946  Non face-to-face time:30 minutes  Description of time:  Discharge planning including medication and plan of care review, coordination of care with SW and nursing for needed DME, HH,  PT/OT for home care    Harleen Travis, NP

## 2019-09-24 ENCOUNTER — DOCUMENTATION ONLY (OUTPATIENT)
Dept: HEPATOLOGY | Facility: HOSPITAL | Age: 69
End: 2019-09-24

## 2019-09-24 NOTE — PROGRESS NOTES
[facility]                                                                             Skilled Nursing [facility]                                                                                 Discharge Summary    Date of Admission: 9/4/19    Date of Discharge:  9/24/19  Principal Problem:  Debility r/t rhabdo, sepsis, UTI        HPI:  Mr. Waters is a 69 year-old male with type 2 DM, seizure disorder, dementia, and a history of CVA in 2012. He initially presented to the ED after being found down at home for 3 days, diagnosed with rhabdo, UTI, and sepsis.  Patient was treated with Vancomycin and Zosyn given persistent fevers and AMS. Blood cultures grew Enterococcus Faecalis and coagulase negative staph. Patient had Klebsiella pneumoniae in urine. Once sensitivities returned, broad spectrum antibiotics were de-escalated to ciprofloxacin and ampicillin; ciprofloxacin was administered for 3 days total to treat the UTI and ampicillin will be administered for a total of 2 weeks (until September 12, 2019) for E. fecalis bacteremia. He had markedly notable R-sided weakness so there were concerns for stroke/acute vascular insult. However, MRI performed revealed no acute processes, only generalized volume loss reflective of chronic microvascular changes. Patient was started on Atorvastatin 40mg on discharge due to history of CVA.     Hospital Course:  Patient progressed well with PT and OT. Patient had no significant events during their stay at SNF. Home health was set up. DME was ordered if needed. Follow up appointment to be made by patient within one week. All prescriptions and discharge instructions were ordered to be given to the patient prior to discharge.     PEx     Constitutional: Patient appears well-developed and in no distress    Head: Normocephalic and atraumatic.   Eyes: Pupils are equal, round, and reactive to light.   Neck:  Normal range of motion. Neck supple.   Cardiovascular: Normal rate, regular rhythm and normal heart sounds.    Pulmonary/Chest: Effort normal and breath sounds are clear  Abdominal: Soft. Bowel sounds are normal.   Musculoskeletal: Decreased range of motion.   Neurological: Alert and oriented to person, place, and Not to time.   Psychiatric: Normal mood and affect. Behavior is normal.   Skin: Skin is warm and dry, + stage II sacral pressure ulcer, resolved    Discharge Diet:regular diet with Normal Fluid intake of 1500 - 2000 mL per day    Activity: activity as tolerated    Discharge Condition: Good    Disposition: Home-Health Care [facility]     Assessment and Plan:     Aftercare of sepsis, resolved  + Enterococcus Faecalis BC  -Initially treated with vancomycin and pip-tazo for empiric coverage.  -Sepsis likely secondary to UTI plus bacteremia.   - Tx de escalated to treatment with Ampicillin-extended infusion (over 8H) every 8 hours via PICC- end date 9/12  -9/11 continue ampicillin infusions via PICC line-end date 09/12.  Initiate repeat BC next week on 9/17 to ensure resolution of infection.  -9/16 Ampicillin infusions completed, initiate order to discontinue PICC line, blood cultures ordered for 9/17, will await results  -9/18 BC drawn 9/17, awaiting results. Initiate D/C PICC line  -9/23 blood cultures no growth today-final.  No further intervention needed     Status post fall, resolved  Weakness, ongoing  - shoulder xray was performed and it revealed intact osseous structures w/o evidence of fracture nor GH joint dislocation   -increased weakness in RUE and RLE. Likely secondary to rhadbo.       Hypertension, controlled   -Continue home losartan 25mg daily      DM (diabetes mellitus), controlled  -HGBAIC 6.4  -home metformin XR 500mg BID  - Diabetic diet     Left Buttock pressure ulcer stage II, resolved  Date Identified: 9/5, present on admit     Seizure disorder, controlled  - EEG with no seizure  activity  -Continue keppra 500mg 3 tablets BID.   -9/16 Keppra level 16.2, stable.  Continue on current dose of Keppra.     Hx of Anemia, stable  -Continue B complex vitamin      History of CVA  -Significant R upper and R lower extremity weakness>MRI revealed no acute findings, only generalized volume loss consistent with chronic ischemic changes and old infarcts.   -continue home aspirin, started on Lipitor     UTI (urinary tract infection), resolved  -+100,000 CFU Klebsiella pneumoniae   -Treated with ciprofloxacin for 3 days for treatment of UTI (until 09/02)     LISHA (acute kidney injury) 2/2 rhabdo, resolved  - Cr 0.9, baseline     Non-traumatic rhabdomyolysis, resolved  - found two days after a non-traumatic fall at his home>CPK [id number]. Creatinine at 1.6 (0.8)       No future appointments.      Time spent on the discharge of patient: greater than 35 minutes.  Patient was seen and examined on the date of discharge and determined to be suitable for discharge.      Harleen Travis, NP

## 2019-11-05 ENCOUNTER — HOSPITAL ENCOUNTER (EMERGENCY)
Facility: HOSPITAL | Age: 69
Discharge: LEFT BEFORE DISCHARGE | End: 2019-11-05
Attending: EMERGENCY MEDICINE
Payer: MEDICARE

## 2019-11-05 VITALS
WEIGHT: 150 LBS | SYSTOLIC BLOOD PRESSURE: 111 MMHG | RESPIRATION RATE: 16 BRPM | TEMPERATURE: 98 F | OXYGEN SATURATION: 99 % | HEIGHT: 65 IN | HEART RATE: 78 BPM | BODY MASS INDEX: 24.99 KG/M2 | DIASTOLIC BLOOD PRESSURE: 88 MMHG

## 2019-11-05 DIAGNOSIS — R53.1 WEAKNESS: Primary | ICD-10-CM

## 2019-11-05 PROCEDURE — 99284 PR EMERGENCY DEPT VISIT,LEVEL IV: ICD-10-PCS | Mod: ,,, | Performed by: EMERGENCY MEDICINE

## 2019-11-05 PROCEDURE — 99281 EMR DPT VST MAYX REQ PHY/QHP: CPT

## 2019-11-05 PROCEDURE — 99284 EMERGENCY DEPT VISIT MOD MDM: CPT | Mod: ,,, | Performed by: EMERGENCY MEDICINE

## 2019-11-05 NOTE — ED PROVIDER NOTES
Encounter Date: 11/5/2019       History     Chief Complaint   Patient presents with    Weakness     Pt c/o onset weakness walking on [address].  States he had to stop to catch his balance.     Mr. Waters is a 70 yo male with past medical history of DM2, CVA(2012 with unknown deficits--unsteady gait), Seizures, closed head injury, E. Faecalis bacteremia who presents with chief complaint of generalized weakness. He said it was transient/is not sure how long it lasted. He did not have any focal weakness. He says this has happened in the past. He says he felt like he needed to take his seizure medication but denies having actual seizure. He denies any loss of consciousness or fall that occurred.  He says he is now feeling completely back to his baseline and would like to go back to gen care doctor. He denies any numbness, slurred speech, facial droop, confusion/disorientation. He denies any shortness of breath, chest pain, or pain elsewhere.         Review of patient's allergies indicates:  No Known Allergies  Past Medical History:   Diagnosis Date    CHI (closed head injury)     Dementia     Diabetes mellitus     Hyperlipidemia     Seizures      Past Surgical History:   Procedure Laterality Date    ABDOMINAL SURGERY    BACK SURGERY    FRACTURE SURGERY    HAND SURGERY     Family History    Arthritis Mother    Heart disease Mother    Hypertension Mother    Stroke Mother    Diabetes Father    Asthma Sister     Social History     Tobacco Use    Smoking status: Never Smoker    Smokeless tobacco: Never Used   Substance Use Topics    Alcohol use: No     Frequency: Never    Drug use: No     Review of Systems   Constitutional: Negative for appetite change, chills and fever.   HENT: Negative for rhinorrhea, sinus pain, sore throat and trouble swallowing.    Eyes: Negative for photophobia and visual disturbance.   Respiratory: Negative for cough and shortness of breath.    Cardiovascular: Negative for  chest pain and leg swelling.   Gastrointestinal: Negative for abdominal pain, constipation, diarrhea and nausea.   Genitourinary: Negative for difficulty urinating, dysuria, frequency, hematuria and urgency.   Musculoskeletal: Negative for arthralgias and gait problem.   Skin: Negative for pallor and rash.   Neurological: Positive for weakness (generalized - resolved). Negative for dizziness, seizures, syncope, speech difficulty, light-headedness, numbness and headaches.       Physical Exam     Initial Vitals [11/05/19 1500]   BP Pulse Resp Temp SpO2   111/88 78 16 97.8 °F (36.6 °C) 99 %      MAP       --         Physical Exam    Vitals reviewed.  Constitutional: He appears well-developed and well-nourished. He is not diaphoretic. No distress.   HENT:   Head: Normocephalic and atraumatic.   Mouth/Throat: No oropharyngeal exudate.   Eyes: EOM are normal. Pupils are equal, round, and reactive to light. No scleral icterus.   Neck: Normal range of motion. Neck supple. No JVD present.   Cardiovascular: Normal rate, regular rhythm, normal heart sounds and intact distal pulses. Exam reveals no gallop and no friction rub.    No murmur heard.  Pulmonary/Chest: Breath sounds normal. No respiratory distress. He has no wheezes. He has no rhonchi. He has no rales.   Abdominal: Soft. Bowel sounds are normal. He exhibits no distension. There is no tenderness.   Musculoskeletal: He exhibits no edema.   Neurological: He is alert and oriented to person, place, and time. He displays normal reflexes. No cranial nerve deficit or sensory deficit. GCS score is 15. GCS eye subscore is 4. GCS verbal subscore is 5. GCS motor subscore is 6.   Some unsteady gait but patient has at baseline   Some difficulty with proprioception  5/5 strength throughtout   Skin: Skin is warm and dry.   Psychiatric: He has a normal mood and affect. Thought content normal.         ED Course   Procedures  Labs Reviewed - No data to display       Imaging Results     None          Medical Decision Making:   History:   Old Medical Records: I decided to obtain old medical records.  Old Records Summarized: other records.       <> Summary of Records: Patient originally admitted to hospital medicine team 4 8/27-9/4 for rhabdomyolysis, UTI, and E. faecalis and coag neg staph sepsis after been found down at home for 3 days. He completed antibiotic therapy and rehab treatment in SNF and was discharged 9/24.  Initial Assessment:   70 yo male with past medical history of T2DM, CVA(2012) with residual unsteady gait, HLD, seizures and closed head injury presents with transient episode of generalized weakness with no seizure, loss of consciousness. The weakness had resolved once he was brought by EMS and he insists on going to [facility] who have arranged for transportation.  He is feeling back to baseline with no complaints, is protecting airway, is alert and oriented, and has no new focal neurologic deficits.  Differential Diagnosis:   Differential includes electrolyte abnormality, hypothyroidism, anemia, infection.   ED Management:  3:48 PM  Patient insists on leaving since he is being picked up by [facility]. No evidence of acute abnormality on exam. Patient is alert and oriented, protecting airway, without focal neurologic deficit.              Attending Attestation:   Physician Attestation Statement for Resident:  As the supervising MD   Physician Attestation Statement: I have personally seen and examined this patient.   I agree with the above history. -:   As the supervising MD I agree with the above PE.    As the supervising MD I agree with the above treatment, course, plan, and disposition.            Attending ED Notes:   STAFF ATTENDING PHYSICIAN NOTE:  I have individually/jointly evaluated Edwin Lopez  and discussed their ED management with the resident physician. I have also reviewed their notes, assessments, and procedures documented.  I was present during all critical  "portions of any procedure(s) performed on Edwin Lopez Jr., who presented to the ED voluntarily for evaluation of non focal and generalized weakness. He denied any headache, visual changes, difficulty with his speech, difficulty with any movement of upper lower extremities or any ambulation problems.  No gross focal deficits or appreciated on exam.  Patient was alert awake and oriented x3 and answering questions appropriately.  After arrival to our ED, patient reported "feeling much better" and not wanting to wait to be seen as he was in the process of abandoning our care, when I was able to at least discussed with him any concerns for complaints. No neurovascular or hemodynamic instability during my screening process.  He had capacity to refuse care, for which I respected his decision and discussed symptoms warranting ED return, which he understood.  ____________________  Pb Alexander MD, [facility]  Emergency Medicine Staff                          Clinical Impression:       ICD-10-CM ICD-9-CM   1. Weakness R53.1 780.79         Disposition:   Disposition: Eloped  Condition: Stable      Patient with now resolved generalized weakness. Will be eloping to [facility] and has transportation. No focal abnormality on exam changed from baseline. Alert and oriented and protecting airway.     MD Graham Felton MD  Resident  11/05/19 1601       Wilfrid Alexander MD  11/07/19 1636    "

## 2019-11-05 NOTE — ED NOTES
Pt states he called his [facility] facility and was told to come to the clinic and not bed seen in ED.  Charge nurse notified,  PA speaking with pt.

## 2019-11-05 NOTE — ED NOTES
Pt states he wants to leave.  Pt was assessed by PA & Dr. Alexander.  States pt may leave. Spoke with Gen care staff states they are sending an uber to get pt.  Pt updated on plan  Of care.

## 2019-11-11 ENCOUNTER — HOSPITAL ENCOUNTER (INPATIENT)
Facility: HOSPITAL | Age: 69
LOS: 21 days | Discharge: NURSING, REHAB OR LONG-TERM CARE | DRG: 870 | End: 2019-12-02
Attending: EMERGENCY MEDICINE | Admitting: PSYCHIATRY & NEUROLOGY
Payer: MEDICARE

## 2019-11-11 DIAGNOSIS — Z71.3 RESTRICTED DIET: ICD-10-CM

## 2019-11-11 DIAGNOSIS — E11.9 TYPE 2 DIABETES MELLITUS WITHOUT COMPLICATION, UNSPECIFIED WHETHER LONG TERM INSULIN USE: ICD-10-CM

## 2019-11-11 DIAGNOSIS — R41.82 ALTERED MENTAL STATUS: ICD-10-CM

## 2019-11-11 DIAGNOSIS — Z87.898 HISTORY OF SEIZURE: ICD-10-CM

## 2019-11-11 DIAGNOSIS — Z86.73 HISTORY OF CVA IN ADULTHOOD: ICD-10-CM

## 2019-11-11 DIAGNOSIS — R78.81 BACTEREMIA: ICD-10-CM

## 2019-11-11 DIAGNOSIS — M62.82 NON-TRAUMATIC RHABDOMYOLYSIS: ICD-10-CM

## 2019-11-11 DIAGNOSIS — I10 ESSENTIAL HYPERTENSION: ICD-10-CM

## 2019-11-11 DIAGNOSIS — Z87.820 HISTORY OF CLOSED HEAD INJURY: ICD-10-CM

## 2019-11-11 DIAGNOSIS — Z99.11 ON MECHANICALLY ASSISTED VENTILATION: ICD-10-CM

## 2019-11-11 DIAGNOSIS — Z01.818 ENCOUNTER FOR INTUBATION: ICD-10-CM

## 2019-11-11 DIAGNOSIS — R65.20 SEVERE SEPSIS: ICD-10-CM

## 2019-11-11 DIAGNOSIS — N17.9 AKI (ACUTE KIDNEY INJURY): ICD-10-CM

## 2019-11-11 DIAGNOSIS — R53.1 WEAKNESS: ICD-10-CM

## 2019-11-11 DIAGNOSIS — R41.82 ALTERED MENTAL STATUS, UNSPECIFIED ALTERED MENTAL STATUS TYPE: ICD-10-CM

## 2019-11-11 DIAGNOSIS — R23.9 ALTERATION IN SKIN INTEGRITY: ICD-10-CM

## 2019-11-11 DIAGNOSIS — D72.829 LEUKOCYTOSIS, UNSPECIFIED TYPE: ICD-10-CM

## 2019-11-11 DIAGNOSIS — G40.909 SEIZURE DISORDER: ICD-10-CM

## 2019-11-11 DIAGNOSIS — A41.9 SEVERE SEPSIS: ICD-10-CM

## 2019-11-11 DIAGNOSIS — Z91.81 STATUS POST FALL: ICD-10-CM

## 2019-11-11 DIAGNOSIS — R41.82 ALTERED MENTAL STATE: Primary | ICD-10-CM

## 2019-11-11 DIAGNOSIS — G93.40 ACUTE ENCEPHALOPATHY: ICD-10-CM

## 2019-11-11 DIAGNOSIS — N39.0 URINARY TRACT INFECTION WITHOUT HEMATURIA, SITE UNSPECIFIED: ICD-10-CM

## 2019-11-11 LAB
ALBUMIN SERPL BCP-MCNC: 3.6 G/DL (ref 3.5–5.2)
ALLENS TEST: ABNORMAL
ALLENS TEST: NORMAL
ALP SERPL-CCNC: 82 U/L (ref 55–135)
ALT SERPL W/O P-5'-P-CCNC: 53 U/L (ref 10–44)
AMPHET+METHAMPHET UR QL: NEGATIVE
ANION GAP SERPL CALC-SCNC: 15 MMOL/L (ref 8–16)
AST SERPL-CCNC: 174 U/L (ref 10–40)
BACTERIA #/AREA URNS AUTO: ABNORMAL /HPF
BARBITURATES UR QL SCN>200 NG/ML: NEGATIVE
BASOPHILS # BLD AUTO: 0.03 K/UL (ref 0–0.2)
BASOPHILS NFR BLD: 0.2 % (ref 0–1.9)
BENZODIAZ UR QL SCN>200 NG/ML: NEGATIVE
BILIRUB SERPL-MCNC: 1.1 MG/DL (ref 0.1–1)
BILIRUB UR QL STRIP: NEGATIVE
BNP SERPL-MCNC: 263 PG/ML (ref 0–99)
BUN SERPL-MCNC: 31 MG/DL (ref 8–23)
BZE UR QL SCN: NEGATIVE
CALCIUM SERPL-MCNC: 8.8 MG/DL (ref 8.7–10.5)
CANNABINOIDS UR QL SCN: NEGATIVE
CHLORIDE SERPL-SCNC: 114 MMOL/L (ref 95–110)
CHOLEST SERPL-MCNC: 133 MG/DL (ref 120–199)
CHOLEST/HDLC SERPL: 2 {RATIO} (ref 2–5)
CK SERPL-CCNC: ABNORMAL U/L (ref 20–200)
CLARITY UR REFRACT.AUTO: ABNORMAL
CO2 SERPL-SCNC: 19 MMOL/L (ref 23–29)
COLOR UR AUTO: YELLOW
CREAT SERPL-MCNC: 1.4 MG/DL (ref 0.5–1.4)
CREAT UR-MCNC: 170 MG/DL (ref 23–375)
DELSYS: ABNORMAL
DELSYS: ABNORMAL
DELSYS: NORMAL
DIFFERENTIAL METHOD: ABNORMAL
EOSINOPHIL # BLD AUTO: 0 K/UL (ref 0–0.5)
EOSINOPHIL NFR BLD: 0 % (ref 0–8)
ERYTHROCYTE [DISTWIDTH] IN BLOOD BY AUTOMATED COUNT: 13.2 % (ref 11.5–14.5)
ERYTHROCYTE [SEDIMENTATION RATE] IN BLOOD BY WESTERGREN METHOD: 16 MM/H
ERYTHROCYTE [SEDIMENTATION RATE] IN BLOOD BY WESTERGREN METHOD: 20 MM/H
ERYTHROCYTE [SEDIMENTATION RATE] IN BLOOD BY WESTERGREN METHOD: 20 MM/H
EST. GFR  (AFRICAN AMERICAN): 58.8 ML/MIN/1.73 M^2
EST. GFR  (NON AFRICAN AMERICAN): 50.9 ML/MIN/1.73 M^2
ESTIMATED AVG GLUCOSE: 137 MG/DL (ref 68–131)
ETHANOL UR-MCNC: <10 MG/DL
FIO2: 100
FIO2: 21
FIO2: 21
GLUCOSE SERPL-MCNC: 102 MG/DL (ref 70–110)
GLUCOSE UR QL STRIP: NEGATIVE
HBA1C MFR BLD HPLC: 6.4 % (ref 4–5.6)
HCO3 UR-SCNC: 19.7 MMOL/L (ref 24–28)
HCO3 UR-SCNC: 20.6 MMOL/L (ref 24–28)
HCO3 UR-SCNC: 21.1 MMOL/L (ref 24–28)
HCT VFR BLD AUTO: 43.2 % (ref 40–54)
HCT VFR BLD CALC: 46 %PCV (ref 36–54)
HDLC SERPL-MCNC: 68 MG/DL (ref 40–75)
HDLC SERPL: 51.1 % (ref 20–50)
HGB BLD-MCNC: 12.9 G/DL (ref 14–18)
HGB UR QL STRIP: ABNORMAL
HYALINE CASTS UR QL AUTO: 4 /LPF
IMM GRANULOCYTES # BLD AUTO: 0.07 K/UL (ref 0–0.04)
IMM GRANULOCYTES NFR BLD AUTO: 0.4 % (ref 0–0.5)
INR PPP: 1.1 (ref 0.8–1.2)
KETONES UR QL STRIP: ABNORMAL
LACTATE SERPL-SCNC: 1.4 MMOL/L (ref 0.5–2.2)
LACTATE SERPL-SCNC: 2.2 MMOL/L (ref 0.5–2.2)
LDH SERPL L TO P-CCNC: 2.19 MMOL/L (ref 0.5–2.2)
LDLC SERPL CALC-MCNC: 54.4 MG/DL (ref 63–159)
LEUKOCYTE ESTERASE UR QL STRIP: ABNORMAL
LYMPHOCYTES # BLD AUTO: 1.9 K/UL (ref 1–4.8)
LYMPHOCYTES NFR BLD: 11.9 % (ref 18–48)
MCH RBC QN AUTO: 25.2 PG (ref 27–31)
MCHC RBC AUTO-ENTMCNC: 29.9 G/DL (ref 32–36)
MCV RBC AUTO: 84 FL (ref 82–98)
METHADONE UR QL SCN>300 NG/ML: NEGATIVE
MICROSCOPIC COMMENT: ABNORMAL
MIN VOL: 11.6
MODE: ABNORMAL
MODE: ABNORMAL
MODE: NORMAL
MONOCYTES # BLD AUTO: 1.3 K/UL (ref 0.3–1)
MONOCYTES NFR BLD: 8.1 % (ref 4–15)
NEUTROPHILS # BLD AUTO: 12.4 K/UL (ref 1.8–7.7)
NEUTROPHILS NFR BLD: 79.4 % (ref 38–73)
NITRITE UR QL STRIP: NEGATIVE
NONHDLC SERPL-MCNC: 65 MG/DL
NRBC BLD-RTO: 0 /100 WBC
OPIATES UR QL SCN: NEGATIVE
PCO2 BLDA: 33.5 MMHG (ref 35–45)
PCO2 BLDA: 33.8 MMHG (ref 35–45)
PCO2 BLDA: 35 MMHG (ref 35–45)
PCP UR QL SCN>25 NG/ML: NEGATIVE
PEEP: 5
PH SMN: 7.37 [PH] (ref 7.35–7.45)
PH SMN: 7.39 [PH] (ref 7.35–7.45)
PH SMN: 7.4 [PH] (ref 7.35–7.45)
PH UR STRIP: 5 [PH] (ref 5–8)
PIP: 18
PLATELET # BLD AUTO: 279 K/UL (ref 150–350)
PMV BLD AUTO: 11.1 FL (ref 9.2–12.9)
PO2 BLDA: 23 MMHG (ref 40–60)
PO2 BLDA: 25 MMHG (ref 40–60)
PO2 BLDA: 595 MMHG (ref 80–100)
POC BE: -4 MMOL/L
POC BE: -4 MMOL/L
POC BE: -5 MMOL/L
POC IONIZED CALCIUM: 1.05 MMOL/L (ref 1.06–1.42)
POC SATURATED O2: 100 % (ref 95–100)
POC SATURATED O2: 40 % (ref 95–100)
POC SATURATED O2: 44 % (ref 95–100)
POC TCO2: 21 MMOL/L (ref 24–29)
POC TCO2: 22 MMOL/L (ref 23–27)
POC TCO2: 22 MMOL/L (ref 24–29)
POCT GLUCOSE: 130 MG/DL (ref 70–110)
POTASSIUM BLD-SCNC: 4.3 MMOL/L (ref 3.5–5.1)
POTASSIUM SERPL-SCNC: 4.1 MMOL/L (ref 3.5–5.1)
PROT SERPL-MCNC: 7.6 G/DL (ref 6–8.4)
PROT UR QL STRIP: ABNORMAL
PROTHROMBIN TIME: 11.5 SEC (ref 9–12.5)
RBC # BLD AUTO: 5.12 M/UL (ref 4.6–6.2)
RBC #/AREA URNS AUTO: 4 /HPF (ref 0–4)
SAMPLE: ABNORMAL
SAMPLE: NORMAL
SITE: ABNORMAL
SITE: NORMAL
SODIUM BLD-SCNC: 146 MMOL/L (ref 136–145)
SODIUM SERPL-SCNC: 148 MMOL/L (ref 136–145)
SP GR UR STRIP: 1.02 (ref 1–1.03)
SP02: 100
TOXICOLOGY INFORMATION: NORMAL
TRIGL SERPL-MCNC: 53 MG/DL (ref 30–150)
TROPONIN I SERPL DL<=0.01 NG/ML-MCNC: 0.29 NG/ML (ref 0–0.03)
TSH SERPL DL<=0.005 MIU/L-ACNC: 1.3 UIU/ML (ref 0.4–4)
URN SPEC COLLECT METH UR: ABNORMAL
VT: 480
WBC # BLD AUTO: 15.65 K/UL (ref 3.9–12.7)
WBC #/AREA URNS AUTO: >100 /HPF (ref 0–5)
WBC CLUMPS UR QL AUTO: ABNORMAL

## 2019-11-11 PROCEDURE — 31500 PR INSERT, EMERGENCY ENDOTRACH AIRWAY: ICD-10-PCS | Mod: ,,, | Performed by: EMERGENCY MEDICINE

## 2019-11-11 PROCEDURE — 95951 PR EEG MONITORING/VIDEORECORD: ICD-10-PCS | Mod: 26,,, | Performed by: PSYCHIATRY & NEUROLOGY

## 2019-11-11 PROCEDURE — 84295 ASSAY OF SERUM SODIUM: CPT

## 2019-11-11 PROCEDURE — 80061 LIPID PANEL: CPT

## 2019-11-11 PROCEDURE — 85025 COMPLETE CBC W/AUTO DIFF WBC: CPT

## 2019-11-11 PROCEDURE — 93010 EKG 12-LEAD: ICD-10-PCS | Mod: ,,, | Performed by: INTERNAL MEDICINE

## 2019-11-11 PROCEDURE — 83605 ASSAY OF LACTIC ACID: CPT

## 2019-11-11 PROCEDURE — 84132 ASSAY OF SERUM POTASSIUM: CPT

## 2019-11-11 PROCEDURE — 99291 PR CRITICAL CARE, E/M 30-74 MINUTES: ICD-10-PCS | Mod: 25,,, | Performed by: EMERGENCY MEDICINE

## 2019-11-11 PROCEDURE — 99900035 HC TECH TIME PER 15 MIN (STAT)

## 2019-11-11 PROCEDURE — 94761 N-INVAS EAR/PLS OXIMETRY MLT: CPT

## 2019-11-11 PROCEDURE — 87186 SC STD MICRODIL/AGAR DIL: CPT | Mod: 59

## 2019-11-11 PROCEDURE — 99291 CRITICAL CARE FIRST HOUR: CPT | Mod: 25

## 2019-11-11 PROCEDURE — 80177 DRUG SCRN QUAN LEVETIRACETAM: CPT

## 2019-11-11 PROCEDURE — 85610 PROTHROMBIN TIME: CPT

## 2019-11-11 PROCEDURE — 27000221 HC OXYGEN, UP TO 24 HOURS

## 2019-11-11 PROCEDURE — 83880 ASSAY OF NATRIURETIC PEPTIDE: CPT

## 2019-11-11 PROCEDURE — 25000003 PHARM REV CODE 250

## 2019-11-11 PROCEDURE — 83036 HEMOGLOBIN GLYCOSYLATED A1C: CPT

## 2019-11-11 PROCEDURE — 93005 ELECTROCARDIOGRAM TRACING: CPT

## 2019-11-11 PROCEDURE — 85014 HEMATOCRIT: CPT

## 2019-11-11 PROCEDURE — 87070 CULTURE OTHR SPECIMN AEROBIC: CPT

## 2019-11-11 PROCEDURE — 81001 URINALYSIS AUTO W/SCOPE: CPT

## 2019-11-11 PROCEDURE — 99292 PR CRITICAL CARE, ADDL 30 MIN: ICD-10-PCS | Mod: 25,,, | Performed by: EMERGENCY MEDICINE

## 2019-11-11 PROCEDURE — 87040 BLOOD CULTURE FOR BACTERIA: CPT

## 2019-11-11 PROCEDURE — 63600175 PHARM REV CODE 636 W HCPCS: Performed by: NURSE PRACTITIONER

## 2019-11-11 PROCEDURE — 63600175 PHARM REV CODE 636 W HCPCS

## 2019-11-11 PROCEDURE — 20000000 HC ICU ROOM

## 2019-11-11 PROCEDURE — 82330 ASSAY OF CALCIUM: CPT

## 2019-11-11 PROCEDURE — 99291 PR CRITICAL CARE, E/M 30-74 MINUTES: ICD-10-PCS | Mod: ,,, | Performed by: NURSE PRACTITIONER

## 2019-11-11 PROCEDURE — 99291 CRITICAL CARE FIRST HOUR: CPT | Mod: ,,, | Performed by: NURSE PRACTITIONER

## 2019-11-11 PROCEDURE — 95951 PR EEG MONITORING/VIDEORECORD: CPT | Mod: 26,,, | Performed by: PSYCHIATRY & NEUROLOGY

## 2019-11-11 PROCEDURE — 84484 ASSAY OF TROPONIN QUANT: CPT

## 2019-11-11 PROCEDURE — 96366 THER/PROPH/DIAG IV INF ADDON: CPT

## 2019-11-11 PROCEDURE — 82550 ASSAY OF CK (CPK): CPT

## 2019-11-11 PROCEDURE — 99292 CRITICAL CARE ADDL 30 MIN: CPT | Mod: 25,,, | Performed by: EMERGENCY MEDICINE

## 2019-11-11 PROCEDURE — 82803 BLOOD GASES ANY COMBINATION: CPT

## 2019-11-11 PROCEDURE — 94003 VENT MGMT INPAT SUBQ DAY: CPT

## 2019-11-11 PROCEDURE — 96365 THER/PROPH/DIAG IV INF INIT: CPT

## 2019-11-11 PROCEDURE — 80053 COMPREHEN METABOLIC PANEL: CPT

## 2019-11-11 PROCEDURE — 36600 WITHDRAWAL OF ARTERIAL BLOOD: CPT

## 2019-11-11 PROCEDURE — 87205 SMEAR GRAM STAIN: CPT

## 2019-11-11 PROCEDURE — 84443 ASSAY THYROID STIM HORMONE: CPT

## 2019-11-11 PROCEDURE — 87086 URINE CULTURE/COLONY COUNT: CPT

## 2019-11-11 PROCEDURE — 63600175 PHARM REV CODE 636 W HCPCS: Performed by: PHYSICIAN ASSISTANT

## 2019-11-11 PROCEDURE — S0028 INJECTION, FAMOTIDINE, 20 MG: HCPCS | Performed by: NURSE PRACTITIONER

## 2019-11-11 PROCEDURE — 31500 INSERT EMERGENCY AIRWAY: CPT | Mod: ,,, | Performed by: EMERGENCY MEDICINE

## 2019-11-11 PROCEDURE — 99291 CRITICAL CARE FIRST HOUR: CPT | Mod: 25,,, | Performed by: EMERGENCY MEDICINE

## 2019-11-11 PROCEDURE — 25000003 PHARM REV CODE 250: Performed by: NURSE PRACTITIONER

## 2019-11-11 PROCEDURE — 87088 URINE BACTERIA CULTURE: CPT

## 2019-11-11 PROCEDURE — 51702 INSERT TEMP BLADDER CATH: CPT

## 2019-11-11 PROCEDURE — 63600175 PHARM REV CODE 636 W HCPCS: Performed by: EMERGENCY MEDICINE

## 2019-11-11 PROCEDURE — 99292 CRITICAL CARE ADDL 30 MIN: CPT

## 2019-11-11 PROCEDURE — 99900026 HC AIRWAY MAINTENANCE (STAT)

## 2019-11-11 PROCEDURE — 93010 ELECTROCARDIOGRAM REPORT: CPT | Mod: ,,, | Performed by: INTERNAL MEDICINE

## 2019-11-11 PROCEDURE — 80047 BASIC METABLC PNL IONIZED CA: CPT

## 2019-11-11 PROCEDURE — 80307 DRUG TEST PRSMV CHEM ANLYZR: CPT

## 2019-11-11 PROCEDURE — 87077 CULTURE AEROBIC IDENTIFY: CPT

## 2019-11-11 PROCEDURE — 31500 INSERT EMERGENCY AIRWAY: CPT

## 2019-11-11 RX ORDER — FAMOTIDINE 10 MG/ML
20 INJECTION INTRAVENOUS 2 TIMES DAILY
Status: DISCONTINUED | OUTPATIENT
Start: 2019-11-11 | End: 2019-11-12

## 2019-11-11 RX ORDER — ACETAMINOPHEN 325 MG/1
650 TABLET ORAL EVERY 6 HOURS PRN
Status: DISCONTINUED | OUTPATIENT
Start: 2019-11-11 | End: 2019-11-12

## 2019-11-11 RX ORDER — SODIUM CHLORIDE 9 MG/ML
INJECTION, SOLUTION INTRAVENOUS CONTINUOUS
Status: DISCONTINUED | OUTPATIENT
Start: 2019-11-11 | End: 2019-11-12

## 2019-11-11 RX ORDER — ETOMIDATE 2 MG/ML
INJECTION INTRAVENOUS
Status: COMPLETED
Start: 2019-11-11 | End: 2019-11-11

## 2019-11-11 RX ORDER — CHLORHEXIDINE GLUCONATE ORAL RINSE 1.2 MG/ML
15 SOLUTION DENTAL 2 TIMES DAILY
Status: DISCONTINUED | OUTPATIENT
Start: 2019-11-11 | End: 2019-11-14

## 2019-11-11 RX ORDER — AMOXICILLIN 250 MG
1 CAPSULE ORAL 2 TIMES DAILY
Status: DISCONTINUED | OUTPATIENT
Start: 2019-11-11 | End: 2019-11-18

## 2019-11-11 RX ORDER — INSULIN ASPART 100 [IU]/ML
1-10 INJECTION, SOLUTION INTRAVENOUS; SUBCUTANEOUS EVERY 6 HOURS PRN
Status: DISCONTINUED | OUTPATIENT
Start: 2019-11-11 | End: 2019-11-12

## 2019-11-11 RX ORDER — GLUCAGON 1 MG
1 KIT INJECTION
Status: DISCONTINUED | OUTPATIENT
Start: 2019-11-11 | End: 2019-11-12

## 2019-11-11 RX ORDER — ETOMIDATE 2 MG/ML
20 INJECTION INTRAVENOUS
Status: COMPLETED | OUTPATIENT
Start: 2019-11-11 | End: 2019-11-11

## 2019-11-11 RX ORDER — PROPOFOL 10 MG/ML
5 INJECTION, EMULSION INTRAVENOUS CONTINUOUS
Status: DISCONTINUED | OUTPATIENT
Start: 2019-11-11 | End: 2019-11-12

## 2019-11-11 RX ORDER — SUCCINYLCHOLINE CHLORIDE 20 MG/ML
100 INJECTION INTRAMUSCULAR; INTRAVENOUS
Status: COMPLETED | OUTPATIENT
Start: 2019-11-11 | End: 2019-11-11

## 2019-11-11 RX ORDER — NICARDIPINE HYDROCHLORIDE 0.2 MG/ML
1 INJECTION INTRAVENOUS CONTINUOUS
Status: DISCONTINUED | OUTPATIENT
Start: 2019-11-11 | End: 2019-11-13

## 2019-11-11 RX ORDER — LEVETIRACETAM 15 MG/ML
1500 INJECTION INTRAVASCULAR
Status: COMPLETED | OUTPATIENT
Start: 2019-11-11 | End: 2019-11-11

## 2019-11-11 RX ORDER — ONDANSETRON 2 MG/ML
4 INJECTION INTRAMUSCULAR; INTRAVENOUS EVERY 6 HOURS PRN
Status: DISCONTINUED | OUTPATIENT
Start: 2019-11-11 | End: 2019-11-18

## 2019-11-11 RX ORDER — CLOPIDOGREL BISULFATE 75 MG/1
75 TABLET ORAL DAILY
COMMUNITY

## 2019-11-11 RX ORDER — CEFTRIAXONE 1 G/1
1 INJECTION, POWDER, FOR SOLUTION INTRAMUSCULAR; INTRAVENOUS
Status: DISCONTINUED | OUTPATIENT
Start: 2019-11-12 | End: 2019-11-12

## 2019-11-11 RX ORDER — LEVETIRACETAM 5 MG/ML
500 INJECTION INTRAVASCULAR
Status: COMPLETED | OUTPATIENT
Start: 2019-11-11 | End: 2019-11-11

## 2019-11-11 RX ORDER — SODIUM CHLORIDE 0.9 % (FLUSH) 0.9 %
10 SYRINGE (ML) INJECTION
Status: DISCONTINUED | OUTPATIENT
Start: 2019-11-11 | End: 2019-12-02 | Stop reason: HOSPADM

## 2019-11-11 RX ORDER — PROPOFOL 10 MG/ML
INJECTION, EMULSION INTRAVENOUS
Status: DISPENSED
Start: 2019-11-11 | End: 2019-11-12

## 2019-11-11 RX ORDER — VANCOMYCIN 2 GRAM/500 ML IN 0.9 % SODIUM CHLORIDE INTRAVENOUS
2000
Status: DISCONTINUED | OUTPATIENT
Start: 2019-11-11 | End: 2019-11-11

## 2019-11-11 RX ORDER — LEVETIRACETAM 10 MG/ML
1000 INJECTION INTRAVASCULAR EVERY 12 HOURS
Status: DISCONTINUED | OUTPATIENT
Start: 2019-11-12 | End: 2019-11-18

## 2019-11-11 RX ORDER — SUCCINYLCHOLINE CHLORIDE 20 MG/ML
INJECTION INTRAMUSCULAR; INTRAVENOUS
Status: COMPLETED
Start: 2019-11-11 | End: 2019-11-11

## 2019-11-11 RX ADMIN — LEVETIRACETAM 500 MG: 5 INJECTION INTRAVENOUS at 01:11

## 2019-11-11 RX ADMIN — SODIUM CHLORIDE: 0.9 INJECTION, SOLUTION INTRAVENOUS at 04:11

## 2019-11-11 RX ADMIN — CHLORHEXIDINE GLUCONATE 0.12% ORAL RINSE 15 ML: 1.2 LIQUID ORAL at 09:11

## 2019-11-11 RX ADMIN — SODIUM CHLORIDE 500 ML: 0.9 INJECTION, SOLUTION INTRAVENOUS at 11:11

## 2019-11-11 RX ADMIN — PROPOFOL 5 MCG/KG/MIN: 10 INJECTION, EMULSION INTRAVENOUS at 03:11

## 2019-11-11 RX ADMIN — SODIUM CHLORIDE 1000 ML: 0.9 INJECTION, SOLUTION INTRAVENOUS at 01:11

## 2019-11-11 RX ADMIN — FAMOTIDINE 20 MG: 10 INJECTION, SOLUTION INTRAVENOUS at 09:11

## 2019-11-11 RX ADMIN — SUCCINYLCHOLINE CHLORIDE 20 MG: 20 INJECTION, SOLUTION INTRAMUSCULAR; INTRAVENOUS at 02:11

## 2019-11-11 RX ADMIN — ETOMIDATE 40 MG: 2 INJECTION INTRAVENOUS at 02:11

## 2019-11-11 RX ADMIN — LEVETIRACETAM INJECTION 1500 MG: 15 INJECTION INTRAVENOUS at 01:11

## 2019-11-11 RX ADMIN — ACETAMINOPHEN 650 MG: 325 TABLET ORAL at 11:11

## 2019-11-11 RX ADMIN — CEFTRIAXONE 2 G: 2 INJECTION, SOLUTION INTRAVENOUS at 03:11

## 2019-11-11 RX ADMIN — AZITHROMYCIN MONOHYDRATE 500 MG: 500 INJECTION, POWDER, LYOPHILIZED, FOR SOLUTION INTRAVENOUS at 04:11

## 2019-11-11 RX ADMIN — SUCCINYLCHOLINE CHLORIDE 20 MG: 20 INJECTION INTRAMUSCULAR; INTRAVENOUS at 02:11

## 2019-11-11 NOTE — HPI
Mr Lopez is a 69 yr old male with a PMH of CVA, seizures, CHI, DM II, dementia, HTN, who presents to the ED with AMS. Patient was last heard from by a friend on Friday 11/8 (3 days ago). Found down at home 11/11, incontinent of urine and bowel. CTH negative. MRI c-spine & brain w/o acute findings. Mild leukocytosis, UA positive for UTI. cEEG suppressed. Patient intubated in the ED for airway protection. Admitted to [facility] for higher level of care.

## 2019-11-11 NOTE — HOSPITAL COURSE
11/11/2019: Intubated in ED. Admit to NCC.  11/12/2019: Switched Ceftriaxone to Zosyn, rechecking BCx, IVF @ 150cc/hr w/ 1L bolus; Tube feeds & dvt ppx initiated. EEG suppressed, propofol disctoninued & fentanyl 50mcg q2hr added.  11/13/2019: Steadily improving since presentation. Continue Keppra & Abx, added 200cc q6 FW to feeds, & dc'd vent.  11/14/2019: mental status continues to improve. Following commands in all 4 ext. FW 200cc q6hr; Extubate today; pending sensitivities of cx's; added ASA/statin  11/15/2019: Mental status improved; secretions persistent. De-escalated Abx to ceftriaxone given susceptibilities; added breathing tx q4. NPO in AM for possible extubation.  11/16/19: start glycopyrrolate for secretions  11/17/19: plan to extubate  11/18/2019 NAONE. AF and HDS. Able to stepdown to Hospital Medicine

## 2019-11-11 NOTE — SUBJECTIVE & OBJECTIVE
Review of Systems: Unable to obtain a complete ROS due to level of consciousness.     Vitals:   Temp: 99.1 °F (37.3 °C)  Pulse: 78  BP: (!) 178/108  MAP (mmHg): 135  Resp: (!) 25  SpO2: 100 %  O2 Device (Oxygen Therapy): ventilator  Vent Mode: A/C  Set Rate: 16 bmp  Vt Set: 480 mL  PEEP/CPAP: 5 cmH20  Peak Airway Pressure: 23 cmH2O  Mean Airway Pressure: 9.8 cmH20    Temp  Min: 97.7 °F (36.5 °C)  Max: 99.1 °F (37.3 °C)  Pulse  Min: 76  Max: 86  BP  Min: 157/88  Max: 178/108  MAP (mmHg)  Min: 123  Max: 135  Resp  Min: 0  Max: 25  SpO2  Min: 97 %  Max: 100 %    No intake/output data recorded.       Examination:   Constitutional: Cachectic. Well-developed.  Eyes: Conjunctiva clear, anicteric. Lids no lesions.  Head/Ears/Nose/Mouth/Throat/Neck: Dry mucous membranes. External ears, nose atraumatic. Bruising to Armando. Cheeks. Lac to R leg.  Cardiovascular: Regular rhythm. No leg edema.  Respiratory: Pursed lip breathing. Diminished BS to auscultation.  Gastrointestinal: No hernia. Soft, nondistended, nontender. + bowel sounds.    Neurologic:  -GCS E 4 V 1 M 4  -Obtunded. Eyes open spontaneously. Does not follow commands.  -Cranial nerves: EOM intact, PERRL, no facial droop, +cough  -Motor: Withdraws/grimaces to noxious stimuli to all 4 extremities  Unable to test orientation, language, memory, judgment, insight, fund of knowledge, hearing, shoulder shrug, tongue protrusion, coordination, gait due to level of consciousness.    Medications:   Continuous  sodium chloride 0.9%    niCARdipine Last Rate: Stopped (11/11/19 1515)   propofol    Scheduled  azithromycin 500 mg ED 1 Time   cefTRIAXone (ROCEPHIN) IVPB 2 g ED 1 Time   propofol     senna-docusate 8.6-50 mg 1 tablet BID   PRN  acetaminophen 650 mg Q6H PRN   glucagon (human recombinant) 1 mg PRN   insulin aspart U-100 1-10 Units Q6H PRN   ondansetron 4 mg Q6H PRN   sodium chloride 0.9% 10 mL PRN      Today I independently reviewed pertinent medications,  lines/drains/airways, imaging, cardiology results, laboratory results, microbiology results, notably:     ISTAT: Recent Labs   Lab 11/11/19  1401 11/11/19  1401   PH 7.374  --    PCO2 33.8*  --    PO2 25*  --    POCSATURATED 44*  --    HCO3 19.7*  --    BE -5  --    POCNA 146*  --    POCK 4.3  --    POCTCO2 21*  --    POCICA 1.05*  --    POCLAC  --  2.19   SAMPLE VENOUS VENOUS      Chem: No results for input(s): NA, K, CL, CO2, GLU, BUN, CREATININE, ESTGFRAFRICA, EGFRNONAA, CALCIUM, CAION, MG, PHOS, ANIONGAP, PROT, ALBUMIN, BILITOT, BILIDIR, ALKPHOS, AST, ALT, TROPONINI, BNP, NTPROBNP in the last 24 hours.  Heme: Recent Labs   Lab 11/11/19  1323 11/11/19  1401   WBC 15.65*  --    HGB 12.9*  --    HCT 43.2 46     --    INR 1.1  --      Endo: No results for input(s): POCTGLUCOSE in the last 24 hours.

## 2019-11-11 NOTE — ED TRIAGE NOTES
Pt brought in via [place] neighbor/friend called last heard from Friday. Pt was found on the floor non-verbal on urine.

## 2019-11-11 NOTE — ASSESSMENT & PLAN NOTE
Hx of Seizures, CHI  -Loaded with Keppra in the ED  -Check keppra level  -cEEG to eval for current seizure activity

## 2019-11-11 NOTE — ASSESSMENT & PLAN NOTE
Hx of frequent falls, UTI  Patient found down at home, incontinent of urine/feces 11/11  LKN 11/8  CTH negative    -Admit to NCC  -Hourly neuro checks, Hourly vital signs  -MRI brain and c-spine wo contrast pending  -cEEG after imaging  -will need LP  -Blood cultures, sputum culture pending  -UA with many bacteria, urine culture pending- start Rocephin  -PT/OT/SLP  -Monitor for Fever, if febrile, will need empiric abx coverage

## 2019-11-11 NOTE — H&P
[facility]  Neurocritical Care  History & Physical    Admit Date: 11/11/2019  Service Date: 11/11/2019  Length of Stay: 0    Subjective:     Chief Complaint: Altered mental status    History of Present Illness: Mr Lopez is a 69 yr old male with a PMH of CVA, seizures, CHI, DM II, dementia, HTN, who presents to the ED with AMS. Patient was last heard from by a friend on Friday 11/8 (3 days ago). Found down at home 11/11, incontinent of urine and bowel. CTH negative. MRI c-spine, brain pending. Mild leukocytosis, UA positive for UTI. cEEG pending. Patient intubated in the ED for airway protection. Will admit to [facility] for higher level of care. Will need LP.    Review of Systems: Unable to obtain a complete ROS due to level of consciousness.     Vitals:   Temp: 99.1 °F (37.3 °C)  Pulse: 78  BP: (!) 178/108  MAP (mmHg): 135  Resp: (!) 25  SpO2: 100 %  O2 Device (Oxygen Therapy): ventilator  Vent Mode: A/C  Set Rate: 16 bmp  Vt Set: 480 mL  PEEP/CPAP: 5 cmH20  Peak Airway Pressure: 23 cmH2O  Mean Airway Pressure: 9.8 cmH20    Temp  Min: 97.7 °F (36.5 °C)  Max: 99.1 °F (37.3 °C)  Pulse  Min: 76  Max: 86  BP  Min: 157/88  Max: 178/108  MAP (mmHg)  Min: 123  Max: 135  Resp  Min: 0  Max: 25  SpO2  Min: 97 %  Max: 100 %    No intake/output data recorded.       Examination:   Constitutional: Cachectic. Well-developed.  Eyes: Conjunctiva clear, anicteric. Lids no lesions.  Head/Ears/Nose/Mouth/Throat/Neck: Dry mucous membranes. External ears, nose atraumatic. Bruising to Armando. Cheeks. Lac to R leg.  Cardiovascular: Regular rhythm. No leg edema.  Respiratory: Pursed lip breathing. Diminished BS to auscultation.  Gastrointestinal: No hernia. Soft, nondistended, nontender. + bowel sounds.    Neurologic:  -GCS E 4 V 1 M 4  -Obtunded. Eyes open spontaneously. Does not follow commands.  -Cranial nerves: EOM intact, PERRL, no facial droop, +cough  -Motor: Withdraws/grimaces to noxious stimuli to all 4  extremities  Unable to test orientation, language, memory, judgment, insight, fund of knowledge, hearing, shoulder shrug, tongue protrusion, coordination, gait due to level of consciousness.    Medications:   Continuous  sodium chloride 0.9%    niCARdipine Last Rate: Stopped (11/11/19 1515)   propofol    Scheduled  azithromycin 500 mg ED 1 Time   cefTRIAXone (ROCEPHIN) IVPB 2 g ED 1 Time   propofol     senna-docusate 8.6-50 mg 1 tablet BID   PRN  acetaminophen 650 mg Q6H PRN   glucagon (human recombinant) 1 mg PRN   insulin aspart U-100 1-10 Units Q6H PRN   ondansetron 4 mg Q6H PRN   sodium chloride 0.9% 10 mL PRN      Today I independently reviewed pertinent medications, lines/drains/airways, imaging, cardiology results, laboratory results, microbiology results, notably:     ISTAT: Recent Labs   Lab 11/11/19  1401 11/11/19  1401   PH 7.374  --    PCO2 33.8*  --    PO2 25*  --    POCSATURATED 44*  --    HCO3 19.7*  --    BE -5  --    POCNA 146*  --    POCK 4.3  --    POCTCO2 21*  --    POCICA 1.05*  --    POCLAC  --  2.19   SAMPLE VENOUS VENOUS      Chem: No results for input(s): NA, K, CL, CO2, GLU, BUN, CREATININE, ESTGFRAFRICA, EGFRNONAA, CALCIUM, CAION, MG, PHOS, ANIONGAP, PROT, ALBUMIN, BILITOT, BILIDIR, ALKPHOS, AST, ALT, TROPONINI, BNP, NTPROBNP in the last 24 hours.  Heme: Recent Labs   Lab 11/11/19  1323 11/11/19  1401   WBC 15.65*  --    HGB 12.9*  --    HCT 43.2 46     --    INR 1.1  --      Endo: No results for input(s): POCTGLUCOSE in the last 24 hours.       Assessment/Plan:     Neuro  History of closed head injury  Hx of    Altered mental status  Hx of frequent falls, UTI  Patient found down at home, incontinent of urine/feces 11/11  LKN 11/8  CTH negative    -Admit to NCC  -Hourly neuro checks, Hourly vital signs  -MRI brain and c-spine wo contrast pending  -cEEG after imaging  -will need LP  -Blood cultures, sputum culture pending  -UA with many bacteria, urine culture pending- start  Rocephin  -PT/OT/SLP  -Monitor for Fever, if febrile, will need empiric abx coverage      Seizure disorder  Hx of Seizures, CHI  -Loaded with Keppra in the ED  -Check keppra level  -cEEG to eval for current seizure activity    Pulmonary  On mechanically assisted ventilation  Intubated in ED for airway protection  -Daily ABGs, CXR  -Famotidine, Peridex ordered    Cardiac/Vascular  Hypertension  SBP Goal < 180    Renal/  UTI (urinary tract infection)  Hx of UTIs, previous urine culture pos. for klebsiella  UA with many bacteria, culture pending  -Start rocephin    Oncology  Leukocytosis  Elevated WBC  See AMS    Endocrine  Type 2 diabetes mellitus  Hgb A1c pending  SSI      The patient is being Prophylaxed for:  Venous Thromboembolism with: Mechanical  Stress Ulcer with: H2B  Ventilator Pneumonia with: chlorhexidine oral care    Activity Orders          Diet NPO: NPO starting at 11/11 1459    Commode at bedside starting at 11/11 1459        Full Code     CC time spent: 35 minutes    Maria Del Rosario Thomas NP  Neurocritical Care  [facility]

## 2019-11-11 NOTE — ED PROVIDER NOTES
Encounter Date: 11/11/2019       History     Chief Complaint   Patient presents with    Altered Mental Status     arrived via NO EMS with c/o altered mental status, found on floor at home by family, LSN Friday, hx of stroke, found lying in malodorous urine on EMS arrival, responsive to pain on scene, not responsive with repeated verbal stimuli     Mr Lopez is a 69yoM presents via EMS found down at home; pertinent PMHx seizures, DM 2, dementia, HLD, history closed head injury. Patient was last heard from by a friend on Friday (3 days ago).  Found down at home, incontinent of urine and bowel.  No respiratory distress noted per EMS, sat >92% on RA, no Home oxygen need.  BP stable per EMS, EKG unremarkable per EMS.  Patient nonverbal, does not respond to touch or voice, opens  The patients available PMH, PSH, Social History, medications, allergies, and triage vital signs were reviewed just prior to their medical evaluation.    Please be advised this text was dictated with [employer] software and may contain errors due to translation.           Review of patient's allergies indicates:  No Known Allergies  Past Medical History:   Diagnosis Date    CHI (closed head injury)     Dementia     Diabetes mellitus     Hyperlipidemia     Seizures      History reviewed. No pertinent surgical history.  No family history on file.  Social History     Tobacco Use    Smoking status: Never Smoker    Smokeless tobacco: Never Used   Substance Use Topics    Alcohol use: No     Frequency: Never    Drug use: No     Review of Systems   Unable to perform ROS: Mental status change       Physical Exam     Initial Vitals [11/11/19 1302]   BP Pulse Resp Temp SpO2   (!) 157/88 77 20 97.7 °F (36.5 °C) 97 %      MAP       --         Physical Exam    Vitals reviewed.  Constitutional: He appears well-developed. He is not diaphoretic.   HENT:   Head: Normocephalic.   Right Ear: External ear normal.   Left Ear: External ear normal.   Nose: Nose  normal.   Bruising over right zygomatic process  Lower lobe edematous with mild abrasion  Abrasion to chin   Eyes: No scleral icterus.   Very slowly reactive pupils, 3mm   Cardiovascular: Normal rate, regular rhythm and intact distal pulses.   Pulmonary/Chest: No stridor. No respiratory distress. He has no wheezes. He has no rhonchi. He has no rales.   No objective increased work of breathing on exam   Abdominal: There is guarding (tense abdomen, though in setting of widespread muscular fasciculations).   Musculoskeletal: He exhibits no edema.   Widespread muscular fasciculations   Neurological:   obtunded, open eyes spontaneously, not following any commands, not withdrawing from pain, GCS 9, coughing on own volition   Skin: Skin is warm and dry. Capillary refill takes less than 2 seconds. No rash noted.   Rectal temp 99.1F  Abrasion over R patella         ED Course   Intubation  Date/Time: 11/11/2019 3:42 PM  Performed by: Homero Tong MD  Authorized by: Homero Tong MD   Consent Done: Emergent Situation  Indications: airway protection  Intubation method: video-assisted  Patient status: paralyzed (RSI)  Preoxygenation: BVM and nonrebreather mask  Sedatives: etomidate  Paralytic: succinylcholine  Number of attempts: 3  Ventilation between attempts: BVM  Cricoid pressure: no  Cords visualized: yes  Post-procedure assessment: ETCO2 monitor and CO2 detector  Breath sounds: clear  Cuff inflated: yes  Tube secured with: ETT hopper  Chest x-ray findings: endotracheal tube in appropriate position  Patient tolerance: Patient tolerated the procedure well with no immediate complications        Labs Reviewed   CBC W/ AUTO DIFFERENTIAL - Abnormal; Notable for the following components:       Result Value    WBC 15.65 (*)     Hemoglobin 12.9 (*)     Mean Corpuscular Hemoglobin 25.2 (*)     Mean Corpuscular Hemoglobin Conc 29.9 (*)     Gran # (ANC) 12.4 (*)     Immature Grans (Abs) 0.07 (*)     Mono # 1.3 (*)     Gran%  79.4 (*)     Lymph% 11.9 (*)     All other components within normal limits   URINALYSIS, REFLEX TO URINE CULTURE - Abnormal; Notable for the following components:    Appearance, UA Cloudy (*)     Protein, UA 1+ (*)     Ketones, UA 1+ (*)     Occult Blood UA 3+ (*)     Leukocytes, UA 3+ (*)     All other components within normal limits    Narrative:     Preferred Collection Type->Urine, Clean Catch   TROPONIN I - Abnormal; Notable for the following components:    Troponin I 0.286 (*)     All other components within normal limits   B-TYPE NATRIURETIC PEPTIDE - Abnormal; Notable for the following components:     (*)     All other components within normal limits    Narrative:     ADD-ON LIPID #[id number]; TSH #[id number] PER MURPHY WEAVER MD 15:53    11/11/2019   ADD-ON LEVE #[id number] PER MURPHY WEAVER MD 16:01  11/11/2019   ADD-ON GHGB #[id number] PER MURPHY WEAVER MD 17:12  11/11/2019    URINALYSIS MICROSCOPIC - Abnormal; Notable for the following components:    WBC, UA >100 (*)     WBC Clumps, UA Few (*)     Bacteria Many (*)     Hyaline Casts, UA 4 (*)     All other components within normal limits    Narrative:     Preferred Collection Type->Urine, Clean Catch   COMPREHENSIVE METABOLIC PANEL - Abnormal; Notable for the following components:    Sodium 148 (*)     Chloride 114 (*)     CO2 19 (*)     BUN, Bld 31 (*)     Total Bilirubin 1.1 (*)      (*)     ALT 53 (*)     eGFR if  58.8 (*)     eGFR if non  50.9 (*)     All other components within normal limits   LIPID PANEL - Abnormal; Notable for the following components:    LDL Cholesterol 54.4 (*)     Hdl/Cholesterol Ratio 51.1 (*)     All other components within normal limits    Narrative:     ADD-ON LIPID #[id number]; TSH #[id number] PER MURPHY WEAVER MD 15:53    11/11/2019   ADD-ON LEVE #[id number] PER MURPHY WEAVER MD 16:01  11/11/2019    HEMOGLOBIN A1C - Abnormal; Notable for the following components:     Hemoglobin A1C 6.4 (*)     Estimated Avg Glucose 137 (*)     All other components within normal limits    Narrative:     ADD-ON LIPID #[id number]; TSH #[id number] PER MURPHY WEAVER MD 15:53    11/11/2019   ADD-ON LEVE #[id number] PER MURPHY WEAVER MD 16:01  11/11/2019   ADD-ON GHGB #[id number] PER MURPHY WEAVER MD 17:12  11/11/2019    ISTAT PROCEDURE - Abnormal; Notable for the following components:    POC PCO2 33.5 (*)     POC PO2 23 (*)     POC HCO3 20.6 (*)     POC SATURATED O2 40 (*)     POC TCO2 22 (*)     All other components within normal limits   ISTAT PROCEDURE - Abnormal; Notable for the following components:    POC PCO2 33.8 (*)     POC PO2 25 (*)     POC HCO3 19.7 (*)     POC SATURATED O2 44 (*)     POC Sodium 146 (*)     POC TCO2 21 (*)     POC Ionized Calcium 1.05 (*)     All other components within normal limits   ISTAT PROCEDURE - Abnormal; Notable for the following components:    POC PO2 595 (*)     POC HCO3 21.1 (*)     POC TCO2 22 (*)     All other components within normal limits   CULTURE, BLOOD   CULTURE, BLOOD   CULTURE, RESPIRATORY   CULTURE, URINE   LACTIC ACID, PLASMA   PROTIME-INR   TOXICOLOGY SCREEN, URINE, RANDOM (COMPLIANCE)   TSH   LEVETIRACETAM  (KEPPRA) LEVEL   HEMOGLOBIN A1C   LIPID PANEL   URINALYSIS, REFLEX TO URINE CULTURE   TSH    Narrative:     ADD-ON LIPID #[id number]; TSH #[id number] PER MURPHY WEAVER MD 15:53    11/11/2019   ADD-ON LEVE #[id number] PER MURPHY WEAVER MD 16:01  11/11/2019    LACTIC ACID, PLASMA   TOXICOLOGY SCREEN, URINE, RANDOM (COMPLIANCE)    Narrative:     Preferred Collection Type->Urine, Clean Catch  ADD-ON UCMPL #[id number] PER MURPHY WEAVER MD 16:07  11/11/2019    CK   LEVETIRACETAM  (KEPPRA) LEVEL   TYPE & SCREEN   ISTAT LACTATE   ISTAT CHEM8   POCT GLUCOSE MONITORING CONTINUOUS        ECG Results          EKG 12-lead (In process)  Result time 11/11/19 13:52:51    In process by Interface, Lab In [facility] (11/11/19 13:52:51)                  Narrative:    Test Reason : R41.82,    Vent. Rate : 080 BPM     Atrial Rate : 080 BPM     P-R Int : 156 ms          QRS Dur : 060 ms      QT Int : 384 ms       P-R-T Axes : -26 003 -79 degrees     QTc Int : 442 ms    Undetermined rhythm  ST and T wave abnormality, consider inferior ischemia  Abnormal ECG  When compared with ECG of 28-AUG-2019 03:23,  Current undetermined rhythm precludes rhythm comparison, needs review  T wave inversion now evident in Inferior leads    Referred By: System System           Confirmed By:                             Imaging Results          X-Ray Chest 1 View (Final result)  Result time 11/11/19 15:31:02    Final result by Joss Brooke MD (11/11/19 15:31:02)                 Impression:      As above.      Electronically signed by: Joss Brooke MD  Date:    11/11/2019  Time:    15:31             Narrative:    EXAMINATION:  XR CHEST 1 VIEW    CLINICAL HISTORY:  Encounter for other preprocedural examination    TECHNIQUE:  Single frontal view of the chest was performed.    COMPARISON:  Chest radiograph earlier same day at 13:49 hours    FINDINGS:  Monitoring leads and tubing overlie the chest.  Patient is slightly rotated.    Interval placement of endotracheal tube with tip terminating approximately 3.2 cm above the oniel.  Interval placement of enteric tube with tip below the diaphragm past midline of the right upper quadrant out of field of view.  No large pneumothorax or new focal opacity.  Otherwise grossly stable chest.                               CT Head Without Contrast (Final result)  Result time 11/11/19 14:20:51    Final result by Vaughn Macias MD (11/11/19 14:20:51)                 Impression:      Examination mildly degraded by patient motion artifact.    No compelling evidence of acute intracranial pathology.    Chronic microvascular ischemic change with generalized cerebral volume loss.  Stable right frontal encephalomalacia.    Mild diffuse extracranial soft tissue  swelling.    Further workup as warranted clinically.      Electronically signed by: Vaughn Macias MD  Date:    11/11/2019  Time:    14:20             Narrative:    EXAMINATION:  CT HEAD WITHOUT CONTRAST    CLINICAL HISTORY:  Confusion/delirium, altered LOC, unexplained;    TECHNIQUE:  Low dose axial CT images obtained throughout the head without the use of intravenous contrast.  Axial, sagittal and coronal reconstructions were performed.    Examination mildly degraded by patient motion artifact.    COMPARISON:  MRI 08/29/2019, head CT 27 19    FINDINGS:  Intracranial compartment:    Ventricles are stable in size.  Mild generalized cerebral volume loss.    Stable focus of encephalomalacia involving right frontal lobe, centered in the posterior aspect of the superior frontal gyrus.  Chronic microvascular ischemic change throughout the supratentorial white matter.  No new parenchymal mass, hemorrhage, edema or major vascular distribution infarct.    No new extra-axial blood or fluid collections.    Skull/extracranial contents (limited evaluation):    Mild extracranial soft tissue swelling throughout the subcutaneous soft tissues.  No acute calvarial fracture.  Remote right medial orbital wall fracture.  Mastoid air cells and paranasal sinuses are essentially clear.                               X-Ray Chest AP Portable (Final result)  Result time 11/11/19 14:02:04    Final result by Wilfredo Ortez MD (11/11/19 14:02:04)                 Impression:      No significant intrathoracic abnormality.  Allowing for a poorer inspiratory depth level on the current examination, there has been no significant detrimental interval change in the appearance of the chest since 08/27/2019.      Electronically signed by: Wilfredo Ortez MD  Date:    11/11/2019  Time:    14:02             Narrative:    EXAMINATION:  XR CHEST AP PORTABLE    CLINICAL HISTORY:  Sepsis;    COMPARISON:  Comparison is made to 08/27/2019.    FINDINGS:  Allowing for  "magnification of the cardiomediastinal silhouette related to projection, the true cardiac size is close to the upper limit of normal.  Pulmonary vascularity is normal.  Lung zones appear essentially clear, and are free of significant airspace consolidation or volume loss.  No pleural fluid of any substantial volume is seen on either side.  No abnormal mediastinal widening.  No pneumothorax.  Surgical clips are again identified just superior to the GE junction.  GE junction                                 Medical Decision Making:   History:   I obtained history from: someone other than patient.       <> Summary of History: Family member at bedside reports similar episode in the past where he was found down for 2 days.  Per family member, "they thought it had to do with his seizures"  Old Medical Records: I decided to obtain old medical records.  Old Records Summarized: records from clinic visits and records from previous admission(s).  Initial Assessment:   Patient with seizure history presents for acute mental status change, obtunded with GCS of 9, found down for 2-3 days at home.  Signs of facial trauma likely from fall, LTAB/maintaining airway, generalized fasciculations, afebrile, VSS  Differential Diagnosis:   DDx includes nonconvulsive status epilepticus, CVA, electrolyte disturbance, rhabdomyolysis, encephalopathy. Physical exam and history taking lower clinical suspicion for acute abdomen, septic shock, herniation.  Independently Interpreted Test(s):   I have ordered and independently interpreted X-rays - see prior notes.  I have ordered and independently interpreted EKG Reading(s) - see prior notes  Clinical Tests:   Lab Tests: Ordered and Reviewed  Radiological Study: Ordered and Reviewed  Medical Tests: Ordered and Reviewed  ED Management:  Patient maintaining airway, HR/BP stable. Keppra IV loaded, IV bolus with septic order set initiated.  Lactic WNL, i-STAT shows compensation of metabolic acidosis, " otherwise unremarkable. CT head without acute findings.  Chest x-ray without acute findings.  CMP and CK pending.  Concern about nonconvulsive status epilepticus, NICU consult and will see the patient at the bedside.  MRI ordered to assess for ischemic event.  Update:  NICU requests intubation for airway protection, will admit to their service.  No change in neurologic status immediately prior to intubation.  Discussed with family member, who confirms patient is full code.  Update:  Patient intubated.  Hypertensive 200s/100s, started on Cardene infusion.  Admitted to NICU for further management.  Family member agreed to plan of care and voiced understanding.    Eva Arrington PA-C  11/11/2019    I discussed the following case, diagnosis and plan of care with attending physician.      Other:   I have discussed this case with another health care provider.              Attending Attestation:     Physician Attestation Statement for NP/PA:   I have conducted a face to face encounter with this patient in addition to the NP/PA, due to Medical Complexity    Other NP/PA Attestation Additions:      Medical Decision Making: Found down, last seen 2 days ago.  AMS, h/o seizures.  Keppra loaded, not improving in ED.  Neuro ICU requested intubation.  Some thick secretions, but CXR shows no infiltrate.  UTI on labs.  Abx given.  Neuro ICU to admit.       Attending Critical Care:   Critical Care Times:   ==============================================================  · Total Critical Care Time - exclusive of procedural time: 75 minutes.  ==============================================================  Critical care was necessary to treat or prevent imminent or life-threatening deterioration of the following conditions: CNS failure.                             Clinical Impression:       ICD-10-CM ICD-9-CM   1. Altered mental state R41.82 780.97   2. Encounter for intubation Z01.818 V72.83   3. Acute encephalopathy G93.40 348.30   4.  History of seizure Z87.898 V12.49         Disposition:   Disposition: Admitted  Condition: Critical                     Eva Arrington PA-C  11/11/19 1820

## 2019-11-11 NOTE — ASSESSMENT & PLAN NOTE
Hx of UTIs, previous urine culture pos. for klebsiella  UA with many bacteria, culture pending  -Start rocephin

## 2019-11-11 NOTE — PT/OT/SLP PROGRESS
Speech Language Pathology      Edwin Lopez Jr.  MRN: [id number]     Pt currently intubated and not appropriate for skilled speech services at this time. Current orders will be discontinued. Team to re-consult upon extubation once medically appropriate.     Rachana Coats CCC-SLP

## 2019-11-11 NOTE — ED NOTES
LOC: The patient is alert. PT is nonverbal.   APPEARANCE: No acute distress noted.   PSYCHOSOCIAL: Patient is calm and cooperative.   SKIN: The skin is warm, dry.   RESPIRATORY: Airway is open and patent. Bilateral chest rise and fall. Respirations are spontaneous, even and unlabored. Normal effort and rate noted. No accessory muscle use noted.   CARDIAC: Patient has a normal rate and rhythm.   ABDOMEN: Soft and non tender to palpation. No distention noted.   URINARY:  incontinent to urine and stool.    NEUROLOGIC: Eyes open spontaneously. PT moans to painful stimuli Tolerating saliva secretions well. Pt unable to follow commands.   MUSCULOSKELETAL: No obvious deformities noted.

## 2019-11-12 PROBLEM — R23.9 ALTERATION IN SKIN INTEGRITY: Status: ACTIVE | Noted: 2019-11-12

## 2019-11-12 LAB
ALBUMIN SERPL BCP-MCNC: 3.1 G/DL (ref 3.5–5.2)
ALLENS TEST: ABNORMAL
ALP SERPL-CCNC: 72 U/L (ref 55–135)
ALT SERPL W/O P-5'-P-CCNC: 48 U/L (ref 10–44)
ANION GAP SERPL CALC-SCNC: 12 MMOL/L (ref 8–16)
APTT BLDCRRT: 24 SEC (ref 21–32)
ASCENDING AORTA: 4.2 CM
AST SERPL-CCNC: 142 U/L (ref 10–40)
AV INDEX (PROSTH): 0.82
AV MEAN GRADIENT: 5 MMHG
AV PEAK GRADIENT: 7 MMHG
AV VALVE AREA: 2.59 CM2
AV VELOCITY RATIO: 0.92
BASOPHILS # BLD AUTO: 0.02 K/UL (ref 0–0.2)
BASOPHILS NFR BLD: 0.2 % (ref 0–1.9)
BILIRUB SERPL-MCNC: 0.8 MG/DL (ref 0.1–1)
BSA FOR ECHO PROCEDURE: 1.73 M2
BUN SERPL-MCNC: 28 MG/DL (ref 8–23)
CALCIUM SERPL-MCNC: 8.3 MG/DL (ref 8.7–10.5)
CHLORIDE SERPL-SCNC: 116 MMOL/L (ref 95–110)
CK MB SERPL-MCNC: 31.7 NG/ML (ref 0.1–6.5)
CK MB SERPL-RTO: 0.3 % (ref 0–5)
CK SERPL-CCNC: 9829 U/L (ref 20–200)
CK SERPL-CCNC: 9829 U/L (ref 20–200)
CO2 SERPL-SCNC: 19 MMOL/L (ref 23–29)
CREAT SERPL-MCNC: 1.2 MG/DL (ref 0.5–1.4)
CV ECHO LV RWT: 0.36 CM
DELSYS: ABNORMAL
DIFFERENTIAL METHOD: ABNORMAL
DOP CALC AO PEAK VEL: 1.36 M/S
DOP CALC AO VTI: 24.13 CM
DOP CALC LVOT AREA: 3.2 CM2
DOP CALC LVOT DIAMETER: 2.01 CM
DOP CALC LVOT PEAK VEL: 1.25 M/S
DOP CALC LVOT STROKE VOLUME: 62.54 CM3
DOP CALCLVOT PEAK VEL VTI: 19.72 CM
E WAVE DECELERATION TIME: 323.04 MSEC
E/A RATIO: 1
E/E' RATIO: 11.14 M/S
ECHO LV POSTERIOR WALL: 0.8 CM (ref 0.6–1.1)
EOSINOPHIL # BLD AUTO: 0 K/UL (ref 0–0.5)
EOSINOPHIL NFR BLD: 0.1 % (ref 0–8)
ERYTHROCYTE [DISTWIDTH] IN BLOOD BY AUTOMATED COUNT: 13.2 % (ref 11.5–14.5)
ERYTHROCYTE [SEDIMENTATION RATE] IN BLOOD BY WESTERGREN METHOD: 16 MM/H
EST. GFR  (AFRICAN AMERICAN): >60 ML/MIN/1.73 M^2
EST. GFR  (NON AFRICAN AMERICAN): >60 ML/MIN/1.73 M^2
FIO2: 50
FRACTIONAL SHORTENING: 31 % (ref 28–44)
GLUCOSE SERPL-MCNC: 110 MG/DL (ref 70–110)
HCO3 UR-SCNC: 18.2 MMOL/L (ref 24–28)
HCT VFR BLD AUTO: 42.4 % (ref 40–54)
HGB BLD-MCNC: 12.6 G/DL (ref 14–18)
IMM GRANULOCYTES # BLD AUTO: 0.02 K/UL (ref 0–0.04)
IMM GRANULOCYTES NFR BLD AUTO: 0.2 % (ref 0–0.5)
INR PPP: 1.2 (ref 0.8–1.2)
INTERVENTRICULAR SEPTUM: 0.8 CM (ref 0.6–1.1)
LA MAJOR: 5.11 CM
LA MINOR: 4.91 CM
LA WIDTH: 3.42 CM
LEFT ATRIUM SIZE: 2.88 CM
LEFT ATRIUM VOLUME INDEX: 24.3 ML/M2
LEFT ATRIUM VOLUME: 41.93 CM3
LEFT INTERNAL DIMENSION IN SYSTOLE: 3.1 CM (ref 2.1–4)
LEFT VENTRICLE DIASTOLIC VOLUME INDEX: 35.64 ML/M2
LEFT VENTRICLE DIASTOLIC VOLUME: 61.45 ML
LEFT VENTRICLE MASS INDEX: 66 G/M2
LEFT VENTRICLE SYSTOLIC VOLUME INDEX: 9.2 ML/M2
LEFT VENTRICLE SYSTOLIC VOLUME: 15.94 ML
LEFT VENTRICULAR INTERNAL DIMENSION IN DIASTOLE: 4.5 CM (ref 3.5–6)
LEFT VENTRICULAR MASS: 113.63 G
LV LATERAL E/E' RATIO: 11.14 M/S
LV SEPTAL E/E' RATIO: 11.14 M/S
LYMPHOCYTES # BLD AUTO: 1.6 K/UL (ref 1–4.8)
LYMPHOCYTES NFR BLD: 18.2 % (ref 18–48)
MAGNESIUM SERPL-MCNC: 1.7 MG/DL (ref 1.6–2.6)
MCH RBC QN AUTO: 25.1 PG (ref 27–31)
MCHC RBC AUTO-ENTMCNC: 29.7 G/DL (ref 32–36)
MCV RBC AUTO: 85 FL (ref 82–98)
MIN VOL: 11
MODE: ABNORMAL
MONOCYTES # BLD AUTO: 0.7 K/UL (ref 0.3–1)
MONOCYTES NFR BLD: 7.9 % (ref 4–15)
MV PEAK A VEL: 0.78 M/S
MV PEAK E VEL: 0.78 M/S
NEUTROPHILS # BLD AUTO: 6.6 K/UL (ref 1.8–7.7)
NEUTROPHILS NFR BLD: 73.4 % (ref 38–73)
NRBC BLD-RTO: 0 /100 WBC
PCO2 BLDA: 29.8 MMHG (ref 35–45)
PEEP: 5
PH SMN: 7.39 [PH] (ref 7.35–7.45)
PHOSPHATE SERPL-MCNC: 3.4 MG/DL (ref 2.7–4.5)
PIP: 18
PISA TR MAX VEL: 2.58 M/S
PLATELET # BLD AUTO: 214 K/UL (ref 150–350)
PMV BLD AUTO: 10.6 FL (ref 9.2–12.9)
PO2 BLDA: 215 MMHG (ref 80–100)
POC BE: -7 MMOL/L
POC SATURATED O2: 100 % (ref 95–100)
POC TCO2: 19 MMOL/L (ref 23–27)
POCT GLUCOSE: 100 MG/DL (ref 70–110)
POCT GLUCOSE: 115 MG/DL (ref 70–110)
POCT GLUCOSE: 135 MG/DL (ref 70–110)
POCT GLUCOSE: 93 MG/DL (ref 70–110)
POCT GLUCOSE: 97 MG/DL (ref 70–110)
POTASSIUM SERPL-SCNC: 3.6 MMOL/L (ref 3.5–5.1)
POTASSIUM SERPL-SCNC: 4.2 MMOL/L (ref 3.5–5.1)
PROT SERPL-MCNC: 6.7 G/DL (ref 6–8.4)
PROTHROMBIN TIME: 11.7 SEC (ref 9–12.5)
RA MAJOR: 4.41 CM
RA WIDTH: 3.06 CM
RBC # BLD AUTO: 5.01 M/UL (ref 4.6–6.2)
RIGHT VENTRICULAR END-DIASTOLIC DIMENSION: 3.46 CM
RV TISSUE DOPPLER FREE WALL SYSTOLIC VELOCITY 1 (APICAL 4 CHAMBER VIEW): 18 CM/S
SAMPLE: ABNORMAL
SINUS: 3.75 CM
SITE: ABNORMAL
SODIUM SERPL-SCNC: 147 MMOL/L (ref 136–145)
SP02: 99
STJ: 3.96 CM
TDI LATERAL: 0.07 M/S
TDI SEPTAL: 0.07 M/S
TDI: 0.07 M/S
TR MAX PG: 27 MMHG
TRICUSPID ANNULAR PLANE SYSTOLIC EXCURSION: 2.04 CM
TROPONIN I SERPL DL<=0.01 NG/ML-MCNC: 0.16 NG/ML (ref 0–0.03)
VT: 480
WBC # BLD AUTO: 9.01 K/UL (ref 3.9–12.7)

## 2019-11-12 PROCEDURE — 82553 CREATINE MB FRACTION: CPT

## 2019-11-12 PROCEDURE — 85730 THROMBOPLASTIN TIME PARTIAL: CPT

## 2019-11-12 PROCEDURE — 27000221 HC OXYGEN, UP TO 24 HOURS

## 2019-11-12 PROCEDURE — 63600175 PHARM REV CODE 636 W HCPCS: Performed by: PHYSICIAN ASSISTANT

## 2019-11-12 PROCEDURE — 63600175 PHARM REV CODE 636 W HCPCS: Performed by: NURSE PRACTITIONER

## 2019-11-12 PROCEDURE — 63600175 PHARM REV CODE 636 W HCPCS: Performed by: PSYCHIATRY & NEUROLOGY

## 2019-11-12 PROCEDURE — 94761 N-INVAS EAR/PLS OXIMETRY MLT: CPT

## 2019-11-12 PROCEDURE — 80053 COMPREHEN METABOLIC PANEL: CPT

## 2019-11-12 PROCEDURE — 25000003 PHARM REV CODE 250: Performed by: NURSE PRACTITIONER

## 2019-11-12 PROCEDURE — 25000003 PHARM REV CODE 250: Performed by: PHYSICIAN ASSISTANT

## 2019-11-12 PROCEDURE — 94003 VENT MGMT INPAT SUBQ DAY: CPT

## 2019-11-12 PROCEDURE — 95951 PR EEG MONITORING/VIDEORECORD: ICD-10-PCS | Mod: 26,,, | Performed by: PSYCHIATRY & NEUROLOGY

## 2019-11-12 PROCEDURE — 83735 ASSAY OF MAGNESIUM: CPT

## 2019-11-12 PROCEDURE — 99900026 HC AIRWAY MAINTENANCE (STAT)

## 2019-11-12 PROCEDURE — 99291 CRITICAL CARE FIRST HOUR: CPT | Mod: GC,,, | Performed by: PSYCHIATRY & NEUROLOGY

## 2019-11-12 PROCEDURE — 99900035 HC TECH TIME PER 15 MIN (STAT)

## 2019-11-12 PROCEDURE — 36600 WITHDRAWAL OF ARTERIAL BLOOD: CPT

## 2019-11-12 PROCEDURE — 85610 PROTHROMBIN TIME: CPT

## 2019-11-12 PROCEDURE — 95951 PR EEG MONITORING/VIDEORECORD: CPT | Mod: 26,,, | Performed by: PSYCHIATRY & NEUROLOGY

## 2019-11-12 PROCEDURE — 84484 ASSAY OF TROPONIN QUANT: CPT

## 2019-11-12 PROCEDURE — S0028 INJECTION, FAMOTIDINE, 20 MG: HCPCS | Performed by: NURSE PRACTITIONER

## 2019-11-12 PROCEDURE — 36620 INSERTION CATHETER ARTERY: CPT | Mod: GC,,, | Performed by: PHYSICIAN ASSISTANT

## 2019-11-12 PROCEDURE — 36620 PR INSERT CATH,ART,PERCUT,SHORTTERM: ICD-10-PCS | Mod: GC,,, | Performed by: PHYSICIAN ASSISTANT

## 2019-11-12 PROCEDURE — 84100 ASSAY OF PHOSPHORUS: CPT

## 2019-11-12 PROCEDURE — 20000000 HC ICU ROOM

## 2019-11-12 PROCEDURE — 99291 PR CRITICAL CARE, E/M 30-74 MINUTES: ICD-10-PCS | Mod: GC,,, | Performed by: PSYCHIATRY & NEUROLOGY

## 2019-11-12 PROCEDURE — 97110 THERAPEUTIC EXERCISES: CPT

## 2019-11-12 PROCEDURE — 85025 COMPLETE CBC W/AUTO DIFF WBC: CPT

## 2019-11-12 PROCEDURE — 82550 ASSAY OF CK (CPK): CPT

## 2019-11-12 PROCEDURE — 63600175 PHARM REV CODE 636 W HCPCS

## 2019-11-12 PROCEDURE — 97167 OT EVAL HIGH COMPLEX 60 MIN: CPT

## 2019-11-12 PROCEDURE — 82803 BLOOD GASES ANY COMBINATION: CPT

## 2019-11-12 PROCEDURE — 95951 HC EEG MONITORING/VIDEO RECORD: CPT

## 2019-11-12 PROCEDURE — 87040 BLOOD CULTURE FOR BACTERIA: CPT

## 2019-11-12 PROCEDURE — 84132 ASSAY OF SERUM POTASSIUM: CPT

## 2019-11-12 RX ORDER — FENTANYL CITRATE 50 UG/ML
INJECTION, SOLUTION INTRAMUSCULAR; INTRAVENOUS
Status: COMPLETED
Start: 2019-11-12 | End: 2019-11-12

## 2019-11-12 RX ORDER — SODIUM,POTASSIUM PHOSPHATES 280-250MG
2 POWDER IN PACKET (EA) ORAL
Status: DISCONTINUED | OUTPATIENT
Start: 2019-11-12 | End: 2019-11-18

## 2019-11-12 RX ORDER — FAMOTIDINE 20 MG/1
20 TABLET, FILM COATED ORAL 2 TIMES DAILY
Status: DISCONTINUED | OUTPATIENT
Start: 2019-11-12 | End: 2019-11-14

## 2019-11-12 RX ORDER — LANOLIN ALCOHOL/MO/W.PET/CERES
800 CREAM (GRAM) TOPICAL
Status: DISCONTINUED | OUTPATIENT
Start: 2019-11-12 | End: 2019-11-18

## 2019-11-12 RX ORDER — SODIUM CHLORIDE, SODIUM LACTATE, POTASSIUM CHLORIDE, CALCIUM CHLORIDE 600; 310; 30; 20 MG/100ML; MG/100ML; MG/100ML; MG/100ML
INJECTION, SOLUTION INTRAVENOUS CONTINUOUS
Status: DISCONTINUED | OUTPATIENT
Start: 2019-11-12 | End: 2019-11-25

## 2019-11-12 RX ORDER — FENTANYL CITRATE 50 UG/ML
50 INJECTION, SOLUTION INTRAMUSCULAR; INTRAVENOUS
Status: DISCONTINUED | OUTPATIENT
Start: 2019-11-12 | End: 2019-11-18

## 2019-11-12 RX ORDER — POTASSIUM CHLORIDE 20 MEQ/15ML
40 SOLUTION ORAL
Status: DISCONTINUED | OUTPATIENT
Start: 2019-11-12 | End: 2019-11-18

## 2019-11-12 RX ORDER — ACETAMINOPHEN 325 MG/1
650 TABLET ORAL EVERY 4 HOURS PRN
Status: DISCONTINUED | OUTPATIENT
Start: 2019-11-12 | End: 2019-11-18

## 2019-11-12 RX ORDER — ACETAMINOPHEN 325 MG/1
650 TABLET ORAL EVERY 4 HOURS PRN
Status: DISCONTINUED | OUTPATIENT
Start: 2019-11-12 | End: 2019-11-12

## 2019-11-12 RX ORDER — FENTANYL CITRATE/PF 250MCG/5ML
50 SYRINGE (ML) INTRAVENOUS
Status: DISCONTINUED | OUTPATIENT
Start: 2019-11-12 | End: 2019-11-12

## 2019-11-12 RX ORDER — HEPARIN SODIUM 5000 [USP'U]/ML
5000 INJECTION, SOLUTION INTRAVENOUS; SUBCUTANEOUS EVERY 8 HOURS
Status: DISCONTINUED | OUTPATIENT
Start: 2019-11-12 | End: 2019-12-02 | Stop reason: HOSPADM

## 2019-11-12 RX ORDER — PHENYLEPHRINE HCL IN 0.9% NACL 1 MG/10 ML
SYRINGE (ML) INTRAVENOUS
Status: DISPENSED
Start: 2019-11-12 | End: 2019-11-12

## 2019-11-12 RX ORDER — GLUCAGON 1 MG
1 KIT INJECTION
Status: DISCONTINUED | OUTPATIENT
Start: 2019-11-12 | End: 2019-12-02 | Stop reason: HOSPADM

## 2019-11-12 RX ORDER — INSULIN ASPART 100 [IU]/ML
1-10 INJECTION, SOLUTION INTRAVENOUS; SUBCUTANEOUS EVERY 4 HOURS PRN
Status: DISCONTINUED | OUTPATIENT
Start: 2019-11-12 | End: 2019-12-02 | Stop reason: HOSPADM

## 2019-11-12 RX ADMIN — FENTANYL CITRATE 50 MCG: 50 INJECTION INTRAMUSCULAR; INTRAVENOUS at 12:11

## 2019-11-12 RX ADMIN — FAMOTIDINE 20 MG: 10 INJECTION, SOLUTION INTRAVENOUS at 08:11

## 2019-11-12 RX ADMIN — SENNOSIDES AND DOCUSATE SODIUM 1 TABLET: 8.6; 5 TABLET ORAL at 08:11

## 2019-11-12 RX ADMIN — POTASSIUM CHLORIDE 40 MEQ: 20 SOLUTION ORAL at 05:11

## 2019-11-12 RX ADMIN — FENTANYL CITRATE 50 MCG: 50 INJECTION INTRAMUSCULAR; INTRAVENOUS at 03:11

## 2019-11-12 RX ADMIN — HEPARIN SODIUM 5000 UNITS: 5000 INJECTION, SOLUTION INTRAVENOUS; SUBCUTANEOUS at 09:11

## 2019-11-12 RX ADMIN — CHLORHEXIDINE GLUCONATE 0.12% ORAL RINSE 15 ML: 1.2 LIQUID ORAL at 08:11

## 2019-11-12 RX ADMIN — NICARDIPINE HYDROCHLORIDE 5 MG/HR: 0.2 INJECTION, SOLUTION INTRAVENOUS at 02:11

## 2019-11-12 RX ADMIN — LEVETIRACETAM 1000 MG: 10 INJECTION INTRAVENOUS at 09:11

## 2019-11-12 RX ADMIN — Medication 800 MG: at 05:11

## 2019-11-12 RX ADMIN — SODIUM CHLORIDE, SODIUM LACTATE, POTASSIUM CHLORIDE, AND CALCIUM CHLORIDE: .6; .31; .03; .02 INJECTION, SOLUTION INTRAVENOUS at 12:11

## 2019-11-12 RX ADMIN — PIPERACILLIN AND TAZOBACTAM 4.5 G: 4; .5 INJECTION, POWDER, FOR SOLUTION INTRAVENOUS at 08:11

## 2019-11-12 RX ADMIN — SODIUM CHLORIDE, SODIUM LACTATE, POTASSIUM CHLORIDE, AND CALCIUM CHLORIDE 1000 ML: .6; .31; .03; .02 INJECTION, SOLUTION INTRAVENOUS at 12:11

## 2019-11-12 RX ADMIN — HEPARIN SODIUM 5000 UNITS: 5000 INJECTION, SOLUTION INTRAVENOUS; SUBCUTANEOUS at 01:11

## 2019-11-12 RX ADMIN — LEVETIRACETAM 1000 MG: 10 INJECTION INTRAVENOUS at 08:11

## 2019-11-12 RX ADMIN — PROPOFOL 20 MCG/KG/MIN: 10 INJECTION, EMULSION INTRAVENOUS at 05:11

## 2019-11-12 RX ADMIN — FAMOTIDINE 20 MG: 20 TABLET ORAL at 08:11

## 2019-11-12 RX ADMIN — PIPERACILLIN AND TAZOBACTAM 4.5 G: 4; .5 INJECTION, POWDER, FOR SOLUTION INTRAVENOUS at 12:11

## 2019-11-12 RX ADMIN — LEVETIRACETAM 1000 MG: 10 INJECTION INTRAVENOUS at 12:11

## 2019-11-12 NOTE — PROGRESS NOTES
[facility]  Neurocritical Care  Progress Note    Admit Date: 11/11/2019  Service Date: 11/12/2019  Length of Stay: 1    Subjective:     Chief Complaint: Altered mental status    History of Present Illness: Mr Lopez is a 69 yr old male with a PMH of CVA, seizures, CHI, DM II, dementia, HTN, who presents to the ED with AMS. Patient was last heard from by a friend on Friday 11/8 (3 days ago). Found down at home 11/11, incontinent of urine and bowel. CTH negative. MRI c-spine & brain w/o acute findings. Mild leukocytosis, UA positive for UTI. cEEG suppressed. Patient intubated in the ED for airway protection. Admitted to [facility] for higher level of care.     Hospital Course: 11/11/2019: Intubated in ED. Admit to [facility].  11/12/2019: Switched Ceftriaxone to Zosyn, rechecking BCx, IVF @ 150cc/hr w/ 1L bolus; Tube feeds & dvt ppx initiated. EEG suppressed, propofol disctoninued & fentanyl 50mcg q2hr added.      Interval History:    Ceftriaxone -> Zosyn  LR 150cc/hr w/o water boluses w/ 1L LR bolus  D/c C-collar  Start TF  Repeat BCx x2  Replace electrolytes (try through OG)  D/c propofol  Fentanyl 50 q2    Review of Systems  Unable to obtain a complete ROS due to level of consciousness.  Objective:     Vitals:  Temp: 98 °F (36.7 °C)  Pulse: 78  Rhythm: normal sinus rhythm  BP: (!) 150/82  MAP (mmHg): 109  Resp: (!) 21  SpO2: 100 %  Oxygen Concentration (%): 40  O2 Device (Oxygen Therapy): ventilator  Vent Mode: A/C  Set Rate: 16 bmp  Vt Set: 400 mL  PEEP/CPAP: 5 cmH20  Peak Airway Pressure: 8.5 cmH2O  Mean Airway Pressure: 6.6 cmH20  Plateau Pressure: 14 cmH20    Temp  Min: 98 °F (36.7 °C)  Max: 100.2 °F (37.9 °C)  Pulse  Min: 72  Max: 99  BP  Min: 93/62  Max: 189/100  MAP (mmHg)  Min: 81  Max: 136  Resp  Min: 0  Max: 25  SpO2  Min: 50 %  Max: 100 %  Oxygen Concentration (%)  Min: 40  Max: 100    11/11 0701 - 11/12 0700  In: 1971.2 [I.V.:1121.2]  Out: 370 [Urine:370]   Unmeasured Output  Stool Occurrence: 0        Physical Exam  Constitutional: Cachectic. Well-developed.  Eyes: Conjunctiva clear, anicteric. Lids no lesions.  Head/Ears/Nose/Mouth/Throat/Neck: Dry mucous membranes. External ears, nose atraumatic. Bruising to Armando. Cheeks. Lac to R leg.  Cardiovascular: Regular rhythm. No leg edema.  Respiratory: Intubated, mechanical breath sounds  Gastrointestinal: No hernia. Soft, nondistended, nontender. + bowel sounds.     Neurologic:  -GCS E 4 V 1 M 4  -Obtunded. Eyes open spontaneously. Does not follow commands.  -Cranial nerves: EOM intact, PERRL, no facial droop, +cough  -Motor: Withdraws/grimaces to noxious stimuli to B/L LE; not UEs  Unable to test orientation, language, memory, judgment, insight, fund of knowledge, hearing, shoulder shrug, tongue protrusion, coordination, gait due to level of consciousness.    Medications:  Continuous  lactated ringers Last Rate: 125 mL/hr at 11/12/19 1300   niCARdipine Last Rate: Stopped (11/11/19 1515)   Scheduled  chlorhexidine 15 mL BID   famotidine 20 mg BID   heparin (porcine) 5,000 Units Q8H   lactated ringers 1,000 mL Once   levetiracetam IVPB 1,000 mg Q12H   phenylephrine HCl in 0.9% NaCl     piperacillin-tazobactam (ZOSYN) IVPB 4.5 g Q8H   senna-docusate 8.6-50 mg 1 tablet BID   PRN  acetaminophen 650 mg Q6H PRN   fentaNYL 50 mcg Q2H PRN   glucagon (human recombinant) 1 mg PRN   insulin aspart U-100 1-10 Units Q4H PRN   magnesium oxide 800 mg PRN   magnesium oxide 800 mg PRN   ondansetron 4 mg Q6H PRN   potassium chloride 10% 40 mEq PRN   potassium chloride 10% 40 mEq PRN   potassium chloride 10% 40 mEq PRN   potassium, sodium phosphates 2 packet PRN   potassium, sodium phosphates 2 packet PRN   potassium, sodium phosphates 2 packet PRN   sodium chloride 0.9% 10 mL PRN     Today I personally reviewed pertinent medications, lines/drains/airways, imaging, cardiology results, laboratory results, microbiology results, notably:    Diet  Diet NPO  Diet  NPO      Assessment/Plan:     Neuro  * Altered mental status  Hx of frequent falls, UTI  Patient found down at home, incontinent of urine/feces 11/11  LKN 11/8  CTH negative    -Admit to [facility]  -Hourly neuro checks, Hourly vital signs  -MRI brain and c-spine wo contrast pending  -cEEG negative  -BCx w/ GNR; pending rpt BCx  -UA with many bacteria, urine culture pending- On zosyn  -PT/OT/SLP  -Monitor for Fever curve; consider adding gram + if cultures present      Seizure disorder  Hx of Seizures, CHI  -Loaded with Keppra in the ED  -cEEG negative  - stopped propofol; added fentanyl 50mcg q2h  - cont keppra 1g BID for ppx    Cardiac/Vascular  Hypertension  SBP Goal < 180    Renal/  UTI (urinary tract infection)  Hx of UTIs, previous urine culture pos. for klebsiella  UA with many bacteria, culture pending  On zosyn    Oncology  Leukocytosis  Elevated WBC  See AMS    Endocrine  Type 2 diabetes mellitus  Hgb A1c 6.4  SSI w/ accuchecks          The patient is being Prophylaxed for:  Venous Thromboembolism with: Chemical  Stress Ulcer with: H2B  Ventilator Pneumonia with: chlorhexidine oral care    Activity Orders          Diet NPO: NPO starting at 11/11 1459    Commode at bedside starting at 11/11 1459        Full Code    Marco Ireland MD  Neurocritical Care  [facility]

## 2019-11-12 NOTE — PLAN OF CARE
VS and assessment per flow sheet, patient progressing towards goal as tolerated. Neuro status unchanged per baseline, propofol gtt d/c. Continuous EEG place. Blood cultures obtained, broad spectrum abx added. Medications given per MD Orders. TF started. Cardene gtt started to keep SBP< 180. Electroloytes being replaced. Plan of care reviewed with Edwin Lopez Jr. ( no evidence of learning noted), ,and pt jamila , all concerns addressed. Emotional support provided. Will continue to monitor.

## 2019-11-12 NOTE — PLAN OF CARE
Nutrition assessment completed. Please see RD note for details.    Recommendation/Intervention:   1. Continue TF order Glucerna 1.5 @ goal rate 45mL/hr.   - Provides 1620kcals, 89g protein and 820mL free water.     2. If able to extubate and advance diet, recommend Diabetic with texture per SLP recommendations.     RD to monitor.    Goals: Pt to tolerate >85% EEN and EPN by RD follow up  Nutrition Goal Status: new  Communication of RD Recs: reviewed with RN  Problem: Feeding Intolerance (Enteral Nutrition)  Goal: Feeding Tolerance  Outcome: Ongoing, Progressing

## 2019-11-12 NOTE — PT/OT/SLP PROGRESS
Physical Therapy      Patient Name:  Edwin Lopez Jr.   MRN:  [id number]    Patient not seen today secondary to Other (Comment)(patient intubated). PT orders acknowledged. Patient intubated on evaluation attempt. Will follow-up pending extubation as patient is medically appropriate to mobilize OOB. Patient appropriate for one therapy discipline at this time (OT following).       Scarlet Chilel, PT

## 2019-11-12 NOTE — ASSESSMENT & PLAN NOTE
Hx of Seizures, CHI  -Loaded with Keppra in the ED  -cEEG negative  - stopped propofol; added fentanyl 50mcg q2h  - cont keppra 1g BID for ppx

## 2019-11-12 NOTE — CARE UPDATE
Pt transferred to [facility] by transport vent.  Pt tolerated the transfer very well. Ambu bag and mask with oxygen with patient.

## 2019-11-12 NOTE — PLAN OF CARE
OT evaluation completed.  NOHEMI De  11/12/2019    Problem: Occupational Therapy Goal  Goal: Occupational Therapy Goal  Description  Goals set 11/12 to be addressed for 14 days with expiration date, 11/26  Patient will increase functional independence with ADLs by performing:    Patient will demonstrate rolling to the right with mod assist.  Not met   Patient will demonstrate rolling to the left with mod assist.   Not met  Patient will demonstrate supine -sit with mod  assist.   Not met  Patient will demonstrate stand pivot transfers with max assist.   Not met  Patient will demonstrate grooming while seated with max assist.   Not met  Patient will demonstrate upper body dressing with max assist while seated EOB.   Not met  Patient will demonstrate lower body dressing with max assist while seated EOB.   Not met  Patient will demonstrate toileting with max assist.   Not met  Patient will demonstrate bathing while seated EOB with max assist.   Not met  Patient will demonstrate ability to follow 3/5 commands.   Not met  Patient's family / caregiver will demonstrate independence and safety with assisting patient with self-care skills and functional mobility.     Not met  Patient's family / caregiver will demonstrate independence with providing ROM and changes in bed positioning.   Not met           Outcome: Ongoing, Progressing

## 2019-11-12 NOTE — SUBJECTIVE & OBJECTIVE
Interval History:    Ceftriaxone -> Zosyn  LR 150cc/hr w/o water boluses w/ 1L LR bolus  D/c C-collar  Start TF  Repeat BCx x2  Replace electrolytes (try through OG)  D/c propofol  Fentanyl 50 q2    Review of Systems  Unable to obtain a complete ROS due to level of consciousness.  Objective:     Vitals:  Temp: 98 °F (36.7 °C)  Pulse: 78  Rhythm: normal sinus rhythm  BP: (!) 150/82  MAP (mmHg): 109  Resp: (!) 21  SpO2: 100 %  Oxygen Concentration (%): 40  O2 Device (Oxygen Therapy): ventilator  Vent Mode: A/C  Set Rate: 16 bmp  Vt Set: 400 mL  PEEP/CPAP: 5 cmH20  Peak Airway Pressure: 8.5 cmH2O  Mean Airway Pressure: 6.6 cmH20  Plateau Pressure: 14 cmH20    Temp  Min: 98 °F (36.7 °C)  Max: 100.2 °F (37.9 °C)  Pulse  Min: 72  Max: 99  BP  Min: 93/62  Max: 189/100  MAP (mmHg)  Min: 81  Max: 136  Resp  Min: 0  Max: 25  SpO2  Min: 50 %  Max: 100 %  Oxygen Concentration (%)  Min: 40  Max: 100    11/11 0701 - 11/12 0700  In: 1971.2 [I.V.:1121.2]  Out: 370 [Urine:370]   Unmeasured Output  Stool Occurrence: 0       Physical Exam  Constitutional: Cachectic. Well-developed.  Eyes: Conjunctiva clear, anicteric. Lids no lesions.  Head/Ears/Nose/Mouth/Throat/Neck: Dry mucous membranes. External ears, nose atraumatic. Bruising to Armando. Cheeks. Lac to R leg.  Cardiovascular: Regular rhythm. No leg edema.  Respiratory: Intubated, mechanical breath sounds  Gastrointestinal: No hernia. Soft, nondistended, nontender. + bowel sounds.     Neurologic:  -GCS E 4 V 1 M 4  -Obtunded. Eyes open spontaneously. Does not follow commands.  -Cranial nerves: EOM intact, PERRL, no facial droop, +cough  -Motor: Withdraws/grimaces to noxious stimuli to B/L LE; not UEs  Unable to test orientation, language, memory, judgment, insight, fund of knowledge, hearing, shoulder shrug, tongue protrusion, coordination, gait due to level of consciousness.    Medications:  Continuous  lactated ringers Last Rate: 125 mL/hr at 11/12/19 1300   niCARdipine Last Rate:  Stopped (11/11/19 3633)   Scheduled  chlorhexidine 15 mL BID   famotidine 20 mg BID   heparin (porcine) 5,000 Units Q8H   lactated ringers 1,000 mL Once   levetiracetam IVPB 1,000 mg Q12H   phenylephrine HCl in 0.9% NaCl     piperacillin-tazobactam (ZOSYN) IVPB 4.5 g Q8H   senna-docusate 8.6-50 mg 1 tablet BID   PRN  acetaminophen 650 mg Q6H PRN   fentaNYL 50 mcg Q2H PRN   glucagon (human recombinant) 1 mg PRN   insulin aspart U-100 1-10 Units Q4H PRN   magnesium oxide 800 mg PRN   magnesium oxide 800 mg PRN   ondansetron 4 mg Q6H PRN   potassium chloride 10% 40 mEq PRN   potassium chloride 10% 40 mEq PRN   potassium chloride 10% 40 mEq PRN   potassium, sodium phosphates 2 packet PRN   potassium, sodium phosphates 2 packet PRN   potassium, sodium phosphates 2 packet PRN   sodium chloride 0.9% 10 mL PRN     Today I personally reviewed pertinent medications, lines/drains/airways, imaging, cardiology results, laboratory results, microbiology results, notably:    Diet  Diet NPO  Diet NPO

## 2019-11-12 NOTE — PROCEDURES
"Edwin Lopez Jr. is a 69 y.o. male patient.    Temp: 98.6 °F (37 °C) (19 0701)  Pulse: 72 (19 1000)  Resp: 16 (19 1000)  BP: (!) 151/88 (19 1000)  SpO2: 99 % (19 1000)  Weight: 64 kg (141 lb 1.5 oz) (19)  Height: 5' 5" (165.1 cm) (19 1302)       Arterial Line  Date/Time: 2019 6:00 AM  Performed by: Milla Corbett PA-C  Authorized by: Milla Corbett PA-C   Consent Done: Yes  Consent: Verbal consent obtained.  Risks and benefits: risks, benefits and alternatives were discussed  Consent given by: jamila   Patient identity confirmed: , name and MRN  Time out: Immediately prior to procedure a "time out" was called to verify the correct patient, procedure, equipment, support staff and site/side marked as required.  Preparation: Patient was prepped and draped in the usual sterile fashion.  Indications: respiratory failure and hemodynamic monitoring  Location: right radial  Rip's test normal: yes  Needle gauge: 20  Number of attempts: 1  Post-procedure: dressing applied  Post-procedure CMS: normal  Patient tolerance: Patient tolerated the procedure well with no immediate complications          Milla Corbett  2019  "

## 2019-11-12 NOTE — CARE UPDATE
PT has what appears to be a wound under the tube hopper on the left side face ABHINAV Randle was notified who said that a consult was put into wound care to look at patient

## 2019-11-12 NOTE — ASSESSMENT & PLAN NOTE
Hx of UTIs, previous urine culture pos. for klebsiella  UA with many bacteria, culture pending  On zosyn

## 2019-11-12 NOTE — ASSESSMENT & PLAN NOTE
Hx of frequent falls, UTI  Patient found down at home, incontinent of urine/feces 11/11  LKN 11/8  CTH negative    -Admit to NCC  -Hourly neuro checks, Hourly vital signs  -MRI brain and c-spine wo contrast pending  -cEEG negative  -BCx w/ GNR; pending rpt BCx  -UA with many bacteria, urine culture pending- On zosyn  -PT/OT/SLP  -Monitor for Fever curve; consider adding gram + if cultures present

## 2019-11-12 NOTE — ED NOTES
Pt on transport vent going to MRI, had to transport to x-ray now waiting in MRI for table. Pt doing well.

## 2019-11-12 NOTE — PROGRESS NOTES
Pt in MRI, propofol @ 40. VSS. Hand off given by ABHINAV Weeks. Pt to be transferred to [facility] after scan complete. Will continue to monitor.

## 2019-11-12 NOTE — CONSULTS
Inpatient consult to Physical Medicine Rehab  Consult performed by: MERLY Galicia  Consult ordered by: Maria Del Rosario Thomas NP  Reason for consult: assess rehab needs      Consult received.  Reviewed patient history and current admission.    Patient is intubated and too acute at this time; rehab potential cannot be appropriately assessed.  Recommend early mobility, OOB in chair, and PT/OT/SLP when appropriate.  Will follow for additional rehab needs and post-acute recommendation when patient is medically stable and able to participate with therapy.    Thank you for consult.    BABAK Almonte, FNP-C  Physical Medicine & Rehabilitation   11/12/2019  Spectralink: [id number]

## 2019-11-12 NOTE — PLAN OF CARE
11/12/19 1417   Post-Acute Status   Post-Acute Authorization Placement   Post-Acute Placement Status Awaiting Internal Medical Clearance   Discharge Delays None known at this time       Denise Hou RN, CCRN-K, [facility]  Neuro-Critical Care   X [phone]

## 2019-11-12 NOTE — PROGRESS NOTES
Patient seen for wound consult for chin and skin breakdown to right knee.  Unknown etiology for wounds to chin and right knee.  Chin recommend cleaning BID with sterile normal saline, apply cavilon no sting barrier film.  Right knee: recommend twice a week clean with sterile normal saline, dress with foam dressing. Monitor knee as wound may continue to demarcate.   No breakdown noted to bilateral heels, foam dressings and heel boots unitized by nursing.  No sacral pressure injury noted, moisture noted to gluteal cleft/ skin intact. Recommend clear barrier cream BID/prn.    Sarah Mcdaniel NP with [facility] approved recommendations. Nursing to continue care. Wound care to follow prn.     11/12/19 1339        Wound 11/11/19 1947 Ulceration anterior Knee   Date First Assessed/Time First Assessed: 11/11/19 1947   Pre-existing: Yes  Primary Wound Type: Ulceration  Side: Right  Orientation: anterior  Location: Knee   Wound Image    Wound WDL ex  (unknown etiology)   Drainage Amount None   Drainage Characteristics/Odor No odor   Appearance Moist;Pink;Yellow;Slough;Black;Eschar   Tissue loss description Full thickness   Black (%), Wound Tissue Color 50 %   Red (%), Wound Tissue Color 20 %   Yellow (%), Wound Tissue Color 30 %   Periwound Area Intact   Wound Length (cm) 2.5 cm   Wound Width (cm) 7 cm   Wound Depth (cm) 0.1 cm   Wound Volume (cm^3) 1.75 cm^3   Wound Surface Area (cm^2) 17.5 cm^2   Care Cleansed with:;Sterile normal saline   Dressing Foam   Dressing Change Due 11/15/19        Wound 11/11/19 1948 Ulceration Chin   Date First Assessed/Time First Assessed: 11/11/19 1948   Primary Wound Type: Ulceration  Side: Left  Location: Chin   Wound Image    Wound WDL ex   Drainage Amount None   Drainage Characteristics/Odor No odor   Appearance   (dry crust, dark discoloration)   Periwound Area Intact   Wound Length (cm) 1 cm   Wound Width (cm) 1 cm   Wound Depth (cm) 0.1 cm   Wound Volume (cm^3) 0.1 cm^3   Wound Surface Area (cm^2)  1 cm^2   Care   (recommend cavilon no sting barrier BID)   Skin Interventions   Pressure Reduction Devices pressure-redistributing mattress utilized;positioning supports utilized;heel offloading device utilized  (waffle overlay, huber low airloss, heel boots)

## 2019-11-12 NOTE — PLAN OF CARE
POC reviewed with pt at 0500. Pt neuro exam remained unchanged through the night (withdraw from L/R LE; no movement L/R UE 4/4 reflex), EEG showed seizure activity per epileptology. MAP declined x3 throughout the night to mid 60s MAGDALENA Loyola contacted, ordered 3x 500ml NS bolus and insertion of arterial line at 0500. Anny consent given by jamila over the phone and verified by RN. Urine output remained <40ml/hr through the night. CPK labs elevated @ 9829U/L at 0214 AM draw. No Blood Cx growth @ 0115 update, resp Cx showed rare gram positive cocci/rare gram negative rods. Will continue to monitor. See flowsheets for full assessment and VS info.

## 2019-11-12 NOTE — PT/OT/SLP EVAL
Occupational Therapy   Evaluation    Name: Edwin Lopez Jr.  MRN: [id number]  Admitting Diagnosis:  Altered mental status      Recommendations:     Discharge Recommendations: (pending extubation)  Discharge Equipment Recommendations:  (pending extubation)  Barriers to discharge:  Inaccessible home environment, Decreased caregiver support    Assessment:     Edwin Lopez Jr. is a 69 y.o. male with a medical diagnosis of Altered mental status.  He presents with performance deficits affecting function: weakness, impaired endurance, impaired sensation, impaired self care skills, impaired functional mobilty, gait instability, impaired balance, visual deficits, impaired cognition, decreased coordination, decreased upper extremity function, decreased lower extremity function, decreased safety awareness, abnormal tone, decreased ROM, impaired coordination, impaired fine motor, impaired cardiopulmonary response to activity.      Rehab Prognosis: Good; patient would benefit from acute skilled OT services to address these deficits and reach maximum level of function.       Plan:     Patient to be seen 3 x/week to address the above listed problems via self-care/home management, therapeutic activities, therapeutic exercises, neuromuscular re-education, cognitive retraining, sensory integration  · Plan of Care Expires: 12/10/19  · Plan of Care Reviewed with: patient    Subjective     Patient:  Nonverbal; intubated    Occupational Profile:  Per chart review, patient resides in [place].  Patient intubated and family not available for providing social history.    Pain/Comfort:  · Pain Rating 1: 0/10  · Pain Rating Post-Intervention 1: 0/10    Patients cultural, spiritual, Muslim conflicts given the current situation: no    Objective:     Communicated with: Nurse prior to session.  Patient found supine with arterial line, bed alarm, blood pressure cuff, peripheral IV, pulse ox (continuous), restraints, SCD, telemetry,  ventilator, pressure relief boots, PICC line, bess catheter, EEG, cervical collar(OG tube) upon OT entry to room.    General Precautions: Standard, aspiration, fall   Orthopedic Precautions: pending cervical spine MRI  Braces: N/A     Occupational Performance:    Bed Mobility:    · Patient completed Rolling/Turning to Left with  dependent  · Patient completed Rolling/Turning to Right with dependent    Functional Mobility/Transfers:  · Dependent drawsheet transfers    Activities of Daily Living:  · Feeding:  NPO    · Grooming: total assistance while supine    Cognitive/Visual Perceptual:  Cognitive/Psychosocial Skills:     -       Oriented to: Daily orientation provided   -       Follows Commands/attention:Unable to follow commands   -       Communication: Nonverbal; intubated    Physical Exam:  Postural examination/scapula alignment:    -       Rounded shoulders  Skin integrity: Visible skin intact  Edema:  None noted  Upper Extremity Range of Motion: PROM WNL    AMPAC 6 Click ADL:  AMPAC Total Score: 6    Treatment & Education:  Patient education provided on role of OT and need for continued OT upon discharge.   Continued education, patient/ family training recommended.  Daily orientation provided.  PROM performed bilateral UE/LEs one set x 10 rep in all planes of motion with stretches provided at end range; sustained stretch provided for ankle dorsiflexion.  Assistance and facilitation provided for upward rotation of the scapula during shoulder flexion and abduction.  Patient kept eyes closed 100% of the session.  Patient unable to follow commands.  Positioning provided for midline orientation with bilateral UEs elevated and heels lifted off mattress.  Gentle cervical rotation provided.   Family not present during the session.  Education:      Patient left supine with all lines intact, call button in reach and bed alarm on    GOALS:   Multidisciplinary Problems     Occupational Therapy Goals        Problem:  Occupational Therapy Goal    Goal Priority Disciplines Outcome Interventions   Occupational Therapy Goal     OT, PT/OT Ongoing, Progressing    Description:  Goals set 11/12 to be addressed for 14 days with expiration date, 11/26  Patient will increase functional independence with ADLs by performing:    Patient will demonstrate rolling to the right with mod assist.  Not met   Patient will demonstrate rolling to the left with mod assist.   Not met  Patient will demonstrate supine -sit with mod  assist.   Not met  Patient will demonstrate stand pivot transfers with max assist.   Not met  Patient will demonstrate grooming while seated with max assist.   Not met  Patient will demonstrate upper body dressing with max assist while seated EOB.   Not met  Patient will demonstrate lower body dressing with max assist while seated EOB.   Not met  Patient will demonstrate toileting with max assist.   Not met  Patient will demonstrate bathing while seated EOB with max assist.   Not met  Patient will demonstrate ability to follow 3/5 commands.   Not met  Patient's family / caregiver will demonstrate independence and safety with assisting patient with self-care skills and functional mobility.     Not met  Patient's family / caregiver will demonstrate independence with providing ROM and changes in bed positioning.   Not met                            History:     Past Medical History:   Diagnosis Date    CHI (closed head injury)     Dementia     Diabetes mellitus     Hyperlipidemia     Seizures        History reviewed. No pertinent surgical history.    Time Tracking:     OT Date of Treatment: 11/12/19  OT Start Time: 0531  OT Stop Time: 0550  OT Total Time (min): 19 min    Billable Minutes:Evaluation 9  Therapeutic Exercise 10    NOHEMI De  11/12/2019

## 2019-11-12 NOTE — CARE UPDATE
[id number] patient received from ED-MRI via transport ventilator with oxygen attached via  RT, Conner. Patient is intubated with 7.5 OETT which is 25 cm hugo at lips. 1933 patient placed on 980 ventilator at documented and ordered parameters. Initial vent check  done and patient assessment done and noted. RT to follow.

## 2019-11-12 NOTE — NURSING TRANSFER
Patient arrived to [facility] from [facility] ED by EMS    Type of stroke/diagnosis:  AMS    TPA N/A    Thrombectomy N/A    Current symptoms: AMS,     Skin assessment done: Yes  Wounds noted: skin tear R knee, L chin    JACINTO Armband Applied: yes    NCC notified: SULEMA Thomas

## 2019-11-12 NOTE — PLAN OF CARE
Attempted to meet with patient and or family in room for discharge planning assessment. Pt unable to answer questions 2/2 patient intubated on vent.  No family present in room.  CM phoned patient's niece, Garrick Cuevas [phone].  Per niece, the patient lives alone in a single story house with no ETIENNE.  Niece stated that the patient was in [facility] 2 months ago and was discharged to [facility].  Niece stated that the patient has been home from [facility] for about one month.   Per Medical record, the patient was previously homeless, but his SW helped him obtain his current housing.   Mariann Valentino (CM/SW) [phone].  Per niece, she is the patient's primary caretaker, and she checks on him several times per day.  Niece stated that she will assist patient upon discharge.  Niece stated that the pateint has 13 children that she is aware of and she is in contact with three of his children.  Per niece, the patient uses [facility] for PCP.           Hortencia Howard (daughter): [phone]   Harriet (sister) [phone], Garrick (niece) [phone],  Mariann Valentino (CM/SW) [phone]               11/12/19 1400   Discharge Assessment   Assessment Type Discharge Planning Assessment   Confirmed/corrected address and phone number on facesheet? Yes   Assessment information obtained from? Caregiver   Expected Length of Stay (days) 7   Communicated expected length of stay with patient/caregiver yes   Prior to hospitilization cognitive status: Alert/Oriented   Prior to hospitalization functional status: Independent   Current cognitive status: Coma/Sedated/Intubated   Current Functional Status: Completely Dependent   Lives With alone   Able to Return to Prior Arrangements other (see comments)  (esteban)   Is patient able to care for self after discharge? Unable to determine at this time (comments)   Who are your caregiver(s) and their phone number(s)? Garrick Cuevas (niece) [phone]   Patient's perception of  discharge disposition other (comments)  (esteban)   Readmission Within the Last 30 Days no previous admission in last 30 days   Patient currently being followed by outpatient case management? No   Patient currently receives any other outside agency services? No   Equipment Currently Used at Home cane, straight;walker, standard;wheelchair   Do you have any problems affording any of your prescribed medications? No   Is the patient taking medications as prescribed? yes   Does the patient have transportation home? Yes   Transportation Anticipated family or friend will provide   Does the patient receive services at the [facility]? No   Discharge Plan A Long-term acute care facility (LTAC)   Discharge Plan B Skilled Nursing Facility   DME Needed Upon Discharge  other (see comments)  (tbd)   Patient/Family in Agreement with Plan unable to assess         Discharge/ My Health Packet Folder Given to patient/family:            PCP:  Harini Montenegro MD          Pharmacy:    [facility] #[id number] - [place], [place] - [address]  [address]  [place] [address]  Phone: [phone] Fax: [phone]        Emergency Contacts:  Extended Emergency Contact Information  Primary Emergency Contact: Mariann Valentino   [facility] Phone: [phone]  Relation: Other      Insurance:  Payor: [employer] MANAGED MEDICARE / Plan: [employer] MEDICARE HMO / Product Type: Capitation /                 Denise Hou RN, CCRN-K, [facility]  Neuro-Critical Care   X [id number]

## 2019-11-12 NOTE — CHAPLAIN
Spiritual Care Encounter  Please contact the Department of Spiritual Care, your Unit  or the On-call  at any time if any needs are brought to your attention. i.e. Anytime you note spiritual, existential or emotional distress or there is a new diagnosis and/or prognosis.      Purpose:  To provide emotional, spiritual and/or existential support to the patient and/or their loved one's as needed and/or identified by staff, loved one's and/or the patient.   Visit Type: Initial Visit  Visit Category: Critical Care  Visited With: Family, Health care provider, Patient  Number of Family Visited: 1(Niece found me in Hallway and asked for Visit)  Length of Visit: 36 Minutes  Continue Visiting: Yes  Referral From: Family(Pt's Niece)  Referral To:      Assessment/Intervention:  Upon leaving the unit I was approached by a young lady who asked that I go and pray with the patient. Upon introducing myself she stated that she was his niece and that she would very much apricate a prayer being had over him and that he himself was very spiritual. I attended to the patients bedside and introduced myself and role. The patient was not alert at the time of my visit. I said a short prayer over the patient and upon beginning to exit noticed that a paper with a small testament was on the shelf. Upon looking at the paper it was noted to be a prayer the patient had written and signed 11/11/2019 by the patient. I then read the written prayer over the patient and retrieved a plastic sleeve for it to be placed in lest it be damaged. I encouraged staff to reach out should the patient or family need anything further.   Spiritual Resources Requested: Prayer       Patient Spiritual Encounters  Care Provided: Prayer support, Compassionate presence  Patient Coping: Other (Comment)(SHINE, Patient non-responsive)  Comments - Patient: Patient not alert at time of visit   Family Spiritual Encounters  Care Provided: Reflective  listening, Compassionate presence  Family Coping: Other (Comment)(SHINE, interaction was brief)  Comments - Family: Family asked that I prayer over/with patient; further assessment needed   Stated Samaritan: Judaism  Samaritan Given By:Other Family Member   EPIC Charted Samaritan: Judaism       OF NOTE:  Spiritual, Cultural Beliefs, Sabianist Practices, Values that Affect Care: no                   Plan of Care/Goals:  Follow up will be completed, as needed, for the spiritual and emotional support of the patient and/or their family/support(s). The patient and/or their family/support(s) were informed of how to contact the Spiritual Care Department as well as our availability and functions.  There will be continued prayer over the patient and their loved ones. Please contact the Department of Spiritual Care, your Unit  or the On-call  at any time when you have concerns brought to your attention prior to our follow-up. i.e. Anytime you note spiritual, existential or emotional distress or there is a new diagnosis and/or prognosis.       Janae Ramos    Office: [phone]  On-Call 24/7: [phone]  Department of Spiritual Care - [facility] [place]

## 2019-11-12 NOTE — CONSULTS
"  [facility]  Adult Nutrition  Consult Note    SUMMARY     Recommendations    Recommendation/Intervention:   1. Continue TF order Glucerna 1.5 @ goal rate 45mL/hr.   - Provides 1620kcals, 89g protein and 820mL free water.     2. If able to extubate and advance diet, recommend Diabetic with texture per SLP recommendations.     RD to monitor.    Goals: Pt to tolerate >85% EEN and EPN by RD follow up  Nutrition Goal Status: new  Communication of RD Recs: reviewed with RN    Reason for Assessment    Reason For Assessment: consult  Diagnosis: other (see comments)(AMS)  Relevant Medical History: CVA, seizures, T2DM, dementia  Interdisciplinary Rounds: attended  General Information Comments: Pt intubated, sedated. TF ordered, to be started today. No family at bedside to provide nutrition hx. No weight hx in chart. NFPE completed - noted pt with moderate to severe wasting in temporals, clavicles, triceps, SHINE calves 2/2 boots and BP cuff and nourished in hands. Unable to confirm criteria for malnutrition at this time.  Nutrition Discharge Planning: unable to determine at this time    Nutrition Risk Screen    Nutrition Risk Screen: dysphagia or difficulty swallowing    Nutrition/Diet History    Spiritual, Cultural Beliefs, Druze Practices, Values that Affect Care: no  Factors Affecting Nutritional Intake: NPO, on mechanical ventilation    Anthropometrics    Temp: 98 °F (36.7 °C)  Height: 5' 6" (167.6 cm)  Height (inches): 66 in  Weight Method: Bed Scale  Weight: 64 kg (141 lb)  Weight (lb): 141 lb  Ideal Body Weight (IBW), Male: 142 lb  % Ideal Body Weight, Male (lb): 99.3 lb  BMI (Calculated): 22.8  BMI Grade: 18.5-24.9 - normal       Lab/Procedures/Meds    Pertinent Labs Reviewed: reviewed  Pertinent Labs Comments: POCT Glu 100-130, HgbA1c 6.4  Pertinent Medications Reviewed: reviewed  Pertinent Medications Comments: IVF, propofol, cardene    Estimated/Assessed Needs    Weight Used For Calorie " Calculations: 64 kg (141 lb 1.5 oz)  Energy Calorie Requirements (kcal): 1657  Energy Need Method: [facility]  Protein Requirements: 77-96g(1.2-1.5g/kg)  Weight Used For Protein Calculations: 64 kg (141 lb 1.5 oz)  Fluid Requirements (mL): 1mL/kcal or per MD     RDA Method (mL): 1657  CHO Requirement: 207g CHO daily      Nutrition Prescription Ordered    Current Diet Order: NPO  Nutrition Order Comments: TF to be started today  Current Nutrition Support Formula Ordered: Glucerna 1.5  Current Nutrition Support Rate Ordered: 45 (ml)  Current Nutrition Support Frequency Ordered: mL/hr    Evaluation of Received Nutrient/Fluid Intake    Enteral Calories (kcal): 1620  Enteral Protein (gm): 89  Enteral (Free Water) Fluid (mL): 820  Lipid Calories (kcals): 203 kcals(propofol @ 7.7mL/hr)  Total Calories (kcal): 1823    % Kcal Needs: 110  % Protein Needs: 100    IV Fluid (mL): 1800  I/O: +2L since admit, LBM 11/11    Energy Calories Required: meeting needs  Protein Required: meeting needs  Fluid Required: other (see comments)(per MD)    % Intake of Estimated Energy Needs: 75 - 100 %  % Meal Intake: NPO    Nutrition Risk    Level of Risk/Frequency of Follow-up: high(f/u 2x/week)     Assessment and Plan    Nutrition Problem  Inadequate oral intake    Related to (etiology):   NPO    Signs and Symptoms (as evidenced by):   Pt requiring alternative means of nutrition to meet at least 85% EEN and EPN.     Interventions(treatment strategy):  Collaboration of care with providers    Nutrition Diagnosis Status:   New           Monitor and Evaluation    Food and Nutrient Intake: enteral nutrition intake, food and beverage intake, energy intake  Food and Nutrient Adminstration: diet order, enteral and parenteral nutrition administration  Anthropometric Measurements: weight, weight change, body mass index  Biochemical Data, Medical Tests and Procedures: electrolyte and renal panel, gastrointestinal profile, glucose/endocrine profile,  inflammatory profile, lipid profile  Nutrition-Focused Physical Findings: overall appearance     Malnutrition Assessment             Weight Loss (Malnutrition): (unknown)  Energy Intake (Malnutrition): (unknown)   Orbital Region (Subcutaneous Fat Loss): moderate depletion   Sabianism Region (Muscle Loss): moderate depletion  Clavicle Bone Region (Muscle Loss): severe depletion  Clavicle and Acromion Bone Region (Muscle Loss): severe depletion  Scapular Bone Region (Muscle Loss): severe depletion  Dorsal Hand (Muscle Loss): moderate depletion  Patellar Region (Muscle Loss): (SHINE)  Anterior Thigh Region (Muscle Loss): (SHINE)  Posterior Calf Region (Muscle Loss): (SHINE)                 Nutrition Follow-Up    RD Follow-up?: Yes

## 2019-11-13 LAB
ALBUMIN SERPL BCP-MCNC: 2.5 G/DL (ref 3.5–5.2)
ALLENS TEST: ABNORMAL
ALP SERPL-CCNC: 68 U/L (ref 55–135)
ALT SERPL W/O P-5'-P-CCNC: 35 U/L (ref 10–44)
ANION GAP SERPL CALC-SCNC: 9 MMOL/L (ref 8–16)
AST SERPL-CCNC: 85 U/L (ref 10–40)
BACTERIA UR CULT: ABNORMAL
BASOPHILS # BLD AUTO: 0.03 K/UL (ref 0–0.2)
BASOPHILS NFR BLD: 0.3 % (ref 0–1.9)
BILIRUB SERPL-MCNC: 0.7 MG/DL (ref 0.1–1)
BUN SERPL-MCNC: 15 MG/DL (ref 8–23)
CALCIUM SERPL-MCNC: 8 MG/DL (ref 8.7–10.5)
CHLORIDE SERPL-SCNC: 118 MMOL/L (ref 95–110)
CK SERPL-CCNC: 3668 U/L (ref 20–200)
CO2 SERPL-SCNC: 19 MMOL/L (ref 23–29)
CREAT SERPL-MCNC: 0.8 MG/DL (ref 0.5–1.4)
DELSYS: ABNORMAL
DIFFERENTIAL METHOD: ABNORMAL
EOSINOPHIL # BLD AUTO: 0.1 K/UL (ref 0–0.5)
EOSINOPHIL NFR BLD: 1 % (ref 0–8)
ERYTHROCYTE [DISTWIDTH] IN BLOOD BY AUTOMATED COUNT: 13.4 % (ref 11.5–14.5)
ERYTHROCYTE [SEDIMENTATION RATE] IN BLOOD BY WESTERGREN METHOD: 16 MM/H
EST. GFR  (AFRICAN AMERICAN): >60 ML/MIN/1.73 M^2
EST. GFR  (NON AFRICAN AMERICAN): >60 ML/MIN/1.73 M^2
FIO2: 40
GLUCOSE SERPL-MCNC: 130 MG/DL (ref 70–110)
HCO3 UR-SCNC: 21.4 MMOL/L (ref 24–28)
HCT VFR BLD AUTO: 33.9 % (ref 40–54)
HGB BLD-MCNC: 10.2 G/DL (ref 14–18)
IMM GRANULOCYTES # BLD AUTO: 0.02 K/UL (ref 0–0.04)
IMM GRANULOCYTES NFR BLD AUTO: 0.2 % (ref 0–0.5)
LEVETIRACETAM SERPL-MCNC: 9.9 UG/ML (ref 3–60)
LYMPHOCYTES # BLD AUTO: 1.8 K/UL (ref 1–4.8)
LYMPHOCYTES NFR BLD: 18.9 % (ref 18–48)
MAGNESIUM SERPL-MCNC: 1.5 MG/DL (ref 1.6–2.6)
MCH RBC QN AUTO: 25.8 PG (ref 27–31)
MCHC RBC AUTO-ENTMCNC: 30.1 G/DL (ref 32–36)
MCV RBC AUTO: 86 FL (ref 82–98)
MODE: ABNORMAL
MONOCYTES # BLD AUTO: 0.8 K/UL (ref 0.3–1)
MONOCYTES NFR BLD: 8.3 % (ref 4–15)
NEUTROPHILS # BLD AUTO: 6.8 K/UL (ref 1.8–7.7)
NEUTROPHILS NFR BLD: 71.3 % (ref 38–73)
NRBC BLD-RTO: 0 /100 WBC
PCO2 BLDA: 35.4 MMHG (ref 35–45)
PEEP: 5
PH SMN: 7.39 [PH] (ref 7.35–7.45)
PHOSPHATE SERPL-MCNC: 1.9 MG/DL (ref 2.7–4.5)
PLATELET # BLD AUTO: 182 K/UL (ref 150–350)
PMV BLD AUTO: 10.8 FL (ref 9.2–12.9)
PO2 BLDA: 187 MMHG (ref 80–100)
POC BE: -4 MMOL/L
POC SATURATED O2: 100 % (ref 95–100)
POC TCO2: 22 MMOL/L (ref 23–27)
POCT GLUCOSE: 101 MG/DL (ref 70–110)
POCT GLUCOSE: 124 MG/DL (ref 70–110)
POCT GLUCOSE: 132 MG/DL (ref 70–110)
POCT GLUCOSE: 145 MG/DL (ref 70–110)
POTASSIUM SERPL-SCNC: 4.3 MMOL/L (ref 3.5–5.1)
PROT SERPL-MCNC: 5.6 G/DL (ref 6–8.4)
RBC # BLD AUTO: 3.95 M/UL (ref 4.6–6.2)
SAMPLE: ABNORMAL
SITE: ABNORMAL
SODIUM SERPL-SCNC: 146 MMOL/L (ref 136–145)
SP02: 100
VT: 400
WBC # BLD AUTO: 9.56 K/UL (ref 3.9–12.7)

## 2019-11-13 PROCEDURE — 20000000 HC ICU ROOM

## 2019-11-13 PROCEDURE — 95951 PR EEG MONITORING/VIDEORECORD: CPT | Mod: 26,,, | Performed by: PSYCHIATRY & NEUROLOGY

## 2019-11-13 PROCEDURE — 97110 THERAPEUTIC EXERCISES: CPT

## 2019-11-13 PROCEDURE — 27000221 HC OXYGEN, UP TO 24 HOURS

## 2019-11-13 PROCEDURE — 95951 PR EEG MONITORING/VIDEORECORD: ICD-10-PCS | Mod: 26,,, | Performed by: PSYCHIATRY & NEUROLOGY

## 2019-11-13 PROCEDURE — 63600175 PHARM REV CODE 636 W HCPCS: Performed by: NURSE PRACTITIONER

## 2019-11-13 PROCEDURE — 37799 UNLISTED PX VASCULAR SURGERY: CPT

## 2019-11-13 PROCEDURE — 84100 ASSAY OF PHOSPHORUS: CPT

## 2019-11-13 PROCEDURE — 80053 COMPREHEN METABOLIC PANEL: CPT

## 2019-11-13 PROCEDURE — 99900026 HC AIRWAY MAINTENANCE (STAT)

## 2019-11-13 PROCEDURE — 99291 CRITICAL CARE FIRST HOUR: CPT | Mod: GC,,, | Performed by: PSYCHIATRY & NEUROLOGY

## 2019-11-13 PROCEDURE — 82803 BLOOD GASES ANY COMBINATION: CPT

## 2019-11-13 PROCEDURE — 99900035 HC TECH TIME PER 15 MIN (STAT)

## 2019-11-13 PROCEDURE — 83735 ASSAY OF MAGNESIUM: CPT

## 2019-11-13 PROCEDURE — 85025 COMPLETE CBC W/AUTO DIFF WBC: CPT

## 2019-11-13 PROCEDURE — 95951 HC EEG MONITORING/VIDEO RECORD: CPT

## 2019-11-13 PROCEDURE — 25000003 PHARM REV CODE 250: Performed by: NURSE PRACTITIONER

## 2019-11-13 PROCEDURE — 25000003 PHARM REV CODE 250: Performed by: PSYCHIATRY & NEUROLOGY

## 2019-11-13 PROCEDURE — 82550 ASSAY OF CK (CPK): CPT

## 2019-11-13 PROCEDURE — 99291 PR CRITICAL CARE, E/M 30-74 MINUTES: ICD-10-PCS | Mod: GC,,, | Performed by: PSYCHIATRY & NEUROLOGY

## 2019-11-13 PROCEDURE — 94761 N-INVAS EAR/PLS OXIMETRY MLT: CPT

## 2019-11-13 PROCEDURE — 94003 VENT MGMT INPAT SUBQ DAY: CPT

## 2019-11-13 RX ADMIN — FAMOTIDINE 20 MG: 20 TABLET ORAL at 08:11

## 2019-11-13 RX ADMIN — POTASSIUM & SODIUM PHOSPHATES POWDER PACK 280-160-250 MG 2 PACKET: 280-160-250 PACK at 06:11

## 2019-11-13 RX ADMIN — SENNOSIDES AND DOCUSATE SODIUM 1 TABLET: 8.6; 5 TABLET ORAL at 08:11

## 2019-11-13 RX ADMIN — PIPERACILLIN AND TAZOBACTAM 4.5 G: 4; .5 INJECTION, POWDER, FOR SOLUTION INTRAVENOUS at 04:11

## 2019-11-13 RX ADMIN — Medication 800 MG: at 06:11

## 2019-11-13 RX ADMIN — LEVETIRACETAM 1000 MG: 10 INJECTION INTRAVENOUS at 08:11

## 2019-11-13 RX ADMIN — CHLORHEXIDINE GLUCONATE 0.12% ORAL RINSE 15 ML: 1.2 LIQUID ORAL at 08:11

## 2019-11-13 RX ADMIN — HEPARIN SODIUM 5000 UNITS: 5000 INJECTION, SOLUTION INTRAVENOUS; SUBCUTANEOUS at 06:11

## 2019-11-13 RX ADMIN — PIPERACILLIN AND TAZOBACTAM 4.5 G: 4; .5 INJECTION, POWDER, FOR SOLUTION INTRAVENOUS at 12:11

## 2019-11-13 RX ADMIN — POTASSIUM & SODIUM PHOSPHATES POWDER PACK 280-160-250 MG 2 PACKET: 280-160-250 PACK at 09:11

## 2019-11-13 RX ADMIN — HEPARIN SODIUM 5000 UNITS: 5000 INJECTION, SOLUTION INTRAVENOUS; SUBCUTANEOUS at 09:11

## 2019-11-13 RX ADMIN — Medication 800 MG: at 09:11

## 2019-11-13 RX ADMIN — CHLORHEXIDINE GLUCONATE 0.12% ORAL RINSE 15 ML: 1.2 LIQUID ORAL at 09:11

## 2019-11-13 RX ADMIN — POTASSIUM & SODIUM PHOSPHATES POWDER PACK 280-160-250 MG 2 PACKET: 280-160-250 PACK at 02:11

## 2019-11-13 RX ADMIN — HEPARIN SODIUM 5000 UNITS: 5000 INJECTION, SOLUTION INTRAVENOUS; SUBCUTANEOUS at 01:11

## 2019-11-13 RX ADMIN — ACETAMINOPHEN 650 MG: 325 TABLET ORAL at 07:11

## 2019-11-13 RX ADMIN — PIPERACILLIN AND TAZOBACTAM 4.5 G: 4; .5 INJECTION, POWDER, FOR SOLUTION INTRAVENOUS at 08:11

## 2019-11-13 NOTE — SUBJECTIVE & OBJECTIVE
Interval History:    Continue keppra  Wean vent  Continue abx   q6  DC bess  Weaning ETT    Review of Systems  Unable to obtain a complete ROS due to level of consciousness.  Objective:     Vitals:  Temp: 99.3 °F (37.4 °C)  Pulse: 79  Rhythm: normal sinus rhythm  BP: (!) 145/81  MAP (mmHg): 108  Resp: (!) 21  SpO2: 99 %  Oxygen Concentration (%): 56  O2 Device (Oxygen Therapy): ventilator  Vent Mode: Spont  Set Rate: 16 bmp  Vt Set: 400 mL  Pressure Support: 5 cmH20  PEEP/CPAP: 5 cmH20  Peak Airway Pressure: 11 cmH2O  Mean Airway Pressure: 7.4 cmH20  Plateau Pressure: 14 cmH20    Temp  Min: 99.1 °F (37.3 °C)  Max: 99.6 °F (37.6 °C)  Pulse  Min: 66  Max: 90  BP  Min: 104/68  Max: 166/96  MAP (mmHg)  Min: 81  Max: 125  Resp  Min: 18  Max: 22  SpO2  Min: 96 %  Max: 100 %  Oxygen Concentration (%)  Min: 39  Max: 96    11/12 0701 - 11/13 0700  In: 4673.5 [I.V.:2863.5]  Out: 1570 [Urine:1570]   Unmeasured Output  Stool Occurrence: 1       Physical Exam  Constitutional: Cachectic. Well-developed.  Eyes: Conjunctiva clear, anicteric. Lids no lesions.  Head/Ears/Nose/Mouth/Throat/Neck: Dry mucous membranes. External ears, nose atraumatic. Bruising to Armando. Cheeks. Lac to R leg.  Cardiovascular: Regular rhythm. No leg edema.  Respiratory: Intubated, mechanical breath sounds  Gastrointestinal: No hernia. Soft, nondistended, nontender. + bowel sounds.  Neurologic:  -GCS E 4 V 1 M 4  -Somnolent. Eyes open spontaneously. Following commands.  -Cranial nerves: EOM intact, PERRL, no facial droop, +cough  -Motor: Withdraws in all 4  Unable to test orientation, language, fund of knowledge, hearing, shoulder shrug, tongue protrusion, coordination, gait due to level of consciousness. Although expresses understanding of current plan via head nods.    Medications:  Continuous  lactated ringers Last Rate: 125 mL/hr at 11/13/19 1605   Scheduled  chlorhexidine 15 mL BID   famotidine 20 mg BID   heparin (porcine) 5,000 Units Q8H    levetiracetam IVPB 1,000 mg Q12H   piperacillin-tazobactam (ZOSYN) IVPB 4.5 g Q8H   senna-docusate 8.6-50 mg 1 tablet BID   PRN  acetaminophen 650 mg Q4H PRN   fentaNYL 50 mcg Q2H PRN   glucagon (human recombinant) 1 mg PRN   insulin aspart U-100 1-10 Units Q4H PRN   magnesium oxide 800 mg PRN   magnesium oxide 800 mg PRN   ondansetron 4 mg Q6H PRN   potassium chloride 10% 40 mEq PRN   potassium chloride 10% 40 mEq PRN   potassium chloride 10% 40 mEq PRN   potassium, sodium phosphates 2 packet PRN   potassium, sodium phosphates 2 packet PRN   potassium, sodium phosphates 2 packet PRN   sodium chloride 0.9% 10 mL PRN     Today I personally reviewed pertinent medications, lines/drains/airways, imaging, cardiology results, laboratory results, microbiology results, notably:    Diet  Diet NPO  Diet NPO

## 2019-11-13 NOTE — ASSESSMENT & PLAN NOTE
Hx of Seizures, CHI  -Loaded with Keppra in the ED  -cEEG negative  - dc'd propofol  - cont keppra 1g BID for ppx  - Clinically improving; stable.

## 2019-11-13 NOTE — ASSESSMENT & PLAN NOTE
BCx w/ Proteus; pending susceptibilities  Pending rpt cx; leukocytosis improving; Afebrile  Zosyn  Monitor Cx's & Fever curve

## 2019-11-13 NOTE — PLAN OF CARE
POC reviewed with pt and niece. No acute events overnight. Cardene titrated off at 1900 SBP remained below 180 without medical management through the night. Pt neuro status improved with eyes opening to speech, has not begun to follow commands.**0500 neuro exam pt began to follow basic commands on L upper extremity, no movement on R upper and bilateral lower. Pt progressing toward goals. Will continue to monitor. See flowsheets for full assessment and VS info

## 2019-11-13 NOTE — PROGRESS NOTES
[facility]  Neurocritical Care  Progress Note    Admit Date: 11/11/2019  Service Date: 11/13/2019  Length of Stay: 2    Subjective:     Chief Complaint: Altered mental status    History of Present Illness: Mr Lopez is a 69 yr old male with a PMH of CVA, seizures, CHI, DM II, dementia, HTN, who presents to the ED with AMS. Patient was last heard from by a friend on Friday 11/8 (3 days ago). Found down at home 11/11, incontinent of urine and bowel. CTH negative. MRI c-spine & brain w/o acute findings. Mild leukocytosis, UA positive for UTI. cEEG suppressed. Patient intubated in the ED for airway protection. Admitted to [facility] for higher level of care.     Hospital Course: 11/11/2019: Intubated in ED. Admit to [facility].  11/12/2019: Switched Ceftriaxone to Zosyn, rechecking BCx, IVF @ 150cc/hr w/ 1L bolus; Tube feeds & dvt ppx initiated. EEG suppressed, propofol disctoninued & fentanyl 50mcg q2hr added.  11/13/2019: Steadily improving since presentation. Continue Keppra & Abx, added 200cc q6 FW to feeds, & dc'd vent.      Interval History:    Continue keppra  Wean vent  Continue abx   q6  DC bess  Weaning ETT    Review of Systems  Unable to obtain a complete ROS due to level of consciousness.  Objective:     Vitals:  Temp: 99.3 °F (37.4 °C)  Pulse: 79  Rhythm: normal sinus rhythm  BP: (!) 145/81  MAP (mmHg): 108  Resp: (!) 21  SpO2: 99 %  Oxygen Concentration (%): 56  O2 Device (Oxygen Therapy): ventilator  Vent Mode: Spont  Set Rate: 16 bmp  Vt Set: 400 mL  Pressure Support: 5 cmH20  PEEP/CPAP: 5 cmH20  Peak Airway Pressure: 11 cmH2O  Mean Airway Pressure: 7.4 cmH20  Plateau Pressure: 14 cmH20    Temp  Min: 99.1 °F (37.3 °C)  Max: 99.6 °F (37.6 °C)  Pulse  Min: 66  Max: 90  BP  Min: 104/68  Max: 166/96  MAP (mmHg)  Min: 81  Max: 125  Resp  Min: 18  Max: 22  SpO2  Min: 96 %  Max: 100 %  Oxygen Concentration (%)  Min: 39  Max: 96    11/12 0701 - 11/13 0700  In: 4673.5 [I.V.:2863.5]  Out: 1570  [Urine:1570]   Unmeasured Output  Stool Occurrence: 1       Physical Exam  Constitutional: Cachectic. Well-developed.  Eyes: Conjunctiva clear, anicteric. Lids no lesions.  Head/Ears/Nose/Mouth/Throat/Neck: Dry mucous membranes. External ears, nose atraumatic. Bruising to Armando. Cheeks. Lac to R leg.  Cardiovascular: Regular rhythm. No leg edema.  Respiratory: Intubated, mechanical breath sounds  Gastrointestinal: No hernia. Soft, nondistended, nontender. + bowel sounds.  Neurologic:  -GCS E 4 V 1 M 4  -Somnolent. Eyes open spontaneously. Following commands.  -Cranial nerves: EOM intact, PERRL, no facial droop, +cough  -Motor: Withdraws in all 4  Unable to test orientation, language, fund of knowledge, hearing, shoulder shrug, tongue protrusion, coordination, gait due to level of consciousness. Although expresses understanding of current plan via head nods.    Medications:  Continuous  lactated ringers Last Rate: 125 mL/hr at 11/13/19 1605   Scheduled  chlorhexidine 15 mL BID   famotidine 20 mg BID   heparin (porcine) 5,000 Units Q8H   levetiracetam IVPB 1,000 mg Q12H   piperacillin-tazobactam (ZOSYN) IVPB 4.5 g Q8H   senna-docusate 8.6-50 mg 1 tablet BID   PRN  acetaminophen 650 mg Q4H PRN   fentaNYL 50 mcg Q2H PRN   glucagon (human recombinant) 1 mg PRN   insulin aspart U-100 1-10 Units Q4H PRN   magnesium oxide 800 mg PRN   magnesium oxide 800 mg PRN   ondansetron 4 mg Q6H PRN   potassium chloride 10% 40 mEq PRN   potassium chloride 10% 40 mEq PRN   potassium chloride 10% 40 mEq PRN   potassium, sodium phosphates 2 packet PRN   potassium, sodium phosphates 2 packet PRN   potassium, sodium phosphates 2 packet PRN   sodium chloride 0.9% 10 mL PRN     Today I personally reviewed pertinent medications, lines/drains/airways, imaging, cardiology results, laboratory results, microbiology results, notably:    Diet  Diet NPO  Diet NPO      Assessment/Plan:     Neuro  * Altered mental status  Hx of frequent falls,  UTI  Patient found down at home, incontinent of urine/feces 11/11  LKN 11/8  CTH negative    -Admit to [facility]  -Hourly neuro checks, Hourly vital signs  -MRI brain and c-spine wo contrast pending  -cEEG negative  -BCx w/ GNR; pending rpt BCx  -UA concerning for infectious etiology, BCx w/ Proteus - On zosyn  -PT/OT/SLP  -Monitor for Fever curve; consider adding gram + if cultures present      Seizure disorder  Hx of Seizures, CHI  -Loaded with Keppra in the ED  -cEEG negative  - dc'd propofol  - cont keppra 1g BID for ppx  - Clinically improving; stable.    Cardiac/Vascular  Hypertension  SBP Goal < 180    Renal/  UTI (urinary tract infection)  Hx of UTIs, previous urine culture pos. for klebsiella  UA with many bacteria, UCx w/ GNR; BCx w/ Proteus  On zosyn  Fever curve improving    ID  Bacteremia  BCx w/ Proteus; pending susceptibilities  Pending rpt cx; leukocytosis improving; Afebrile  Zosyn  Monitor Cx's & Fever curve    Oncology  Leukocytosis  Elevated WBC  See AMS    Endocrine  Type 2 diabetes mellitus  Hgb A1c 6.4  SSI w/ accuchecks          The patient is being Prophylaxed for:  Venous Thromboembolism with: Chemical  Stress Ulcer with: H2B  Ventilator Pneumonia with: chlorhexidine oral care    Activity Orders          Diet NPO: NPO starting at 11/11 1459    Commode at bedside starting at 11/11 1459        Full Code    Marco Ireland MD  Neurocritical Care  [facility]

## 2019-11-13 NOTE — ASSESSMENT & PLAN NOTE
Hx of frequent falls, UTI  Patient found down at home, incontinent of urine/feces 11/11  LKN 11/8  CTH negative    -Admit to [facility]  -Hourly neuro checks, Hourly vital signs  -MRI brain and c-spine wo contrast pending  -cEEG negative  -BCx w/ GNR; pending rpt BCx  -UA concerning for infectious etiology, BCx w/ Proteus - On zosyn  -PT/OT/SLP  -Monitor for Fever curve; consider adding gram + if cultures present

## 2019-11-13 NOTE — ASSESSMENT & PLAN NOTE
Hx of UTIs, previous urine culture pos. for klebsiella  UA with many bacteria, UCx w/ GNR; BCx w/ Proteus  On zosyn  Fever curve improving

## 2019-11-13 NOTE — PLAN OF CARE
POC reviewed with pt and family. Pt unable to verbalize understanding. Obrien d/c. Vent setting switched from a/c to spont. Questions and concerns addressed. No acute events today. Pt progressing toward goals. Will continue to monitor. See flowsheets for full assessment and VS info.

## 2019-11-13 NOTE — PLAN OF CARE
Goals remain appropriate.  NOHEMI De  11/13/2019    Problem: Occupational Therapy Goal  Goal: Occupational Therapy Goal  Description  Goals set 11/12 to be addressed for 14 days with expiration date, 11/26  Patient will increase functional independence with ADLs by performing:    Patient will demonstrate rolling to the right with mod assist.  Not met   Patient will demonstrate rolling to the left with mod assist.   Not met  Patient will demonstrate supine -sit with mod  assist.   Not met  Patient will demonstrate stand pivot transfers with max assist.   Not met  Patient will demonstrate grooming while seated with max assist.   Not met  Patient will demonstrate upper body dressing with max assist while seated EOB.   Not met  Patient will demonstrate lower body dressing with max assist while seated EOB.   Not met  Patient will demonstrate toileting with max assist.   Not met  Patient will demonstrate bathing while seated EOB with max assist.   Not met  Patient will demonstrate ability to follow 3/5 commands.   Not met  Patient's family / caregiver will demonstrate independence and safety with assisting patient with self-care skills and functional mobility.     Not met  Patient's family / caregiver will demonstrate independence with providing ROM and changes in bed positioning.   Not met           Outcome: Ongoing, Progressing

## 2019-11-13 NOTE — PLAN OF CARE
SW advised by RN that Pt niece and Pt home worker at bedside and they have questions.     SW met with Pt niece and Pt home worker at bedside. Addressed questions about dc planning. Home worker reported Pt cannot be out of the home for more then two weeks. She will talk with the  about the possibility of extending that based on when Pt is able to get recs or a better idea of what will be needed at dc.     Zahraa Mixon, RAHAT  Neurocritical Care   [facility]  [id number]

## 2019-11-13 NOTE — PT/OT/SLP PROGRESS
Occupational Therapy   Treatment    Name: Edwin Lopez Jr.  MRN: [id number]  Admitting Diagnosis:  Altered mental status       Recommendations:     Discharge Recommendations: (pending extubation)  Discharge Equipment Recommendations:  (pending extubation)  Barriers to discharge:  Decreased caregiver support, Inaccessible home environment    Assessment:     Edwin Lopez Jr. is a 69 y.o. male with a medical diagnosis of Altered mental status.  He presents with performance deficits affecting function are weakness, impaired self care skills, impaired balance, decreased coordination, decreased safety awareness, decreased ROM, impaired coordination, decreased upper extremity function, impaired functional mobilty, impaired endurance, impaired sensation, gait instability, impaired cognition, decreased lower extremity function, impaired fine motor, impaired cardiopulmonary response to activity. Improvements noted with alertness.    Rehab Prognosis:  Good; patient would benefit from acute skilled OT services to address these deficits and reach maximum level of function.       Plan:     Patient to be seen 3 x/week to address the above listed problems via self-care/home management, neuromuscular re-education, cognitive retraining, therapeutic activities, therapeutic exercises, sensory integration  · Plan of Care Expires: 12/10/19  · Plan of Care Reviewed with: patient    Subjective   Patient:  Nonverbal; intubated  Pain/Comfort:  · Pain Rating 1: 0/10  · Pain Rating Post-Intervention 1: 0/10    Objective:     Communicated with: Nurse prior to session.  Patient found supine with ventilator, telemetry, SCD, pulse ox (continuous), peripheral IV, pressure relief boots, bess catheter, EEG, blood pressure cuff, bed alarm, arterial line(OG tube) upon OT entry to room.    General Precautions: Standard, aspiration, fall, NPO, seizure   Orthopedic Precautions:N/A   Braces: N/A   Social History:  Per chart review, patient resides alone in  one story home with no steps to enter.  2 months ago, patient was discharged from the hospital to a NH and then one month ago, was discharged from the NH to home.  Niece checks in on patient daily.  Patient has 13 children; niece is in contact with 3 of them.  DME:  Cane, std walker, wheelchair.  Occupational Performance:     Bed Mobility:    · Patient completed Rolling/Turning to Left with  total assistance  · Patient completed Rolling/Turning to Right with total assistance     Functional Mobility/Transfers:  · Dependent drawsheet transfers    Activities of Daily Living:  · Feeding:  NPO    · Grooming: total assistance while supine      [facility] 6 Click ADL: 6    Treatment & Education:  Patient education provided on role of OT and need for continued OT upon discharge.   Continued education, patient/ family training recommended.  Daily orientation provided.  PROM performed bilateral UE/LEs one set x 10 rep in all planes of motion with stretches provided at end range; sustained stretch provided for ankle dorsiflexion.  Assistance and facilitation provided for upward rotation of the scapula during shoulder flexion and abduction.  Patient kept eyes open 100% of the session.  Patient unable to follow commands.  Positioning provided for midline orientation with bilateral UEs elevated and heels lifted off mattress.  Gentle cervical rotation provided.   Family not present during the session.      Patient left supine with all lines intact, call button in reach and bed alarm onEducation:      GOALS:   Multidisciplinary Problems     Occupational Therapy Goals        Problem: Occupational Therapy Goal    Goal Priority Disciplines Outcome Interventions   Occupational Therapy Goal     OT, PT/OT Ongoing, Progressing    Description:  Goals set 11/12 to be addressed for 14 days with expiration date, 11/26  Patient will increase functional independence with ADLs by performing:    Patient will demonstrate rolling to the right with mod  assist.  Not met   Patient will demonstrate rolling to the left with mod assist.   Not met  Patient will demonstrate supine -sit with mod  assist.   Not met  Patient will demonstrate stand pivot transfers with max assist.   Not met  Patient will demonstrate grooming while seated with max assist.   Not met  Patient will demonstrate upper body dressing with max assist while seated EOB.   Not met  Patient will demonstrate lower body dressing with max assist while seated EOB.   Not met  Patient will demonstrate toileting with max assist.   Not met  Patient will demonstrate bathing while seated EOB with max assist.   Not met  Patient will demonstrate ability to follow 3/5 commands.   Not met  Patient's family / caregiver will demonstrate independence and safety with assisting patient with self-care skills and functional mobility.     Not met  Patient's family / caregiver will demonstrate independence with providing ROM and changes in bed positioning.   Not met                            Time Tracking:     OT Date of Treatment: 11/13/19  OT Start Time: 0400  OT Stop Time: 0423  OT Total Time (min): 23 min    Billable Minutes:Therapeutic Exercise 23    NOHEMI De  11/13/2019

## 2019-11-14 LAB
ALBUMIN SERPL BCP-MCNC: 2.3 G/DL (ref 3.5–5.2)
ALLENS TEST: ABNORMAL
ALP SERPL-CCNC: 66 U/L (ref 55–135)
ALT SERPL W/O P-5'-P-CCNC: 35 U/L (ref 10–44)
ANION GAP SERPL CALC-SCNC: 5 MMOL/L (ref 8–16)
AST SERPL-CCNC: 65 U/L (ref 10–40)
BACTERIA BLD CULT: ABNORMAL
BACTERIA SPEC AEROBE CULT: NORMAL
BACTERIA SPEC AEROBE CULT: NORMAL
BASOPHILS # BLD AUTO: 0.03 K/UL (ref 0–0.2)
BASOPHILS NFR BLD: 0.4 % (ref 0–1.9)
BILIRUB SERPL-MCNC: 0.7 MG/DL (ref 0.1–1)
BUN SERPL-MCNC: 10 MG/DL (ref 8–23)
CALCIUM SERPL-MCNC: 8 MG/DL (ref 8.7–10.5)
CHLORIDE SERPL-SCNC: 113 MMOL/L (ref 95–110)
CK SERPL-CCNC: 1772 U/L (ref 20–200)
CO2 SERPL-SCNC: 28 MMOL/L (ref 23–29)
CREAT SERPL-MCNC: 0.8 MG/DL (ref 0.5–1.4)
DELSYS: ABNORMAL
DIFFERENTIAL METHOD: ABNORMAL
EOSINOPHIL # BLD AUTO: 0.2 K/UL (ref 0–0.5)
EOSINOPHIL NFR BLD: 2.6 % (ref 0–8)
ERYTHROCYTE [DISTWIDTH] IN BLOOD BY AUTOMATED COUNT: 13.3 % (ref 11.5–14.5)
EST. GFR  (AFRICAN AMERICAN): >60 ML/MIN/1.73 M^2
EST. GFR  (NON AFRICAN AMERICAN): >60 ML/MIN/1.73 M^2
FIO2: 40
GLUCOSE SERPL-MCNC: 139 MG/DL (ref 70–110)
GRAM STN SPEC: NORMAL
HCO3 UR-SCNC: 27.1 MMOL/L (ref 24–28)
HCT VFR BLD AUTO: 32.2 % (ref 40–54)
HGB BLD-MCNC: 9.6 G/DL (ref 14–18)
IMM GRANULOCYTES # BLD AUTO: 0.02 K/UL (ref 0–0.04)
IMM GRANULOCYTES NFR BLD AUTO: 0.2 % (ref 0–0.5)
LYMPHOCYTES # BLD AUTO: 2.2 K/UL (ref 1–4.8)
LYMPHOCYTES NFR BLD: 25.7 % (ref 18–48)
MAGNESIUM SERPL-MCNC: 1.5 MG/DL (ref 1.6–2.6)
MCH RBC QN AUTO: 25.4 PG (ref 27–31)
MCHC RBC AUTO-ENTMCNC: 29.8 G/DL (ref 32–36)
MCV RBC AUTO: 85 FL (ref 82–98)
MODE: ABNORMAL
MONOCYTES # BLD AUTO: 0.6 K/UL (ref 0.3–1)
MONOCYTES NFR BLD: 7.4 % (ref 4–15)
NEUTROPHILS # BLD AUTO: 5.4 K/UL (ref 1.8–7.7)
NEUTROPHILS NFR BLD: 63.7 % (ref 38–73)
NRBC BLD-RTO: 0 /100 WBC
PCO2 BLDA: 40.4 MMHG (ref 35–45)
PH SMN: 7.43 [PH] (ref 7.35–7.45)
PHOSPHATE SERPL-MCNC: 2.1 MG/DL (ref 2.7–4.5)
PLATELET # BLD AUTO: 183 K/UL (ref 150–350)
PMV BLD AUTO: 10.9 FL (ref 9.2–12.9)
PO2 BLDA: 196 MMHG (ref 80–100)
POC BE: 3 MMOL/L
POC SATURATED O2: 100 % (ref 95–100)
POC TCO2: 28 MMOL/L (ref 23–27)
POCT GLUCOSE: 103 MG/DL (ref 70–110)
POCT GLUCOSE: 115 MG/DL (ref 70–110)
POCT GLUCOSE: 126 MG/DL (ref 70–110)
POCT GLUCOSE: 148 MG/DL (ref 70–110)
POCT GLUCOSE: 149 MG/DL (ref 70–110)
POTASSIUM SERPL-SCNC: 3.9 MMOL/L (ref 3.5–5.1)
PROT SERPL-MCNC: 5.5 G/DL (ref 6–8.4)
PS: 5
RBC # BLD AUTO: 3.78 M/UL (ref 4.6–6.2)
SAMPLE: ABNORMAL
SITE: ABNORMAL
SODIUM SERPL-SCNC: 146 MMOL/L (ref 136–145)
SP02: 100
WBC # BLD AUTO: 8.41 K/UL (ref 3.9–12.7)

## 2019-11-14 PROCEDURE — 99900035 HC TECH TIME PER 15 MIN (STAT)

## 2019-11-14 PROCEDURE — 25000003 PHARM REV CODE 250: Performed by: STUDENT IN AN ORGANIZED HEALTH CARE EDUCATION/TRAINING PROGRAM

## 2019-11-14 PROCEDURE — 94010 BREATHING CAPACITY TEST: CPT

## 2019-11-14 PROCEDURE — 94150 VITAL CAPACITY TEST: CPT

## 2019-11-14 PROCEDURE — 95951 PR EEG MONITORING/VIDEORECORD: CPT | Mod: 26,52,, | Performed by: PSYCHIATRY & NEUROLOGY

## 2019-11-14 PROCEDURE — 85025 COMPLETE CBC W/AUTO DIFF WBC: CPT

## 2019-11-14 PROCEDURE — 82803 BLOOD GASES ANY COMBINATION: CPT

## 2019-11-14 PROCEDURE — 25000003 PHARM REV CODE 250: Performed by: NURSE PRACTITIONER

## 2019-11-14 PROCEDURE — 82550 ASSAY OF CK (CPK): CPT

## 2019-11-14 PROCEDURE — 94761 N-INVAS EAR/PLS OXIMETRY MLT: CPT

## 2019-11-14 PROCEDURE — 83735 ASSAY OF MAGNESIUM: CPT

## 2019-11-14 PROCEDURE — 95951 PR EEG MONITORING/VIDEORECORD: ICD-10-PCS | Mod: 26,52,, | Performed by: PSYCHIATRY & NEUROLOGY

## 2019-11-14 PROCEDURE — 94003 VENT MGMT INPAT SUBQ DAY: CPT

## 2019-11-14 PROCEDURE — 20000000 HC ICU ROOM

## 2019-11-14 PROCEDURE — 99233 SBSQ HOSP IP/OBS HIGH 50: CPT | Mod: GC,,, | Performed by: PSYCHIATRY & NEUROLOGY

## 2019-11-14 PROCEDURE — 80053 COMPREHEN METABOLIC PANEL: CPT

## 2019-11-14 PROCEDURE — 27000221 HC OXYGEN, UP TO 24 HOURS

## 2019-11-14 PROCEDURE — 99900026 HC AIRWAY MAINTENANCE (STAT)

## 2019-11-14 PROCEDURE — 99233 PR SUBSEQUENT HOSPITAL CARE,LEVL III: ICD-10-PCS | Mod: GC,,, | Performed by: PSYCHIATRY & NEUROLOGY

## 2019-11-14 PROCEDURE — 84100 ASSAY OF PHOSPHORUS: CPT

## 2019-11-14 PROCEDURE — 37799 UNLISTED PX VASCULAR SURGERY: CPT

## 2019-11-14 PROCEDURE — 63600175 PHARM REV CODE 636 W HCPCS: Performed by: NURSE PRACTITIONER

## 2019-11-14 RX ORDER — ATORVASTATIN CALCIUM 20 MG/1
40 TABLET, FILM COATED ORAL NIGHTLY
Status: DISCONTINUED | OUTPATIENT
Start: 2019-11-14 | End: 2019-11-18

## 2019-11-14 RX ORDER — NAPROXEN SODIUM 220 MG/1
81 TABLET, FILM COATED ORAL DAILY
Status: DISCONTINUED | OUTPATIENT
Start: 2019-11-15 | End: 2019-11-18

## 2019-11-14 RX ADMIN — PIPERACILLIN AND TAZOBACTAM 4.5 G: 4; .5 INJECTION, POWDER, FOR SOLUTION INTRAVENOUS at 12:11

## 2019-11-14 RX ADMIN — HEPARIN SODIUM 5000 UNITS: 5000 INJECTION, SOLUTION INTRAVENOUS; SUBCUTANEOUS at 09:11

## 2019-11-14 RX ADMIN — POTASSIUM & SODIUM PHOSPHATES POWDER PACK 280-160-250 MG 2 PACKET: 280-160-250 PACK at 05:11

## 2019-11-14 RX ADMIN — LEVETIRACETAM 1000 MG: 10 INJECTION INTRAVENOUS at 08:11

## 2019-11-14 RX ADMIN — ATORVASTATIN CALCIUM 40 MG: 20 TABLET, FILM COATED ORAL at 08:11

## 2019-11-14 RX ADMIN — LEVETIRACETAM 1000 MG: 10 INJECTION INTRAVENOUS at 09:11

## 2019-11-14 RX ADMIN — POTASSIUM & SODIUM PHOSPHATES POWDER PACK 280-160-250 MG 2 PACKET: 280-160-250 PACK at 10:11

## 2019-11-14 RX ADMIN — HEPARIN SODIUM 5000 UNITS: 5000 INJECTION, SOLUTION INTRAVENOUS; SUBCUTANEOUS at 02:11

## 2019-11-14 RX ADMIN — HEPARIN SODIUM 5000 UNITS: 5000 INJECTION, SOLUTION INTRAVENOUS; SUBCUTANEOUS at 05:11

## 2019-11-14 RX ADMIN — PIPERACILLIN AND TAZOBACTAM 4.5 G: 4; .5 INJECTION, POWDER, FOR SOLUTION INTRAVENOUS at 04:11

## 2019-11-14 RX ADMIN — POTASSIUM & SODIUM PHOSPHATES POWDER PACK 280-160-250 MG 2 PACKET: 280-160-250 PACK at 06:11

## 2019-11-14 RX ADMIN — PIPERACILLIN AND TAZOBACTAM 4.5 G: 4; .5 INJECTION, POWDER, FOR SOLUTION INTRAVENOUS at 08:11

## 2019-11-14 RX ADMIN — Medication 800 MG: at 05:11

## 2019-11-14 RX ADMIN — Medication 800 MG: at 10:11

## 2019-11-14 NOTE — PLAN OF CARE
POC reviewed with pt at 0500. Pt seemed to understand with nonverbal communication.  No acute events overnight. Pt progressing toward goals, PT maintained functional resp status overnight tube feedings paused at 3am in anticipation of extubation. Will continue to monitor. See flowsheets for full assessment and VS info

## 2019-11-14 NOTE — ASSESSMENT & PLAN NOTE
BCx w/ Proteus; pending susceptibilities  Pending rpt cx; leukocytosis improving; Afebrile  Pending cx sensitivities  Zosyn  Monitor Cx's & Fever curve

## 2019-11-14 NOTE — ASSESSMENT & PLAN NOTE
Hx of UTIs, previous urine culture pos. for klebsiella  UA with many bacteria, UCx w/ Ecoli; BCx w/ Proteus  On zosyn; pending Cx sensitivities   Fever curve improving

## 2019-11-14 NOTE — PT/OT/SLP PROGRESS
Physical Therapy      Patient Name:  Edwin Lopez Jr.   MRN:  [id number]    Patient not seen today secondary to-- pt remains intubated, awaiting potential extubation today. Will follow-up pending extubation as patient is medically appropriate to mobilize OOB. Patient appropriate for one therapy discipline at this time (OT following).      Nellie Lee, PT   11/14/2019

## 2019-11-14 NOTE — ASSESSMENT & PLAN NOTE
Hx of Seizures, CHI  -Loaded with Keppra in the ED  -cEEG negative; dc today  - dc'd propofol  - cont keppra 1g BID for ppx  - Clinically improving; stable.

## 2019-11-14 NOTE — SUBJECTIVE & OBJECTIVE
Interval History:   FW to 200 q6  Extubation parameters  Continue abx - f/u sensitivities   Resume statin  Secondary prevention with asa     Review of Systems  Unable to obtain a complete ROS due to intubation  Objective:     Vitals:  Temp: 98.7 °F (37.1 °C)  Pulse: 60  Rhythm: normal sinus rhythm  BP: (!) 175/86  MAP (mmHg): 121  Resp: 16  SpO2: 100 %  Oxygen Concentration (%): 39  O2 Device (Oxygen Therapy): ventilator  Vent Mode: Spont  Pressure Support: 5 cmH20  PEEP/CPAP: 5 cmH20  Peak Airway Pressure: 10 cmH2O  Mean Airway Pressure: 7 cmH20  Plateau Pressure: 14 cmH20    Temp  Min: 98.7 °F (37.1 °C)  Max: 100.5 °F (38.1 °C)  Pulse  Min: 54  Max: 79  BP  Min: 130/80  Max: 175/86  MAP (mmHg)  Min: 101  Max: 121  Resp  Min: 15  Max: 22  SpO2  Min: 99 %  Max: 100 %  Oxygen Concentration (%)  Min: 39  Max: 96    11/13 0701 - 11/14 0700  In: 4680 [I.V.:3370]  Out: 1495 [Urine:1495]   Unmeasured Output  Stool Occurrence: 1       Physical Exam  Constitutional: Cachectic. Well-developed.  Eyes: Conjunctiva clear, anicteric. Lids no lesions.  Head/Ears/Nose/Mouth/Throat/Neck: Dry mucous membranes. External ears, nose atraumatic. Bruising to Armando. Cheeks. Lac to R leg.  Cardiovascular: Regular rhythm. No leg edema.  Respiratory: Intubated, mechanical breath sounds  Gastrointestinal: No hernia. Soft, nondistended, nontender. + bowel sounds.  Neurologic:  -GCS E 4 V 1 M 5  -Eyes open spontaneously. Following commands in all 4 ext.  -Cranial nerves: EOM intact, PERRL, no facial droop.  -Motor: Withdraws in all 4  Unable to test orientation, language, fund of knowledge, hearing, shoulder shrug, tongue protrusion, coordination, gait due to level of consciousness. Although expresses understanding of current plan via head nods.    Medications:  Continuous  lactated ringers Last Rate: 75 mL/hr at 11/14/19 0912   Scheduled  [START ON 11/15/2019] aspirin 81 mg Daily   atorvastatin 40 mg QHS   heparin (porcine) 5,000 Units Q8H    levetiracetam IVPB 1,000 mg Q12H   piperacillin-tazobactam (ZOSYN) IVPB 4.5 g Q8H   senna-docusate 8.6-50 mg 1 tablet BID   PRN  acetaminophen 650 mg Q4H PRN   fentaNYL 50 mcg Q2H PRN   glucagon (human recombinant) 1 mg PRN   insulin aspart U-100 1-10 Units Q4H PRN   magnesium oxide 800 mg PRN   magnesium oxide 800 mg PRN   ondansetron 4 mg Q6H PRN   potassium chloride 10% 40 mEq PRN   potassium chloride 10% 40 mEq PRN   potassium chloride 10% 40 mEq PRN   potassium, sodium phosphates 2 packet PRN   potassium, sodium phosphates 2 packet PRN   potassium, sodium phosphates 2 packet PRN   sodium chloride 0.9% 10 mL PRN     Today I personally reviewed pertinent medications, lines/drains/airways, imaging, cardiology results, laboratory results, microbiology results, notably:    Diet  Diet NPO  Diet NPO

## 2019-11-14 NOTE — PLAN OF CARE
FW to 200 q6  Extubation parameters  Continue abx - f/u sensitivities   Resume statin  Secondary prevention with asa

## 2019-11-14 NOTE — PROGRESS NOTES
"[facility]  Adult Nutrition  Progress Note    SUMMARY       Recommendations    Recommendation/Intervention:   1. If unable to advance diet s/p extubation, recommend restating TF.   Glucerna 1.5 @ goal rate 45mL/hr.   - Provides 1620kcals, 89g protein and 820mL free water.     2. If able to extubate and advance diet, recommend Diabetic with texture per SLP recommendations.     RD to monitor.    Goals: Pt to tolerate >85% EEN and EPN by RD follow up  Nutrition Goal Status: goal met  Communication of RD Recs: reviewed with RN    Reason for Assessment    Reason For Assessment: RD follow-up  Diagnosis: other (see comments)(AMS)  Relevant Medical History: CVA, seizures, T2DM, dementia  Interdisciplinary Rounds: attended  General Information Comments: Pt remains intubated. TF held this morning for possible extubation. No family at bedside, no wt hx in chart. NFPE completed 11/12- noted pt with moderate to severe wasting in temporals, clavicles, triceps, SHINE calves 2/2 boots and BP cuff and nourished in hands. Unable to confirm criteria for malnutrition at this time.  Nutrition Discharge Planning: unable to determine at this time    Nutrition Risk Screen    Nutrition Risk Screen: tube feeding or parenteral nutrition    Nutrition/Diet History    Spiritual, Cultural Beliefs, Amish Practices, Values that Affect Care: no  Factors Affecting Nutritional Intake: NPO, on mechanical ventilation    Anthropometrics    Temp: 98.8 °F (37.1 °C)  Height: 5' 6" (167.6 cm)  Height (inches): 66 in  Weight Method: Bed Scale  Weight: 64 kg (141 lb)  Weight (lb): 141 lb  Ideal Body Weight (IBW), Male: 142 lb  % Ideal Body Weight, Male (lb): 99.3 lb  BMI (Calculated): 22.8  BMI Grade: 18.5-24.9 - normal       Lab/Procedures/Meds    Pertinent Labs Reviewed: reviewed  Pertinent Labs Comments: Na 146, POCT Glu 115-149  Pertinent Medications Reviewed: reviewed  Pertinent Medications Comments: IVF, heparin, " insulin    Estimated/Assessed Needs    Weight Used For Calorie Calculations: 64 kg (141 lb 1.5 oz)  Energy Calorie Requirements (kcal): 1657  Energy Need Method: [place]  Protein Requirements: 77-96g(1.2-1.5g/kg)  Weight Used For Protein Calculations: 64 kg (141 lb 1.5 oz)  Fluid Requirements (mL): 1mL/kcal or per MD     RDA Method (mL): 1657  CHO Requirement: 207g CHO daily      Nutrition Prescription Ordered    Current Diet Order: NPO  Nutrition Order Comments: TF held for possible extubation  Current Nutrition Support Formula Ordered: Glucerna 1.5  Current Nutrition Support Rate Ordered: 45 (ml)  Current Nutrition Support Frequency Ordered: mL/hr    Evaluation of Received Nutrient/Fluid Intake    Enteral Calories (kcal): 1620  Enteral Protein (gm): 89  Enteral (Free Water) Fluid (mL): 820    % Kcal Needs: 93  % Protein Needs: 100    IV Fluid (mL): 1800  I/O: +7.8L since admit, good UOP, LBM 11/14    Energy Calories Required: meeting needs  Protein Required: meeting needs  Fluid Required: other (see comments)(per MD)    Comments: 0mL residuals 11/13    % Intake of Estimated Energy Needs: 75 - 100 %  % Meal Intake: NPO    Nutrition Risk    Level of Risk/Frequency of Follow-up: high(f/u 2x/week)     Assessment and Plan    Nutrition Problem  Inadequate oral intake     Related to (etiology):   NPO     Signs and Symptoms (as evidenced by):   Pt requiring alternative means of nutrition to meet at least 85% EEN and EPN.      Interventions(treatment strategy):  Collaboration of care with providers     Nutrition Diagnosis Status:   Continues    Monitor and Evaluation    Food and Nutrient Intake: enteral nutrition intake, food and beverage intake, energy intake  Food and Nutrient Adminstration: diet order, enteral and parenteral nutrition administration  Anthropometric Measurements: weight, weight change, body mass index  Biochemical Data, Medical Tests and Procedures: electrolyte and renal panel, gastrointestinal  profile, glucose/endocrine profile, inflammatory profile, lipid profile  Nutrition-Focused Physical Findings: overall appearance     Malnutrition Assessment             Weight Loss (Malnutrition): (unknown)  Energy Intake (Malnutrition): (unknown)   Orbital Region (Subcutaneous Fat Loss): moderate depletion   [place] Region (Muscle Loss): moderate depletion  Clavicle Bone Region (Muscle Loss): severe depletion  Clavicle and Acromion Bone Region (Muscle Loss): severe depletion  Scapular Bone Region (Muscle Loss): severe depletion  Dorsal Hand (Muscle Loss): moderate depletion  Patellar Region (Muscle Loss): (SHINE)  Anterior Thigh Region (Muscle Loss): (SHINE)  Posterior Calf Region (Muscle Loss): (SHINE)                 Nutrition Follow-Up    RD Follow-up?: Yes

## 2019-11-14 NOTE — PROGRESS NOTES
[facility]  Neurocritical Care  Progress Note    Admit Date: 11/11/2019  Service Date: 11/14/2019  Length of Stay: 3    Subjective:     Chief Complaint: Altered mental status    History of Present Illness: Mr Lopez is a 69 yr old male with a PMH of CVA, seizures, CHI, DM II, dementia, HTN, who presents to the ED with AMS. Patient was last heard from by a friend on Friday 11/8 (3 days ago). Found down at home 11/11, incontinent of urine and bowel. CTH negative. MRI c-spine & brain w/o acute findings. Mild leukocytosis, UA positive for UTI. cEEG suppressed. Patient intubated in the ED for airway protection. Admitted to [facility] for higher level of care.     Hospital Course: 11/11/2019: Intubated in ED. Admit to [facility].  11/12/2019: Switched Ceftriaxone to Zosyn, rechecking BCx, IVF @ 150cc/hr w/ 1L bolus; Tube feeds & dvt ppx initiated. EEG suppressed, propofol disctoninued & fentanyl 50mcg q2hr added.  11/13/2019: Steadily improving since presentation. Continue Keppra & Abx, added 200cc q6 FW to feeds, & dc'd vent.  11/14/2019: mental status continues to improve. Following commands in all 4 ext. FW 200cc q6hr; Extubate today; pending sensitivities of cx's; added ASA/statin           Interval History:   FW to 200 q6  Extubation parameters  Continue abx - f/u sensitivities   Resume statin  Secondary prevention with asa     Review of Systems  Unable to obtain a complete ROS due to intubation  Objective:     Vitals:  Temp: 98.7 °F (37.1 °C)  Pulse: 60  Rhythm: normal sinus rhythm  BP: (!) 175/86  MAP (mmHg): 121  Resp: 16  SpO2: 100 %  Oxygen Concentration (%): 39  O2 Device (Oxygen Therapy): ventilator  Vent Mode: Spont  Pressure Support: 5 cmH20  PEEP/CPAP: 5 cmH20  Peak Airway Pressure: 10 cmH2O  Mean Airway Pressure: 7 cmH20  Plateau Pressure: 14 cmH20    Temp  Min: 98.7 °F (37.1 °C)  Max: 100.5 °F (38.1 °C)  Pulse  Min: 54  Max: 79  BP  Min: 130/80  Max: 175/86  MAP (mmHg)  Min: 101  Max: 121  Resp   Min: 15  Max: 22  SpO2  Min: 99 %  Max: 100 %  Oxygen Concentration (%)  Min: 39  Max: 96    11/13 0701 - 11/14 0700  In: 4680 [I.V.:3370]  Out: 1495 [Urine:1495]   Unmeasured Output  Stool Occurrence: 1       Physical Exam  Constitutional: Cachectic. Well-developed.  Eyes: Conjunctiva clear, anicteric. Lids no lesions.  Head/Ears/Nose/Mouth/Throat/Neck: Dry mucous membranes. External ears, nose atraumatic. Bruising to Armando. Cheeks. Lac to R leg.  Cardiovascular: Regular rhythm. No leg edema.  Respiratory: Intubated, mechanical breath sounds  Gastrointestinal: No hernia. Soft, nondistended, nontender. + bowel sounds.  Neurologic:  -GCS E 4 V 1 M 5  -Eyes open spontaneously. Following commands in all 4 ext.  -Cranial nerves: EOM intact, PERRL, no facial droop.  -Motor: Withdraws in all 4  Unable to test orientation, language, fund of knowledge, hearing, shoulder shrug, tongue protrusion, coordination, gait due to level of consciousness. Although expresses understanding of current plan via head nods.    Medications:  Continuous  lactated ringers Last Rate: 75 mL/hr at 11/14/19 0912   Scheduled  [START ON 11/15/2019] aspirin 81 mg Daily   atorvastatin 40 mg QHS   heparin (porcine) 5,000 Units Q8H   levetiracetam IVPB 1,000 mg Q12H   piperacillin-tazobactam (ZOSYN) IVPB 4.5 g Q8H   senna-docusate 8.6-50 mg 1 tablet BID   PRN  acetaminophen 650 mg Q4H PRN   fentaNYL 50 mcg Q2H PRN   glucagon (human recombinant) 1 mg PRN   insulin aspart U-100 1-10 Units Q4H PRN   magnesium oxide 800 mg PRN   magnesium oxide 800 mg PRN   ondansetron 4 mg Q6H PRN   potassium chloride 10% 40 mEq PRN   potassium chloride 10% 40 mEq PRN   potassium chloride 10% 40 mEq PRN   potassium, sodium phosphates 2 packet PRN   potassium, sodium phosphates 2 packet PRN   potassium, sodium phosphates 2 packet PRN   sodium chloride 0.9% 10 mL PRN     Today I personally reviewed pertinent medications, lines/drains/airways, imaging, cardiology results,  laboratory results, microbiology results, notably:    Diet  Diet NPO  Diet NPO      Assessment/Plan:     Neuro  * Altered mental status  Hx of frequent falls, UTI  Patient found down at home, incontinent of urine/feces 11/11  LKN 11/8  CTH negative    -Admit to NCC  -Hourly neuro checks, Hourly vital signs  -MRI brain and c-spine wo contrast pending  -cEEG negative  -BCx w/ GNR; pending rpt BCx  -UA concerning for infectious etiology, BCx w/ Proteus - On zosyn  -PT/OT/SLP  -Monitor for Fever curve; consider adding gram + if cultures present      History of closed head injury  Hx of    Seizure disorder  Hx of Seizures, CHI  -Loaded with Keppra in the ED  -cEEG negative; dc today  - dc'd propofol  - cont keppra 1g BID for ppx  - Clinically improving; stable.    Cardiac/Vascular  Hypertension  SBP Goal < 180    Renal/  UTI (urinary tract infection)  Hx of UTIs, previous urine culture pos. for klebsiella  UA with many bacteria, UCx w/ Ecoli; BCx w/ Proteus  On zosyn; pending Cx sensitivities   Fever curve improving    ID  Bacteremia  BCx w/ Proteus; pending susceptibilities  Pending rpt cx; leukocytosis improving; Afebrile  Pending cx sensitivities  Zosyn  Monitor Cx's & Fever curve    Oncology  Leukocytosis  Elevated WBC  See AMS    Endocrine  Type 2 diabetes mellitus  Hgb A1c 6.4  SSI w/ accuchecks          The patient is being Prophylaxed for:  Venous Thromboembolism with: Chemical  Stress Ulcer with: None  Ventilator Pneumonia with: not applicable    Activity Orders          Diet NPO: NPO starting at 11/11 1459    Commode at bedside starting at 11/11 1459        Full Code    Marco Ireland MD  Neurocritical Care  [facility]

## 2019-11-14 NOTE — PLAN OF CARE
Per MD, plan is to wean to extubate patient today if tolerated.  Not medically stable for discharge.        11/14/19 1631   Discharge Reassessment   Assessment Type Discharge Planning Reassessment   Provided patient/caregiver education on the expected discharge date and the discharge plan Yes   Do you have any problems affording any of your prescribed medications? No   Discharge Plan A Rehab   Discharge Plan B Skilled Nursing Facility   DME Needed Upon Discharge  other (see comments)  (TBD)   Patient choice form signed by patient/caregiver N/A   Anticipated Discharge Disposition Rehab   Can the patient answer the patient profile reliably? No, cognitively impaired  (Patient on vent)   How does the patient rate their overall health at the present time?   (esteban)   Describe the patient's ability to walk at the present time. Does not walk or unable to take any steps at all   How often would a person be available to care for the patient? Occasionally   Number of comorbid conditions (as recorded on the chart) Five or more   During the past month, has the patient often been bothered by feeling down, depressed or hopeless?   (esteban)   During the past month, has the patient often been bothered by little interest or pleasure in doing things?   (esteban)   Post-Acute Status   Post-Acute Authorization Placement   Post-Acute Placement Status Awaiting Internal Medical Clearance  (vent)   Discharge Delays None known at this time       Denise Hou RN, CCRN-K, [facility]  Neuro-Critical Care   [phone]

## 2019-11-14 NOTE — PLAN OF CARE
Plan of care reviewed with pt.All treatments and procedures explained to pt.No acute distress noted.

## 2019-11-15 LAB
ALBUMIN SERPL BCP-MCNC: 2.4 G/DL (ref 3.5–5.2)
ALLENS TEST: ABNORMAL
ALP SERPL-CCNC: 61 U/L (ref 55–135)
ALT SERPL W/O P-5'-P-CCNC: 29 U/L (ref 10–44)
ANION GAP SERPL CALC-SCNC: 10 MMOL/L (ref 8–16)
AST SERPL-CCNC: 47 U/L (ref 10–40)
BASOPHILS # BLD AUTO: 0.03 K/UL (ref 0–0.2)
BASOPHILS NFR BLD: 0.4 % (ref 0–1.9)
BILIRUB SERPL-MCNC: 0.8 MG/DL (ref 0.1–1)
BUN SERPL-MCNC: 9 MG/DL (ref 8–23)
CALCIUM SERPL-MCNC: 7.9 MG/DL (ref 8.7–10.5)
CHLORIDE SERPL-SCNC: 109 MMOL/L (ref 95–110)
CK SERPL-CCNC: 1071 U/L (ref 20–200)
CO2 SERPL-SCNC: 24 MMOL/L (ref 23–29)
CREAT SERPL-MCNC: 0.7 MG/DL (ref 0.5–1.4)
DELSYS: ABNORMAL
DIFFERENTIAL METHOD: ABNORMAL
EOSINOPHIL # BLD AUTO: 0.3 K/UL (ref 0–0.5)
EOSINOPHIL NFR BLD: 3 % (ref 0–8)
ERYTHROCYTE [DISTWIDTH] IN BLOOD BY AUTOMATED COUNT: 13.2 % (ref 11.5–14.5)
ERYTHROCYTE [SEDIMENTATION RATE] IN BLOOD BY WESTERGREN METHOD: 16 MM/H
EST. GFR  (AFRICAN AMERICAN): >60 ML/MIN/1.73 M^2
EST. GFR  (NON AFRICAN AMERICAN): >60 ML/MIN/1.73 M^2
FIO2: 40
GLUCOSE SERPL-MCNC: 98 MG/DL (ref 70–110)
HCO3 UR-SCNC: 27.1 MMOL/L (ref 24–28)
HCT VFR BLD AUTO: 33.4 % (ref 40–54)
HGB BLD-MCNC: 9.9 G/DL (ref 14–18)
IMM GRANULOCYTES # BLD AUTO: 0.03 K/UL (ref 0–0.04)
IMM GRANULOCYTES NFR BLD AUTO: 0.4 % (ref 0–0.5)
LYMPHOCYTES # BLD AUTO: 2.1 K/UL (ref 1–4.8)
LYMPHOCYTES NFR BLD: 24.9 % (ref 18–48)
MAGNESIUM SERPL-MCNC: 1.4 MG/DL (ref 1.6–2.6)
MCH RBC QN AUTO: 25 PG (ref 27–31)
MCHC RBC AUTO-ENTMCNC: 29.6 G/DL (ref 32–36)
MCV RBC AUTO: 84 FL (ref 82–98)
MODE: ABNORMAL
MONOCYTES # BLD AUTO: 0.7 K/UL (ref 0.3–1)
MONOCYTES NFR BLD: 8.3 % (ref 4–15)
NEUTROPHILS # BLD AUTO: 5.3 K/UL (ref 1.8–7.7)
NEUTROPHILS NFR BLD: 63 % (ref 38–73)
NRBC BLD-RTO: 0 /100 WBC
PCO2 BLDA: 41.9 MMHG (ref 35–45)
PEEP: 5
PH SMN: 7.42 [PH] (ref 7.35–7.45)
PHOSPHATE SERPL-MCNC: 3.7 MG/DL (ref 2.7–4.5)
PLATELET # BLD AUTO: 191 K/UL (ref 150–350)
PMV BLD AUTO: 10.5 FL (ref 9.2–12.9)
PO2 BLDA: 186 MMHG (ref 80–100)
POC BE: 3 MMOL/L
POC SATURATED O2: 100 % (ref 95–100)
POC TCO2: 28 MMOL/L (ref 23–27)
POCT GLUCOSE: 114 MG/DL (ref 70–110)
POCT GLUCOSE: 36 MG/DL (ref 70–110)
POCT GLUCOSE: 76 MG/DL (ref 70–110)
POCT GLUCOSE: 80 MG/DL (ref 70–110)
POCT GLUCOSE: 90 MG/DL (ref 70–110)
POCT GLUCOSE: 91 MG/DL (ref 70–110)
POCT GLUCOSE: 92 MG/DL (ref 70–110)
POTASSIUM SERPL-SCNC: 3.9 MMOL/L (ref 3.5–5.1)
PROT SERPL-MCNC: 5.6 G/DL (ref 6–8.4)
RBC # BLD AUTO: 3.96 M/UL (ref 4.6–6.2)
SAMPLE: ABNORMAL
SITE: ABNORMAL
SODIUM SERPL-SCNC: 143 MMOL/L (ref 136–145)
SP02: 100
VT: 400
WBC # BLD AUTO: 8.43 K/UL (ref 3.9–12.7)

## 2019-11-15 PROCEDURE — 83735 ASSAY OF MAGNESIUM: CPT

## 2019-11-15 PROCEDURE — 25000242 PHARM REV CODE 250 ALT 637 W/ HCPCS: Performed by: PSYCHIATRY & NEUROLOGY

## 2019-11-15 PROCEDURE — 82550 ASSAY OF CK (CPK): CPT

## 2019-11-15 PROCEDURE — 63600175 PHARM REV CODE 636 W HCPCS: Performed by: NURSE PRACTITIONER

## 2019-11-15 PROCEDURE — 82803 BLOOD GASES ANY COMBINATION: CPT

## 2019-11-15 PROCEDURE — 97110 THERAPEUTIC EXERCISES: CPT

## 2019-11-15 PROCEDURE — 99900026 HC AIRWAY MAINTENANCE (STAT)

## 2019-11-15 PROCEDURE — 63600175 PHARM REV CODE 636 W HCPCS: Performed by: STUDENT IN AN ORGANIZED HEALTH CARE EDUCATION/TRAINING PROGRAM

## 2019-11-15 PROCEDURE — 25000003 PHARM REV CODE 250: Performed by: NURSE PRACTITIONER

## 2019-11-15 PROCEDURE — 27200966 HC CLOSED SUCTION SYSTEM

## 2019-11-15 PROCEDURE — 85025 COMPLETE CBC W/AUTO DIFF WBC: CPT

## 2019-11-15 PROCEDURE — 94640 AIRWAY INHALATION TREATMENT: CPT

## 2019-11-15 PROCEDURE — 94761 N-INVAS EAR/PLS OXIMETRY MLT: CPT

## 2019-11-15 PROCEDURE — 99233 SBSQ HOSP IP/OBS HIGH 50: CPT | Mod: GC,,, | Performed by: PSYCHIATRY & NEUROLOGY

## 2019-11-15 PROCEDURE — 25000003 PHARM REV CODE 250: Performed by: STUDENT IN AN ORGANIZED HEALTH CARE EDUCATION/TRAINING PROGRAM

## 2019-11-15 PROCEDURE — 25000242 PHARM REV CODE 250 ALT 637 W/ HCPCS: Performed by: NURSE PRACTITIONER

## 2019-11-15 PROCEDURE — 63600175 PHARM REV CODE 636 W HCPCS: Performed by: PSYCHIATRY & NEUROLOGY

## 2019-11-15 PROCEDURE — 20000000 HC ICU ROOM

## 2019-11-15 PROCEDURE — 99900035 HC TECH TIME PER 15 MIN (STAT)

## 2019-11-15 PROCEDURE — 84100 ASSAY OF PHOSPHORUS: CPT

## 2019-11-15 PROCEDURE — 25000003 PHARM REV CODE 250: Performed by: PSYCHIATRY & NEUROLOGY

## 2019-11-15 PROCEDURE — 94003 VENT MGMT INPAT SUBQ DAY: CPT

## 2019-11-15 PROCEDURE — 80053 COMPREHEN METABOLIC PANEL: CPT

## 2019-11-15 PROCEDURE — 27000221 HC OXYGEN, UP TO 24 HOURS

## 2019-11-15 PROCEDURE — 99233 PR SUBSEQUENT HOSPITAL CARE,LEVL III: ICD-10-PCS | Mod: GC,,, | Performed by: PSYCHIATRY & NEUROLOGY

## 2019-11-15 PROCEDURE — 37799 UNLISTED PX VASCULAR SURGERY: CPT

## 2019-11-15 RX ORDER — SODIUM CHLORIDE FOR INHALATION 3 %
4 VIAL, NEBULIZER (ML) INHALATION EVERY 4 HOURS
Status: DISCONTINUED | OUTPATIENT
Start: 2019-11-15 | End: 2019-11-15

## 2019-11-15 RX ORDER — IPRATROPIUM BROMIDE AND ALBUTEROL SULFATE 2.5; .5 MG/3ML; MG/3ML
3 SOLUTION RESPIRATORY (INHALATION) EVERY 4 HOURS
Status: DISCONTINUED | OUTPATIENT
Start: 2019-11-15 | End: 2019-12-02 | Stop reason: HOSPADM

## 2019-11-15 RX ORDER — SODIUM CHLORIDE FOR INHALATION 3 %
4 VIAL, NEBULIZER (ML) INHALATION
Status: DISCONTINUED | OUTPATIENT
Start: 2019-11-15 | End: 2019-11-25

## 2019-11-15 RX ORDER — CHLORHEXIDINE GLUCONATE ORAL RINSE 1.2 MG/ML
15 SOLUTION DENTAL 2 TIMES DAILY
Status: DISCONTINUED | OUTPATIENT
Start: 2019-11-15 | End: 2019-11-17

## 2019-11-15 RX ADMIN — ASPIRIN 81 MG CHEWABLE TABLET 81 MG: 81 TABLET CHEWABLE at 09:11

## 2019-11-15 RX ADMIN — LEVETIRACETAM 1000 MG: 10 INJECTION INTRAVENOUS at 09:11

## 2019-11-15 RX ADMIN — CEFTRIAXONE 2 G: 2 INJECTION, SOLUTION INTRAVENOUS at 08:11

## 2019-11-15 RX ADMIN — SODIUM CHLORIDE SOLN NEBU 3% 4 ML: 3 NEBU SOLN at 07:11

## 2019-11-15 RX ADMIN — IPRATROPIUM BROMIDE AND ALBUTEROL SULFATE 3 ML: .5; 3 SOLUTION RESPIRATORY (INHALATION) at 11:11

## 2019-11-15 RX ADMIN — Medication 800 MG: at 05:11

## 2019-11-15 RX ADMIN — IPRATROPIUM BROMIDE AND ALBUTEROL SULFATE 3 ML: .5; 3 SOLUTION RESPIRATORY (INHALATION) at 07:11

## 2019-11-15 RX ADMIN — Medication 800 MG: at 09:11

## 2019-11-15 RX ADMIN — Medication 800 MG: at 02:11

## 2019-11-15 RX ADMIN — HEPARIN SODIUM 5000 UNITS: 5000 INJECTION, SOLUTION INTRAVENOUS; SUBCUTANEOUS at 09:11

## 2019-11-15 RX ADMIN — HEPARIN SODIUM 5000 UNITS: 5000 INJECTION, SOLUTION INTRAVENOUS; SUBCUTANEOUS at 05:11

## 2019-11-15 RX ADMIN — PIPERACILLIN AND TAZOBACTAM 4.5 G: 4; .5 INJECTION, POWDER, FOR SOLUTION INTRAVENOUS at 05:11

## 2019-11-15 RX ADMIN — CHLORHEXIDINE GLUCONATE 0.12% ORAL RINSE 15 ML: 1.2 LIQUID ORAL at 09:11

## 2019-11-15 RX ADMIN — SENNOSIDES AND DOCUSATE SODIUM 1 TABLET: 8.6; 5 TABLET ORAL at 09:11

## 2019-11-15 RX ADMIN — HEPARIN SODIUM 5000 UNITS: 5000 INJECTION, SOLUTION INTRAVENOUS; SUBCUTANEOUS at 02:11

## 2019-11-15 RX ADMIN — ATORVASTATIN CALCIUM 40 MG: 20 TABLET, FILM COATED ORAL at 09:11

## 2019-11-15 RX ADMIN — FENTANYL CITRATE 50 MCG: 50 INJECTION INTRAMUSCULAR; INTRAVENOUS at 10:11

## 2019-11-15 RX ADMIN — SODIUM CHLORIDE SOLN NEBU 3% 4 ML: 3 NEBU SOLN at 11:11

## 2019-11-15 RX ADMIN — PIPERACILLIN AND TAZOBACTAM 4.5 G: 4; .5 INJECTION, POWDER, FOR SOLUTION INTRAVENOUS at 12:11

## 2019-11-15 NOTE — PT/OT/SLP PROGRESS
Occupational Therapy   Treatment    Name: Edwin Lopez Jr.  MRN: [id number]  Admitting Diagnosis:  Altered mental status       Recommendations:     Discharge Recommendations: (pending extubation)  Discharge Equipment Recommendations:  (pending extubation )  Barriers to discharge:  Inaccessible home environment, Decreased caregiver support    Assessment:     Edwin Lopez Jr. is a 69 y.o. male with a medical diagnosis of Altered mental status.  He presents with performance deficits affecting function are weakness, impaired self care skills, impaired balance, impaired endurance, impaired functional mobilty, decreased upper extremity function, decreased lower extremity function, gait instability, impaired sensation, impaired cognition, decreased coordination, decreased safety awareness, impaired coordination, impaired fine motor, decreased ROM, impaired cardiopulmonary response to activity. Improvements noted with alertness, AROM, following commands and communication via head nods.     Rehab Prognosis:  Good; patient would benefit from acute skilled OT services to address these deficits and reach maximum level of function.       Plan:     Patient to be seen 3 x/week to address the above listed problems via self-care/home management, neuromuscular re-education, therapeutic activities, cognitive retraining, sensory integration, therapeutic exercises  · Plan of Care Expires: 12/10/19  · Plan of Care Reviewed with: patient    Subjective   Patient:  Nonverbal; intubated; responding with head nods.  Nurse reported that the plan is for possible extubation later today.  Pain/Comfort:  · Pain Rating 1: 0/10  · Pain Rating Post-Intervention 1: 0/10    Objective:     Communicated with: Nurse prior to session.  Patient found supine with ventilator, telemetry, SCD, restraints, pulse ox (continuous), peripheral IV, pressure relief boots, bess catheter, blood pressure cuff, bed alarm, arterial line(OG tube) upon OT entry to  room.    General Precautions: Standard, aspiration, fall, NPO, seizure   Orthopedic Precautions:N/A   Braces: N/A     Occupational Performance:     Bed Mobility:    · Patient completed Rolling/Turning to Left with  total assistance  · Patient completed Rolling/Turning to Right with total assistance     Functional Mobility/Transfers:  · Dependent drawsheet transfers    Activities of Daily Living:  · Feeding:  NPO    · Grooming: total assistance while supine    [facility] 6 Click ADL: 6    Treatment & Education:  Patient education provided on role of OT and need for continued OT upon discharge.   Continued education, patient/ family training recommended.  Daily orientation provided.  AAROM performed bilateral UE and left LE, PROM performed right LE one set x 10 rep in all planes of motion with stretches provided at end range; sustained stretch provided for ankle dorsiflexion.  Assistance and facilitation provided for upward rotation of the scapula during shoulder flexion and abduction.  Patient kept eyes open 100% of the session.  Patient able to follow 3/3 one step commands.  Positioning provided for midline orientation with bilateral UEs elevated and heels lifted off mattress.  Gentle cervical rotation provided.   Family not present during the session.       Patient left supine with all lines intact, call button in reach and bed alarm onEducation:      GOALS:   Multidisciplinary Problems     Occupational Therapy Goals        Problem: Occupational Therapy Goal    Goal Priority Disciplines Outcome Interventions   Occupational Therapy Goal     OT, PT/OT Ongoing, Progressing    Description:  Goals set 11/12 to be addressed for 14 days with expiration date, 11/26  Patient will increase functional independence with ADLs by performing:    Patient will demonstrate rolling to the right with mod assist.  Not met   Patient will demonstrate rolling to the left with mod assist.   Not met  Patient will demonstrate supine -sit with mod   assist.   Not met  Patient will demonstrate stand pivot transfers with max assist.   Not met  Patient will demonstrate grooming while seated with max assist.   Not met  Patient will demonstrate upper body dressing with max assist while seated EOB.   Not met  Patient will demonstrate lower body dressing with max assist while seated EOB.   Not met  Patient will demonstrate toileting with max assist.   Not met  Patient will demonstrate bathing while seated EOB with max assist.   Not met  Patient will demonstrate ability to follow 3/5 commands.   Not met  Patient's family / caregiver will demonstrate independence and safety with assisting patient with self-care skills and functional mobility.     Not met  Patient's family / caregiver will demonstrate independence with providing ROM and changes in bed positioning.   Not met                            Time Tracking:     OT Date of Treatment: 11/15/19  OT Start Time: 0400  OT Stop Time: 0423  OT Total Time (min): 23 min    Billable Minutes:Therapeutic Exercise 23    NOHEMI De  11/15/2019

## 2019-11-15 NOTE — PT/OT/SLP PROGRESS
Physical Therapy      Patient Name:  Edwin Lopez Jr.   MRN:  [id number]    Patient not seen today secondary to Other (Comment)(patient intubated). Patient is appropriate for one therapy discipline at this time (OT). Will follow up pending extubation and medical stability.     Scarlet Chilel, PT

## 2019-11-15 NOTE — PROGRESS NOTES
Patient seen for new consult for skin breakdown beneath ET tube holders to cheeks and follow up for skin breakdown to right knee.    Unable to assess beneath ET tube holders to cheeks, recommend during routine changing of tube holders for RN or respiratory therapist to apply cavilon skin barrier wipe to cheeks.  Discussed with Staff RN and Nadeen with respiratory therapy.    Right knee wound appears to be improving, less black discoloration noted. Recommend continuing foam dressing changes twice a week and monitor as wound continues to demarcate.    Nursing to continue care. Wound care to follow prn.       11/15/19 0924        Wound 11/11/19 1947 Ulceration anterior Knee   Date First Assessed/Time First Assessed: 11/11/19 1947   Pre-existing: Yes  Primary Wound Type: Ulceration  Side: Right  Orientation: anterior  Location: Knee   Wound Image    Wound WDL ex   Drainage Amount Scant   Drainage Characteristics/Odor Creamy;No odor   Appearance Moist;Pink;Yellow  (less black discoloration noted)   Black (%), Wound Tissue Color 10 %   Red (%), Wound Tissue Color 60 %   Yellow (%), Wound Tissue Color 30 %   Periwound Area Intact   Wound Length (cm) 2.5 cm   Wound Width (cm) 6 cm   Wound Depth (cm) 0.1 cm   Wound Volume (cm^3) 1.5 cm^3   Wound Surface Area (cm^2) 15 cm^2   Care Cleansed with:;Sterile normal saline   Dressing Changed;Foam   Dressing Change Due 11/19/19

## 2019-11-15 NOTE — PLAN OF CARE
Goals remain appropriate.  NOHEMI De  11/15/2019    Problem: Occupational Therapy Goal  Goal: Occupational Therapy Goal  Description  Goals set 11/12 to be addressed for 14 days with expiration date, 11/26  Patient will increase functional independence with ADLs by performing:    Patient will demonstrate rolling to the right with mod assist.  Not met   Patient will demonstrate rolling to the left with mod assist.   Not met  Patient will demonstrate supine -sit with mod  assist.   Not met  Patient will demonstrate stand pivot transfers with max assist.   Not met  Patient will demonstrate grooming while seated with max assist.   Not met  Patient will demonstrate upper body dressing with max assist while seated EOB.   Not met  Patient will demonstrate lower body dressing with max assist while seated EOB.   Not met  Patient will demonstrate toileting with max assist.   Not met  Patient will demonstrate bathing while seated EOB with max assist.   Not met  Patient will demonstrate ability to follow 3/5 commands.   Not met  Patient's family / caregiver will demonstrate independence and safety with assisting patient with self-care skills and functional mobility.     Not met  Patient's family / caregiver will demonstrate independence with providing ROM and changes in bed positioning.   Not met           Outcome: Ongoing, Progressing

## 2019-11-15 NOTE — SUBJECTIVE & OBJECTIVE
Interval History:   Neuro exam stable - improved  Secretions persist  Continue abx - 14 day course  Breathing treatments - q4 scheduled overnight  NPO in AM for possible extubation   NA much improved    Review of Systems  Unable to obtain a complete ROS due to intubation  Objective:     Vitals:  Temp: 98.8 °F (37.1 °C)  Pulse: 70  Rhythm: normal sinus rhythm  BP: (!) 150/83  MAP (mmHg): 110  Resp: 16  SpO2: 100 %  Oxygen Concentration (%): 39  O2 Device (Oxygen Therapy): ventilator  Vent Mode: SIMV  Set Rate: 16 bmp  Vt Set: 400 mL  Pressure Support: 10 cmH20  PEEP/CPAP: 5 cmH20  Peak Airway Pressure: 22 cmH2O  Mean Airway Pressure: 7.9 cmH20  Plateau Pressure: 14 cmH20    Temp  Min: 98.3 °F (36.8 °C)  Max: 99.6 °F (37.6 °C)  Pulse  Min: 62  Max: 89  BP  Min: 130/78  Max: 185/109  MAP (mmHg)  Min: 99  Max: 140  Resp  Min: 16  Max: 19  SpO2  Min: 99 %  Max: 100 %  Oxygen Concentration (%)  Min: 39  Max: 60    11/14 0701 - 11/15 0700  In: 3310 [I.V.:1960]  Out: 1700 [Urine:1700]   Unmeasured Output  Stool Occurrence: 0       Physical Exam  Constitutional: Cachectic. Well-developed.  Eyes: Conjunctiva clear, anicteric. Lids no lesions.  Head/Ears/Nose/Mouth/Throat/Neck: Dry mucous membranes. External ears, nose atraumatic. Bruising to Armando. Cheeks. Lac to R leg.  Cardiovascular: Regular rhythm. No leg edema.  Respiratory: Intubated, mechanical breath sounds  Gastrointestinal: No hernia. Soft, nondistended, nontender. + bowel sounds.  Neurologic:  -GCS E 4 V 1 M 5  -Eyes open spontaneously. Following commands in all 4 ext.  -Cranial nerves: EOM intact, PERRL, no facial droop.  -Motor: Withdraws in all 4    Medications:  Continuous  lactated ringers Last Rate: 75 mL/hr at 11/15/19 1701   Scheduled  albuterol-ipratropium 3 mL Q4H   aspirin 81 mg Daily   atorvastatin 40 mg QHS   cefTRIAXone (ROCEPHIN) IVPB 2 g Q24H   heparin (porcine) 5,000 Units Q8H   levetiracetam IVPB 1,000 mg Q12H   senna-docusate 8.6-50 mg 1 tablet BID    sodium chloride 3% 4 mL Q4H   PRN  acetaminophen 650 mg Q4H PRN   fentaNYL 50 mcg Q2H PRN   glucagon (human recombinant) 1 mg PRN   insulin aspart U-100 1-10 Units Q4H PRN   magnesium oxide 800 mg PRN   magnesium oxide 800 mg PRN   ondansetron 4 mg Q6H PRN   potassium chloride 10% 40 mEq PRN   potassium chloride 10% 40 mEq PRN   potassium chloride 10% 40 mEq PRN   potassium, sodium phosphates 2 packet PRN   potassium, sodium phosphates 2 packet PRN   potassium, sodium phosphates 2 packet PRN   sodium chloride 0.9% 10 mL PRN     Today I personally reviewed pertinent medications, lines/drains/airways, imaging, cardiology results, laboratory results, microbiology results, notably:    Diet  Diet NPO  Diet NPO

## 2019-11-15 NOTE — PLAN OF CARE
POC reviewed with pt at 0500. Pt is unable to verbalize understanding, but can nod appropriately. A line in place. ETT and OGT in place. Pt NPO for possible extubation. 200cc H2O Q4. LR gtt continued. Questions and concerns addressed. No acute events overnight. Pt progressing toward goals. Will continue to monitor. See flowsheets for full assessment and VS info

## 2019-11-15 NOTE — PLAN OF CARE
Neuro exam stable - improved  Secretions persist  Continue abx - 14 day course  Breathing treatments - q4 scheduled overnight  NPO in AM for possible extubation   NA much improved

## 2019-11-15 NOTE — PLAN OF CARE
POC reviewed with pt and family at 1400. Pt unable verbalize understanding d/t intubation. Questions and concerns addressed with pt and family and in agreement with POC. No acute events today. Sys <180 maintained with no issues. LR continued @ 75cc/hr. Pt progressing toward goals. Will continue to monitor. See flowsheets for full assessment and VS info.

## 2019-11-16 LAB
ALBUMIN SERPL BCP-MCNC: 2.3 G/DL (ref 3.5–5.2)
ALLENS TEST: ABNORMAL
ALP SERPL-CCNC: 58 U/L (ref 55–135)
ALT SERPL W/O P-5'-P-CCNC: 28 U/L (ref 10–44)
ANION GAP SERPL CALC-SCNC: 7 MMOL/L (ref 8–16)
AST SERPL-CCNC: 38 U/L (ref 10–40)
BACTERIA BLD CULT: NORMAL
BASOPHILS # BLD AUTO: 0.01 K/UL (ref 0–0.2)
BASOPHILS NFR BLD: 0.1 % (ref 0–1.9)
BILIRUB SERPL-MCNC: 0.7 MG/DL (ref 0.1–1)
BUN SERPL-MCNC: 10 MG/DL (ref 8–23)
CALCIUM SERPL-MCNC: 7.7 MG/DL (ref 8.7–10.5)
CHLORIDE SERPL-SCNC: 106 MMOL/L (ref 95–110)
CK SERPL-CCNC: 797 U/L (ref 20–200)
CO2 SERPL-SCNC: 26 MMOL/L (ref 23–29)
CREAT SERPL-MCNC: 0.7 MG/DL (ref 0.5–1.4)
DELSYS: ABNORMAL
DIFFERENTIAL METHOD: ABNORMAL
EOSINOPHIL # BLD AUTO: 0.3 K/UL (ref 0–0.5)
EOSINOPHIL NFR BLD: 4.5 % (ref 0–8)
ERYTHROCYTE [DISTWIDTH] IN BLOOD BY AUTOMATED COUNT: 13 % (ref 11.5–14.5)
ERYTHROCYTE [SEDIMENTATION RATE] IN BLOOD BY WESTERGREN METHOD: 16 MM/H
EST. GFR  (AFRICAN AMERICAN): >60 ML/MIN/1.73 M^2
EST. GFR  (NON AFRICAN AMERICAN): >60 ML/MIN/1.73 M^2
FIO2: 40
GLUCOSE SERPL-MCNC: 126 MG/DL (ref 70–110)
HCO3 UR-SCNC: 29.6 MMOL/L (ref 24–28)
HCT VFR BLD AUTO: 30.8 % (ref 40–54)
HGB BLD-MCNC: 9.4 G/DL (ref 14–18)
IMM GRANULOCYTES # BLD AUTO: 0.03 K/UL (ref 0–0.04)
IMM GRANULOCYTES NFR BLD AUTO: 0.4 % (ref 0–0.5)
LYMPHOCYTES # BLD AUTO: 1.9 K/UL (ref 1–4.8)
LYMPHOCYTES NFR BLD: 26.2 % (ref 18–48)
MAGNESIUM SERPL-MCNC: 1.6 MG/DL (ref 1.6–2.6)
MCH RBC QN AUTO: 26 PG (ref 27–31)
MCHC RBC AUTO-ENTMCNC: 30.5 G/DL (ref 32–36)
MCV RBC AUTO: 85 FL (ref 82–98)
MODE: ABNORMAL
MONOCYTES # BLD AUTO: 0.8 K/UL (ref 0.3–1)
MONOCYTES NFR BLD: 10.5 % (ref 4–15)
NEUTROPHILS # BLD AUTO: 4.3 K/UL (ref 1.8–7.7)
NEUTROPHILS NFR BLD: 58.3 % (ref 38–73)
NRBC BLD-RTO: 0 /100 WBC
PCO2 BLDA: 39.9 MMHG (ref 35–45)
PEEP: 5
PH SMN: 7.48 [PH] (ref 7.35–7.45)
PHOSPHATE SERPL-MCNC: 2.9 MG/DL (ref 2.7–4.5)
PLATELET # BLD AUTO: 174 K/UL (ref 150–350)
PMV BLD AUTO: 10 FL (ref 9.2–12.9)
PO2 BLDA: 183 MMHG (ref 80–100)
POC BE: 6 MMOL/L
POC SATURATED O2: 100 % (ref 95–100)
POC TCO2: 31 MMOL/L (ref 23–27)
POCT GLUCOSE: 109 MG/DL (ref 70–110)
POCT GLUCOSE: 110 MG/DL (ref 70–110)
POCT GLUCOSE: 126 MG/DL (ref 70–110)
POCT GLUCOSE: 128 MG/DL (ref 70–110)
POTASSIUM SERPL-SCNC: 3.8 MMOL/L (ref 3.5–5.1)
PROT SERPL-MCNC: 5.4 G/DL (ref 6–8.4)
RBC # BLD AUTO: 3.62 M/UL (ref 4.6–6.2)
SAMPLE: ABNORMAL
SITE: ABNORMAL
SODIUM SERPL-SCNC: 139 MMOL/L (ref 136–145)
VT: 400
WBC # BLD AUTO: 7.33 K/UL (ref 3.9–12.7)

## 2019-11-16 PROCEDURE — 99900035 HC TECH TIME PER 15 MIN (STAT)

## 2019-11-16 PROCEDURE — 82550 ASSAY OF CK (CPK): CPT

## 2019-11-16 PROCEDURE — 25000003 PHARM REV CODE 250: Performed by: STUDENT IN AN ORGANIZED HEALTH CARE EDUCATION/TRAINING PROGRAM

## 2019-11-16 PROCEDURE — 63600175 PHARM REV CODE 636 W HCPCS: Performed by: NURSE PRACTITIONER

## 2019-11-16 PROCEDURE — 27000221 HC OXYGEN, UP TO 24 HOURS

## 2019-11-16 PROCEDURE — 37799 UNLISTED PX VASCULAR SURGERY: CPT

## 2019-11-16 PROCEDURE — 83735 ASSAY OF MAGNESIUM: CPT

## 2019-11-16 PROCEDURE — 99233 PR SUBSEQUENT HOSPITAL CARE,LEVL III: ICD-10-PCS | Mod: ,,, | Performed by: NURSE PRACTITIONER

## 2019-11-16 PROCEDURE — 82803 BLOOD GASES ANY COMBINATION: CPT

## 2019-11-16 PROCEDURE — 25000003 PHARM REV CODE 250: Performed by: NURSE PRACTITIONER

## 2019-11-16 PROCEDURE — 80053 COMPREHEN METABOLIC PANEL: CPT

## 2019-11-16 PROCEDURE — 27200966 HC CLOSED SUCTION SYSTEM

## 2019-11-16 PROCEDURE — 25000003 PHARM REV CODE 250: Performed by: PSYCHIATRY & NEUROLOGY

## 2019-11-16 PROCEDURE — 84100 ASSAY OF PHOSPHORUS: CPT

## 2019-11-16 PROCEDURE — 85025 COMPLETE CBC W/AUTO DIFF WBC: CPT

## 2019-11-16 PROCEDURE — 99233 SBSQ HOSP IP/OBS HIGH 50: CPT | Mod: ,,, | Performed by: NURSE PRACTITIONER

## 2019-11-16 PROCEDURE — 94761 N-INVAS EAR/PLS OXIMETRY MLT: CPT

## 2019-11-16 PROCEDURE — 94640 AIRWAY INHALATION TREATMENT: CPT

## 2019-11-16 PROCEDURE — 20000000 HC ICU ROOM

## 2019-11-16 PROCEDURE — 63600175 PHARM REV CODE 636 W HCPCS: Performed by: STUDENT IN AN ORGANIZED HEALTH CARE EDUCATION/TRAINING PROGRAM

## 2019-11-16 PROCEDURE — 99900026 HC AIRWAY MAINTENANCE (STAT)

## 2019-11-16 PROCEDURE — 25000242 PHARM REV CODE 250 ALT 637 W/ HCPCS: Performed by: NURSE PRACTITIONER

## 2019-11-16 PROCEDURE — 94003 VENT MGMT INPAT SUBQ DAY: CPT

## 2019-11-16 PROCEDURE — 25000242 PHARM REV CODE 250 ALT 637 W/ HCPCS: Performed by: PSYCHIATRY & NEUROLOGY

## 2019-11-16 RX ORDER — FAMOTIDINE 20 MG/1
20 TABLET, FILM COATED ORAL 2 TIMES DAILY
Status: DISCONTINUED | OUTPATIENT
Start: 2019-11-16 | End: 2019-11-17

## 2019-11-16 RX ORDER — GLYCOPYRROLATE 0.2 MG/ML
0.1 INJECTION INTRAMUSCULAR; INTRAVENOUS EVERY 8 HOURS
Status: COMPLETED | OUTPATIENT
Start: 2019-11-16 | End: 2019-11-17

## 2019-11-16 RX ADMIN — SODIUM CHLORIDE SOLN NEBU 3% 4 ML: 3 NEBU SOLN at 03:11

## 2019-11-16 RX ADMIN — GLYCOPYRROLATE 0.1 MG: 0.2 INJECTION INTRAMUSCULAR; INTRAVENOUS at 09:11

## 2019-11-16 RX ADMIN — HEPARIN SODIUM 5000 UNITS: 5000 INJECTION, SOLUTION INTRAVENOUS; SUBCUTANEOUS at 09:11

## 2019-11-16 RX ADMIN — HEPARIN SODIUM 5000 UNITS: 5000 INJECTION, SOLUTION INTRAVENOUS; SUBCUTANEOUS at 05:11

## 2019-11-16 RX ADMIN — HEPARIN SODIUM 5000 UNITS: 5000 INJECTION, SOLUTION INTRAVENOUS; SUBCUTANEOUS at 02:11

## 2019-11-16 RX ADMIN — FAMOTIDINE 20 MG: 20 TABLET ORAL at 08:11

## 2019-11-16 RX ADMIN — CHLORHEXIDINE GLUCONATE 0.12% ORAL RINSE 15 ML: 1.2 LIQUID ORAL at 08:11

## 2019-11-16 RX ADMIN — POTASSIUM CHLORIDE 40 MEQ: 20 SOLUTION ORAL at 06:11

## 2019-11-16 RX ADMIN — IPRATROPIUM BROMIDE AND ALBUTEROL SULFATE 3 ML: .5; 3 SOLUTION RESPIRATORY (INHALATION) at 03:11

## 2019-11-16 RX ADMIN — IPRATROPIUM BROMIDE AND ALBUTEROL SULFATE 3 ML: .5; 3 SOLUTION RESPIRATORY (INHALATION) at 11:11

## 2019-11-16 RX ADMIN — SODIUM CHLORIDE SOLN NEBU 3% 4 ML: 3 NEBU SOLN at 11:11

## 2019-11-16 RX ADMIN — ATORVASTATIN CALCIUM 40 MG: 20 TABLET, FILM COATED ORAL at 08:11

## 2019-11-16 RX ADMIN — Medication 800 MG: at 06:11

## 2019-11-16 RX ADMIN — ASPIRIN 81 MG CHEWABLE TABLET 81 MG: 81 TABLET CHEWABLE at 08:11

## 2019-11-16 RX ADMIN — LEVETIRACETAM 1000 MG: 10 INJECTION INTRAVENOUS at 08:11

## 2019-11-16 RX ADMIN — SENNOSIDES AND DOCUSATE SODIUM 1 TABLET: 8.6; 5 TABLET ORAL at 08:11

## 2019-11-16 RX ADMIN — IPRATROPIUM BROMIDE AND ALBUTEROL SULFATE 3 ML: .5; 3 SOLUTION RESPIRATORY (INHALATION) at 07:11

## 2019-11-16 RX ADMIN — GLYCOPYRROLATE 0.1 MG: 0.2 INJECTION INTRAMUSCULAR; INTRAVENOUS at 05:11

## 2019-11-16 RX ADMIN — CEFTRIAXONE 2 G: 2 INJECTION, SOLUTION INTRAVENOUS at 08:11

## 2019-11-16 RX ADMIN — SODIUM CHLORIDE SOLN NEBU 3% 4 ML: 3 NEBU SOLN at 07:11

## 2019-11-16 RX ADMIN — LEVETIRACETAM 1000 MG: 10 INJECTION INTRAVENOUS at 09:11

## 2019-11-16 NOTE — ASSESSMENT & PLAN NOTE
BCx w/ Proteus; pending susceptibilities  Pending rpt cx; leukocytosis improving; Afebrile  Pending cx sensitivities  Zosyn-> ceftriaxone now; both susc to ceftriaxone  Monitor Cx's & Fever curve

## 2019-11-16 NOTE — ASSESSMENT & PLAN NOTE
Hx of Seizures, CHI  -Loaded with Keppra in the ED  -cEEG negative; dc'd  - dc'd propofol  - cont keppra 1g BID for ppx  - Clinically improving; stable.

## 2019-11-16 NOTE — PROGRESS NOTES
[facility]  Neurocritical Care  Progress Note    Admit Date: 11/11/2019  Service Date: 11/15/2019  Length of Stay: 4    Subjective:     Chief Complaint: Altered mental status    History of Present Illness: Mr Lopez is a 69 yr old male with a PMH of CVA, seizures, CHI, DM II, dementia, HTN, who presents to the ED with AMS. Patient was last heard from by a friend on Friday 11/8 (3 days ago). Found down at home 11/11, incontinent of urine and bowel. CTH negative. MRI c-spine & brain w/o acute findings. Mild leukocytosis, UA positive for UTI. cEEG suppressed. Patient intubated in the ED for airway protection. Admitted to [facility] for higher level of care.     Hospital Course: 11/11/2019: Intubated in ED. Admit to [facility].  11/12/2019: Switched Ceftriaxone to Zosyn, rechecking BCx, IVF @ 150cc/hr w/ 1L bolus; Tube feeds & dvt ppx initiated. EEG suppressed, propofol disctoninued & fentanyl 50mcg q2hr added.  11/13/2019: Steadily improving since presentation. Continue Keppra & Abx, added 200cc q6 FW to feeds, & dc'd vent.  11/14/2019: mental status continues to improve. Following commands in all 4 ext. FW 200cc q6hr; Extubate today; pending sensitivities of cx's; added ASA/statin  11/15/2019: Mental status improved; secretions persistent. De-escalated Abx to ceftriaxone given susceptibilities; added breathing tx q4. NPO in AM for possible extubation.         Interval History:   Neuro exam stable - improved  Secretions persist  Continue abx - 14 day course  Breathing treatments - q4 scheduled overnight  NPO in AM for possible extubation   NA much improved    Review of Systems  Unable to obtain a complete ROS due to intubation  Objective:     Vitals:  Temp: 98.8 °F (37.1 °C)  Pulse: 70  Rhythm: normal sinus rhythm  BP: (!) 150/83  MAP (mmHg): 110  Resp: 16  SpO2: 100 %  Oxygen Concentration (%): 39  O2 Device (Oxygen Therapy): ventilator  Vent Mode: SIMV  Set Rate: 16 bmp  Vt Set: 400 mL  Pressure Support: 10  cmH20  PEEP/CPAP: 5 cmH20  Peak Airway Pressure: 22 cmH2O  Mean Airway Pressure: 7.9 cmH20  Plateau Pressure: 14 cmH20    Temp  Min: 98.3 °F (36.8 °C)  Max: 99.6 °F (37.6 °C)  Pulse  Min: 62  Max: 89  BP  Min: 130/78  Max: 185/109  MAP (mmHg)  Min: 99  Max: 140  Resp  Min: 16  Max: 19  SpO2  Min: 99 %  Max: 100 %  Oxygen Concentration (%)  Min: 39  Max: 60    11/14 0701 - 11/15 0700  In: 3310 [I.V.:1960]  Out: 1700 [Urine:1700]   Unmeasured Output  Stool Occurrence: 0       Physical Exam  Constitutional: Cachectic. Well-developed.  Eyes: Conjunctiva clear, anicteric. Lids no lesions.  Head/Ears/Nose/Mouth/Throat/Neck: Dry mucous membranes. External ears, nose atraumatic. Bruising to Armando. Cheeks. Lac to R leg.  Cardiovascular: Regular rhythm. No leg edema.  Respiratory: Intubated, mechanical breath sounds  Gastrointestinal: No hernia. Soft, nondistended, nontender. + bowel sounds.  Neurologic:  -GCS E 4 V 1 M 5  -Eyes open spontaneously. Following commands in all 4 ext.  -Cranial nerves: EOM intact, PERRL, no facial droop.  -Motor: Withdraws in all 4    Medications:  Continuous  lactated ringers Last Rate: 75 mL/hr at 11/15/19 1701   Scheduled  albuterol-ipratropium 3 mL Q4H   aspirin 81 mg Daily   atorvastatin 40 mg QHS   cefTRIAXone (ROCEPHIN) IVPB 2 g Q24H   heparin (porcine) 5,000 Units Q8H   levetiracetam IVPB 1,000 mg Q12H   senna-docusate 8.6-50 mg 1 tablet BID   sodium chloride 3% 4 mL Q4H   PRN  acetaminophen 650 mg Q4H PRN   fentaNYL 50 mcg Q2H PRN   glucagon (human recombinant) 1 mg PRN   insulin aspart U-100 1-10 Units Q4H PRN   magnesium oxide 800 mg PRN   magnesium oxide 800 mg PRN   ondansetron 4 mg Q6H PRN   potassium chloride 10% 40 mEq PRN   potassium chloride 10% 40 mEq PRN   potassium chloride 10% 40 mEq PRN   potassium, sodium phosphates 2 packet PRN   potassium, sodium phosphates 2 packet PRN   potassium, sodium phosphates 2 packet PRN   sodium chloride 0.9% 10 mL PRN     Today I personally  reviewed pertinent medications, lines/drains/airways, imaging, cardiology results, laboratory results, microbiology results, notably:    Diet  Diet NPO  Diet NPO      Assessment/Plan:     Neuro  * Altered mental status  Hx of frequent falls, UTI  Patient found down at home, incontinent of urine/feces 11/11  LKN 11/8  CTH negative    -Admit to [facility]  -Hourly neuro checks, Hourly vital signs  -MRI brain and c-spine wo contrast pending  -cEEG negative  -BCx w/ GNR; pending rpt BCx  -UA concerning for infectious etiology,Ucx w/ Ecoli; BCx w/ Proteus   -PT/OT/SLP  -Monitor for Fever curve  - de-escalated zosyn to ceftriaxone given susceptibilities.      History of closed head injury  Hx of    Seizure disorder  Hx of Seizures, CHI  -Loaded with Keppra in the ED  -cEEG negative; dc'd  - dc'd propofol  - cont keppra 1g BID for ppx  - Clinically improving; stable.    Cardiac/Vascular  Hypertension  SBP Goal < 180  At goal    Renal/  UTI (urinary tract infection)  Hx of UTIs, previous urine culture pos. for klebsiella  UA with many bacteria, UCx w/ Ecoli; BCx w/ Proteus  Fever curve improving  Zosyn-> ceftriaxone now; both susc to ceftriaxone    ID  Bacteremia  BCx w/ Proteus; pending susceptibilities  Pending rpt cx; leukocytosis improving; Afebrile  Pending cx sensitivities  Zosyn-> ceftriaxone now; both susc to ceftriaxone  Monitor Cx's & Fever curve    Oncology  Leukocytosis  Elevated WBC  See AMS    Endocrine  Type 2 diabetes mellitus  Hgb A1c 6.4  SSI w/ accuchecks          The patient is being Prophylaxed for:  Venous Thromboembolism with: Chemical  Stress Ulcer with: None  Ventilator Pneumonia with: chlorhexidine oral care    Activity Orders          Diet NPO: NPO starting at 11/11 1459    Commode at bedside starting at 11/11 1459        Full Code    Marco Ireland MD  Neurocritical Care  [facility]

## 2019-11-16 NOTE — ASSESSMENT & PLAN NOTE
Hx of frequent falls, UTI  Patient found down at home, incontinent of urine/feces 11/11  LKN 11/8  CTH negative    -Admit to [facility]  -Hourly neuro checks, Hourly vital signs  -MRI brain and c-spine wo contrast pending  -cEEG negative  -BCx w/ GNR; pending rpt BCx  -UA concerning for infectious etiology,Ucx w/ Ecoli; BCx w/ Proteus   -PT/OT/SLP  -Monitor for Fever curve  - de-escalated zosyn to ceftriaxone given susceptibilities.

## 2019-11-16 NOTE — ASSESSMENT & PLAN NOTE
Hx of UTIs, previous urine culture pos. for klebsiella  UA with many bacteria, UCx w/ Ecoli; BCx w/ Proteus  Fever curve improving  Zosyn-> ceftriaxone now; both susc to ceftriaxone

## 2019-11-16 NOTE — PLAN OF CARE
Neuro exam stable  Start glycopyrolate for copious thin secretions  Restart TF   CK resolved  Continue keppra  Complete course of abx

## 2019-11-17 LAB
ALBUMIN SERPL BCP-MCNC: 2.4 G/DL (ref 3.5–5.2)
ALLENS TEST: ABNORMAL
ALP SERPL-CCNC: 64 U/L (ref 55–135)
ALT SERPL W/O P-5'-P-CCNC: 28 U/L (ref 10–44)
ANION GAP SERPL CALC-SCNC: 6 MMOL/L (ref 8–16)
AST SERPL-CCNC: 43 U/L (ref 10–40)
BACTERIA BLD CULT: NORMAL
BACTERIA BLD CULT: NORMAL
BASOPHILS # BLD AUTO: 0.02 K/UL (ref 0–0.2)
BASOPHILS NFR BLD: 0.3 % (ref 0–1.9)
BILIRUB SERPL-MCNC: 0.5 MG/DL (ref 0.1–1)
BUN SERPL-MCNC: 7 MG/DL (ref 8–23)
CALCIUM SERPL-MCNC: 8.2 MG/DL (ref 8.7–10.5)
CHLORIDE SERPL-SCNC: 105 MMOL/L (ref 95–110)
CO2 SERPL-SCNC: 27 MMOL/L (ref 23–29)
CREAT SERPL-MCNC: 0.7 MG/DL (ref 0.5–1.4)
DELSYS: ABNORMAL
DIFFERENTIAL METHOD: ABNORMAL
EOSINOPHIL # BLD AUTO: 0.3 K/UL (ref 0–0.5)
EOSINOPHIL NFR BLD: 3.9 % (ref 0–8)
ERYTHROCYTE [DISTWIDTH] IN BLOOD BY AUTOMATED COUNT: 12.9 % (ref 11.5–14.5)
ERYTHROCYTE [SEDIMENTATION RATE] IN BLOOD BY WESTERGREN METHOD: 18 MM/H
EST. GFR  (AFRICAN AMERICAN): >60 ML/MIN/1.73 M^2
EST. GFR  (NON AFRICAN AMERICAN): >60 ML/MIN/1.73 M^2
FIO2: 40
GLUCOSE SERPL-MCNC: 148 MG/DL (ref 70–110)
HCO3 UR-SCNC: 29.6 MMOL/L (ref 24–28)
HCT VFR BLD AUTO: 32.2 % (ref 40–54)
HGB BLD-MCNC: 9.6 G/DL (ref 14–18)
IMM GRANULOCYTES # BLD AUTO: 0.05 K/UL (ref 0–0.04)
IMM GRANULOCYTES NFR BLD AUTO: 0.6 % (ref 0–0.5)
LYMPHOCYTES # BLD AUTO: 1.6 K/UL (ref 1–4.8)
LYMPHOCYTES NFR BLD: 19.7 % (ref 18–48)
MAGNESIUM SERPL-MCNC: 1.5 MG/DL (ref 1.6–2.6)
MCH RBC QN AUTO: 25.2 PG (ref 27–31)
MCHC RBC AUTO-ENTMCNC: 29.8 G/DL (ref 32–36)
MCV RBC AUTO: 85 FL (ref 82–98)
MODE: ABNORMAL
MONOCYTES # BLD AUTO: 0.8 K/UL (ref 0.3–1)
MONOCYTES NFR BLD: 10.2 % (ref 4–15)
NEUTROPHILS # BLD AUTO: 5.2 K/UL (ref 1.8–7.7)
NEUTROPHILS NFR BLD: 65.3 % (ref 38–73)
NRBC BLD-RTO: 0 /100 WBC
PCO2 BLDA: 42.8 MMHG (ref 35–45)
PEEP: 5
PH SMN: 7.45 [PH] (ref 7.35–7.45)
PHOSPHATE SERPL-MCNC: 2.6 MG/DL (ref 2.7–4.5)
PIP: 19
PLATELET # BLD AUTO: 179 K/UL (ref 150–350)
PMV BLD AUTO: 10.4 FL (ref 9.2–12.9)
PO2 BLDA: 177 MMHG (ref 80–100)
POC BE: 6 MMOL/L
POC SATURATED O2: 100 % (ref 95–100)
POC TCO2: 31 MMOL/L (ref 23–27)
POCT GLUCOSE: 139 MG/DL (ref 70–110)
POCT GLUCOSE: 152 MG/DL (ref 70–110)
POCT GLUCOSE: 85 MG/DL (ref 70–110)
POCT GLUCOSE: 90 MG/DL (ref 70–110)
POCT GLUCOSE: 94 MG/DL (ref 70–110)
POTASSIUM SERPL-SCNC: 4 MMOL/L (ref 3.5–5.1)
PROT SERPL-MCNC: 5.7 G/DL (ref 6–8.4)
PS: 10
RBC # BLD AUTO: 3.81 M/UL (ref 4.6–6.2)
SAMPLE: ABNORMAL
SITE: ABNORMAL
SODIUM SERPL-SCNC: 138 MMOL/L (ref 136–145)
SP02: 99
VT: 400
WBC # BLD AUTO: 7.92 K/UL (ref 3.9–12.7)

## 2019-11-17 PROCEDURE — 80053 COMPREHEN METABOLIC PANEL: CPT

## 2019-11-17 PROCEDURE — 25000003 PHARM REV CODE 250: Performed by: NURSE PRACTITIONER

## 2019-11-17 PROCEDURE — 94150 VITAL CAPACITY TEST: CPT

## 2019-11-17 PROCEDURE — 84100 ASSAY OF PHOSPHORUS: CPT

## 2019-11-17 PROCEDURE — 25000003 PHARM REV CODE 250: Performed by: PSYCHIATRY & NEUROLOGY

## 2019-11-17 PROCEDURE — 63600175 PHARM REV CODE 636 W HCPCS: Performed by: NURSE PRACTITIONER

## 2019-11-17 PROCEDURE — 25000242 PHARM REV CODE 250 ALT 637 W/ HCPCS: Performed by: NURSE PRACTITIONER

## 2019-11-17 PROCEDURE — 27200966 HC CLOSED SUCTION SYSTEM

## 2019-11-17 PROCEDURE — 25000003 PHARM REV CODE 250: Performed by: STUDENT IN AN ORGANIZED HEALTH CARE EDUCATION/TRAINING PROGRAM

## 2019-11-17 PROCEDURE — 94640 AIRWAY INHALATION TREATMENT: CPT

## 2019-11-17 PROCEDURE — 97110 THERAPEUTIC EXERCISES: CPT

## 2019-11-17 PROCEDURE — 94761 N-INVAS EAR/PLS OXIMETRY MLT: CPT

## 2019-11-17 PROCEDURE — 27000221 HC OXYGEN, UP TO 24 HOURS

## 2019-11-17 PROCEDURE — 20000000 HC ICU ROOM

## 2019-11-17 PROCEDURE — 99291 CRITICAL CARE FIRST HOUR: CPT | Mod: ,,, | Performed by: NURSE PRACTITIONER

## 2019-11-17 PROCEDURE — 99291 PR CRITICAL CARE, E/M 30-74 MINUTES: ICD-10-PCS | Mod: ,,, | Performed by: NURSE PRACTITIONER

## 2019-11-17 PROCEDURE — 99900017 HC EXTUBATION W/PARAMETERS (STAT)

## 2019-11-17 PROCEDURE — 99900035 HC TECH TIME PER 15 MIN (STAT)

## 2019-11-17 PROCEDURE — 63600175 PHARM REV CODE 636 W HCPCS: Performed by: STUDENT IN AN ORGANIZED HEALTH CARE EDUCATION/TRAINING PROGRAM

## 2019-11-17 PROCEDURE — 25000242 PHARM REV CODE 250 ALT 637 W/ HCPCS: Performed by: PSYCHIATRY & NEUROLOGY

## 2019-11-17 PROCEDURE — 85025 COMPLETE CBC W/AUTO DIFF WBC: CPT

## 2019-11-17 PROCEDURE — 94010 BREATHING CAPACITY TEST: CPT

## 2019-11-17 PROCEDURE — 99900026 HC AIRWAY MAINTENANCE (STAT)

## 2019-11-17 PROCEDURE — 94003 VENT MGMT INPAT SUBQ DAY: CPT

## 2019-11-17 PROCEDURE — 82803 BLOOD GASES ANY COMBINATION: CPT

## 2019-11-17 PROCEDURE — 37799 UNLISTED PX VASCULAR SURGERY: CPT

## 2019-11-17 PROCEDURE — 83735 ASSAY OF MAGNESIUM: CPT

## 2019-11-17 RX ADMIN — ASPIRIN 81 MG CHEWABLE TABLET 81 MG: 81 TABLET CHEWABLE at 08:11

## 2019-11-17 RX ADMIN — POTASSIUM & SODIUM PHOSPHATES POWDER PACK 280-160-250 MG 2 PACKET: 280-160-250 PACK at 04:11

## 2019-11-17 RX ADMIN — FAMOTIDINE 20 MG: 20 TABLET ORAL at 08:11

## 2019-11-17 RX ADMIN — SODIUM CHLORIDE SOLN NEBU 3% 4 ML: 3 NEBU SOLN at 03:11

## 2019-11-17 RX ADMIN — SODIUM CHLORIDE SOLN NEBU 3% 4 ML: 3 NEBU SOLN at 11:11

## 2019-11-17 RX ADMIN — ACETAMINOPHEN 650 MG: 325 TABLET ORAL at 11:11

## 2019-11-17 RX ADMIN — LEVETIRACETAM 1000 MG: 10 INJECTION INTRAVENOUS at 08:11

## 2019-11-17 RX ADMIN — IPRATROPIUM BROMIDE AND ALBUTEROL SULFATE 3 ML: .5; 3 SOLUTION RESPIRATORY (INHALATION) at 11:11

## 2019-11-17 RX ADMIN — SODIUM CHLORIDE SOLN NEBU 3% 4 ML: 3 NEBU SOLN at 07:11

## 2019-11-17 RX ADMIN — SODIUM CHLORIDE SOLN NEBU 3% 4 ML: 3 NEBU SOLN at 04:11

## 2019-11-17 RX ADMIN — ATORVASTATIN CALCIUM 40 MG: 20 TABLET, FILM COATED ORAL at 08:11

## 2019-11-17 RX ADMIN — GLYCOPYRROLATE 0.1 MG: 0.2 INJECTION INTRAMUSCULAR; INTRAVENOUS at 05:11

## 2019-11-17 RX ADMIN — HEPARIN SODIUM 5000 UNITS: 5000 INJECTION, SOLUTION INTRAVENOUS; SUBCUTANEOUS at 01:11

## 2019-11-17 RX ADMIN — IPRATROPIUM BROMIDE AND ALBUTEROL SULFATE 3 ML: .5; 3 SOLUTION RESPIRATORY (INHALATION) at 12:11

## 2019-11-17 RX ADMIN — CHLORHEXIDINE GLUCONATE 0.12% ORAL RINSE 15 ML: 1.2 LIQUID ORAL at 08:11

## 2019-11-17 RX ADMIN — SENNOSIDES AND DOCUSATE SODIUM 1 TABLET: 8.6; 5 TABLET ORAL at 08:11

## 2019-11-17 RX ADMIN — SODIUM CHLORIDE SOLN NEBU 3% 4 ML: 3 NEBU SOLN at 12:11

## 2019-11-17 RX ADMIN — CEFTRIAXONE 2 G: 2 INJECTION, SOLUTION INTRAVENOUS at 07:11

## 2019-11-17 RX ADMIN — HEPARIN SODIUM 5000 UNITS: 5000 INJECTION, SOLUTION INTRAVENOUS; SUBCUTANEOUS at 09:11

## 2019-11-17 RX ADMIN — IPRATROPIUM BROMIDE AND ALBUTEROL SULFATE 3 ML: .5; 3 SOLUTION RESPIRATORY (INHALATION) at 03:11

## 2019-11-17 RX ADMIN — IPRATROPIUM BROMIDE AND ALBUTEROL SULFATE 3 ML: .5; 3 SOLUTION RESPIRATORY (INHALATION) at 04:11

## 2019-11-17 RX ADMIN — IPRATROPIUM BROMIDE AND ALBUTEROL SULFATE 3 ML: .5; 3 SOLUTION RESPIRATORY (INHALATION) at 07:11

## 2019-11-17 RX ADMIN — Medication 800 MG: at 04:11

## 2019-11-17 RX ADMIN — HEPARIN SODIUM 5000 UNITS: 5000 INJECTION, SOLUTION INTRAVENOUS; SUBCUTANEOUS at 05:11

## 2019-11-17 RX ADMIN — POTASSIUM & SODIUM PHOSPHATES POWDER PACK 280-160-250 MG 2 PACKET: 280-160-250 PACK at 08:11

## 2019-11-17 NOTE — SUBJECTIVE & OBJECTIVE
Review of Systems   Unable to obtain a complete ROS due to level of consciousness-pt intubated  Objective:     Vitals:  Temp: 98.5 °F (36.9 °C)  Pulse: 66  Rhythm: normal sinus rhythm  BP: (!) 154/87  MAP (mmHg): 114  Resp: 16  SpO2: 100 %  Oxygen Concentration (%): 39  O2 Device (Oxygen Therapy): ventilator  Vent Mode: SIMV  Set Rate: 16 bmp  Vt Set: 400 mL  Pressure Support: 10 cmH20  PEEP/CPAP: 5 cmH20  Peak Airway Pressure: 24 cmH2O  Mean Airway Pressure: 7.9 cmH20  Plateau Pressure: 14 cmH20    Temp  Min: 98.3 °F (36.8 °C)  Max: 98.7 °F (37.1 °C)  Pulse  Min: 49  Max: 77  BP  Min: 145/83  Max: 195/94  MAP (mmHg)  Min: 108  Max: 135  Resp  Min: 15  Max: 21  SpO2  Min: 93 %  Max: 100 %  Oxygen Concentration (%)  Min: 39  Max: 90    11/15 0701 - 11/16 0700  In: 3270 [I.V.:1920]  Out: 1300 [Urine:1300]   Unmeasured Output  Stool Occurrence: 0       Physical Exam  GA:  comfortable, no acute distress.   HEENT: No scleral icterus or JVD.   Pulmonary: Diminished to auscultation A/L. No wheezing, crackles, or rhonchi.  Cardiac: RRR S1 & S2 w/o rubs/murmurs/gallops.   Abdominal: Bowel sounds present x 4. No appreciable hepatosplenomegaly.  Skin: No jaundice, rashes, or visible lesions.  Neuro:  --GCS: E4 VT1 M6  --Mental Status:  Opens eyes spont, nods appropriately, tracks,   --CN II-XII grossly intact.   --Pupils 3mm, PERRL.   --Corneal reflex, gag, cough intact.  --LUE spont  --RUE spont  --LLE spont  --RLE- withdraws      Medications:  Continuous  lactated ringers Last Rate: 75 mL/hr at 11/16/19 1405   Scheduled  albuterol-ipratropium 3 mL Q4H   aspirin 81 mg Daily   atorvastatin 40 mg QHS   cefTRIAXone (ROCEPHIN) IVPB 2 g Q24H   chlorhexidine 15 mL BID   glycopyrrolate 0.1 mg Q8H   heparin (porcine) 5,000 Units Q8H   levetiracetam IVPB 1,000 mg Q12H   senna-docusate 8.6-50 mg 1 tablet BID   sodium chloride 3% 4 mL Q4H   PRN  acetaminophen 650 mg Q4H PRN   fentaNYL 50 mcg Q2H PRN   glucagon (human recombinant) 1  mg PRN   insulin aspart U-100 1-10 Units Q4H PRN   magnesium oxide 800 mg PRN   magnesium oxide 800 mg PRN   ondansetron 4 mg Q6H PRN   potassium chloride 10% 40 mEq PRN   potassium chloride 10% 40 mEq PRN   potassium chloride 10% 40 mEq PRN   potassium, sodium phosphates 2 packet PRN   potassium, sodium phosphates 2 packet PRN   potassium, sodium phosphates 2 packet PRN   sodium chloride 0.9% 10 mL PRN     Today I personally reviewed pertinent medications, lines/drains/airways, imaging, laboratory results,    Diet  Diet NPO

## 2019-11-17 NOTE — ASSESSMENT & PLAN NOTE
Hx of frequent falls, UTI  Patient found down at home, incontinent of urine/feces 11/11  LKN 11/8  CTH negative    -Admit to [facility]  -Hourly neuro checks, Hourly vital signs  -MRI brain and c-spine wo contrast pending  -cEEG negative  -BCx w/ GNR; pending rpt BCx  -UA concerning for infectious etiology,Ucx w/ Ecoli; BCx w/ Proteus   -PT/OT/SLP  -Monitor for Fever curve  - ceftriaxone

## 2019-11-17 NOTE — ASSESSMENT & PLAN NOTE
Intubated in ED for airway protection  -Daily ABGs, CXR  -Famotidine, Peridex ordered  - 11/16 Glycopyrrolate for secretions

## 2019-11-17 NOTE — SUBJECTIVE & OBJECTIVE
Review of Systems   Unable to obtain a complete ROS due to level of consciousness.  Objective:     Vitals:  Temp: 98.3 °F (36.8 °C)  Pulse: 72  Rhythm: normal sinus rhythm  BP: (!) 167/88  MAP (mmHg): 120  Resp: (!) 22  SpO2: 98 %  Oxygen Concentration (%): 30  O2 Device (Oxygen Therapy): nasal cannula  Vent Mode: SIMV  Set Rate: 16 bmp  Vt Set: 400 mL  Pressure Support: 10 cmH20  PEEP/CPAP: 5 cmH20  Peak Airway Pressure: 17 cmH2O  Mean Airway Pressure: 13 cmH20  Plateau Pressure: 14 cmH20    Temp  Min: 98.3 °F (36.8 °C)  Max: 99.5 °F (37.5 °C)  Pulse  Min: 57  Max: 91  BP  Min: 129/77  Max: 172/92  MAP (mmHg)  Min: 97  Max: 125  Resp  Min: 5  Max: 196  SpO2  Min: 97 %  Max: 100 %  Oxygen Concentration (%)  Min: 29  Max: 40    11/16 0701 - 11/17 0700  In: 2916.3 [I.V.:1926.3]  Out: 3080 [Urine:3080]   Unmeasured Output  Stool Occurrence: 1       Physical Exam  GA: Alert, comfortable, no acute distress.   HEENT: No scleral icterus or JVD.   Pulmonary: Clear to auscultation A/L. No wheezing, crackles, or rhonchi.  Cardiac: RRR S1 & S2 w/o rubs/murmurs/gallops.   Abdominal: Bowel sounds present x 4. No appreciable hepatosplenomegaly.  Skin: No jaundice, rashes, or visible lesions.  Neuro:  --GCS: E4 Vt1 M6  --Mental Status:  Awake, tracks, follows simple commands  --Pupils 3mm, PERRL.   --Corneal reflex, gag, cough intact.  --LUE spont  --RUE spont  --LLE spont  --RLE withdraws    Medications:  Continuous  lactated ringers Last Rate: 75 mL/hr at 11/17/19 1305   Scheduled  albuterol-ipratropium 3 mL Q4H   aspirin 81 mg Daily   atorvastatin 40 mg QHS   cefTRIAXone (ROCEPHIN) IVPB 2 g Q24H   chlorhexidine 15 mL BID   famotidine 20 mg BID   heparin (porcine) 5,000 Units Q8H   levetiracetam IVPB 1,000 mg Q12H   senna-docusate 8.6-50 mg 1 tablet BID   sodium chloride 3% 4 mL Q4H   PRN  acetaminophen 650 mg Q4H PRN   fentaNYL 50 mcg Q2H PRN   glucagon (human recombinant) 1 mg PRN   insulin aspart U-100 1-10 Units Q4H PRN    magnesium oxide 800 mg PRN   magnesium oxide 800 mg PRN   ondansetron 4 mg Q6H PRN   potassium chloride 10% 40 mEq PRN   potassium chloride 10% 40 mEq PRN   potassium chloride 10% 40 mEq PRN   potassium, sodium phosphates 2 packet PRN   potassium, sodium phosphates 2 packet PRN   potassium, sodium phosphates 2 packet PRN   sodium chloride 0.9% 10 mL PRN     Today I personally reviewed pertinent medications, lines/drains/airways, imaging, laboratory results,     Diet  Diet NPO

## 2019-11-17 NOTE — ASSESSMENT & PLAN NOTE
Hx of UTIs, previous urine culture pos. for klebsiella  UA with many bacteria, UCx w/ Ecoli; BCx w/ Proteus  Fever curve improving  -continue rocephin

## 2019-11-17 NOTE — ASSESSMENT & PLAN NOTE
Intubated in ED for airway protection  -Daily ABGs, CXR  -Famotidine, Peridex ordered  - 11/16 Glycopyrrolate for secretions   11/17: weaning parameters  Plan to extubate

## 2019-11-17 NOTE — PLAN OF CARE
Goals remain appropriate.  NOHEMI De  11/17/2019    Problem: Occupational Therapy Goal  Goal: Occupational Therapy Goal  Description  Goals set 11/12 to be addressed for 14 days with expiration date, 11/26  Patient will increase functional independence with ADLs by performing:    Patient will demonstrate rolling to the right with mod assist.  Not met   Patient will demonstrate rolling to the left with mod assist.   Not met  Patient will demonstrate supine -sit with mod  assist.   Not met  Patient will demonstrate stand pivot transfers with max assist.   Not met  Patient will demonstrate grooming while seated with max assist.   Not met  Patient will demonstrate upper body dressing with max assist while seated EOB.   Not met  Patient will demonstrate lower body dressing with max assist while seated EOB.   Not met  Patient will demonstrate toileting with max assist.   Not met  Patient will demonstrate bathing while seated EOB with max assist.   Not met  Patient will demonstrate ability to follow 3/5 commands.   Not met  Patient's family / caregiver will demonstrate independence and safety with assisting patient with self-care skills and functional mobility.     Not met  Patient's family / caregiver will demonstrate independence with providing ROM and changes in bed positioning.   Not met           Outcome: Ongoing, Progressing

## 2019-11-17 NOTE — PROGRESS NOTES
[facility]  Neurocritical Care  Progress Note    Admit Date: 11/11/2019  Service Date: 11/16/2019  Length of Stay: 5    Subjective:     Chief Complaint: Altered mental status    History of Present Illness: Mr Lopez is a 69 yr old male with a PMH of CVA, seizures, CHI, DM II, dementia, HTN, who presents to the ED with AMS. Patient was last heard from by a friend on Friday 11/8 (3 days ago). Found down at home 11/11, incontinent of urine and bowel. CTH negative. MRI c-spine & brain w/o acute findings. Mild leukocytosis, UA positive for UTI. cEEG suppressed. Patient intubated in the ED for airway protection. Admitted to [facility] for higher level of care.     Hospital Course: 11/11/2019: Intubated in ED. Admit to [facility].  11/12/2019: Switched Ceftriaxone to Zosyn, rechecking BCx, IVF @ 150cc/hr w/ 1L bolus; Tube feeds & dvt ppx initiated. EEG suppressed, propofol disctoninued & fentanyl 50mcg q2hr added.  11/13/2019: Steadily improving since presentation. Continue Keppra & Abx, added 200cc q6 FW to feeds, & dc'd vent.  11/14/2019: mental status continues to improve. Following commands in all 4 ext. FW 200cc q6hr; Extubate today; pending sensitivities of cx's; added ASA/statin  11/15/2019: Mental status improved; secretions persistent. De-escalated Abx to ceftriaxone given susceptibilities; added breathing tx q4. NPO in AM for possible extubation.  11/16/19: start glycopyrrolate for secretions,         Review of Systems   Unable to obtain a complete ROS due to level of consciousness-pt intubated  Objective:     Vitals:  Temp: 98.5 °F (36.9 °C)  Pulse: 66  Rhythm: normal sinus rhythm  BP: (!) 154/87  MAP (mmHg): 114  Resp: 16  SpO2: 100 %  Oxygen Concentration (%): 39  O2 Device (Oxygen Therapy): ventilator  Vent Mode: SIMV  Set Rate: 16 bmp  Vt Set: 400 mL  Pressure Support: 10 cmH20  PEEP/CPAP: 5 cmH20  Peak Airway Pressure: 24 cmH2O  Mean Airway Pressure: 7.9 cmH20  Plateau Pressure: 14 cmH20    Temp   Min: 98.3 °F (36.8 °C)  Max: 98.7 °F (37.1 °C)  Pulse  Min: 49  Max: 77  BP  Min: 145/83  Max: 195/94  MAP (mmHg)  Min: 108  Max: 135  Resp  Min: 15  Max: 21  SpO2  Min: 93 %  Max: 100 %  Oxygen Concentration (%)  Min: 39  Max: 90    11/15 0701 - 11/16 0700  In: 3270 [I.V.:1920]  Out: 1300 [Urine:1300]   Unmeasured Output  Stool Occurrence: 0       Physical Exam  GA:  comfortable, no acute distress.   HEENT: No scleral icterus or JVD.   Pulmonary: Diminished to auscultation A/L. No wheezing, crackles, or rhonchi.  Cardiac: RRR S1 & S2 w/o rubs/murmurs/gallops.   Abdominal: Bowel sounds present x 4. No appreciable hepatosplenomegaly.  Skin: No jaundice, rashes, or visible lesions.  Neuro:  --GCS: E4 VT1 M6  --Mental Status:  Opens eyes spont, nods appropriately, tracks,   --CN II-XII grossly intact.   --Pupils 3mm, PERRL.   --Corneal reflex, gag, cough intact.  --LUE spont  --RUE spont  --LLE spont  --RLE- withdraws      Medications:  Continuous  lactated ringers Last Rate: 75 mL/hr at 11/16/19 1405   Scheduled  albuterol-ipratropium 3 mL Q4H   aspirin 81 mg Daily   atorvastatin 40 mg QHS   cefTRIAXone (ROCEPHIN) IVPB 2 g Q24H   chlorhexidine 15 mL BID   glycopyrrolate 0.1 mg Q8H   heparin (porcine) 5,000 Units Q8H   levetiracetam IVPB 1,000 mg Q12H   senna-docusate 8.6-50 mg 1 tablet BID   sodium chloride 3% 4 mL Q4H   PRN  acetaminophen 650 mg Q4H PRN   fentaNYL 50 mcg Q2H PRN   glucagon (human recombinant) 1 mg PRN   insulin aspart U-100 1-10 Units Q4H PRN   magnesium oxide 800 mg PRN   magnesium oxide 800 mg PRN   ondansetron 4 mg Q6H PRN   potassium chloride 10% 40 mEq PRN   potassium chloride 10% 40 mEq PRN   potassium chloride 10% 40 mEq PRN   potassium, sodium phosphates 2 packet PRN   potassium, sodium phosphates 2 packet PRN   potassium, sodium phosphates 2 packet PRN   sodium chloride 0.9% 10 mL PRN     Today I personally reviewed pertinent medications, lines/drains/airways, imaging, laboratory  results,    Diet  Diet NPO      Assessment/Plan:     Neuro  * Altered mental status  Hx of frequent falls, UTI  Patient found down at home, incontinent of urine/feces 11/11  LKN 11/8  CTH negative    -Admit to NCC  -Hourly neuro checks, Hourly vital signs  -MRI brain and c-spine wo contrast pending  -cEEG negative  -BCx w/ GNR; pending rpt BCx  -UA concerning for infectious etiology,Ucx w/ Ecoli; BCx w/ Proteus   -PT/OT/SLP  -Monitor for Fever curve  - ceftriaxone       Seizure disorder  Hx of Seizures, CHI  -Loaded with Keppra in the ED  -cEEG negative; dc'd  - dc'd propofol  - cont keppra 1g BID for ppx  - Clinically improving; stable.    Pulmonary  On mechanically assisted ventilation  Intubated in ED for airway protection  -Daily ABGs, CXR  -Famotidine, Peridex ordered  - 11/16 Glycopyrrolate for secretions     Cardiac/Vascular  Hypertension  SBP Goal < 180  At goal    Renal/  UTI (urinary tract infection)  Hx of UTIs, previous urine culture pos. for klebsiella  UA with many bacteria, UCx w/ Ecoli; BCx w/ Proteus  Fever curve improving  -continue rocephin      ID  Bacteremia  BCx w/ Proteus; pending susceptibilities  Pending rpt cx; leukocytosis improving; Afebrile  Pending cx sensitivities  Zosyn-> ceftriaxone now; both susc to ceftriaxone  Monitor Cx's & Fever curve    Oncology  Leukocytosis  -resolved    Endocrine  Type 2 diabetes mellitus  Hgb A1c 6.4  SSI w/ accuchecks          The patient is being Prophylaxed for:  Venous Thromboembolism with: Chemical  Stress Ulcer with: H2B  Ventilator Pneumonia with: chlorhexidine oral care    Activity Orders          Diet NPO: NPO starting at 11/11 1459    Commode at bedside starting at 11/11 1459        Full Code    Sarah Mcdaniel NP  Neurocritical Care  [facility]

## 2019-11-17 NOTE — PLAN OF CARE
POC reviewed with pt and family at 1400. Pt verbalized understanding. Questions and concerns addressed. No acute events today. Pt progressing toward goals. Will continue to monitor. See flowsheets for full assessment and VS info.

## 2019-11-17 NOTE — PROGRESS NOTES
[facility]  Neurocritical Care  Progress Note    Admit Date: 11/11/2019  Service Date: 11/17/2019  Length of Stay: 6    Subjective:     Chief Complaint: Altered mental status    History of Present Illness: Mr Lopez is a 69 yr old male with a PMH of CVA, seizures, CHI, DM II, dementia, HTN, who presents to the ED with AMS. Patient was last heard from by a friend on Friday 11/8 (3 days ago). Found down at home 11/11, incontinent of urine and bowel. CTH negative. MRI c-spine & brain w/o acute findings. Mild leukocytosis, UA positive for UTI. cEEG suppressed. Patient intubated in the ED for airway protection. Admitted to [facility] for higher level of care.     Hospital Course: 11/11/2019: Intubated in ED. Admit to [facility].  11/12/2019: Switched Ceftriaxone to Zosyn, rechecking BCx, IVF @ 150cc/hr w/ 1L bolus; Tube feeds & dvt ppx initiated. EEG suppressed, propofol disctoninued & fentanyl 50mcg q2hr added.  11/13/2019: Steadily improving since presentation. Continue Keppra & Abx, added 200cc q6 FW to feeds, & dc'd vent.  11/14/2019: mental status continues to improve. Following commands in all 4 ext. FW 200cc q6hr; Extubate today; pending sensitivities of cx's; added ASA/statin  11/15/2019: Mental status improved; secretions persistent. De-escalated Abx to ceftriaxone given susceptibilities; added breathing tx q4. NPO in AM for possible extubation.  11/16/19: start glycopyrrolate for secretions  11/17: plan to extubate        Review of Systems   Unable to obtain a complete ROS due to level of consciousness.  Objective:     Vitals:  Temp: 98.3 °F (36.8 °C)  Pulse: 72  Rhythm: normal sinus rhythm  BP: (!) 167/88  MAP (mmHg): 120  Resp: (!) 22  SpO2: 98 %  Oxygen Concentration (%): 30  O2 Device (Oxygen Therapy): nasal cannula  Vent Mode: SIMV  Set Rate: 16 bmp  Vt Set: 400 mL  Pressure Support: 10 cmH20  PEEP/CPAP: 5 cmH20  Peak Airway Pressure: 17 cmH2O  Mean Airway Pressure: 13 cmH20  Plateau Pressure: 14  cmH20    Temp  Min: 98.3 °F (36.8 °C)  Max: 99.5 °F (37.5 °C)  Pulse  Min: 57  Max: 91  BP  Min: 129/77  Max: 172/92  MAP (mmHg)  Min: 97  Max: 125  Resp  Min: 5  Max: 196  SpO2  Min: 97 %  Max: 100 %  Oxygen Concentration (%)  Min: 29  Max: 40    11/16 0701 - 11/17 0700  In: 2916.3 [I.V.:1926.3]  Out: 3080 [Urine:3080]   Unmeasured Output  Stool Occurrence: 1       Physical Exam  GA: Alert, comfortable, no acute distress.   HEENT: No scleral icterus or JVD.   Pulmonary: Clear to auscultation A/L. No wheezing, crackles, or rhonchi.  Cardiac: RRR S1 & S2 w/o rubs/murmurs/gallops.   Abdominal: Bowel sounds present x 4. No appreciable hepatosplenomegaly.  Skin: No jaundice, rashes, or visible lesions.  Neuro:  --GCS: E4 Vt1 M6  --Mental Status:  Awake, tracks, follows simple commands  --Pupils 3mm, PERRL.   --Corneal reflex, gag, cough intact.  --LUE spont  --RUE spont  --LLE spont  --RLE withdraws    Medications:  Continuous  lactated ringers Last Rate: 75 mL/hr at 11/17/19 1305   Scheduled  albuterol-ipratropium 3 mL Q4H   aspirin 81 mg Daily   atorvastatin 40 mg QHS   cefTRIAXone (ROCEPHIN) IVPB 2 g Q24H   chlorhexidine 15 mL BID   famotidine 20 mg BID   heparin (porcine) 5,000 Units Q8H   levetiracetam IVPB 1,000 mg Q12H   senna-docusate 8.6-50 mg 1 tablet BID   sodium chloride 3% 4 mL Q4H   PRN  acetaminophen 650 mg Q4H PRN   fentaNYL 50 mcg Q2H PRN   glucagon (human recombinant) 1 mg PRN   insulin aspart U-100 1-10 Units Q4H PRN   magnesium oxide 800 mg PRN   magnesium oxide 800 mg PRN   ondansetron 4 mg Q6H PRN   potassium chloride 10% 40 mEq PRN   potassium chloride 10% 40 mEq PRN   potassium chloride 10% 40 mEq PRN   potassium, sodium phosphates 2 packet PRN   potassium, sodium phosphates 2 packet PRN   potassium, sodium phosphates 2 packet PRN   sodium chloride 0.9% 10 mL PRN     Today I personally reviewed pertinent medications, lines/drains/airways, imaging, laboratory results,     Diet  Diet  NPO      Assessment/Plan:     Neuro  * Altered mental status  Hx of frequent falls, UTI  Patient found down at home, incontinent of urine/feces 11/11  LKN 11/8  CTH negative    -Admit to NCC  -Hourly neuro checks, Hourly vital signs  -MRI brain and c-spine wo contrast pending  -cEEG negative  -BCx w/ GNR; pending rpt BCx  -UA concerning for infectious etiology,Ucx w/ Ecoli; BCx w/ Proteus   -PT/OT/SLP  -Monitor for Fever curve  - ceftriaxone       Seizure disorder  Hx of Seizures, CHI  -Loaded with Keppra in the ED  -cEEG negative; dc'd  - dc'd propofol  - cont keppra 1g BID for ppx  - Clinically improving; stable.    Pulmonary  On mechanically assisted ventilation  Intubated in ED for airway protection  -Daily ABGs, CXR  -Famotidine, Peridex ordered  - 11/16 Glycopyrrolate for secretions   11/17: weaning parameters  Plan to extubate    Cardiac/Vascular  Hypertension  SBP Goal < 180      Renal/  UTI (urinary tract infection)  Hx of UTIs, previous urine culture pos. for klebsiella  UA with many bacteria, UCx w/ Ecoli; BCx w/ Proteus  Fever curve improving  -continue rocephin      ID  Bacteremia  BCx w/ Proteus; pending susceptibilities  Pending rpt cx; leukocytosis improving; Afebrile  Pending cx sensitivities  Zosyn-> ceftriaxone now; both susc to ceftriaxone  Monitor Cx's & Fever curve    Endocrine  Type 2 diabetes mellitus  Hgb A1c 6.4  SSI w/ accuchecks          The patient is being Prophylaxed for:  Venous Thromboembolism with: Chemical  Stress Ulcer with: H2B  Ventilator Pneumonia with: chlorhexidine oral care    Activity Orders          Diet NPO: NPO starting at 11/11 1459    Commode at bedside starting at 11/11 1459        Full Code    Sarah Mcdaniel NP  Neurocritical Care  [facility]

## 2019-11-17 NOTE — PLAN OF CARE
POC reviewed with pt and family at 1400. Pt verbalized understanding. Questions and concerns addressed. No acute events today. Pt extuabted to 1 liter NC. Pt progressing toward goals. Will continue to monitor. See flowsheets for full assessment and VS info.

## 2019-11-17 NOTE — PLAN OF CARE
POC reviewed with pt at 0400. Pt unable to verbalize understanding, but nods appropriately. No acute events overnight. LR remains infusing @ 75. TF @ goal. Pt progressing toward goals. Will continue to monitor. See flowsheets for full assessment and VS info.

## 2019-11-17 NOTE — PLAN OF CARE
Weaning parameters today  Secretions much improved  TF held for extubation  Continue keppra  Complete abx  elyte replacement

## 2019-11-17 NOTE — PT/OT/SLP PROGRESS
Occupational Therapy   Treatment    Name: Edwin Lopez Jr.  MRN: [id number]  Admitting Diagnosis:  Altered mental status       Recommendations:     Discharge Recommendations: (pending extubation)  Discharge Equipment Recommendations:  (pending extubation )  Barriers to discharge:  Inaccessible home environment, Decreased caregiver support    Assessment:     Edwin Lopez Jr. is a 69 y.o. male with a medical diagnosis of Altered mental status.  He presents with performance deficits affecting function are weakness, impaired endurance, impaired self care skills, impaired functional mobilty, gait instability, impaired balance, decreased coordination, decreased upper extremity function, decreased lower extremity function, decreased safety awareness.     Rehab Prognosis:  Good; patient would benefit from acute skilled OT services to address these deficits and reach maximum level of function.       Plan:     Patient to be seen 3 x/week to address the above listed problems via self-care/home management, therapeutic activities, therapeutic exercises, neuromuscular re-education, cognitive retraining, sensory integration  · Plan of Care Expires: 12/10/19  · Plan of Care Reviewed with: patient    Subjective   Patient:  Nonverbal; intubated.  Communicating via head hods.  Pain/Comfort:  · Pain Rating 1: 0/10  · Pain Rating Post-Intervention 1: 0/10    Objective:     Communicated with: Nurse prior to session.  Patient found supine with arterial line, bed alarm, blood pressure cuff, Condom Catheter, pressure relief boots, peripheral IV, pulse ox (continuous), restraints, SCD, telemetry, ventilator(OG tube) upon OT entry to room.    General Precautions: Standard, aspiration, NPO, fall, seizure   Orthopedic Precautions:N/A   Braces: N/A     Occupational Performance:     Bed Mobility:    · Patient completed Rolling/Turning to Left with  total assistance  · Patient completed Rolling/Turning to Right with total assistance     Functional  Mobility/Transfers:  · Dependent drawsheet transfers    Activities of Daily Living:  · Feeding:  NPO    · Grooming: total assistance while supine    [facility] 6 Click ADL: 6    Treatment & Education:  Patient education provided on role of OT and need for continued OT upon discharge.   Continued education, patient/ family training recommended.  Daily orientation provided.  AAROM performed bilateral UE and left LE, PROM performed right LE one set x 10 rep in all planes of motion with stretches provided at end range; sustained stretch provided for ankle dorsiflexion.  Assistance and facilitation provided for upward rotation of the scapula during shoulder flexion and abduction.  Patient kept eyes open 100% of the session.  Patient able to follow 3/3 one step commands.  Positioning provided for midline orientation with bilateral UEs elevated and heels lifted off mattress.  Gentle cervical rotation provided.   Family not present during the session.      Patient left left sidelying with all lines intact, call button in reach, bed alarm on and restraints reapplied at end of sessionEducation:      GOALS:   Multidisciplinary Problems     Occupational Therapy Goals        Problem: Occupational Therapy Goal    Goal Priority Disciplines Outcome Interventions   Occupational Therapy Goal     OT, PT/OT Ongoing, Progressing    Description:  Goals set 11/12 to be addressed for 14 days with expiration date, 11/26  Patient will increase functional independence with ADLs by performing:    Patient will demonstrate rolling to the right with mod assist.  Not met   Patient will demonstrate rolling to the left with mod assist.   Not met  Patient will demonstrate supine -sit with mod  assist.   Not met  Patient will demonstrate stand pivot transfers with max assist.   Not met  Patient will demonstrate grooming while seated with max assist.   Not met  Patient will demonstrate upper body dressing with max assist while seated EOB.   Not met  Patient  will demonstrate lower body dressing with max assist while seated EOB.   Not met  Patient will demonstrate toileting with max assist.   Not met  Patient will demonstrate bathing while seated EOB with max assist.   Not met  Patient will demonstrate ability to follow 3/5 commands.   Not met  Patient's family / caregiver will demonstrate independence and safety with assisting patient with self-care skills and functional mobility.     Not met  Patient's family / caregiver will demonstrate independence with providing ROM and changes in bed positioning.   Not met                            Time Tracking:     OT Date of Treatment: 11/17/19  OT Start Time: 0400  OT Stop Time: 0424  OT Total Time (min): 24 min    Billable Minutes:Therapeutic Exercise 24    NOHEMI De  11/17/2019

## 2019-11-18 PROBLEM — Z71.3 RESTRICTED DIET: Status: ACTIVE | Noted: 2019-11-18

## 2019-11-18 LAB
ALBUMIN SERPL BCP-MCNC: 2.5 G/DL (ref 3.5–5.2)
ALP SERPL-CCNC: 62 U/L (ref 55–135)
ALT SERPL W/O P-5'-P-CCNC: 26 U/L (ref 10–44)
ANION GAP SERPL CALC-SCNC: 9 MMOL/L (ref 8–16)
AST SERPL-CCNC: 37 U/L (ref 10–40)
BASOPHILS # BLD AUTO: 0.03 K/UL (ref 0–0.2)
BASOPHILS NFR BLD: 0.4 % (ref 0–1.9)
BILIRUB SERPL-MCNC: 0.6 MG/DL (ref 0.1–1)
BUN SERPL-MCNC: 5 MG/DL (ref 8–23)
CALCIUM SERPL-MCNC: 8.2 MG/DL (ref 8.7–10.5)
CHLORIDE SERPL-SCNC: 104 MMOL/L (ref 95–110)
CO2 SERPL-SCNC: 24 MMOL/L (ref 23–29)
CREAT SERPL-MCNC: 0.7 MG/DL (ref 0.5–1.4)
DIFFERENTIAL METHOD: ABNORMAL
EOSINOPHIL # BLD AUTO: 0.2 K/UL (ref 0–0.5)
EOSINOPHIL NFR BLD: 2.8 % (ref 0–8)
ERYTHROCYTE [DISTWIDTH] IN BLOOD BY AUTOMATED COUNT: 13.2 % (ref 11.5–14.5)
EST. GFR  (AFRICAN AMERICAN): >60 ML/MIN/1.73 M^2
EST. GFR  (NON AFRICAN AMERICAN): >60 ML/MIN/1.73 M^2
GLUCOSE SERPL-MCNC: 100 MG/DL (ref 70–110)
HCT VFR BLD AUTO: 32 % (ref 40–54)
HGB BLD-MCNC: 9.7 G/DL (ref 14–18)
IMM GRANULOCYTES # BLD AUTO: 0.04 K/UL (ref 0–0.04)
IMM GRANULOCYTES NFR BLD AUTO: 0.5 % (ref 0–0.5)
LYMPHOCYTES # BLD AUTO: 1.1 K/UL (ref 1–4.8)
LYMPHOCYTES NFR BLD: 13.7 % (ref 18–48)
MAGNESIUM SERPL-MCNC: 1.4 MG/DL (ref 1.6–2.6)
MCH RBC QN AUTO: 25.4 PG (ref 27–31)
MCHC RBC AUTO-ENTMCNC: 30.3 G/DL (ref 32–36)
MCV RBC AUTO: 84 FL (ref 82–98)
MONOCYTES # BLD AUTO: 1 K/UL (ref 0.3–1)
MONOCYTES NFR BLD: 12.3 % (ref 4–15)
NEUTROPHILS # BLD AUTO: 5.7 K/UL (ref 1.8–7.7)
NEUTROPHILS NFR BLD: 70.3 % (ref 38–73)
NRBC BLD-RTO: 0 /100 WBC
PHOSPHATE SERPL-MCNC: 2.6 MG/DL (ref 2.7–4.5)
PLATELET # BLD AUTO: 192 K/UL (ref 150–350)
PMV BLD AUTO: 10.1 FL (ref 9.2–12.9)
POCT GLUCOSE: 101 MG/DL (ref 70–110)
POCT GLUCOSE: 102 MG/DL (ref 70–110)
POCT GLUCOSE: 73 MG/DL (ref 70–110)
POCT GLUCOSE: 94 MG/DL (ref 70–110)
POCT GLUCOSE: 96 MG/DL (ref 70–110)
POTASSIUM SERPL-SCNC: 4.2 MMOL/L (ref 3.5–5.1)
PROT SERPL-MCNC: 6 G/DL (ref 6–8.4)
RBC # BLD AUTO: 3.82 M/UL (ref 4.6–6.2)
SODIUM SERPL-SCNC: 137 MMOL/L (ref 136–145)
WBC # BLD AUTO: 8.12 K/UL (ref 3.9–12.7)

## 2019-11-18 PROCEDURE — 99232 SBSQ HOSP IP/OBS MODERATE 35: CPT | Mod: GC,,, | Performed by: PSYCHIATRY & NEUROLOGY

## 2019-11-18 PROCEDURE — 63600175 PHARM REV CODE 636 W HCPCS: Performed by: STUDENT IN AN ORGANIZED HEALTH CARE EDUCATION/TRAINING PROGRAM

## 2019-11-18 PROCEDURE — 25000003 PHARM REV CODE 250: Performed by: NURSE PRACTITIONER

## 2019-11-18 PROCEDURE — 25000003 PHARM REV CODE 250: Performed by: STUDENT IN AN ORGANIZED HEALTH CARE EDUCATION/TRAINING PROGRAM

## 2019-11-18 PROCEDURE — 11000001 HC ACUTE MED/SURG PRIVATE ROOM

## 2019-11-18 PROCEDURE — 97535 SELF CARE MNGMENT TRAINING: CPT

## 2019-11-18 PROCEDURE — 81003 URINALYSIS AUTO W/O SCOPE: CPT

## 2019-11-18 PROCEDURE — 80053 COMPREHEN METABOLIC PANEL: CPT

## 2019-11-18 PROCEDURE — 97162 PT EVAL MOD COMPLEX 30 MIN: CPT

## 2019-11-18 PROCEDURE — 25000242 PHARM REV CODE 250 ALT 637 W/ HCPCS: Performed by: NURSE PRACTITIONER

## 2019-11-18 PROCEDURE — 85025 COMPLETE CBC W/AUTO DIFF WBC: CPT

## 2019-11-18 PROCEDURE — 63600175 PHARM REV CODE 636 W HCPCS: Performed by: NURSE PRACTITIONER

## 2019-11-18 PROCEDURE — 92610 EVALUATE SWALLOWING FUNCTION: CPT

## 2019-11-18 PROCEDURE — 99900026 HC AIRWAY MAINTENANCE (STAT)

## 2019-11-18 PROCEDURE — 94761 N-INVAS EAR/PLS OXIMETRY MLT: CPT

## 2019-11-18 PROCEDURE — 87040 BLOOD CULTURE FOR BACTERIA: CPT

## 2019-11-18 PROCEDURE — 94640 AIRWAY INHALATION TREATMENT: CPT

## 2019-11-18 PROCEDURE — 99232 PR SUBSEQUENT HOSPITAL CARE,LEVL II: ICD-10-PCS | Mod: GC,,, | Performed by: PSYCHIATRY & NEUROLOGY

## 2019-11-18 PROCEDURE — 25000003 PHARM REV CODE 250: Performed by: PSYCHIATRY & NEUROLOGY

## 2019-11-18 PROCEDURE — 25000242 PHARM REV CODE 250 ALT 637 W/ HCPCS: Performed by: PSYCHIATRY & NEUROLOGY

## 2019-11-18 PROCEDURE — 84100 ASSAY OF PHOSPHORUS: CPT

## 2019-11-18 PROCEDURE — 27000221 HC OXYGEN, UP TO 24 HOURS

## 2019-11-18 PROCEDURE — 83735 ASSAY OF MAGNESIUM: CPT

## 2019-11-18 PROCEDURE — 97110 THERAPEUTIC EXERCISES: CPT

## 2019-11-18 PROCEDURE — 36415 COLL VENOUS BLD VENIPUNCTURE: CPT

## 2019-11-18 RX ORDER — IPRATROPIUM BROMIDE AND ALBUTEROL SULFATE 2.5; .5 MG/3ML; MG/3ML
3 SOLUTION RESPIRATORY (INHALATION) EVERY 4 HOURS PRN
Status: DISCONTINUED | OUTPATIENT
Start: 2019-11-18 | End: 2019-12-02 | Stop reason: HOSPADM

## 2019-11-18 RX ORDER — POTASSIUM CHLORIDE 20 MEQ/15ML
40 SOLUTION ORAL
Status: DISCONTINUED | OUTPATIENT
Start: 2019-11-18 | End: 2019-11-18

## 2019-11-18 RX ORDER — LEVETIRACETAM 500 MG/1
500 TABLET ORAL 2 TIMES DAILY
Status: DISCONTINUED | OUTPATIENT
Start: 2019-11-18 | End: 2019-11-19

## 2019-11-18 RX ORDER — LOSARTAN POTASSIUM 25 MG/1
25 TABLET ORAL DAILY
Status: DISCONTINUED | OUTPATIENT
Start: 2019-11-18 | End: 2019-12-02 | Stop reason: HOSPADM

## 2019-11-18 RX ORDER — ATORVASTATIN CALCIUM 20 MG/1
40 TABLET, FILM COATED ORAL NIGHTLY
Status: DISCONTINUED | OUTPATIENT
Start: 2019-11-18 | End: 2019-12-02 | Stop reason: HOSPADM

## 2019-11-18 RX ORDER — AMOXICILLIN 250 MG
1 CAPSULE ORAL 2 TIMES DAILY
Status: DISCONTINUED | OUTPATIENT
Start: 2019-11-18 | End: 2019-12-02 | Stop reason: HOSPADM

## 2019-11-18 RX ORDER — CLOPIDOGREL BISULFATE 75 MG/1
75 TABLET ORAL DAILY
Status: DISCONTINUED | OUTPATIENT
Start: 2019-11-18 | End: 2019-12-02 | Stop reason: HOSPADM

## 2019-11-18 RX ORDER — LANOLIN ALCOHOL/MO/W.PET/CERES
800 CREAM (GRAM) TOPICAL
Status: DISCONTINUED | OUTPATIENT
Start: 2019-11-18 | End: 2019-11-18

## 2019-11-18 RX ORDER — BISACODYL 10 MG
10 SUPPOSITORY, RECTAL RECTAL DAILY PRN
Status: DISCONTINUED | OUTPATIENT
Start: 2019-11-18 | End: 2019-12-02 | Stop reason: HOSPADM

## 2019-11-18 RX ORDER — IBUPROFEN 200 MG
16 TABLET ORAL
Status: DISCONTINUED | OUTPATIENT
Start: 2019-11-18 | End: 2019-12-02 | Stop reason: HOSPADM

## 2019-11-18 RX ORDER — SODIUM,POTASSIUM PHOSPHATES 280-250MG
2 POWDER IN PACKET (EA) ORAL
Status: DISCONTINUED | OUTPATIENT
Start: 2019-11-18 | End: 2019-11-18

## 2019-11-18 RX ORDER — IBUPROFEN 200 MG
24 TABLET ORAL
Status: DISCONTINUED | OUTPATIENT
Start: 2019-11-18 | End: 2019-12-02 | Stop reason: HOSPADM

## 2019-11-18 RX ORDER — NAPROXEN SODIUM 220 MG/1
81 TABLET, FILM COATED ORAL DAILY
Status: DISCONTINUED | OUTPATIENT
Start: 2019-11-19 | End: 2019-12-02 | Stop reason: HOSPADM

## 2019-11-18 RX ORDER — ACETAMINOPHEN 325 MG/1
650 TABLET ORAL EVERY 4 HOURS PRN
Status: DISCONTINUED | OUTPATIENT
Start: 2019-11-18 | End: 2019-12-02 | Stop reason: HOSPADM

## 2019-11-18 RX ORDER — ONDANSETRON 8 MG/1
8 TABLET, ORALLY DISINTEGRATING ORAL EVERY 8 HOURS PRN
Status: DISCONTINUED | OUTPATIENT
Start: 2019-11-18 | End: 2019-12-02 | Stop reason: HOSPADM

## 2019-11-18 RX ORDER — POLYETHYLENE GLYCOL 3350 17 G/17G
17 POWDER, FOR SOLUTION ORAL DAILY
Status: DISCONTINUED | OUTPATIENT
Start: 2019-11-19 | End: 2019-12-02 | Stop reason: HOSPADM

## 2019-11-18 RX ORDER — TALC
6 POWDER (GRAM) TOPICAL NIGHTLY PRN
Status: DISCONTINUED | OUTPATIENT
Start: 2019-11-18 | End: 2019-12-02 | Stop reason: HOSPADM

## 2019-11-18 RX ADMIN — Medication 800 MG: at 03:11

## 2019-11-18 RX ADMIN — SODIUM CHLORIDE SOLN NEBU 3% 4 ML: 3 NEBU SOLN at 12:11

## 2019-11-18 RX ADMIN — LOSARTAN POTASSIUM 25 MG: 25 TABLET, FILM COATED ORAL at 01:11

## 2019-11-18 RX ADMIN — LEVETIRACETAM 500 MG: 500 TABLET ORAL at 08:11

## 2019-11-18 RX ADMIN — SODIUM CHLORIDE SOLN NEBU 3% 4 ML: 3 NEBU SOLN at 04:11

## 2019-11-18 RX ADMIN — SODIUM CHLORIDE, SODIUM LACTATE, POTASSIUM CHLORIDE, AND CALCIUM CHLORIDE: .6; .31; .03; .02 INJECTION, SOLUTION INTRAVENOUS at 10:11

## 2019-11-18 RX ADMIN — SENNOSIDES AND DOCUSATE SODIUM 1 TABLET: 8.6; 5 TABLET ORAL at 09:11

## 2019-11-18 RX ADMIN — POTASSIUM & SODIUM PHOSPHATES POWDER PACK 280-160-250 MG 2 PACKET: 280-160-250 PACK at 03:11

## 2019-11-18 RX ADMIN — IPRATROPIUM BROMIDE AND ALBUTEROL SULFATE 3 ML: .5; 3 SOLUTION RESPIRATORY (INHALATION) at 03:11

## 2019-11-18 RX ADMIN — CLOPIDOGREL BISULFATE 75 MG: 75 TABLET, FILM COATED ORAL at 01:11

## 2019-11-18 RX ADMIN — HEPARIN SODIUM 5000 UNITS: 5000 INJECTION, SOLUTION INTRAVENOUS; SUBCUTANEOUS at 10:11

## 2019-11-18 RX ADMIN — POTASSIUM & SODIUM PHOSPHATES POWDER PACK 280-160-250 MG 2 PACKET: 280-160-250 PACK at 06:11

## 2019-11-18 RX ADMIN — LEVETIRACETAM 1000 MG: 10 INJECTION INTRAVENOUS at 09:11

## 2019-11-18 RX ADMIN — SENNOSIDES AND DOCUSATE SODIUM 1 TABLET: 8.6; 5 TABLET ORAL at 08:11

## 2019-11-18 RX ADMIN — Medication 800 MG: at 06:11

## 2019-11-18 RX ADMIN — HEPARIN SODIUM 5000 UNITS: 5000 INJECTION, SOLUTION INTRAVENOUS; SUBCUTANEOUS at 06:11

## 2019-11-18 RX ADMIN — IPRATROPIUM BROMIDE AND ALBUTEROL SULFATE 3 ML: .5; 3 SOLUTION RESPIRATORY (INHALATION) at 04:11

## 2019-11-18 RX ADMIN — ASPIRIN 81 MG CHEWABLE TABLET 81 MG: 81 TABLET CHEWABLE at 09:11

## 2019-11-18 RX ADMIN — IPRATROPIUM BROMIDE AND ALBUTEROL SULFATE 3 ML: .5; 3 SOLUTION RESPIRATORY (INHALATION) at 12:11

## 2019-11-18 RX ADMIN — CEFTRIAXONE 2 G: 2 INJECTION, SOLUTION INTRAVENOUS at 07:11

## 2019-11-18 RX ADMIN — HEPARIN SODIUM 5000 UNITS: 5000 INJECTION, SOLUTION INTRAVENOUS; SUBCUTANEOUS at 01:11

## 2019-11-18 RX ADMIN — IPRATROPIUM BROMIDE AND ALBUTEROL SULFATE 3 ML: .5; 3 SOLUTION RESPIRATORY (INHALATION) at 08:11

## 2019-11-18 RX ADMIN — Medication 800 MG: at 11:11

## 2019-11-18 RX ADMIN — SODIUM CHLORIDE SOLN NEBU 3% 4 ML: 3 NEBU SOLN at 08:11

## 2019-11-18 RX ADMIN — ACETAMINOPHEN 650 MG: 325 TABLET ORAL at 08:11

## 2019-11-18 RX ADMIN — SODIUM CHLORIDE SOLN NEBU 3% 4 ML: 3 NEBU SOLN at 03:11

## 2019-11-18 RX ADMIN — ATORVASTATIN CALCIUM 40 MG: 20 TABLET, FILM COATED ORAL at 08:11

## 2019-11-18 NOTE — PT/OT/SLP EVAL
"Speech Language Pathology Evaluation  Bedside Swallow    Patient Name:  Edwin Lopez Jr.   MRN:  [id number]  Admitting Diagnosis: Altered mental status    Recommendations:                 General Recommendations:  Cognitive-linguistic evaluation and Monitor diet tolerance  Diet recommendations:  Dental Soft, Thin   Aspiration Precautions: 1 bite/sip at a time, Assistance with meals, Frequent oral care, HOB to 90 degrees, Meds whole 1 at a time, Small bites/sips and Standard aspiration precautions   General Precautions: Standard, aspiration, seizure, fall  Communication strategies:  none    History:     Past Medical History:   Diagnosis Date    CHI (closed head injury)     Dementia     Diabetes mellitus     Hyperlipidemia     Seizures        History reviewed. No pertinent surgical history.    Social History: Patient lives alone.    Prior Intubation HX:  Intubated from 11/11-11/17    Modified Barium Swallow: None this admit    Chest X-Rays: Chest XR 11/18. Findings are as follows: "Previously present endotracheal and enteric tubes have been removed.  No significant detrimental interval change in the appearance of the chest since 11/17/2019 is appreciated."    Prior diet: Regular/thin (does not use upper dentures to eat)     Occupation/hobbies/homemaking: Retired . Pt unable to provide ST with any personal hobbies.     Subjective     "I live with God."     Pt awake and alert when ST arrived. Niece at bedside. Wet, congested cough observed prior to po intake. Pt agreed to participate with ST in bedside swallow evaluation.     Pain/Comfort:  · Pain Rating 1: 0/10  · Pain Rating Post-Intervention 1: 0/10    Objective:     Oral Musculature Evaluation  · Oral Musculature: general weakness  · Dentition: scattered dentition(upper dentures not present, however does not use them for mastication)  · Secretion Management: adequate  · Mucosal Quality: dry  · Oral Labial Strength and Mobility: impaired pursing, " functional retraction, functional seal, functional coordination  · Lingual Strength and Mobility: (weakened protrusion and lateralization)  · Velar Elevation: WFL  · Volitional Cough: strong and productive   · Volitional Swallow: Intact; adeqaute hyolaryngeal elevation to palpation   · Voice Prior to PO Intake: Clear    Bedside Swallow Eval:   Consistencies Assessed:  · Thin liquids - x2 ice chips, x1 tsp, x2 cup sips, x6 straw sips water   · Puree - x2 tsp pudding  · Solids - x1 whole ivette cracker      Oral Phase:   · WFL   · General orofacial weakness however adequate closure around utensil   · No observed anterior loss or pocketing   · Adequate oral clearance     Pharyngeal Phase:   · O2 at 100 and clear vocal quality throughout all po trials   · Pt displayed wet, congested, and productive cough after first administration of tsp water however not suspected 2/2 po trials  · After productive cough, no other overt signs/symptoms of aspiration or airway compromise observed.     Compensatory Strategies  · None    Treatment: Pt safe to consume dental soft diet with thin liquids and meds whole at this time following safe swallow precautions. Pt remains in need of speech/langauge/cognitive evaluation to determine future therapeutic plan of care. ST educated pt and niece regarding role of SLP, current diet rec, safe swallow prec, and POC moving forward to which both verbalized understanding. Continue ST per POC.     Assessment:     Edwin Lopez Jr. is a 69 y.o. male with an SLP diagnosis of Dysphagia.      Goals:   Multidisciplinary Problems     SLP Goals        Problem: SLP Goal    Goal Priority Disciplines Outcome   SLP Goal     SLP Ongoing, Progressing   Description:  Speech Language Pathology Goals  Goals to be met by (11/25)  1. Pt will tolerate dental soft diet with thin liquids without overt s/s of aspiration or airway compromise.   2. Pt will participate in ongoing assessment of swallow to determine least  restrictive diet.  3. Pt will participate in speech/lang/cog evaluation to determine future therapeutic plan.                     Plan:     · Patient to be seen:  4 x/week   · Plan of Care expires:  12/17/19  · Plan of Care reviewed with:  patient, family   · SLP Follow-Up:  Yes       Discharge recommendations:  nursing facility, skilled     Time Tracking:     SLP Treatment Date:   11/18/19  Speech Start Time:  0943  Speech Stop Time:  0959     Speech Total Time (min):  16 min    Billable Minutes: Eval Swallow and Oral Function 8 and Seld Care/Home Management Training 8    PATRICIA Bob  11/18/2019

## 2019-11-18 NOTE — ASSESSMENT & PLAN NOTE
Intubated in ED for airway protection  -Famotidine, Peridex ordered  - 11/16 Glycopyrrolate for secretions   11/17: weaning parameters  Extubated. Resp stable on RA

## 2019-11-18 NOTE — PLAN OF CARE
POC reviewed with pt at 0400. Pt verbalized understanding. No acute events overnight. A-line remains. LR @ 75. Pt progressing toward goals. Will continue to monitor. See flowsheets for full assessment and VS info.

## 2019-11-18 NOTE — PHYSICIAN QUERY
PT Name: Edwin Lopez Jr.  MR #: [id number]     CDS: Alison Baird RN  Contact information: [email or web address]  Documentation of metabolic encephalopathy found in subsequent MD notes, Query withdrawn.  This form is a permanent document in the medical record.     Query Date: November 18, 2019    Physician Query - Neurological Condition Clarification    By submitting this query, we are merely seeking further clarification of documentation to reflect the severity of illness of your patient. Please utilize your independent clinical judgment when addressing the question(s) below.    The Medical record reflects the following:     Indicators   Supporting Clinical Findings Location in Medical Record   x AMS, Confusion,  LOC, etc.  Altered mental status  Hx of frequent falls, UTI  Patient found down at home, incontinent of urine/feces 11/11  LKN 11/8  CTH negative   H&P 11/11  SULEMA Thomas Dr   x Acute / Chronic Illness 70 yo found down with acute encephalopathy, rhabdomyolysis, and uti/sepsis.  Uti and leukocytosis, in setting of multiple admissions and recent hospital dc will broaden to zosyn.    [facility] PN 11/12  Dr Beka Parker    Radiology Findings      Electrolyte Imbalance      Medication      Treatment             x Other UTI (urinary tract infection)  Hx of UTIs, previous urine culture pos. for klebsiella  UA with many bacteria, UCx w/ Ecoli; BCx w/ Proteus    Bacteremia  BCx w/ Proteus; pending susceptibilities [facility] PN 11/15  Dr Beka Xavier     Encephalopathy- is a general term for any diffuse disease of the brain that alters brain function or structure. Treatment of the cognitive dysfunction varies but is ultimately dependent on the treatment of the underlying condition.    Major Symptoms of Encephalopathy - Decreased level of consciousness, fluctuating alertness/concentration, confusion, agitation, lethargy, somnolence, drowsiness, obtundation, stupor, or coma.         References: National  Institutes of Healths (NIH) National [place] of Neurological Disorders and Strokes;  HCPro 2016; Advisory Board     Clinical Guidelines:   These guidelines will set system standards to assist providers in managing, documentation, and coding of encephalopathy. The intent of this document is to serve as a system guideline, not replace the providers clinical judgment:    Provider, please further specify the diagnosis associated with above clinical findings.    [   ] Metabolic Encephalopathy - Due to electrolye imbalance, metabolic derangements, or infections processes, includes Septic Encephalopathy   [   ] Other Encephalopathy - Includes uremic encephalopathy   [   ] Unspecified Encephalopathy      [   ] Other Neurological Condition-  Includes Post-ictal altered mental status. (please specify condition): _________   [   ]  Clinically Undetermined     Please document in your progress notes daily for the duration of treatment until resolved, and include in your discharge summary.

## 2019-11-18 NOTE — PT/OT/SLP PROGRESS
"Occupational Therapy   Treatment    Name: Edwin Lopez Jr.  MRN: [id number]  Admitting Diagnosis:  Altered mental status       Recommendations:     Discharge Recommendations: (pending extubation)  Discharge Equipment Recommendations:  bath bench  Barriers to discharge:  Inaccessible home environment, Decreased caregiver support    Assessment:     Edwin Lopez Jr. is a 69 y.o. male with a medical diagnosis of Altered mental status.  He presents with performance deficits affecting function are weakness, impaired self care skills, impaired balance, decreased safety awareness, decreased coordination, impaired endurance, impaired functional mobilty, impaired sensation, gait instability, impaired cognition, decreased lower extremity function, decreased upper extremity function, abnormal tone, impaired fine motor, impaired coordination.     Rehab Prognosis:  Good; patient would benefit from acute skilled OT services to address these deficits and reach maximum level of function.       Plan:     Patient to be seen 3 x/week to address the above listed problems via neuromuscular re-education, cognitive retraining, therapeutic activities, therapeutic exercises, sensory integration, self-care/home management  · Plan of Care Expires: 12/10/19  · Plan of Care Reviewed with: patient    Subjective   Patient:  "It's 2030."  Pain/Comfort:  · Pain Rating 1: 0/10  · Pain Rating Post-Intervention 1: 0/10    Objective:     Communicated with: Nurse prior to session.  Patient found supine with arterial line, bed alarm, Condom Catheter, pulse ox (continuous), restraints, telemetry, SCD, peripheral IV, blood pressure cuff, bandage right knee upon OT entry to room.    General Precautions: Standard, aspiration, seizure, fall   Orthopedic Precautions:N/A   Braces: N/A     Occupational Performance:     Bed Mobility:    · Patient completed Rolling/Turning to Left with  total assistance  · Patient completed Rolling/Turning to Right with total " assistance  · Patient completed Scooting/Bridging with total assistance  · Patient completed Supine to Sit with total assistance  · Patient completed Sit to Supine with total assistance     Functional Mobility/Transfers:  · Dependent drawsheet transfers    Activities of Daily Living:  · Grooming: total assistance while seated EOB  · Upper Body Dressing: total assistance while seated EOB  · Lower Body Dressing: total assistance while seated EOB    [facility] 6 Click ADL: 6    Treatment & Education:  Patient education provided on role of OT and need for continued OT upon discharge.   Continued education, patient/ family training recommended.  Daily orientation provided.  AAROM performed bilateral UE/LE one set x 10 rep in all planes of motion with stretches provided at end range; sustained stretch provided for ankle dorsiflexion.  Assistance and facilitation provided for upward rotation of the scapula during shoulder flexion and abduction.  Patient kept eyes open 100% of the session.  Patient able to follow 3/3 one step commands.  Poor control of secretions noted throughout the session.  Delayed responses.  Positioning provided for midline orientation with bilateral UEs elevated and heels lifted off mattress.  Gentle cervical rotation provided.   Family not present during the session.         Patient left supine with all lines intact, call button in reach and bed alarm onEducation:      GOALS:   Multidisciplinary Problems     Occupational Therapy Goals        Problem: Occupational Therapy Goal    Goal Priority Disciplines Outcome Interventions   Occupational Therapy Goal     OT, PT/OT Ongoing, Progressing    Description:  Goals set 11/12 to be addressed for 14 days with expiration date, 11/26  Patient will increase functional independence with ADLs by performing:    Patient will demonstrate rolling to the right with mod assist.  Not met   Patient will demonstrate rolling to the left with mod assist.   Not met  Patient will  demonstrate supine -sit with mod  assist.   Not met  Patient will demonstrate stand pivot transfers with max assist.   Not met  Patient will demonstrate grooming while seated with max assist.   Not met  Patient will demonstrate upper body dressing with max assist while seated EOB.   Not met  Patient will demonstrate lower body dressing with max assist while seated EOB.   Not met  Patient will demonstrate toileting with max assist.   Not met  Patient will demonstrate bathing while seated EOB with max assist.   Not met  Patient will demonstrate ability to follow 3/5 commands.   Not met  Patient's family / caregiver will demonstrate independence and safety with assisting patient with self-care skills and functional mobility.     Not met  Patient's family / caregiver will demonstrate independence with providing ROM and changes in bed positioning.   Not met                            Time Tracking:     OT Date of Treatment: 11/18/19  OT Start Time: 0423  OT Stop Time: 0446  OT Total Time (min): 23 min    Billable Minutes:Self Care/Home Management 15  Therapeutic Exercise 8    NOHEMI De  11/18/2019

## 2019-11-18 NOTE — PHYSICIAN QUERY
PT Name: Edwin Lopez Jr.  MR #: [id number]     Physician Query Form - Documentation Clarification      CDS: Alison Baird RN  Contact information: [email or web address]  Documentation of sepsis etiology noted in subsequent MD notes, query withdrawn, no longer needed.    This form is a permanent document in the medical record.     Query Date: November 18, 2019    By submitting this query, we are merely seeking further clarification of documentation. Please utilize your independent clinical judgment when addressing the question(s) below.    The Medical record reflects the following:    Supporting Clinical Findings Location in Medical Record   Rectal temp 99.1F ED provider notes 11/11   70 yo found down with acute encephalopathy, rhabdomyolysis, and uti/sepsis.    Uti and leukocytosis, in setting of multiple admissions and recent hospital dc will broaden to zosyn. Hold on mrsa coverage in setting of high risk of lam   [facility] PN 11/12  Dr Beka Parker   Vitals:   Temp: 99.1 °F (37.3 °C)  Pulse: 78  Resp:  25   H&P 11/11  SULEMA Thomas Dr   Critical condition in that Patient has a condition that poses threat to life and bodily function: vent weaning - transition to spontaneous, at risk for severe sepsis, encephalopathy    [facility] PN 11/13  Attestation  Dr Xavier   UTI (urinary tract infection)  Hx of UTIs, previous urine culture pos. for klebsiella  UA with many bacteria, UCx w/ Ecoli; BCx w/ Proteus  Fever curve improving  Zosyn-> ceftriaxone now; both susc to ceftriaxone    Bacteremia  BCx w/ Proteus; pending susceptibilities [facility] PN 11/15  Dr Beka Xavier                                                               Doctor, please clarify the diagnosis associated with above clinical findings.    Provider Use Only       [  ] Sepsis due to gram negative bacteria (Ecoli and Proteus)       [  ] Sepsis due to other organism (please specify): __________________       [  ] Sepsis ruled out       [  ] Other  clarification of findings: ___________________________________                                                                                                           [  ] Clinically Undetermined

## 2019-11-18 NOTE — PLAN OF CARE
Problem: Physical Therapy Goal  Goal: Physical Therapy Goal  Description  Goals to be met by: 2019    Patient will increase functional independence with mobility by performin. Supine to sit with Minimal Assistance  2. Sit to supine with Minimal Assistance  3. Sit to stand transfer with Minimal Assistance  4. Bed to chair transfer with Minimal Assistance using Rolling Walker  5. Gait  x 50 feet with Minimal Assistance using Rolling Walker.   6. Lower extremity exercise program x15 reps per handout, with independence  *stairs goal to be added pending progress       Outcome: Ongoing, Progressing     PT evaluation completed- see note for details. Goals established and POC initiated.     Nellie Lee, PT, DPT   2019  Pager: [phone]

## 2019-11-18 NOTE — ASSESSMENT & PLAN NOTE
Hx of Seizures, CHI  -Loaded with Keppra in the ED  -cEEG negative; dc'd  - dc'd propofol  - Keppra 1g BID. Continue on this dose until able to f/u with Neurology outpatient  - Clinically improving; stable.

## 2019-11-18 NOTE — PLAN OF CARE
Goals remain appropriate.  NOHEMI De  11/18/2019    Problem: Occupational Therapy Goal  Goal: Occupational Therapy Goal  Description  Goals set 11/12 to be addressed for 14 days with expiration date, 11/26  Patient will increase functional independence with ADLs by performing:    Patient will demonstrate rolling to the right with mod assist.  Not met   Patient will demonstrate rolling to the left with mod assist.   Not met  Patient will demonstrate supine -sit with mod  assist.   Not met  Patient will demonstrate stand pivot transfers with max assist.   Not met  Patient will demonstrate grooming while seated with max assist.   Not met  Patient will demonstrate upper body dressing with max assist while seated EOB.   Not met  Patient will demonstrate lower body dressing with max assist while seated EOB.   Not met  Patient will demonstrate toileting with max assist.   Not met  Patient will demonstrate bathing while seated EOB with max assist.   Not met  Patient will demonstrate ability to follow 3/5 commands.   Not met  Patient's family / caregiver will demonstrate independence and safety with assisting patient with self-care skills and functional mobility.     Not met  Patient's family / caregiver will demonstrate independence with providing ROM and changes in bed positioning.   Not met           Outcome: Ongoing, Progressing

## 2019-11-18 NOTE — SUBJECTIVE & OBJECTIVE
Interval History: NAONE. AF and HDS. Able to stepdown to Hospital Medicine    Review of Systems     Unable to obtain a complete ROS due to intermittent confsion  .  Objective:     Vitals:  Temp: 98.8 °F (37.1 °C)  Pulse: 74  Rhythm: normal sinus rhythm  BP: (!) 173/93  MAP (mmHg): 125  Resp: 20  SpO2: 100 %  Oxygen Concentration (%): 24  O2 Device (Oxygen Therapy): room air    Temp  Min: 98.8 °F (37.1 °C)  Max: 100.6 °F (38.1 °C)  Pulse  Min: 51  Max: 84  BP  Min: 123/71  Max: 183/89  MAP (mmHg)  Min: 91  Max: 130  Resp  Min: 15  Max: 27  SpO2  Min: 95 %  Max: 100 %  Oxygen Concentration (%)  Min: 24  Max: 24    11/17 0701 - 11/18 0700  In: 2305 [I.V.:1920]  Out: 2240 [Urine:2240]   Unmeasured Output  Urine Occurrence: 1  Stool Occurrence: 1       Physical Exam    GA: Alert, comfortable, no acute distress.   HEENT: No scleral icterus or JVD.   Pulmonary: Clear to auscultation A/L. No wheezing, crackles, or rhonchi.  Cardiac: RRR S1 & S2 w/o rubs/murmurs/gallops.   Abdominal: Bowel sounds present x 4. No appreciable hepatosplenomegaly.  Skin: No jaundice, rashes, or visible lesions.  Neuro:  --GCS: E4 V5 M6  --Mental Status:  Awake, tracks, follows simple commands  --Pupils 3mm, PERRL.   --Corneal reflex, gag, cough intact.  --LUE spont  --RUE spont  --LLE spont  --RLE spont, but weaker than left    Medications:  Continuous    lactated ringers Last Rate: 75 mL/hr at 11/18/19 1405   Scheduled    albuterol-ipratropium 3 mL Q4H   [START ON 11/19/2019] aspirin 81 mg Daily   atorvastatin 40 mg QHS   cefTRIAXone (ROCEPHIN) IVPB 2 g Q24H   clopidogrel 75 mg Daily   heparin (porcine) 5,000 Units Q8H   levETIRAcetam 500 mg BID   losartan 25 mg Daily   senna-docusate 8.6-50 mg 1 tablet BID   sodium chloride 3% 4 mL Q4H   PRN    acetaminophen 650 mg Q4H PRN   fentaNYL 50 mcg Q2H PRN   glucagon (human recombinant) 1 mg PRN   insulin aspart U-100 1-10 Units Q4H PRN   magnesium oxide 800 mg PRN   magnesium oxide 800 mg PRN    ondansetron 4 mg Q6H PRN   potassium chloride 10% 40 mEq PRN   potassium chloride 10% 40 mEq PRN   potassium chloride 10% 40 mEq PRN   potassium, sodium phosphates 2 packet PRN   potassium, sodium phosphates 2 packet PRN   potassium, sodium phosphates 2 packet PRN   sodium chloride 0.9% 10 mL PRN     Today I personally reviewed pertinent medications, lines/drains/airways, imaging, laboratory results,     Diet  Diet Dysphagia Soft (IDDSI Level 6)

## 2019-11-18 NOTE — PROVIDER TRANSFER
Neuro Critical Care Transfer of Care note    Date of Admit: 11/11/2019  Date of Transfer / Stepdown: 11/18/2019    Brief History of Present Illness:      Edwin Lopez Jr. is a 69 y.o. male who  has a past medical history of CHI (closed head injury), Dementia, Diabetes mellitus, Hyperlipidemia, and Seizures.. The patient presented to [facility] on 11/11/2019 with a primary complaint of Altered Mental Status (arrived via [facility] with c/o altered mental status, found on floor at home by family, LSN Friday, hx of stroke, found lying in malodorous urine on EMS arrival, responsive to pain on scene, not responsive with repeated verbal stimuli)      Hospital Course: 11/11/2019: Intubated in ED. Admit to [facility].  11/12/2019: Switched Ceftriaxone to Zosyn, rechecking BCx, IVF @ 150cc/hr w/ 1L bolus; Tube feeds & dvt ppx initiated. EEG suppressed, propofol disctoninued & fentanyl 50mcg q2hr added.  11/13/2019: Steadily improving since presentation. Continue Keppra & Abx, added 200cc q6 FW to feeds, & dc'd vent.  11/14/2019: mental status continues to improve. Following commands in all 4 ext. FW 200cc q6hr; Extubate today; pending sensitivities of cx's; added ASA/statin  11/15/2019: Mental status improved; secretions persistent. De-escalated Abx to ceftriaxone given susceptibilities; added breathing tx q4. NPO in AM for possible extubation.  11/16/19: start glycopyrrolate for secretions  11/17: Extubated    Hospital Course By Problem with Pertinent Findings:     1. Altered Mental Status  Hx of frequent falls, UTI  Patient found down at home, incontinent of urine/feces 11/11  LKN 11/8  CTH negative     -MRI brain and c-spine wo contrast stable  -cEEG negative  -UA concerning for infectious etiology,Ucx w/ Ecoli; -BCx w/ Proteus. Completed zosyn course  -PT/OT/SLP  -Monitor for Fever curve  -Ceftriaxone     2. Seizure Disorder  Hx of Seizures, CHI  -Loaded with Keppra in the ED  -cEEG negative; dc'd  - dc'd propofol  - cont  keppra 1g BID - would recommend continuing this dose until outpatient f/u with Neurology  - Clinically improving; stable.    3. HTN  SBP Goal <180    4. UTI   UTI (urinary tract infection)  Hx of UTIs, previous urine culture pos. for klebsiella  UA with many bacteria, UCx w/ Ecoli; BCx w/ Proteus  Fever curve improving  -continue rocephin    5. Bacteremia  BCx w/ Proteus; pending susceptibilities  Pending rpt cx; leukocytosis improving; Afebrile  Pending cx sensitivities  Zosyn-> ceftriaxone now; both susc to ceftriaxone  Monitor Cx's & Fever curve    6. Type 2 DM  Hgb A1c 6.4  SSI w/ accuchecks    7. H/o CVA in adulthood  Restart home plavix      Consultants and Procedures:     Consultants:  PT, OT, SLP  Epilepsy was following, but has been stable    Procedures:    None    Transfer Information:     Diet:  Dental soft  ADAT    Physical Activity:  Per SLP/PT/OT      To Do / Pending Studies / Follow ups:  cont keppra 1g BID - recommend continuing this dose until outpatient follow up with Neurology    Armando Ortiz  Neuro Crtical Care

## 2019-11-18 NOTE — PLAN OF CARE
Patient extubated per MD.  OT recommending SNF.  PT recs pending.    Patient is a [facility] patient.  Per [facility]: Pt has had [facility] in the past  PCP - Robina Burroughs NP .  Pt is assigned to our [facility] location, [address] [place] [id number] [phone].         11/18/19 0946   Discharge Reassessment   Assessment Type Discharge Planning Reassessment   Provided patient/caregiver education on the expected discharge date and the discharge plan Yes   Do you have any problems affording any of your prescribed medications? No   Discharge Plan A Skilled Nursing Facility   Discharge Plan B Rehab   DME Needed Upon Discharge  other (see comments)  ('TBD)   Patient choice form signed by patient/caregiver N/A   Anticipated Discharge Disposition SNF   Can the patient answer the patient profile reliably? No, cognitively impaired   How does the patient rate their overall health at the present time?   (esteban)   Describe the patient's ability to walk at the present time. Does not walk or unable to take any steps at all   How often would a person be available to care for the patient? Occasionally   Number of comorbid conditions (as recorded on the chart) Five or more   During the past month, has the patient often been bothered by feeling down, depressed or hopeless?   (esteban)   During the past month, has the patient often been bothered by little interest or pleasure in doing things?   (esteban)   Post-Acute Status   Post-Acute Authorization Placement   Post-Acute Placement Status Awaiting Internal Medical Clearance   Discharge Delays None known at this time       Denise Hou RN, CCRN-K, [facility]  Neuro-Critical Care   [phone]

## 2019-11-18 NOTE — ASSESSMENT & PLAN NOTE
Hx of frequent falls, UTI  Patient found down at home, incontinent of urine/feces 11/11  LKN 11/8  CTH negative    -q1 neuro checks, q1 vital signs  -MRI brain and c-spine wo contrast unremarkable  -cEEG negative; d/c'd  -BCx w/ GNR; pending rpt BCx  -UA concerning for infectious etiology,Ucx w/ Ecoli; BCx w/ Proteus   -PT/OT/SLP  -Monitor for Fever curve  - ceftriaxone

## 2019-11-18 NOTE — PLAN OF CARE
Neuro exam stable  resp stable on RA  DC a-line  Restart home ARB  Restart plavix  End date in place for abx  Start dental soft diet  Transition to PO  ADAT  TTF - IMT

## 2019-11-18 NOTE — PT/OT/SLP EVAL
Physical Therapy Evaluation    Patient Name:  Edwin Lopez Jr.  MRN: [id number]  Recent Surgery: * No surgery found *      Recommendations:     Discharge Recommendations: Skilled Nursing Facility   Equipment recommendations: none  Barriers to discharge: Inaccessible home and Decreased caregiver support available     Assessment:     Edwin Lopez Jr. is a 69 y.o. male admitted to [facility] on 2019 with medical diagnosis of Altered mental status. Pt presents with weakness, impaired balance, decreased endurance, impaired cardiopulmonary response to activity, decreased safety awareness, altered gait mechanics and impaired functional mobility . Edwin Lopez Jr. would benefit from acute PT intervention to address listed functional deficits, provide patient/caregiver education, reduce fall risk, and maximize (I) and safety with functional mobility. Once medically stable, recommending pt discharge to skilled nursing facility.      Rehab Prognosis: Good; based on positive indicators including able to tolerate therapy intervention and actively participate    Plan:     During this hospitalization, patient to be seen 4 x/week to address the identified rehab impairments via gait training, therapeutic activities, therapeutic exercises, neuromuscular re-education, wheelchair management/training and progress towards stated goals.     Plan of Care Expires:  19  Plan of Care reviewed with: patient, patient's niece     This plan of care has been discussed with the patient/caregiver, who was included in its development and is in agreement with the identified goals and treatment plan.     Subjective     Communicated with RN prior to session.  Patient agreeable to participate. Pt's niece at bedside.     Patient comments/goals: pt unable to clearly state goals; pt with minimal verbalizations during session.     Pain/Comfort:  · Location: pt reported no pain during session   · Pain Ratin/10      Patients cultural, spiritual,  Jewish conflicts given the current situation: No known conflicts     Patient History: information obtained from pt     Living Environment: Pt lives alone in [facility] with 4 ETIENNE, HR available.  Prior Level of Function: modified (I) for mobility and ADLs using SPC as AD   DME owned: rolling walker, single point cane, bedside commode and wheelchair  Caregiver Assistance: pt's niece is able to provide limited assistance (when she isn't working during the day)     Objective:     Patient found with: oxygen, peripheral IV , bed alarm, condom catheter, telemetry, blood pressure cuff, continuous pulse oximeter and arterial line    General Precautions: Fall, NPO, Standard  Orthopedic Precautions: N/A   Braces: N/A  Oxygen Device: nasal cannula     Exams:     Cognition:  o Pt is alert and oriented x 4  o Pt's ability to follow single step commands: intact  o Decreased safety awareness     Sensation:   o Intact light touch sensation BLEs     Edema: none noted     Postural examination/scapula alignment: Rounded shoulder, Head forward and Increased kyphosis     Lower Extremity Range of Motion:  o Right Lower Extremity: WFL  o Left Lower Extremity: WFL     Lower Extremity Strength:  o Right Lower Extremity: 3+/5  o Left Lower Extremity: 3+/5    Functional Mobility:    Bed Mobility:  · Rolling: to left with Maximum Assistance  · Supine > Sit: Maximum Assistance  · Sit > Supine: Total Assistance     Sitting Balance:   · Assistance level: Moderate Assistance  · Duration: ~5 minutes  · Postural deviation(s) noted: rounded shoulders, forward head, posterior lean     Transfers: deferred this visit due to poor trunk control and BLE weakness    Gait: N/T     Outcome Measure: AM-PAC 6 CLICK MOBILITY  Total Score:8     Patient/Caregiver Education:     Therapist educated pt/caregiver regarding:    PT POC and goals for therapy    Safety with mobility and fall risk    Instruction on use of call button and importance of calling nursing  staff for assistance with mobility     Patient/caregiver able to verbalize understanding; will follow-up with pt/caregiver during current admit for additional questions/concerns within scope of practice.     White board updated.   Patient left HOB elevated with all lines intact, call button in reach, RN notified and niece present.    GOALS:   Multidisciplinary Problems     Physical Therapy Goals        Problem: Physical Therapy Goal    Goal Priority Disciplines Outcome Goal Variances Interventions   Physical Therapy Goal     PT, PT/OT Ongoing, Progressing     Description:  Goals to be met by: 2019    Patient will increase functional independence with mobility by performin. Supine to sit with Minimal Assistance  2. Sit to supine with Minimal Assistance  3. Sit to stand transfer with Minimal Assistance  4. Bed to chair transfer with Minimal Assistance using Rolling Walker  5. Gait  x 50 feet with Minimal Assistance using Rolling Walker.   6. Lower extremity exercise program x15 reps per handout, with independence  *stairs goal to be added pending progress                          History:     Past Medical History:   Diagnosis Date    CHI (closed head injury)     Dementia     Diabetes mellitus     Hyperlipidemia     Seizures        History reviewed. No pertinent surgical history.    Time Tracking:     PT Received On: 19  PT Start Time: 931     PT Stop Time: 948  PT Total Time (min): 17 min     Billable Minutes: Evaluation 17 min    Nellie Lee, PT  2019

## 2019-11-18 NOTE — PROVIDER TRANSFER
Hospital Medicine ICU Acceptance Note    Date of Admit: 11/11/2019  Date of Transfer / Stepdown: 11/18/2019  Juan C, C/J, L, Onc (IV chemo w/in 1 month), Gyn/Onc, or other special case?: no   ICU team stepping patient down: [facility]  ICU team member giving verbal handoff: [phone]   Accepting  team: HARMAN    Brief History of Present Illness:      Mr Lopez is a 69 yr old male with a PMH of CVA, seizures, CHI, DM II, dementia, HTN, who presents to the ED with AMS. Patient was last heard from by a friend on Friday 11/8 (3 days ago). Found down at home 11/11, incontinent of urine and bowel. CTH negative. MRI c-spine, brain pending. Mild leukocytosis, UA positive for UTI. cEEG pending. Patient intubated in the ED for airway protection. Will admit to [facility] for higher level of care. Will need LP.    Hospital/ICU Course:     11/11/2019: Intubated in ED. Admit to [facility].  11/12/2019: Switched Ceftriaxone to Zosyn, rechecking BCx, IVF @ 150cc/hr w/ 1L bolus; Tube feeds & dvt ppx initiated. EEG suppressed, propofol disctoninued & fentanyl 50mcg q2hr added.  11/13/2019: Steadily improving since presentation. Continue Keppra & Abx, added 200cc q6 FW to feeds, & dc'd vent.  11/14/2019: mental status continues to improve. Following commands in all 4 ext. FW 200cc q6hr; Extubate today; pending sensitivities of cx's; added ASA/statin  11/15/2019: Mental status improved; secretions persistent. De-escalated Abx to ceftriaxone given susceptibilities; added breathing tx q4. NPO in AM for possible extubation.  11/16/19: start glycopyrrolate for secretions  11/17: Extubated     Consultants and Procedures:     Consultants:  Neurocritical care  Wound care  PMR  Nutrition    Procedures:    CXR, MRI C spine, MRI brain    Transfer Information:     Diet:  Ordered    Physical Activity:  OT/PT Ordered    To Do / Pending Studies / Follow ups:  -he will need to finish course of ceftriaxone for UTI, bacteremia  -will need to obtain midline for ongoing IV  antibiotics  -OT PT recommending SNF  -consult sw/cm for placement  -continue neuro checks  -continue seizure medications  -monitor for hemodynamic instability    Patient has been accepted by Hospital Medicine Team G, who will assume care of the patient upon arrival to the floor from the ICU. Please contact ICU team with any concerns prior to arrival. Please contact Hospital Medicine at [phone] or [phone] (please do NOT leave a voicemail) when patient arrives to the floor.    Esther Lu MD  11/18/2019 2:44 PM  Department of Hospital medicine

## 2019-11-18 NOTE — PLAN OF CARE
SW met with patient to discuss discharge plan of care for SNF, however, pt was unable to verbally answer any of SW questions. Pt often did not make eye contact with SW and only nodded yes to some of the questions that were asked.    SW contacted pt's niece Garrick Cuevas ([phone]) to discuss discharge plan of care for SNF. Garrick states that she would like a referral sent to [facility]. SW asked for additional choices. Garrick refused stating that [facility] will accept patient because he has been there in the past.      11/18/19 1526   Post-Acute Status   Post-Acute Authorization Placement   Post-Acute Placement Status Referrals Sent     Alejandra Martinez LMSW  [facility]

## 2019-11-18 NOTE — PLAN OF CARE
POC reviewed with pt at 1700. Pt verbalized understanding. Patient being stepped down to floor today, awaiting bed. Questions and concerns addressed. Condom cath in place and pt had adequate urine output this shift. Dental soft diet initiated today and patient tolerating well. No acute events today. Pt progressing toward goals. Will continue to monitor. See flowsheets for full assessment and VS info.

## 2019-11-18 NOTE — PLAN OF CARE
Problem: SLP Goal  Goal: SLP Goal  Description  Speech Language Pathology Goals  Goals to be met by (11/25)  1. Pt will tolerate dental soft diet with thin liquids without overt s/s of aspiration or airway compromise.   2. Pt will participate in ongoing assessment of swallow to determine least restrictive diet.  3. Pt will participate in speech/lang/cog evaluation to determine future therapeutic plan.      Pt seen this am by ST for bedside swallow evaluation. Continue ST per POC. See note for details.   Outcome: Ongoing, Progressing

## 2019-11-18 NOTE — PROGRESS NOTES
[facility]  Neurocritical Care  Progress Note    Admit Date: 11/11/2019  Service Date: 11/18/2019  Length of Stay: 7    Subjective:     Chief Complaint: Altered mental status    History of Present Illness: Mr Lopez is a 69 yr old male with a PMH of CVA, seizures, CHI, DM II, dementia, HTN, who presents to the ED with AMS. Patient was last heard from by a friend on Friday 11/8 (3 days ago). Found down at home 11/11, incontinent of urine and bowel. CTH negative. MRI c-spine & brain w/o acute findings. Mild leukocytosis, UA positive for UTI. cEEG suppressed. Patient intubated in the ED for airway protection. Admitted to [facility] for higher level of care.     Hospital Course: 11/11/2019: Intubated in ED. Admit to [facility].  11/12/2019: Switched Ceftriaxone to Zosyn, rechecking BCx, IVF @ 150cc/hr w/ 1L bolus; Tube feeds & dvt ppx initiated. EEG suppressed, propofol disctoninued & fentanyl 50mcg q2hr added.  11/13/2019: Steadily improving since presentation. Continue Keppra & Abx, added 200cc q6 FW to feeds, & dc'd vent.  11/14/2019: mental status continues to improve. Following commands in all 4 ext. FW 200cc q6hr; Extubate today; pending sensitivities of cx's; added ASA/statin  11/15/2019: Mental status improved; secretions persistent. De-escalated Abx to ceftriaxone given susceptibilities; added breathing tx q4. NPO in AM for possible extubation.  11/16/19: start glycopyrrolate for secretions  11/17/19: plan to extubate  11/18/2019 NAONE. AF and HDS. Able to stepdown to Hospital Medicine    Interval History: NAONE. AF and HDS. Able to stepdown to Hospital Medicine    Review of Systems     Unable to obtain a complete ROS due to intermittent confsion  .  Objective:     Vitals:  Temp: 98.8 °F (37.1 °C)  Pulse: 74  Rhythm: normal sinus rhythm  BP: (!) 173/93  MAP (mmHg): 125  Resp: 20  SpO2: 100 %  Oxygen Concentration (%): 24  O2 Device (Oxygen Therapy): room air    Temp  Min: 98.8 °F (37.1 °C)  Max: 100.6 °F  (38.1 °C)  Pulse  Min: 51  Max: 84  BP  Min: 123/71  Max: 183/89  MAP (mmHg)  Min: 91  Max: 130  Resp  Min: 15  Max: 27  SpO2  Min: 95 %  Max: 100 %  Oxygen Concentration (%)  Min: 24  Max: 24    11/17 0701 - 11/18 0700  In: 2305 [I.V.:1920]  Out: 2240 [Urine:2240]   Unmeasured Output  Urine Occurrence: 1  Stool Occurrence: 1       Physical Exam    GA: Alert, comfortable, no acute distress.   HEENT: No scleral icterus or JVD.   Pulmonary: Clear to auscultation A/L. No wheezing, crackles, or rhonchi.  Cardiac: RRR S1 & S2 w/o rubs/murmurs/gallops.   Abdominal: Bowel sounds present x 4. No appreciable hepatosplenomegaly.  Skin: No jaundice, rashes, or visible lesions.  Neuro:  --GCS: E4 V5 M6  --Mental Status:  Awake, tracks, follows simple commands  --Pupils 3mm, PERRL.   --Corneal reflex, gag, cough intact.  --LUE spont  --RUE spont  --LLE spont  --RLE spont, but weaker than left    Medications:  Continuous    lactated ringers Last Rate: 75 mL/hr at 11/18/19 1405   Scheduled    albuterol-ipratropium 3 mL Q4H   [START ON 11/19/2019] aspirin 81 mg Daily   atorvastatin 40 mg QHS   cefTRIAXone (ROCEPHIN) IVPB 2 g Q24H   clopidogrel 75 mg Daily   heparin (porcine) 5,000 Units Q8H   levETIRAcetam 500 mg BID   losartan 25 mg Daily   senna-docusate 8.6-50 mg 1 tablet BID   sodium chloride 3% 4 mL Q4H   PRN    acetaminophen 650 mg Q4H PRN   fentaNYL 50 mcg Q2H PRN   glucagon (human recombinant) 1 mg PRN   insulin aspart U-100 1-10 Units Q4H PRN   magnesium oxide 800 mg PRN   magnesium oxide 800 mg PRN   ondansetron 4 mg Q6H PRN   potassium chloride 10% 40 mEq PRN   potassium chloride 10% 40 mEq PRN   potassium chloride 10% 40 mEq PRN   potassium, sodium phosphates 2 packet PRN   potassium, sodium phosphates 2 packet PRN   potassium, sodium phosphates 2 packet PRN   sodium chloride 0.9% 10 mL PRN     Today I personally reviewed pertinent medications, lines/drains/airways, imaging, laboratory results,     Diet  Diet Dysphagia  Soft (IDDSI Level 6)      Assessment/Plan:     Neuro  * Altered mental status  Hx of frequent falls, UTI  Patient found down at home, incontinent of urine/feces 11/11  LKN 11/8  CTH negative    -q1 neuro checks, q1 vital signs  -MRI brain and c-spine wo contrast unremarkable  -cEEG negative; d/c'd  -BCx w/ GNR; pending rpt BCx  -UA concerning for infectious etiology,Ucx w/ Ecoli; BCx w/ Proteus   -PT/OT/SLP  -Monitor for Fever curve  - ceftriaxone    History of closed head injury  Hx of    History of CVA in adulthood  H/o    Restart home plavix    Seizure disorder  Hx of Seizures, CHI  -Loaded with Keppra in the ED  -cEEG negative; dc'd  - dc'd propofol  - Keppra 1g BID. Continue on this dose until able to f/u with Neurology outpatient  - Clinically improving; stable.    Pulmonary  On mechanically assisted ventilation  Intubated in ED for airway protection  -Famotidine, Peridex ordered  - 11/16 Glycopyrrolate for secretions   11/17: weaning parameters  Extubated. Resp stable on RA    Cardiac/Vascular  Hypertension  SBP Goal < 180  Restart home ARB    Renal/  UTI (urinary tract infection)  Hx of UTIs, previous urine culture pos. for klebsiella  UA with many bacteria, UCx w/ Ecoli; BCx w/ Proteus  Fever curve improving  -continue rocephin      ID  Bacteremia  BCx w/ Proteus; pending susceptibilities  Pending rpt cx; leukocytosis improving; Afebrile  Pending cx sensitivities  Zosyn-> ceftriaxone now; both susc to ceftriaxone  Monitor Cx's & Fever curve    Oncology  Leukocytosis  -resolved    Endocrine  Restricted diet  Start Dental soft diet  ADAT    Type 2 diabetes mellitus  Hgb A1c 6.4  SSI w/ accuchecks              Activity Orders          Diet Dysphagia Soft (IDDSI Level 6): Dysphagia 3 (Mechanical Soft Chopped) starting at 11/18 1157    Commode at bedside starting at 11/11 1459        Full Code    Armando Ortiz MD  Neurocritical Care  [facility]

## 2019-11-19 LAB
ALBUMIN SERPL BCP-MCNC: 2.7 G/DL (ref 3.5–5.2)
ALP SERPL-CCNC: 65 U/L (ref 55–135)
ALT SERPL W/O P-5'-P-CCNC: 29 U/L (ref 10–44)
ANION GAP SERPL CALC-SCNC: 9 MMOL/L (ref 8–16)
AST SERPL-CCNC: 33 U/L (ref 10–40)
BASOPHILS # BLD AUTO: 0.04 K/UL (ref 0–0.2)
BASOPHILS NFR BLD: 0.4 % (ref 0–1.9)
BILIRUB SERPL-MCNC: 0.6 MG/DL (ref 0.1–1)
BILIRUB UR QL STRIP: NEGATIVE
BILIRUB UR QL STRIP: NEGATIVE
BUN SERPL-MCNC: 7 MG/DL (ref 8–23)
CALCIUM SERPL-MCNC: 8.5 MG/DL (ref 8.7–10.5)
CHLORIDE SERPL-SCNC: 104 MMOL/L (ref 95–110)
CLARITY UR REFRACT.AUTO: CLEAR
CLARITY UR REFRACT.AUTO: CLEAR
CO2 SERPL-SCNC: 24 MMOL/L (ref 23–29)
COLOR UR AUTO: YELLOW
COLOR UR AUTO: YELLOW
CREAT SERPL-MCNC: 0.8 MG/DL (ref 0.5–1.4)
DIFFERENTIAL METHOD: ABNORMAL
EOSINOPHIL # BLD AUTO: 0.1 K/UL (ref 0–0.5)
EOSINOPHIL NFR BLD: 1.2 % (ref 0–8)
ERYTHROCYTE [DISTWIDTH] IN BLOOD BY AUTOMATED COUNT: 13.2 % (ref 11.5–14.5)
EST. GFR  (AFRICAN AMERICAN): >60 ML/MIN/1.73 M^2
EST. GFR  (NON AFRICAN AMERICAN): >60 ML/MIN/1.73 M^2
GLUCOSE SERPL-MCNC: 107 MG/DL (ref 70–110)
GLUCOSE UR QL STRIP: NEGATIVE
GLUCOSE UR QL STRIP: NEGATIVE
HCT VFR BLD AUTO: 36.6 % (ref 40–54)
HGB BLD-MCNC: 11 G/DL (ref 14–18)
HGB UR QL STRIP: NEGATIVE
HGB UR QL STRIP: NEGATIVE
IMM GRANULOCYTES # BLD AUTO: 0.04 K/UL (ref 0–0.04)
IMM GRANULOCYTES NFR BLD AUTO: 0.4 % (ref 0–0.5)
KETONES UR QL STRIP: ABNORMAL
KETONES UR QL STRIP: NEGATIVE
LACTATE SERPL-SCNC: 0.9 MMOL/L (ref 0.5–2.2)
LEUKOCYTE ESTERASE UR QL STRIP: NEGATIVE
LEUKOCYTE ESTERASE UR QL STRIP: NEGATIVE
LYMPHOCYTES # BLD AUTO: 1.6 K/UL (ref 1–4.8)
LYMPHOCYTES NFR BLD: 15.9 % (ref 18–48)
MAGNESIUM SERPL-MCNC: 1.6 MG/DL (ref 1.6–2.6)
MCH RBC QN AUTO: 25.5 PG (ref 27–31)
MCHC RBC AUTO-ENTMCNC: 30.1 G/DL (ref 32–36)
MCV RBC AUTO: 85 FL (ref 82–98)
MONOCYTES # BLD AUTO: 0.9 K/UL (ref 0.3–1)
MONOCYTES NFR BLD: 9.5 % (ref 4–15)
NEUTROPHILS # BLD AUTO: 7.1 K/UL (ref 1.8–7.7)
NEUTROPHILS NFR BLD: 72.6 % (ref 38–73)
NITRITE UR QL STRIP: NEGATIVE
NITRITE UR QL STRIP: NEGATIVE
NRBC BLD-RTO: 0 /100 WBC
PH UR STRIP: 8 [PH] (ref 5–8)
PH UR STRIP: >8 [PH] (ref 5–8)
PHOSPHATE SERPL-MCNC: 2.5 MG/DL (ref 2.7–4.5)
PLATELET # BLD AUTO: 254 K/UL (ref 150–350)
PMV BLD AUTO: 10.3 FL (ref 9.2–12.9)
POCT GLUCOSE: 103 MG/DL (ref 70–110)
POCT GLUCOSE: 105 MG/DL (ref 70–110)
POCT GLUCOSE: 105 MG/DL (ref 70–110)
POCT GLUCOSE: 118 MG/DL (ref 70–110)
POCT GLUCOSE: 126 MG/DL (ref 70–110)
POCT GLUCOSE: 93 MG/DL (ref 70–110)
POCT GLUCOSE: 94 MG/DL (ref 70–110)
POTASSIUM SERPL-SCNC: 4.5 MMOL/L (ref 3.5–5.1)
PROT SERPL-MCNC: 6.6 G/DL (ref 6–8.4)
PROT UR QL STRIP: NEGATIVE
PROT UR QL STRIP: NEGATIVE
RBC # BLD AUTO: 4.31 M/UL (ref 4.6–6.2)
SODIUM SERPL-SCNC: 137 MMOL/L (ref 136–145)
SP GR UR STRIP: 1.01 (ref 1–1.03)
SP GR UR STRIP: 1.01 (ref 1–1.03)
URN SPEC COLLECT METH UR: ABNORMAL
URN SPEC COLLECT METH UR: ABNORMAL
WBC # BLD AUTO: 9.74 K/UL (ref 3.9–12.7)

## 2019-11-19 PROCEDURE — 99499 UNLISTED E&M SERVICE: CPT | Mod: ,,, | Performed by: PHYSICIAN ASSISTANT

## 2019-11-19 PROCEDURE — 92526 ORAL FUNCTION THERAPY: CPT

## 2019-11-19 PROCEDURE — 63600175 PHARM REV CODE 636 W HCPCS: Performed by: HOSPITALIST

## 2019-11-19 PROCEDURE — 63600175 PHARM REV CODE 636 W HCPCS: Performed by: NURSE PRACTITIONER

## 2019-11-19 PROCEDURE — 25000003 PHARM REV CODE 250: Performed by: PSYCHIATRY & NEUROLOGY

## 2019-11-19 PROCEDURE — 63600175 PHARM REV CODE 636 W HCPCS: Performed by: STUDENT IN AN ORGANIZED HEALTH CARE EDUCATION/TRAINING PROGRAM

## 2019-11-19 PROCEDURE — 25000003 PHARM REV CODE 250: Performed by: STUDENT IN AN ORGANIZED HEALTH CARE EDUCATION/TRAINING PROGRAM

## 2019-11-19 PROCEDURE — 95813 PR EEG, EXTENDED, 61-119 MINS: ICD-10-PCS | Mod: 26,,, | Performed by: PSYCHIATRY & NEUROLOGY

## 2019-11-19 PROCEDURE — 25000242 PHARM REV CODE 250 ALT 637 W/ HCPCS: Performed by: NURSE PRACTITIONER

## 2019-11-19 PROCEDURE — 99223 PR INITIAL HOSPITAL CARE,LEVL III: ICD-10-PCS | Mod: ,,, | Performed by: INTERNAL MEDICINE

## 2019-11-19 PROCEDURE — 84100 ASSAY OF PHOSPHORUS: CPT

## 2019-11-19 PROCEDURE — 99499 NO LOS: ICD-10-PCS | Mod: ,,, | Performed by: PHYSICIAN ASSISTANT

## 2019-11-19 PROCEDURE — 99233 SBSQ HOSP IP/OBS HIGH 50: CPT | Mod: ,,, | Performed by: HOSPITALIST

## 2019-11-19 PROCEDURE — 99233 PR SUBSEQUENT HOSPITAL CARE,LEVL III: ICD-10-PCS | Mod: ,,, | Performed by: HOSPITALIST

## 2019-11-19 PROCEDURE — 81003 URINALYSIS AUTO W/O SCOPE: CPT

## 2019-11-19 PROCEDURE — 87070 CULTURE OTHR SPECIMN AEROBIC: CPT

## 2019-11-19 PROCEDURE — 80053 COMPREHEN METABOLIC PANEL: CPT

## 2019-11-19 PROCEDURE — 83605 ASSAY OF LACTIC ACID: CPT

## 2019-11-19 PROCEDURE — 87040 BLOOD CULTURE FOR BACTERIA: CPT

## 2019-11-19 PROCEDURE — 83735 ASSAY OF MAGNESIUM: CPT

## 2019-11-19 PROCEDURE — 25000242 PHARM REV CODE 250 ALT 637 W/ HCPCS: Performed by: PSYCHIATRY & NEUROLOGY

## 2019-11-19 PROCEDURE — 94761 N-INVAS EAR/PLS OXIMETRY MLT: CPT

## 2019-11-19 PROCEDURE — 94640 AIRWAY INHALATION TREATMENT: CPT

## 2019-11-19 PROCEDURE — 85025 COMPLETE CBC W/AUTO DIFF WBC: CPT

## 2019-11-19 PROCEDURE — 11000001 HC ACUTE MED/SURG PRIVATE ROOM

## 2019-11-19 PROCEDURE — 25000003 PHARM REV CODE 250: Performed by: HOSPITALIST

## 2019-11-19 PROCEDURE — 95813 EEG EXTND MNTR 61-119 MIN: CPT | Mod: 26,,, | Performed by: PSYCHIATRY & NEUROLOGY

## 2019-11-19 PROCEDURE — 92523 SPEECH SOUND LANG COMPREHEN: CPT

## 2019-11-19 PROCEDURE — 87205 SMEAR GRAM STAIN: CPT

## 2019-11-19 PROCEDURE — 95813 EEG EXTND MNTR 61-119 MIN: CPT

## 2019-11-19 PROCEDURE — 80177 DRUG SCRN QUAN LEVETIRACETAM: CPT

## 2019-11-19 PROCEDURE — 99223 1ST HOSP IP/OBS HIGH 75: CPT | Mod: ,,, | Performed by: INTERNAL MEDICINE

## 2019-11-19 RX ORDER — LEVETIRACETAM 500 MG/1
1000 TABLET ORAL 2 TIMES DAILY
Status: DISCONTINUED | OUTPATIENT
Start: 2019-11-19 | End: 2019-12-02 | Stop reason: HOSPADM

## 2019-11-19 RX ORDER — SODIUM,POTASSIUM PHOSPHATES 280-250MG
1 POWDER IN PACKET (EA) ORAL ONCE
Status: COMPLETED | OUTPATIENT
Start: 2019-11-19 | End: 2019-11-19

## 2019-11-19 RX ORDER — VANCOMYCIN HCL IN 5 % DEXTROSE 1G/250ML
15 PLASTIC BAG, INJECTION (ML) INTRAVENOUS
Status: DISCONTINUED | OUTPATIENT
Start: 2019-11-19 | End: 2019-11-20

## 2019-11-19 RX ORDER — IBUPROFEN 400 MG/1
400 TABLET ORAL EVERY 6 HOURS PRN
Status: DISCONTINUED | OUTPATIENT
Start: 2019-11-19 | End: 2019-11-25

## 2019-11-19 RX ADMIN — VANCOMYCIN HYDROCHLORIDE 1000 MG: 1 INJECTION, POWDER, LYOPHILIZED, FOR SOLUTION INTRAVENOUS at 03:11

## 2019-11-19 RX ADMIN — SODIUM CHLORIDE, SODIUM LACTATE, POTASSIUM CHLORIDE, AND CALCIUM CHLORIDE: .6; .31; .03; .02 INJECTION, SOLUTION INTRAVENOUS at 09:11

## 2019-11-19 RX ADMIN — LEVETIRACETAM 500 MG: 500 TABLET ORAL at 09:11

## 2019-11-19 RX ADMIN — ASPIRIN 81 MG CHEWABLE TABLET 81 MG: 81 TABLET CHEWABLE at 09:11

## 2019-11-19 RX ADMIN — LEVETIRACETAM 1000 MG: 500 TABLET ORAL at 09:11

## 2019-11-19 RX ADMIN — POTASSIUM & SODIUM PHOSPHATES POWDER PACK 280-160-250 MG 1 PACKET: 280-160-250 PACK at 12:11

## 2019-11-19 RX ADMIN — ATORVASTATIN CALCIUM 40 MG: 20 TABLET, FILM COATED ORAL at 09:11

## 2019-11-19 RX ADMIN — HEPARIN SODIUM 5000 UNITS: 5000 INJECTION, SOLUTION INTRAVENOUS; SUBCUTANEOUS at 05:11

## 2019-11-19 RX ADMIN — IPRATROPIUM BROMIDE AND ALBUTEROL SULFATE 3 ML: .5; 3 SOLUTION RESPIRATORY (INHALATION) at 07:11

## 2019-11-19 RX ADMIN — IBUPROFEN 400 MG: 400 TABLET, FILM COATED ORAL at 02:11

## 2019-11-19 RX ADMIN — SODIUM CHLORIDE SOLN NEBU 3% 4 ML: 3 NEBU SOLN at 04:11

## 2019-11-19 RX ADMIN — ERTAPENEM 1 G: 1 INJECTION INTRAMUSCULAR; INTRAVENOUS at 01:11

## 2019-11-19 RX ADMIN — IPRATROPIUM BROMIDE AND ALBUTEROL SULFATE 3 ML: .5; 3 SOLUTION RESPIRATORY (INHALATION) at 12:11

## 2019-11-19 RX ADMIN — IPRATROPIUM BROMIDE AND ALBUTEROL SULFATE 3 ML: .5; 3 SOLUTION RESPIRATORY (INHALATION) at 04:11

## 2019-11-19 RX ADMIN — HEPARIN SODIUM 5000 UNITS: 5000 INJECTION, SOLUTION INTRAVENOUS; SUBCUTANEOUS at 09:11

## 2019-11-19 RX ADMIN — SODIUM CHLORIDE SOLN NEBU 3% 4 ML: 3 NEBU SOLN at 11:11

## 2019-11-19 RX ADMIN — SENNOSIDES AND DOCUSATE SODIUM 1 TABLET: 8.6; 5 TABLET ORAL at 09:11

## 2019-11-19 RX ADMIN — HEPARIN SODIUM 5000 UNITS: 5000 INJECTION, SOLUTION INTRAVENOUS; SUBCUTANEOUS at 01:11

## 2019-11-19 RX ADMIN — IPRATROPIUM BROMIDE AND ALBUTEROL SULFATE 3 ML: .5; 3 SOLUTION RESPIRATORY (INHALATION) at 11:11

## 2019-11-19 RX ADMIN — SODIUM CHLORIDE SOLN NEBU 3% 4 ML: 3 NEBU SOLN at 08:11

## 2019-11-19 RX ADMIN — SODIUM CHLORIDE, SODIUM LACTATE, POTASSIUM CHLORIDE, AND CALCIUM CHLORIDE: .6; .31; .03; .02 INJECTION, SOLUTION INTRAVENOUS at 05:11

## 2019-11-19 RX ADMIN — CLOPIDOGREL BISULFATE 75 MG: 75 TABLET, FILM COATED ORAL at 09:11

## 2019-11-19 RX ADMIN — LOSARTAN POTASSIUM 25 MG: 25 TABLET, FILM COATED ORAL at 09:11

## 2019-11-19 RX ADMIN — ACETAMINOPHEN 650 MG: 325 TABLET ORAL at 12:11

## 2019-11-19 RX ADMIN — SODIUM CHLORIDE SOLN NEBU 3% 4 ML: 3 NEBU SOLN at 12:11

## 2019-11-19 NOTE — PLAN OF CARE
Problem: SLP Goal  Goal: SLP Goal  Description  Speech Language Pathology Goals  Goals to be met by (11/25)  1. Pt will tolerate dental soft diet with thin liquids without overt s/s of aspiration or airway compromise.   2. Pt will participate in ongoing assessment of swallow to determine least restrictive diet.  3. Pt will participate in speech/lang/cog evaluation to determine future therapeutic plan. - met  4. The pt will orient x4.  5. The pt will participate in rote verbal tasks with 75% acc given min cues.  6. Pt will answer simple YNQs with 50% acc given mod cues.  7. Pt will participate in confrontation and categorical naming tasks with 50% acc given mod cues.  8. Pt will participate in 1-2 step commands with 75% acc given mod cues.   9. Pt will participate in ongoing assessment of reading, writing, and visuospatial skills.      Pt seen this am by ST to complete speech/language/cognitive evaluation. Appropriate goals developed. Continue ST per POC.   Outcome: Ongoing, Progressing

## 2019-11-19 NOTE — SUBJECTIVE & OBJECTIVE
Past Medical History:   Diagnosis Date    CHI (closed head injury)     Dementia     Diabetes mellitus     Hyperlipidemia     Seizures        History reviewed. No pertinent surgical history.    Review of patient's allergies indicates:  No Known Allergies    Medications:  Medications Prior to Admission   Medication Sig    clopidogrel (PLAVIX) 75 mg tablet Take 75 mg by mouth once daily.    aspirin (ECOTRIN) 81 MG EC tablet Take 81 mg by mouth once daily.    atorvastatin (LIPITOR) 40 MG tablet Take 1 tablet (40 mg total) by mouth once daily.    b complex vitamins tablet Take 1 tablet by mouth once daily.    levETIRAcetam (KEPPRA) 1000 MG tablet Take 1.5 tablets (1,500 mg total) by mouth 2 (two) times daily.    losartan (COZAAR) 25 MG tablet Take 25 mg by mouth once daily.    metFORMIN (GLUCOPHAGE-XR) 500 MG 24 hr tablet Take 500 mg by mouth 2 (two) times daily with meals.     Antibiotics (From admission, onward)    Start     Stop Route Frequency Ordered    11/19/19 1515  vancomycin in dextrose 5 % 1 gram/250 mL IVPB 1,000 mg  (vancomycin IVPB)      -- IV Every 12 hours (non-standard times) 11/19/19 1408    11/19/19 1315  ertapenem (INVANZ) 1 g in sodium chloride 0.9 % 100 mL IVPB (ready to mix system)      -- IV Every 24 hours (non-standard times) 11/19/19 1201        Antifungals (From admission, onward)    None        Antivirals (From admission, onward)    None           Immunization History   Administered Date(s) Administered    PPD Test 08/30/2019       Family History     None        Social History     Socioeconomic History    Marital status:      Spouse name: Not on file    Number of children: Not on file    Years of education: Not on file    Highest education level: Not on file   Occupational History    Not on file   Social Needs    Financial resource strain: Not on file    Food insecurity:     Worry: Not on file     Inability: Not on file    Transportation needs:     Medical: Not on  file     Non-medical: Not on file   Tobacco Use    Smoking status: Never Smoker    Smokeless tobacco: Never Used   Substance and Sexual Activity    Alcohol use: No     Frequency: Never    Drug use: No    Sexual activity: Never   Lifestyle    Physical activity:     Days per week: Not on file     Minutes per session: Not on file    Stress: Not on file   Relationships    Social connections:     Talks on phone: Not on file     Gets together: Not on file     Attends Taoist service: Not on file     Active member of club or organization: Not on file     Attends meetings of clubs or organizations: Not on file     Relationship status: Not on file   Other Topics Concern    Not on file   Social History Narrative    Not on file     Review of Systems   Unable to perform ROS: Dementia   Constitutional: Positive for activity change and fever.   Respiratory: Positive for cough. Negative for shortness of breath.    Gastrointestinal: Negative for vomiting.   Genitourinary:        +bess     Objective:     Vital Signs (Most Recent):  Temp: (!) 100.4 °F (38 °C) (11/19/19 1544)  Pulse: 76 (11/19/19 1544)  Resp: 14 (11/19/19 1544)  BP: (!) 165/87 (11/19/19 1544)  SpO2: 98 % (11/19/19 1544) Vital Signs (24h Range):  Temp:  [99.1 °F (37.3 °C)-102.2 °F (39 °C)] 100.4 °F (38 °C)  Pulse:  [73-99] 76  Resp:  [14-28] 14  SpO2:  [94 %-100 %] 98 %  BP: (133-180)/(81-99) 165/87     Weight: 64 kg (141 lb)  Body mass index is 22.76 kg/m².    Estimated Creatinine Clearance: 78.6 mL/min (based on SCr of 0.8 mg/dL).    Physical Exam   Constitutional: He appears well-developed and well-nourished. No distress.   HENT:   Head: Normocephalic.   Poor dentition   Cardiovascular: Normal rate and regular rhythm.   Murmur heard.  Pulmonary/Chest: Effort normal. No stridor. He has no wheezes.   Abdominal: Soft. He exhibits no distension. There is no tenderness.   Musculoskeletal: Normal range of motion.   Neurological:   Alert and oriented to person  and place   Skin: Skin is warm and dry. No rash noted. He is not diaphoretic. No erythema.   Vitals reviewed.      Significant Labs: All pertinent labs within the past 24 hours have been reviewed.    Significant Imaging: I have reviewed all pertinent imaging results/findings within the past 24 hours.

## 2019-11-19 NOTE — PLAN OF CARE
Problem: Feeding Intolerance (Enteral Nutrition)  Goal: Feeding Tolerance  Outcome: Met   Recommendations     Recommendation: 1.Continue on IDDSI level 6 diet. 2. If tolerating current diet, recommend advancing to easy to chew foods in IDDSI level 7 diet per SLP instructions. 3. Add boost TID with meals.  Goals: tolerate consuming >50% of meals by next RD follow-up  Nutrition Goal Status: new  Communication of RD Recs: other (comment)(POC)

## 2019-11-19 NOTE — CONSULTS
[facility]  Infectious Disease  Consult Note    Patient Name: Edwin Lopez Jr.  MRN: [id number]  Admission Date: 11/11/2019  Hospital Length of Stay: 8 days  Attending Physician: Esther uL MD  Primary Care Provider: Robina Burroughs NP     Isolation Status: No active isolations    Patient information was obtained from past medical records and ER records.      Inpatient consult to Infectious Diseases  Consult performed by: Chriss Rice PA-C  Consult ordered by: Esther Lu MD        Assessment/Plan:     Acute encephalopathy  68 y/o male with CVA, seizures, DMII, dementia, HTN presents to ED with AMS. Per chart review, pt was found down at home 11/11 incontinent with urine and bowel. CK levels elevated. tox screen negative. Lactate negative.  CT negative, MRI C spine, brain no acute processes. He was noted to have UTI and blood cultures +coag negative staph and proteus. He was admitted to ICU, intubated for airway protection.  His mental status improved and he was extubated on 11/17 and transferred to the floors.    He was noted to be febrile tmax 101.5 overnight. He appeared drowsy but easily arousal. Repeat cultures have been NGTD. He is now on ertapenem.     Recommendations:   1. Agree with ertapenem. Vancomycin added per primary team.  2. CXR without any consolidations. Agree with speech evaluation as concerns for possible aspirational event  3. U/s kidneys for further evaluation. Source of bacteremia unknown at this time  4. repeat blood cultures today are pending. Will follow closely         Thank you for your consult. I will follow-up with patient. Please contact us if you have any additional questions.    Chriss Rice PA-C  Infectious Disease  [facility]  [phone]    Subjective:     Principal Problem: Altered mental status    HPI: 68 y/o male with CVA, seizures, DMII, dementia, HTN presents to ED with AMS. Per chart review, pt was found down at home  11/11 incontinent with urine and bowel. CTS negative, MRI C spine, brain no acute processes. He was noted to have UTI and blood cultures +coag negative staph and proteus. He was admitted to ICU, intubated for airway protection. His abx was switched to ceftriaxone from vancomycin and zosyn. His repeat blood cultures remain NGTD. His mental status improved and he was extubated on 11/17 and transferred to the floors.     He was noted to be febrile tmax 101.5 overnight. He appeared drowsy but easily arousal. He had repeat imaging studies as concerns for worsening encephalopathy. Repeat cultures have been NGTD. He is now on ertapenem.     He lives alone. Denies alcohol use. No IVDU. Has niece that looks after him. Reports having falls at home due to weakness of his legs.     He was seen in august for similar symptoms and was noted to have UTI and E.faecalis bacteremia. He was treated for 14 days of ampicillin and 3 days of ciprofloxacin for his UTI.     Past Medical History:   Diagnosis Date    CHI (closed head injury)     Dementia     Diabetes mellitus     Hyperlipidemia     Seizures        History reviewed. No pertinent surgical history.    Review of patient's allergies indicates:  No Known Allergies    Medications:  Medications Prior to Admission   Medication Sig    clopidogrel (PLAVIX) 75 mg tablet Take 75 mg by mouth once daily.    aspirin (ECOTRIN) 81 MG EC tablet Take 81 mg by mouth once daily.    atorvastatin (LIPITOR) 40 MG tablet Take 1 tablet (40 mg total) by mouth once daily.    b complex vitamins tablet Take 1 tablet by mouth once daily.    levETIRAcetam (KEPPRA) 1000 MG tablet Take 1.5 tablets (1,500 mg total) by mouth 2 (two) times daily.    losartan (COZAAR) 25 MG tablet Take 25 mg by mouth once daily.    metFORMIN (GLUCOPHAGE-XR) 500 MG 24 hr tablet Take 500 mg by mouth 2 (two) times daily with meals.     Antibiotics (From admission, onward)    Start     Stop Route Frequency Ordered     11/19/19 1515  vancomycin in dextrose 5 % 1 gram/250 mL IVPB 1,000 mg  (vancomycin IVPB)      -- IV Every 12 hours (non-standard times) 11/19/19 1408    11/19/19 1315  ertapenem (INVANZ) 1 g in sodium chloride 0.9 % 100 mL IVPB (ready to mix system)      -- IV Every 24 hours (non-standard times) 11/19/19 1201        Antifungals (From admission, onward)    None        Antivirals (From admission, onward)    None           Immunization History   Administered Date(s) Administered    PPD Test 08/30/2019       Family History     None        Social History     Socioeconomic History    Marital status:      Spouse name: Not on file    Number of children: Not on file    Years of education: Not on file    Highest education level: Not on file   Occupational History    Not on file   Social Needs    Financial resource strain: Not on file    Food insecurity:     Worry: Not on file     Inability: Not on file    Transportation needs:     Medical: Not on file     Non-medical: Not on file   Tobacco Use    Smoking status: Never Smoker    Smokeless tobacco: Never Used   Substance and Sexual Activity    Alcohol use: No     Frequency: Never    Drug use: No    Sexual activity: Never   Lifestyle    Physical activity:     Days per week: Not on file     Minutes per session: Not on file    Stress: Not on file   Relationships    Social connections:     Talks on phone: Not on file     Gets together: Not on file     Attends [employer] service: Not on file     Active member of club or organization: Not on file     Attends meetings of clubs or organizations: Not on file     Relationship status: Not on file   Other Topics Concern    Not on file   Social History Narrative    Not on file     Review of Systems   Unable to perform ROS: Dementia   Constitutional: Positive for activity change and fever.   Respiratory: Positive for cough. Negative for shortness of breath.    Gastrointestinal: Negative for vomiting.   Genitourinary:         +maria alejandra     Objective:     Vital Signs (Most Recent):  Temp: (!) 100.4 °F (38 °C) (11/19/19 1544)  Pulse: 76 (11/19/19 1544)  Resp: 14 (11/19/19 1544)  BP: (!) 165/87 (11/19/19 1544)  SpO2: 98 % (11/19/19 1544) Vital Signs (24h Range):  Temp:  [99.1 °F (37.3 °C)-102.2 °F (39 °C)] 100.4 °F (38 °C)  Pulse:  [73-99] 76  Resp:  [14-28] 14  SpO2:  [94 %-100 %] 98 %  BP: (133-180)/(81-99) 165/87     Weight: 64 kg (141 lb)  Body mass index is 22.76 kg/m².    Estimated Creatinine Clearance: 78.6 mL/min (based on SCr of 0.8 mg/dL).    Physical Exam   Constitutional: He appears well-developed and well-nourished. No distress.   HENT:   Head: Normocephalic.   Poor dentition   Cardiovascular: Normal rate and regular rhythm.   Murmur heard.  Pulmonary/Chest: Effort normal. No stridor. He has no wheezes.   Abdominal: Soft. He exhibits no distension. There is no tenderness.   Musculoskeletal: Normal range of motion.   Neurological:   Alert and oriented to person and place   Skin: Skin is warm and dry. No rash noted. He is not diaphoretic. No erythema.   Vitals reviewed.      Significant Labs: All pertinent labs within the past 24 hours have been reviewed.    Significant Imaging: I have reviewed all pertinent imaging results/findings within the past 24 hours.

## 2019-11-19 NOTE — HPI
68 y/o male with CVA, seizures, DMII, dementia, HTN presents to ED with AMS. Per chart review, pt was found down at home 11/11 incontinent with urine and bowel. CTS negative, MRI C spine, brain no acute processes. He was noted to have UTI and blood cultures +coag negative staph and proteus. He was admitted to ICU, intubated for airway protection. His abx was switched to ceftriaxone from vancomycin and zosyn. His repeat blood cultures remain NGTD. His mental status improved and he was extubated on 11/17 and transferred to the floors.     He was noted to be febrile tmax 101.5 overnight. He appeared drowsy but easily arousal. He had repeat imaging studies as concerns for worsening encephalopathy. Repeat cultures have been NGTD. He is now on ertapenem.     He lives alone. Denies alcohol use. No IVDU. Has niece that looks after him. Reports having falls at home due to weakness of his legs.     He was seen in august for similar symptoms and was noted to have UTI and E.faecalis bacteremia. He was treated for 14 days of ampicillin and 3 days of ciprofloxacin for his UTI.

## 2019-11-19 NOTE — NURSING
Placed patient on cooling blanket per Dr. Lu.  Patient also currently on EEG.  Will continue to monitor.

## 2019-11-19 NOTE — ASSESSMENT & PLAN NOTE
68 y/o male with CVA, seizures, DMII, dementia, HTN presents to ED with AMS. Per chart review, pt was found down at home 11/11 incontinent with urine and bowel. CK levels elevated. tox screen negative. Lactate negative.  CT negative, MRI C spine, brain no acute processes. He was noted to have UTI and blood cultures +coag negative staph and proteus. He was admitted to ICU, intubated for airway protection.  His mental status improved and he was extubated on 11/17 and transferred to the floors.    He was noted to be febrile tmax 101.5 overnight. He appeared drowsy but easily arousal. Repeat cultures have been NGTD. He is now on ertapenem.     Recommendations:   1. Agree with ertapenem. Vancomycin added per primary team.  2. CXR without any consolidations. Agree with speech evaluation as concerns for possible aspirational event  3. U/s kidneys for further evaluation. Source of bacteremia unknown at this time  4. repeat blood cultures today are pending. Will follow closely

## 2019-11-19 NOTE — NURSING
Patient's oral temperature now 100.5 without placing patient on cooling blanket.  Will hold off placing patient on cooling blanket at this time.  Will continue to monitor.

## 2019-11-19 NOTE — PROGRESS NOTES
Hospital Medicine   Progress note      Team: [facility] HOSP MED HARMAN Lu MD   Admit Date: 11/11/2019   Hospital Day: 8  NAI: 11/22/2019   Code status: Full Code   Principal Problem: Altered mental status     HPI: Mr Lopez is a 69 yr old male with a PMH of CVA, seizures, CHI, DM II, dementia, HTN, who presents to the ED with AMS. Patient was last heard from by a friend on Friday 11/8 (3 days ago). Found down at home 11/11, incontinent of urine and bowel. CTH negative. MRI c-spine, brain pending. Mild leukocytosis, UA positive for UTI. cEEG pending. Patient intubated in the ED for airway protection. Will admit to [facility] for higher level of care. Will need LP.    ICU Course:   11/11/2019: Intubated in ED. Admit to [facility].  11/12/2019: Switched Ceftriaxone to Zosyn, rechecking BCx, IVF @ 150cc/hr w/ 1L bolus; Tube feeds & dvt ppx initiated. EEG suppressed, propofol disctoninued & fentanyl 50mcg q2hr added.  11/13/2019: Steadily improving since presentation. Continue Keppra & Abx, added 200cc q6 FW to feeds, & dc'd vent.  11/14/2019: mental status continues to improve. Following commands in all 4 ext. FW 200cc q6hr; Extubate today; pending sensitivities of cx's; added ASA/statin  11/15/2019: Mental status improved; secretions persistent. De-escalated Abx to ceftriaxone given susceptibilities; added breathing tx q4. NPO in AM for possible extubation.  11/16/19: start glycopyrrolate for secretions  11/17: Extubated   11/18 transferred to hospital medicine     Interval hx:   Pt was seen and examined at bedside.  Overnight, patient noted to be febrile with T-max of 101.5°.  This morning, patient was seen by me for the 1st time.  He was drowsy but easily arousable.  Patient was oriented to place and person but not time or situation.  He was not very interactive and unable to answer most questions or follow simple commands.  Due to concern for worsening encephalopathy, CT head, blood cultures, UA with reflex cultures, CXR,  sputum culture, Keppra level, lactic acid was ordered.    ROS (Positive in Bold, otherwise negative)  Unable to obtain as patient is encephalopathic    PEx   Temp:  [98.8 °F (37.1 °C)-101.5 °F (38.6 °C)]   Pulse:  [71-99]   Resp:  [14-28]   BP: (133-180)/(81-99)   SpO2:  [94 %-100 %]   Arterial Line BP: (166)/(70)      I & O (Last 24H):     Intake/Output Summary (Last 24 hours) at 11/19/2019 1143  Last data filed at 11/19/2019 0600  Gross per 24 hour   Intake 1200 ml   Output 2430 ml   Net -1230 ml       General:  male  in no acute distress. Drowsy but easily arousable.  Able to track.. Resting in bed.  Not very interactive.  Not able to follow simple commands or give answers simple questions  HEENT: NCAT. PERRL. EOMI. Sclera Anicteric.  CVS: RRR. Normal S1 S2. No murmurs  Pulm: CTAB. Normal respiratory effort. No wheezes, rhonchi, or crackles.  Abdomen: Soft. Non-distended. No tenderness to palpation. No rebound or guarding. +BS.  Extremities: No edema. No cyanosis. Full ROM.  Neuro: Alert, oriented x 2, Spont mvt of all extremities with no focal deficits noted.    Recent Results (from the past 24 hour(s))   POCT glucose    Collection Time: 11/18/19 12:11 PM   Result Value Ref Range    POCT Glucose 96 70 - 110 mg/dL   POCT glucose    Collection Time: 11/18/19  4:02 PM   Result Value Ref Range    POCT Glucose 102 70 - 110 mg/dL   Blood culture    Collection Time: 11/18/19  9:42 PM   Result Value Ref Range    Blood Culture, Routine No Growth to date    Blood culture    Collection Time: 11/18/19  9:55 PM   Result Value Ref Range    Blood Culture, Routine No Growth to date    POCT glucose    Collection Time: 11/18/19 10:20 PM   Result Value Ref Range    POCT Glucose 101 70 - 110 mg/dL   Urinalysis, Reflex to Urine Culture Urine, Clean Catch    Collection Time: 11/18/19 11:05 PM   Result Value Ref Range    Specimen UA Urine, Unspecified     Color, UA Yellow Yellow, Straw, Elizabeth    Appearance, UA Clear  Clear    pH, UA >8.0 (A) 5.0 - 8.0    Specific Gravity, UA 1.010 1.005 - 1.030    Protein, UA Negative Negative    Glucose, UA Negative Negative    Ketones, UA Negative Negative    Bilirubin (UA) Negative Negative    Occult Blood UA Negative Negative    Nitrite, UA Negative Negative    Leukocytes, UA Negative Negative   POCT glucose    Collection Time: 11/19/19  2:00 AM   Result Value Ref Range    POCT Glucose 94 70 - 110 mg/dL   POCT glucose    Collection Time: 11/19/19  4:10 AM   Result Value Ref Range    POCT Glucose 105 70 - 110 mg/dL   Comprehensive metabolic panel    Collection Time: 11/19/19  5:47 AM   Result Value Ref Range    Sodium 137 136 - 145 mmol/L    Potassium 4.5 3.5 - 5.1 mmol/L    Chloride 104 95 - 110 mmol/L    CO2 24 23 - 29 mmol/L    Glucose 107 70 - 110 mg/dL    BUN, Bld 7 (L) 8 - 23 mg/dL    Creatinine 0.8 0.5 - 1.4 mg/dL    Calcium 8.5 (L) 8.7 - 10.5 mg/dL    Total Protein 6.6 6.0 - 8.4 g/dL    Albumin 2.7 (L) 3.5 - 5.2 g/dL    Total Bilirubin 0.6 0.1 - 1.0 mg/dL    Alkaline Phosphatase 65 55 - 135 U/L    AST 33 10 - 40 U/L    ALT 29 10 - 44 U/L    Anion Gap 9 8 - 16 mmol/L    eGFR if African American >60.0 >60 mL/min/1.73 m^2    eGFR if non African American >60.0 >60 mL/min/1.73 m^2   CBC auto differential    Collection Time: 11/19/19  5:47 AM   Result Value Ref Range    WBC 9.74 3.90 - 12.70 K/uL    RBC 4.31 (L) 4.60 - 6.20 M/uL    Hemoglobin 11.0 (L) 14.0 - 18.0 g/dL    Hematocrit 36.6 (L) 40.0 - 54.0 %    Mean Corpuscular Volume 85 82 - 98 fL    Mean Corpuscular Hemoglobin 25.5 (L) 27.0 - 31.0 pg    Mean Corpuscular Hemoglobin Conc 30.1 (L) 32.0 - 36.0 g/dL    RDW 13.2 11.5 - 14.5 %    Platelets 254 150 - 350 K/uL    MPV 10.3 9.2 - 12.9 fL    Immature Granulocytes 0.4 0.0 - 0.5 %    Gran # (ANC) 7.1 1.8 - 7.7 K/uL    Immature Grans (Abs) 0.04 0.00 - 0.04 K/uL    Lymph # 1.6 1.0 - 4.8 K/uL    Mono # 0.9 0.3 - 1.0 K/uL    Eos # 0.1 0.0 - 0.5 K/uL    Baso # 0.04 0.00 - 0.20 K/uL    nRBC 0 0  /100 WBC    Gran% 72.6 38.0 - 73.0 %    Lymph% 15.9 (L) 18.0 - 48.0 %    Mono% 9.5 4.0 - 15.0 %    Eosinophil% 1.2 0.0 - 8.0 %    Basophil% 0.4 0.0 - 1.9 %    Differential Method Automated    Magnesium    Collection Time: 11/19/19  5:47 AM   Result Value Ref Range    Magnesium 1.6 1.6 - 2.6 mg/dL   Phosphorus    Collection Time: 11/19/19  5:47 AM   Result Value Ref Range    Phosphorus 2.5 (L) 2.7 - 4.5 mg/dL   POCT glucose    Collection Time: 11/19/19  7:15 AM   Result Value Ref Range    POCT Glucose 93 70 - 110 mg/dL       Recent Labs   Lab 11/18/19  1211 11/18/19  1602 11/18/19  2220 11/19/19  0200 11/19/19  0410 11/19/19  0715   POCTGLUCOSE 96 102 101 94 105 93       Hemoglobin A1C   Date Value Ref Range Status   11/11/2019 6.4 (H) 4.0 - 5.6 % Final     Comment:     ADA Screening Guidelines:  5.7-6.4%  Consistent with prediabetes  >or=6.5%  Consistent with diabetes  High levels of fetal hemoglobin interfere with the HbA1C  assay. Heterozygous hemoglobin variants (HbS, HgC, etc)do  not significantly interfere with this assay.   However, presence of multiple variants may affect accuracy.     08/27/2019 6.4 (H) 4.0 - 5.6 % Final     Comment:     ADA Screening Guidelines:  5.7-6.4%  Consistent with prediabetes  >or=6.5%  Consistent with diabetes  High levels of fetal hemoglobin interfere with the HbA1C  assay. Heterozygous hemoglobin variants (HbS, HgC, etc)do  not significantly interfere with this assay.   However, presence of multiple variants may affect accuracy.          Active Hospital Problems    Diagnosis  POA    *Altered mental status [R41.82]  Yes    Restricted diet [Z71.3]  Not Applicable    Alteration in skin integrity [R23.9]  Yes    Acute encephalopathy [G93.40]  Unknown    Altered mental state [R41.82]  Yes    Leukocytosis [D72.829]  Yes    History of closed head injury [Z87.820]  Not Applicable    On mechanically assisted ventilation [Z99.11]  Not Applicable    UTI (urinary tract infection)  [N39.0]  Yes    Bacteremia [R78.81]  Yes    Hypertension [I10]  Yes    Type 2 diabetes mellitus [E11.9]  Yes    History of CVA in adulthood [Z86.73]  Not Applicable    Seizure disorder [G40.909]  Yes      Resolved Hospital Problems   No resolved problems to display.          Assessment and Plan for problems addressed today:      albuterol-ipratropium  3 mL Nebulization Q4H    aspirin  81 mg Oral Daily    atorvastatin  40 mg Oral QHS    cefTRIAXone (ROCEPHIN) IVPB  2 g Intravenous Q24H    clopidogrel  75 mg Oral Daily    heparin (porcine)  5,000 Units Subcutaneous Q8H    levETIRAcetam  500 mg Oral BID    losartan  25 mg Oral Daily    polyethylene glycol  17 g Oral Daily    potassium, sodium phosphates  1 packet Oral Once    senna-docusate 8.6-50 mg  1 tablet Oral BID    sodium chloride 3%  4 mL Nebulization Q4H     acetaminophen, albuterol-ipratropium, bisacodyl, Dextrose 10% Bolus, Dextrose 10% Bolus, glucagon (human recombinant), glucose, glucose, insulin aspart U-100, melatonin, ondansetron, promethazine (PHENERGAN) IVPB, sodium chloride 0.9%    Acute metabolic encephalopathy:  Septic encephalopathy:  -Patient with Hx of frequent falls and UTIs was found down at home, incontinent of urine/feces 11/11.  He was last seen at baseline mental status 11/8.  -patient was intubated for airway protection, successfully extubated on 11/17.  -CT head noted to be nonacute  -MRI brain and c-spine wo contrast unremarkable  -cEEG done in the ICU did not show acute epileptic activity  -patient was noted to have positive blood cultures with Proteus, positive urine cultures with ESBL E coli which was thought to be the reason for his acute encephalopathy.    -Patient was treated for UTI initially with Zosyn then with ceftriaxone and mentation improved.  Patient was extubated on 11/17 and transitioned to Hospital Medicine on 11/18  -however, on 11/19, patient was noted to be febrile, and more encephalopathic  -will  order EEG, stat head CT, repeat UA with culture, blood cultures, Keppra level, lactic acid  -continue neuro checks  -fall precautions, aspiration precautions, seizure precautions  -Continue correction of metabolic derangements  -Maintain Delirium precautions: Maintain regular sleep cycle. Early ambulation. Minimal interruptions overnight. Re-orient patient frequently. Maintain adequate bowel regimen, hydration and electrolyte replenishments. Hearing Aids and eye glasses as needed.     Seizure disorder  -patient is a history of seizures several years  -Loaded with Keppra in the ED  -cEEG negative in the ICU  -currently on  Keppra 1g BID. Continue on this dose until able to f/u with Neurology outpatient  -noted to be more encephalopathic and 11/19, repeat EEG ordered     ESBL UTI (urinary tract infection):  -Hx of UTIs, previous urine culture pos. for klebsiella  -UA with many bacteria, UCx w/ ESBL E coli  -patient is being treated with ceftriaxone however, continues to spike fevers.  Might need to switch to ertapenem.  -will consult Infectious Disease    -obtain retroperitoneal ultrasound     Proteus Bacteremia:  -BCx w/ Proteus mirabilis from 11/11.  -blood cultures from 11/12 onwards have shown no growth to date  -Patient was previously on Zosyn, transition to ceftriaxone based on sensitivity report  -however, patient was noted to be febrile again on 11/18.  Blood cultures were repeated.  -infectious Disease was consulted as patient may need to be on broader spectrum antibiotics    Diabetes Mellitus:  -Last HbA1c:  6.4  -SSI with accuchecks qACHS  -BG goal: Preprandial blood glucose target <140 mg/dL, Random glucoses <180 mg/dL  -ADA diet    Essential Hypertension:  -stable  -Continue PTA  losartan 25 mg daily  -monitor vitals q4h  -SBP goal of <160 in hospital    Hyperlipidemia:  -continue PTA atorvastatin 40 mg    History of CVA in adulthood  -continue Plavix, statin    DVT PPx:  Heparin    Discharge plan and  follow up: DC to SNF once medically stable     Esther Lu MD  Hospital Medicine Staff  [phone] pager

## 2019-11-19 NOTE — NURSING
Patient's temperature remains 102.2, physician notified.  Ice packs placed under arms and groin.  Ibuprofen will be administered once verified by pharmacist, per MD orders.  Also, cooling blanket ordered from [employer] at this time.  Will continue to monitor.

## 2019-11-19 NOTE — PT/OT/SLP EVAL
"Speech Language Pathology Evaluation  Cognitive Communication    Patient Name:  Edwin Lopez Jr.   MRN:  [id number]  Admitting Diagnosis: Altered mental status    Recommendations:     Recommendations:                General Recommendations:  Dysphagia therapy and Speech/language therapy  Diet recommendations:  Dental Soft, Thin   Aspiration Precautions: 1 bite/sip at a time, Assistance with meals, Eliminate distractions, Feed only when awake/alert, HOB to 90 degrees, Meds whole 1 at a time, Small bites/sips and Strict aspiration precautions   General Precautions: Standard, aspiration, seizure, fall  Communication strategies:  provide increased time to answer    History:     Past Medical History:   Diagnosis Date    CHI (closed head injury)     Dementia     Diabetes mellitus     Hyperlipidemia     Seizures        History reviewed. No pertinent surgical history.    Social History: Patient lives alone.    Prior Intubation HX:  Intubated from 11/11 -11/17    Modified Barium Swallow: None this admit     Chest X-Rays: Chest XR 11/18. Findings are as follows: "Previously present endotracheal and enteric tubes have been removed.  No significant detrimental interval change in the appearance of the chest since 11/17/2019 is appreciated."    Prior diet: Regular/thin (does not use upper dentures to eat)    Occupation/hobbies/homemaking: Retired . Pt unable to provide ST with personal hobbies.     **Above info obtained during bedside swallow evaluation 11/18    Subjective     Pt awake, lying down in bed. Pt displayed coughing at rest. Pt agreed to participate in evaluation and repositioned to upright position.     Pain/Comfort:  · Pain Rating 1: 0/10  · Pain Rating Post-Intervention 1: 0/10    Objective:   Cognitive Status:    Arousal/Alertness - appropriate response to stimuli  Attention - pt with intermittent inattention throughout evaluation   Orientation - Place, unable to orient to person, time, " situation   Problem Solving - Categories - 25% acc with given min cues with convergent naming and 0% acc given min cues when asked to name 1-3 members in a given category, Sequencing - 0% acc, and Compare/contrast - Unable to compare/constrast       Receptive Language:   Comprehension:      Questions - simple and complex yes/no with nodding yes to all questions   Commands  One step - 50% acc  two step basic commands - 0% acc    Pragmatics:    Unable to maintain eye contact, abnormal affect, and impaired maintenance of tasks     Expressive Language:  Verbal:    Automatic Speech  Counting - Unable to complete 1-10 or MAINE without max verbal modeling and Days of the week    Repetition Words - accurately repeated 4 word span and 6 digit span  and Phrases    Naming Confrontation - 80% acc with mod cues and Single word responsive naming - 60% acc with mod cues  Conversational speech - pt unable to engage in conversation with ST. Delayed initiation and processing evident. Pt with obvious word-finding difficulty and confusion.   Nonverbal:   Gestures - pt utilized nodding to indicate understanding, however accuracy of nods were questionable. No other gestures or facial expression used.       Motor Speech:  TBA    Voice:   Clear     Visual-Spatial:  TBA    Reading:   TBA     Written Expression:   TBA    Swallow Assessment: Pt with coughing when ST entered the room. Pt unable to elicit volitional cough or swallow this session. Pt consumed ice chips x2, tsp water x1, cup sip water x1, straw sip water x4, and ivette cracker x2 bites without overt s/s of aspiration or airway compromise.     Treatment: Pt observed to be more lethargic and demonstrated flat affect throughout assessment. This is a change when compared to previous bedside swallow evaluation, when pt displayed more facial expression and communicated in short phrases as well as followed commands accurately. Medical team notified. ST educated pt regarding new goals to  address speech/language, continued diet rec, and POC moving forward. Pt nodded in agreement however understanding questionable. Continue ST per POC.     Assessment:   Edwin Lopez Jr. is a 69 y.o. male with an SLP diagnosis of Aphasia and Dysphagia.     Goals:   Multidisciplinary Problems     SLP Goals        Problem: SLP Goal    Goal Priority Disciplines Outcome   SLP Goal     SLP Ongoing, Progressing   Description:  Speech Language Pathology Goals  Goals to be met by (11/25)  1. Pt will tolerate dental soft diet with thin liquids without overt s/s of aspiration or airway compromise.   2. Pt will participate in ongoing assessment of swallow to determine least restrictive diet.  3. Pt will participate in speech/lang/cog evaluation to determine future therapeutic plan. - met  4. The pt will orient x4.  5. The pt will participate in rote verbal tasks with 75% acc given min cues.  6. Pt will answer simple YNQs with 50% acc given mod cues.  7. Pt will participate in confrontation and categorical naming tasks with 50% acc given mod cues.  8. Pt will participate in 1-2 step commands with 75% acc given mod cues.   9. Pt will participate in ongoing assessment of reading, writing, and visuospatial skills.                     Plan:   · Patient to be seen:  4 x/week   · Plan of Care expires:  12/17/19  · Plan of Care reviewed with:  patient   · SLP Follow-Up:  Yes       Discharge recommendations:  Discharge Facility/Level of Care Needs: nursing facility, skilled     Time Tracking:   SLP Treatment Date:   11/19/19  Speech Start Time:  1056  Speech Stop Time:  1125     Speech Total Time (min):  29 min    Billable Minutes: Eval 21  and Treatment Swallowing Dysfunction 8    Simran Yi CATHY  11/19/2019

## 2019-11-19 NOTE — NURSING
Physician notified of patient's temperature 102.2.  Patient given tylenol per MD orders, and will recheck in 1 hour.  Patient also straight catheterized for urine culture per MD orders.  Patient tolerated well, will continue to monitor.

## 2019-11-19 NOTE — NURSING
Wound care orders performed to chin, cheeks, gluteal cleft and right knee.  Patient tolerated well, will continue to monitor.

## 2019-11-19 NOTE — PT/OT/SLP PROGRESS
Physical Therapy      Patient Name:  Edwin Lopez Jr.   MRN:  [id number]    Patient not seen today secondary to Other (Comment)(Per chart review pt with worsened encephalopathy and medical plan for EEG. On evaluation 11/18, pt already requires Max to Total A with poor ability to follow commands. PT to follow up 11/20. ). Will follow-up per POC as appropriate.     Carl Smith, PTA

## 2019-11-20 LAB
ALBUMIN SERPL BCP-MCNC: 2.6 G/DL (ref 3.5–5.2)
ALP SERPL-CCNC: 69 U/L (ref 55–135)
ALT SERPL W/O P-5'-P-CCNC: 28 U/L (ref 10–44)
ANION GAP SERPL CALC-SCNC: 8 MMOL/L (ref 8–16)
AST SERPL-CCNC: 29 U/L (ref 10–40)
BASOPHILS # BLD AUTO: 0.03 K/UL (ref 0–0.2)
BASOPHILS NFR BLD: 0.3 % (ref 0–1.9)
BILIRUB SERPL-MCNC: 0.7 MG/DL (ref 0.1–1)
BUN SERPL-MCNC: 8 MG/DL (ref 8–23)
CALCIUM SERPL-MCNC: 8.5 MG/DL (ref 8.7–10.5)
CHLORIDE SERPL-SCNC: 102 MMOL/L (ref 95–110)
CO2 SERPL-SCNC: 24 MMOL/L (ref 23–29)
CREAT SERPL-MCNC: 0.8 MG/DL (ref 0.5–1.4)
DIFFERENTIAL METHOD: ABNORMAL
EOSINOPHIL # BLD AUTO: 0.1 K/UL (ref 0–0.5)
EOSINOPHIL NFR BLD: 0.5 % (ref 0–8)
ERYTHROCYTE [DISTWIDTH] IN BLOOD BY AUTOMATED COUNT: 13.4 % (ref 11.5–14.5)
EST. GFR  (AFRICAN AMERICAN): >60 ML/MIN/1.73 M^2
EST. GFR  (NON AFRICAN AMERICAN): >60 ML/MIN/1.73 M^2
GLUCOSE SERPL-MCNC: 138 MG/DL (ref 70–110)
HCT VFR BLD AUTO: 33.3 % (ref 40–54)
HGB BLD-MCNC: 10.2 G/DL (ref 14–18)
IMM GRANULOCYTES # BLD AUTO: 0.03 K/UL (ref 0–0.04)
IMM GRANULOCYTES NFR BLD AUTO: 0.3 % (ref 0–0.5)
LEVETIRACETAM SERPL-MCNC: 8.2 UG/ML (ref 3–60)
LYMPHOCYTES # BLD AUTO: 1.3 K/UL (ref 1–4.8)
LYMPHOCYTES NFR BLD: 12.5 % (ref 18–48)
MAGNESIUM SERPL-MCNC: 1.6 MG/DL (ref 1.6–2.6)
MCH RBC QN AUTO: 25.6 PG (ref 27–31)
MCHC RBC AUTO-ENTMCNC: 30.6 G/DL (ref 32–36)
MCV RBC AUTO: 84 FL (ref 82–98)
MONOCYTES # BLD AUTO: 0.8 K/UL (ref 0.3–1)
MONOCYTES NFR BLD: 8 % (ref 4–15)
NEUTROPHILS # BLD AUTO: 8.1 K/UL (ref 1.8–7.7)
NEUTROPHILS NFR BLD: 78.4 % (ref 38–73)
NRBC BLD-RTO: 0 /100 WBC
PHOSPHATE SERPL-MCNC: 2.7 MG/DL (ref 2.7–4.5)
PLATELET # BLD AUTO: 261 K/UL (ref 150–350)
PMV BLD AUTO: 10.2 FL (ref 9.2–12.9)
POCT GLUCOSE: 102 MG/DL (ref 70–110)
POCT GLUCOSE: 113 MG/DL (ref 70–110)
POCT GLUCOSE: 115 MG/DL (ref 70–110)
POCT GLUCOSE: 120 MG/DL (ref 70–110)
POCT GLUCOSE: 127 MG/DL (ref 70–110)
POTASSIUM SERPL-SCNC: 4.1 MMOL/L (ref 3.5–5.1)
PROT SERPL-MCNC: 6.5 G/DL (ref 6–8.4)
RBC # BLD AUTO: 3.99 M/UL (ref 4.6–6.2)
SODIUM SERPL-SCNC: 134 MMOL/L (ref 136–145)
WBC # BLD AUTO: 10.37 K/UL (ref 3.9–12.7)

## 2019-11-20 PROCEDURE — 85025 COMPLETE CBC W/AUTO DIFF WBC: CPT

## 2019-11-20 PROCEDURE — 25000003 PHARM REV CODE 250: Performed by: PSYCHIATRY & NEUROLOGY

## 2019-11-20 PROCEDURE — 97530 THERAPEUTIC ACTIVITIES: CPT

## 2019-11-20 PROCEDURE — 83735 ASSAY OF MAGNESIUM: CPT

## 2019-11-20 PROCEDURE — 92507 TX SP LANG VOICE COMM INDIV: CPT

## 2019-11-20 PROCEDURE — 84100 ASSAY OF PHOSPHORUS: CPT

## 2019-11-20 PROCEDURE — 25000242 PHARM REV CODE 250 ALT 637 W/ HCPCS: Performed by: NURSE PRACTITIONER

## 2019-11-20 PROCEDURE — 63600175 PHARM REV CODE 636 W HCPCS: Performed by: NURSE PRACTITIONER

## 2019-11-20 PROCEDURE — 25000003 PHARM REV CODE 250: Performed by: HOSPITALIST

## 2019-11-20 PROCEDURE — 94761 N-INVAS EAR/PLS OXIMETRY MLT: CPT

## 2019-11-20 PROCEDURE — 99233 PR SUBSEQUENT HOSPITAL CARE,LEVL III: ICD-10-PCS | Mod: ,,, | Performed by: PHYSICIAN ASSISTANT

## 2019-11-20 PROCEDURE — 25000242 PHARM REV CODE 250 ALT 637 W/ HCPCS: Performed by: PHYSICIAN ASSISTANT

## 2019-11-20 PROCEDURE — 80053 COMPREHEN METABOLIC PANEL: CPT

## 2019-11-20 PROCEDURE — 25000003 PHARM REV CODE 250: Performed by: STUDENT IN AN ORGANIZED HEALTH CARE EDUCATION/TRAINING PROGRAM

## 2019-11-20 PROCEDURE — 99233 SBSQ HOSP IP/OBS HIGH 50: CPT | Mod: ,,, | Performed by: PHYSICIAN ASSISTANT

## 2019-11-20 PROCEDURE — 27000221 HC OXYGEN, UP TO 24 HOURS

## 2019-11-20 PROCEDURE — 97110 THERAPEUTIC EXERCISES: CPT

## 2019-11-20 PROCEDURE — 99233 PR SUBSEQUENT HOSPITAL CARE,LEVL III: ICD-10-PCS | Mod: ,,, | Performed by: HOSPITALIST

## 2019-11-20 PROCEDURE — 97112 NEUROMUSCULAR REEDUCATION: CPT

## 2019-11-20 PROCEDURE — 25000242 PHARM REV CODE 250 ALT 637 W/ HCPCS: Performed by: PSYCHIATRY & NEUROLOGY

## 2019-11-20 PROCEDURE — 94640 AIRWAY INHALATION TREATMENT: CPT

## 2019-11-20 PROCEDURE — 63600175 PHARM REV CODE 636 W HCPCS: Performed by: STUDENT IN AN ORGANIZED HEALTH CARE EDUCATION/TRAINING PROGRAM

## 2019-11-20 PROCEDURE — 99900035 HC TECH TIME PER 15 MIN (STAT)

## 2019-11-20 PROCEDURE — 63600175 PHARM REV CODE 636 W HCPCS: Performed by: HOSPITALIST

## 2019-11-20 PROCEDURE — 92526 ORAL FUNCTION THERAPY: CPT

## 2019-11-20 PROCEDURE — 99233 SBSQ HOSP IP/OBS HIGH 50: CPT | Mod: ,,, | Performed by: HOSPITALIST

## 2019-11-20 PROCEDURE — 11000001 HC ACUTE MED/SURG PRIVATE ROOM

## 2019-11-20 RX ADMIN — IPRATROPIUM BROMIDE AND ALBUTEROL SULFATE 3 ML: .5; 3 SOLUTION RESPIRATORY (INHALATION) at 04:11

## 2019-11-20 RX ADMIN — IPRATROPIUM BROMIDE AND ALBUTEROL SULFATE 3 ML: .5; 3 SOLUTION RESPIRATORY (INHALATION) at 03:11

## 2019-11-20 RX ADMIN — SENNOSIDES AND DOCUSATE SODIUM 1 TABLET: 8.6; 5 TABLET ORAL at 10:11

## 2019-11-20 RX ADMIN — SODIUM CHLORIDE, SODIUM LACTATE, POTASSIUM CHLORIDE, AND CALCIUM CHLORIDE: .6; .31; .03; .02 INJECTION, SOLUTION INTRAVENOUS at 02:11

## 2019-11-20 RX ADMIN — SODIUM CHLORIDE SOLN NEBU 3% 4 ML: 3 NEBU SOLN at 08:11

## 2019-11-20 RX ADMIN — CLOPIDOGREL BISULFATE 75 MG: 75 TABLET, FILM COATED ORAL at 08:11

## 2019-11-20 RX ADMIN — HEPARIN SODIUM 5000 UNITS: 5000 INJECTION, SOLUTION INTRAVENOUS; SUBCUTANEOUS at 10:11

## 2019-11-20 RX ADMIN — ATORVASTATIN CALCIUM 40 MG: 20 TABLET, FILM COATED ORAL at 10:11

## 2019-11-20 RX ADMIN — SODIUM CHLORIDE SOLN NEBU 3% 4 ML: 3 NEBU SOLN at 12:11

## 2019-11-20 RX ADMIN — ACETAMINOPHEN 650 MG: 325 TABLET ORAL at 04:11

## 2019-11-20 RX ADMIN — IPRATROPIUM BROMIDE AND ALBUTEROL SULFATE 3 ML: .5; 3 SOLUTION RESPIRATORY (INHALATION) at 08:11

## 2019-11-20 RX ADMIN — VANCOMYCIN HYDROCHLORIDE 1000 MG: 1 INJECTION, POWDER, LYOPHILIZED, FOR SOLUTION INTRAVENOUS at 03:11

## 2019-11-20 RX ADMIN — ACETAMINOPHEN 650 MG: 325 TABLET ORAL at 09:11

## 2019-11-20 RX ADMIN — LOSARTAN POTASSIUM 25 MG: 25 TABLET, FILM COATED ORAL at 08:11

## 2019-11-20 RX ADMIN — LEVETIRACETAM 1000 MG: 500 TABLET ORAL at 08:11

## 2019-11-20 RX ADMIN — LEVETIRACETAM 1000 MG: 500 TABLET ORAL at 10:11

## 2019-11-20 RX ADMIN — HEPARIN SODIUM 5000 UNITS: 5000 INJECTION, SOLUTION INTRAVENOUS; SUBCUTANEOUS at 06:11

## 2019-11-20 RX ADMIN — SODIUM CHLORIDE SOLN NEBU 3% 4 ML: 3 NEBU SOLN at 05:11

## 2019-11-20 RX ADMIN — ASPIRIN 81 MG CHEWABLE TABLET 81 MG: 81 TABLET CHEWABLE at 08:11

## 2019-11-20 RX ADMIN — IBUPROFEN 400 MG: 400 TABLET, FILM COATED ORAL at 08:11

## 2019-11-20 RX ADMIN — IPRATROPIUM BROMIDE AND ALBUTEROL SULFATE 3 ML: .5; 3 SOLUTION RESPIRATORY (INHALATION) at 12:11

## 2019-11-20 RX ADMIN — HEPARIN SODIUM 5000 UNITS: 5000 INJECTION, SOLUTION INTRAVENOUS; SUBCUTANEOUS at 02:11

## 2019-11-20 RX ADMIN — SODIUM CHLORIDE SOLN NEBU 3% 4 ML: 3 NEBU SOLN at 03:11

## 2019-11-20 RX ADMIN — ERTAPENEM 1 G: 1 INJECTION INTRAMUSCULAR; INTRAVENOUS at 02:11

## 2019-11-20 RX ADMIN — IBUPROFEN 400 MG: 400 TABLET, FILM COATED ORAL at 05:11

## 2019-11-20 NOTE — NURSING
Patient placed on cooling blanket and given oral tylenol for temperature of 100.6 per MD orders.  Will continue to monitor.

## 2019-11-20 NOTE — NURSING
MD notified of patients temperature of 100.4.  Ibuprofen given per MD orders, will continue to monitor.

## 2019-11-20 NOTE — ASSESSMENT & PLAN NOTE
68 y/o male with CVA, seizures, DMII, dementia, HTN presents to ED with AMS. Per chart review, pt was found down at home 11/11 incontinent with urine and bowel. CK levels elevated. tox screen negative. Lactate negative.  CT negative, MRI C spine, brain no acute processes. He was noted to have UTI and blood cultures +coag negative staph and proteus. He was admitted to ICU, intubated for airway protection.  His mental status improved and he was extubated on 11/17 and transferred to the floors.    He continues to be febrile today. tmax 100.5. He is more alert and oriented today. U/s renal studies +left sided hydronephrosis. CXR clear. RCx normal dennis. Repeat blood cultures NGTD. UA clear.    Recommendations:   1. Agree with ertapenem 1g daily. May d/c vancomycin  2. CXR without any consolidations. Speech following, appreciate assistance.   3. Anticipate 14 days of IV abx from for complicated UTI and bacteremia.  (end date 12/3/19) with weekly CBC CMP sent to ID clinic at    4. ID will sign off. Please call if with questions.

## 2019-11-20 NOTE — PROGRESS NOTES
[facility]  Infectious Disease  Progress Note    Patient Name: Edwin Lopez Jr.  MRN: [id number]  Admission Date: 11/11/2019  Length of Stay: 9 days  Attending Physician: Esther Lu MD  Primary Care Provider: Robina Burroughs NP    Isolation Status: No active isolations  Assessment/Plan:      Acute encephalopathy  70 y/o male with CVA, seizures, DMII, dementia, HTN presents to ED with AMS. Per chart review, pt was found down at home 11/11 incontinent with urine and bowel. CK levels elevated. tox screen negative. Lactate negative.  CT negative, MRI C spine, brain no acute processes. He was noted to have UTI and blood cultures +coag negative staph and proteus. He was admitted to ICU, intubated for airway protection.  His mental status improved and he was extubated on 11/17 and transferred to the floors.    He continues to be febrile today. tmax 100.5. He is more alert and oriented today. U/s renal studies +left sided hydronephrosis. CXR clear. RCx normal dennis. Repeat blood cultures NGTD. UA clear.    Recommendations:   1. Agree with ertapenem 1g daily. May d/c vancomycin  2. CXR without any consolidations. Speech following, appreciate assistance.   3. Anticipate 14 days of IV abx from for complicated UTI and bacteremia.  (end date 12/3/19) with weekly CBC CMP sent to ID clinic at    4. ID will sign off. Please call if with questions.           Thank you for your consult. I will sign off. Please contact us if you have any additional questions.    Chriss Rice PA-C  Infectious Disease  [facility]  [phone]    Subjective:     Principal Problem:Altered mental status    HPI: 70 y/o male with CVA, seizures, DMII, dementia, HTN presents to ED with AMS. Per chart review, pt was found down at home 11/11 incontinent with urine and bowel. CTS negative, MRI C spine, brain no acute processes. He was noted to have UTI and blood cultures +coag negative staph and proteus.  He was admitted to ICU, intubated for airway protection. His abx was switched to ceftriaxone from vancomycin and zosyn. His repeat blood cultures remain NGTD. His mental status improved and he was extubated on 11/17 and transferred to the floors.     He was noted to be febrile tmax 101.5 overnight. He appeared drowsy but easily arousal. He had repeat imaging studies as concerns for worsening encephalopathy. Repeat cultures have been NGTD. He is now on ertapenem.     He lives alone. Denies alcohol use. No IVDU. Has niece that looks after him. Reports having falls at home due to weakness of his legs.     He was seen in august for similar symptoms and was noted to have UTI and E.faecalis bacteremia. He was treated for 14 days of ampicillin and 3 days of ciprofloxacin for his UTI.   Interval History:   Continues to be febrile  Found to have left sided hydronephrosis on imaging  Repeat blood cultures NGTD  respiraotry cultures NGTD  CXR clear  Speech following, pt with aspiration risks. Recommending puree diet.     Review of Systems   Unable to perform ROS: Dementia   Constitutional: Positive for fatigue and fever. Negative for chills and diaphoresis.   Respiratory: Positive for cough.    Gastrointestinal: Negative for abdominal pain.   Genitourinary:        +bess   Musculoskeletal: Negative for back pain.     Objective:     Vital Signs (Most Recent):  Temp: 98.7 °F (37.1 °C) (11/20/19 1240)  Pulse: 93 (11/20/19 1240)  Resp: 16 (11/20/19 1200)  BP: 119/71 (11/20/19 1240)  SpO2: 96 % (11/20/19 1240) Vital Signs (24h Range):  Temp:  [97.8 °F (36.6 °C)-102.2 °F (39 °C)] 98.7 °F (37.1 °C)  Pulse:  [] 93  Resp:  [14-18] 16  SpO2:  [93 %-98 %] 96 %  BP: (119-169)/(71-90) 119/71     Weight: 64 kg (141 lb)  Body mass index is 22.76 kg/m².    Estimated Creatinine Clearance: 78.6 mL/min (based on SCr of 0.8 mg/dL).    Physical Exam   Constitutional: He appears well-developed and well-nourished. No distress.   HENT:   Head:  Normocephalic.   Poor dentition   Cardiovascular: Normal rate and regular rhythm.   Murmur heard.  Pulmonary/Chest: Effort normal. No stridor. He has no wheezes.   Abdominal: Soft. He exhibits no distension. There is no tenderness.   Musculoskeletal: Normal range of motion.   Neurological:   Alert and oriented to person and place  Answering questions appropriately  More alert this AM   Skin: Skin is warm and dry. No rash noted. He is not diaphoretic. No erythema.   Vitals reviewed.      Significant Labs: All pertinent labs within the past 24 hours have been reviewed.    Significant Imaging: I have reviewed all pertinent imaging results/findings within the past 24 hours.

## 2019-11-20 NOTE — PT/OT/SLP PROGRESS
"Speech Language Pathology Treatment    Patient Name:  Edwin Lopez Jr.   MRN:  [id number]  Admitting Diagnosis: Altered mental status    Recommendations:                 General Recommendations:  Dysphagia therapy, Speech/language therapy and Cognitive-linguistic therapy  Diet recommendations:  Mechanical soft, Liquid Diet Level: Thin   Aspiration Precautions: 1 bite/sip at a time, Assistance with meals, Eliminate distractions, Feed only when awake/alert, HOB to 90 degrees, Meds whole 1 at a time (crush in puree if difficulty noted), Small bites/sips, encourage double swallows and Strict aspiration precautions   General Precautions: Standard, aspiration, fall  Communication strategies:  provide increased time to answer and go to room if call light pushed    Subjective     "Pretty Good" (pt stated when asked how he was doing)    Pain/Comfort:  · Pain Rating 1: 0/10  · Pain Rating Post-Intervention 1: 0/10    Objective:     Has the patient been evaluated by SLP for swallowing?   Yes  Keep patient NPO? No     Pt seen this am with no family present.  Nursing reported pt tolerate container of puree this am at breakfast.  Nursing indicated pt with increased time to manage the dental soft solids.    Mr. Lopez was asleep upon entry, but did rouse to participate with mod stimulation.  Pooling of secretions was noted.  Pt was able to clear with cued swallow and suction.  Cough on command was fairly productive as well.  Mr. Lopez was presented with cup sips of water x4 , puree boluses x4, and small piece of cracker x1.  Oral phase was c/b increased mastication time required.  When asked if he was having trouble chewing boluses up, pt stated " I think so".  Initiation of pharyngeal phase was mildly delayed and laryngeal elevation does appear somewhat reduced.  Double swallows noted at times.  Of note, pt presents with a generalized cough throughout session (both with and without po presentations).  This results in difficulty " "fully ruling out aspiration at bedside.  Chest x-ray taken yesterday indicates "Lungs are clear ".  Pt does reportedly spike fevers. If at any time team wishes to fully rule out aspiration, a modified barium swallow study would be warranted.  Given nursing report of increased effort with Dental Soft Solids at times,  ST recommends changing to Mechanical Soft (Dysphagia Level 5) to assist with ease of intake.   In regards to language skills, pt counted 1-10 ind'ly this date.  He responded to automatic phrases with 80% accuracy and named common objects around the room with 80% accuracy.  Pt was oriented to self and place.  He was unable to state accurate month/year.  Information was reviewed. Pt responded to simple y/n questions with 50% accuracy.  He followed basic commands with 80%.    Education provided regarding continued language stimulation, swallow precautions, s/s aspiration, recommendations and plan of care .   Reviewed results of session/ecommendations with nursing post session and with Dr. Lu via secure chat.    Assessment:     Edwin Lopez Jr. is a 69 y.o. male with an SLP diagnosis of Aphasia, Dysphagia and Cognitive-Linguistic Impairment.      Goals:   Multidisciplinary Problems     SLP Goals        Problem: SLP Goal    Goal Priority Disciplines Outcome   SLP Goal     SLP Ongoing, Progressing   Description:  Speech Language Pathology Goals  Goals to be met by (11/25)  1. Pt will tolerate dental soft diet with thin liquids without overt s/s of aspiration or airway compromise.   2. Pt will participate in ongoing assessment of swallow to determine least restrictive diet.  3. Pt will participate in speech/lang/cog evaluation to determine future therapeutic plan. - met  4. The pt will orient x4.  5. The pt will participate in rote verbal tasks with 75% acc given min cues.  6. Pt will answer simple YNQs with 50% acc given mod cues.  7. Pt will participate in confrontation and categorical naming tasks with " 50% acc given mod cues.  8. Pt will participate in 1-2 step commands with 75% acc given mod cues.   9. Pt will participate in ongoing assessment of reading, writing, and visuospatial skills.                     Plan:     · Patient to be seen:  4 x/week   · Plan of Care expires:  12/17/19  · Plan of Care reviewed with:  patient   · SLP Follow-Up:  Yes       Discharge recommendations:  nursing facility, skilled     Time Tracking:     SLP Treatment Date:   11/20/19  Speech Start Time:  1120  Speech Stop Time:  1147     Speech Total Time (min):  27 min    Billable Minutes: Speech Therapy Individual 17 and Treatment Swallowing Dysfunction 10    EUSEBIO Nielsen, CCC-SLP  Speech Language Pathologist  [phone]  11/20/2019

## 2019-11-20 NOTE — PT/OT/SLP PROGRESS
Occupational Therapy   Treatment    Name: Edwin Lopez Jr.  MRN: [id number]  Admitting Diagnosis:  Altered mental status       Recommendations:     Discharge Recommendations: nursing facility, skilled  Discharge Equipment Recommendations:  bath bench  Barriers to discharge:  Inaccessible home environment, Decreased caregiver support    Assessment:     Edwin Lopez Jr. is a 69 y.o. male with a medical diagnosis of Altered mental status.  He presents with progression towards goals and good participation in treatment session. Pt continues to require max A for all functional tasks. Pt was able to perform sit to stand 3x with max A x2 persons. Performance deficits affecting function are weakness, impaired endurance, impaired self care skills, impaired functional mobilty, impaired balance, decreased coordination, decreased upper extremity function, decreased lower extremity function, impaired cardiopulmonary response to activity. Pt would benefit from skilled OT services in order to maximize independence with ADLs and facilitate safe discharge. Pt would benefit from skilled nursing upon discharge to further increase independence in self-care and functional mobility.    Rehab Prognosis:  Good; patient would benefit from acute skilled OT services to address these deficits and reach maximum level of function.       Plan:     Patient to be seen 3 x/week to address the above listed problems via cognitive retraining, therapeutic activities, therapeutic exercises, self-care/home management, neuromuscular re-education  · Plan of Care Expires: 12/10/19  · Plan of Care Reviewed with: patient    Subjective     Pain/Comfort:  · Pain Rating 1: 0/10  · Pain Rating Post-Intervention 1: 0/10    Objective:     Communicated with: RN prior to session.  Patient found HOB elevated with SCD, telemetry, peripheral IV upon OT entry to room.    General Precautions: Standard, fall   Orthopedic Precautions:N/A   Braces: N/A     Occupational  Performance:     Bed Mobility:    · Patient completed Scooting with maximal assistance  · Patient completed Supine to Sit with maximal assistance  · Patient completed Sit to Supine with maximal assistance     Functional Mobility/Transfers:  · Patient completed Sit <> Stand Transfer with maximal assistance x2 persons  with  no assistive device x3 trials with pt 75% upright    Activities of Daily Living:  · Feeding: set up assistance in bed with increased time       AMPAC 6 Click ADL: 9    Treatment & Education:  -BUE AROM exercises x5 reps  (shoulder flexion with target given and finger abduction/adduction)   -Pt able to follow one step commands with demonstration and tactile cue during exercise  -Worked extensively on sitting balance (maintained midline and correcting to midline)   -Therapist provided facilitation and instruction of proper body mechanics, energy conservation, and fall prevention strategies during tasks listed above.  -Pt educated on role of OT, POC and goals for therapy  -Pt verbalized understanding. Pt expressed no further concerns/questions        Patient left HOB elevated with all lines intact, call button in reach and RN notifiedEducation:      GOALS:   Multidisciplinary Problems     Occupational Therapy Goals        Problem: Occupational Therapy Goal    Goal Priority Disciplines Outcome Interventions   Occupational Therapy Goal     OT, PT/OT Ongoing, Progressing    Description:  Goals set 11/12 to be addressed for 14 days with expiration date, 11/26  Patient will increase functional independence with ADLs by performing:    Patient will demonstrate rolling to the right with mod assist.  Not met   Patient will demonstrate rolling to the left with mod assist.   Not met  Patient will demonstrate supine -sit with mod  assist.   Not met  Patient will demonstrate stand pivot transfers with max assist.   Not met  Patient will demonstrate grooming while seated with max assist.   Not met  Patient will  demonstrate upper body dressing with max assist while seated EOB.   Not met  Patient will demonstrate lower body dressing with max assist while seated EOB.   Not met  Patient will demonstrate toileting with max assist.   Not met  Patient will demonstrate bathing while seated EOB with max assist.   Not met  Patient will demonstrate ability to follow 3/5 commands.   Not met  Patient's family / caregiver will demonstrate independence and safety with assisting patient with self-care skills and functional mobility.     Not met  Patient's family / caregiver will demonstrate independence with providing ROM and changes in bed positioning.   Not met                            Time Tracking:     OT Date of Treatment: 11/20/19  OT Start Time: 1327  OT Stop Time: 1359  OT Total Time (min): 32 min    Billable Minutes:Therapeutic Activity 30    Christina Mendiola OT  11/20/2019

## 2019-11-20 NOTE — PLAN OF CARE
Problem: Occupational Therapy Goal  Goal: Occupational Therapy Goal  Description  Goals set 11/12 to be addressed for 14 days with expiration date, 11/26  Patient will increase functional independence with ADLs by performing:    Patient will demonstrate rolling to the right with mod assist.  Not met   Patient will demonstrate rolling to the left with mod assist.   Not met  Patient will demonstrate supine -sit with mod  assist.   Not met  Patient will demonstrate stand pivot transfers with max assist.   Not met  Patient will demonstrate grooming while seated with max assist.   Not met  Patient will demonstrate upper body dressing with max assist while seated EOB.   Not met  Patient will demonstrate lower body dressing with max assist while seated EOB.   Not met  Patient will demonstrate toileting with max assist.   Not met  Patient will demonstrate bathing while seated EOB with max assist.   Not met  Patient will demonstrate ability to follow 3/5 commands.   Not met  Patient's family / caregiver will demonstrate independence and safety with assisting patient with self-care skills and functional mobility.     Not met  Patient's family / caregiver will demonstrate independence with providing ROM and changes in bed positioning.   Not met           Outcome: Ongoing, Progressing     Goals reviewed and remain appropriate, continue POC  Christina Mendiola OTR/L  11/20/2019

## 2019-11-20 NOTE — SUBJECTIVE & OBJECTIVE
Interval History:   Continues to be febrile  Found to have left sided hydronephrosis on imaging  Repeat blood cultures NGTD  respiraotry cultures NGTD  CXR clear  Speech following, pt with aspiration risks. Recommending puree diet.     Review of Systems   Unable to perform ROS: Dementia   Constitutional: Positive for fatigue and fever. Negative for chills and diaphoresis.   Respiratory: Positive for cough.    Gastrointestinal: Negative for abdominal pain.   Genitourinary:        +bess   Musculoskeletal: Negative for back pain.     Objective:     Vital Signs (Most Recent):  Temp: 98.7 °F (37.1 °C) (11/20/19 1240)  Pulse: 93 (11/20/19 1240)  Resp: 16 (11/20/19 1200)  BP: 119/71 (11/20/19 1240)  SpO2: 96 % (11/20/19 1240) Vital Signs (24h Range):  Temp:  [97.8 °F (36.6 °C)-102.2 °F (39 °C)] 98.7 °F (37.1 °C)  Pulse:  [] 93  Resp:  [14-18] 16  SpO2:  [93 %-98 %] 96 %  BP: (119-169)/(71-90) 119/71     Weight: 64 kg (141 lb)  Body mass index is 22.76 kg/m².    Estimated Creatinine Clearance: 78.6 mL/min (based on SCr of 0.8 mg/dL).    Physical Exam   Constitutional: He appears well-developed and well-nourished. No distress.   HENT:   Head: Normocephalic.   Poor dentition   Cardiovascular: Normal rate and regular rhythm.   Murmur heard.  Pulmonary/Chest: Effort normal. No stridor. He has no wheezes.   Abdominal: Soft. He exhibits no distension. There is no tenderness.   Musculoskeletal: Normal range of motion.   Neurological:   Alert and oriented to person and place  Answering questions appropriately  More alert this AM   Skin: Skin is warm and dry. No rash noted. He is not diaphoretic. No erythema.   Vitals reviewed.      Significant Labs: All pertinent labs within the past 24 hours have been reviewed.    Significant Imaging: I have reviewed all pertinent imaging results/findings within the past 24 hours.

## 2019-11-20 NOTE — NURSING
Spoke with CT tech and ultrasound technician.  Patient is no longer connected to EEG and is free to come down for tests.

## 2019-11-20 NOTE — PROCEDURES
EXTENDED  ELECTROENCEPHALOGRAM  REPORT    Edwin Lopez Jr.  [id number]  1950    DATE OF SERVICE: 11/19/19    DATE OF ADMISSION: 11/11/2019  1:07 PM    ADMITTING/REQUESTING PROVIDER: Ileana Huff MD    METHODOLOGY   Electroencephalographic (EEG) recording is with electrodes placed according to the International 10-20 placement system.  Thirty two (32) channels of digital signal (sampling rate of 512/sec) including T1 and T2 was simultaneously recorded from the scalp and may include  EKG, EMG, and/or eye monitors.  Recording band pass was 0.1 to 512 hz.  Digital video recording of the patient is simultaneously recorded with the EEG.  The patient is instructed report clinical symptoms which may occur during the recording session.  EEG and video recording is stored and archived in digital format.  Activation procedures which include photic stimulation, hyperventilation and instructing patients to perform simple task are done in selected patients.   The EEG is displayed on a monitor screen and can be reviewed using different montages.  Computer assisted analysis is employed to detect spike and electrographic seizure activity.   The entire record is submitted for computer analysis.  The entire recording is visually reviewed and the times identified by computer analysis as being spikes or seizures are reviewed again.  Compresses spectral analysis (CSA) is also performed on the activity recorded from each individual channel.  This is displayed as a power display of frequencies from 0 to 30 Hz over time.   The CSA is reviewed looking for asymmetries in power between homologous areas of the scalp and then compared with the original EEG recording.     SOAK (Smart [employer]ational [employer] tool[employer]) software was also utilized in the review of this study.  This software suite analyzes the EEG recording in multiple domains.  Coherence and rhythmicity is computed to identify EEG sections which may contain organized seizures.  Each channel undergoes analysis to  detect presence of spike and sharp waves which have special and morphological characteristic of epileptic activity.  The routine EEG recording is converted from spacial into frequency domain.  This is then displayed comparing homologous areas to identify areas of significant asymmetry.  Algorithm to identify non-cortically generated artifact is used to separate eye movement, EMG and other artifact from the EEG.      RECORDING TIMES  Start on 11/19/19, 15:34  Stop on 11/19/19, 17:43    A total of 2 hours and 9 minutes of EEG recording was obtained.    EEG FINDINGS  Background activity:   The background rhythm was characterized by theta-alpha (7-9 Hz) activity with a 9 Hz posterior dominant alpha rhythm at 30-70 microvolts.   Symmetry and continuity: the background was continuous; asymmetry with intermittent theta > alpha slowing was noted on the left        Sleep:    Normal sleep transients including sleep spindles, K complexes, vertex waves and POSTS were seen.    Activation procedures:   Photic stimulation and hyperventilation were not performed    Abnormal activity:   No epileptiform discharges, periodic discharges, lateralized rhythmic delta activity or electrographic seizures were seen.    EKG:   Irregular rhythm seen    IMPRESSION:   This is an abnormal extended (2 hours and 9 minutes) EEG due to the intermittent left sided slowing seen, suggestive of underlying structural lesion  No epileptiform activity or electrographic seizures seen.  Irregular heart rate noted on EKG.    CLINICAL CORRELATION IS RECOMMENDED    Coco Fernández MD, AB(), MAGDIEL DAMON.  Neurology-Epilepsy.  [facility].

## 2019-11-20 NOTE — NURSING
Patient's rectal temperature 101.4.  Patient given tylenol and placed back on cooling blanket.  Will recheck temperature in 1 hour.  Will continue to monitor.

## 2019-11-20 NOTE — PROGRESS NOTES
Hospital Medicine   Progress note      Team: [facility] HARMAN Lu MD   Admit Date: 11/11/2019   Hospital Day: 9  NAI: 11/22/2019   Code status: Full Code   Principal Problem: Altered mental status     HPI: Mr Lopez is a 69 yr old male with a PMH of CVA, seizures, CHI, DM II, dementia, HTN, who presents to the ED with AMS. Patient was last heard from by a friend on Friday 11/8 (3 days ago). Found down at home 11/11, incontinent of urine and bowel. CTH negative. MRI c-spine, brain pending. Mild leukocytosis, UA positive for UTI. cEEG pending. Patient intubated in the ED for airway protection. Will admit to [facility] for higher level of care. Will need LP.    ICU Course:   11/11/2019: Intubated in ED. Admit to [facility].  11/12/2019: Switched Ceftriaxone to Zosyn, rechecking BCx, IVF @ 150cc/hr w/ 1L bolus; Tube feeds & dvt ppx initiated. EEG suppressed, propofol disctoninued & fentanyl 50mcg q2hr added.  11/13/2019: Steadily improving since presentation. Continue Keppra & Abx, added 200cc q6 FW to feeds, & dc'd vent.  11/14/2019: mental status continues to improve. Following commands in all 4 ext. FW 200cc q6hr; Extubate today; pending sensitivities of cx's; added ASA/statin  11/15/2019: Mental status improved; secretions persistent. De-escalated Abx to ceftriaxone given susceptibilities; added breathing tx q4. NPO in AM for possible extubation.  11/16/19: start glycopyrrolate for secretions  11/17: Extubated   11/18 transferred to hospital medicine     Interval hx:   Pt was seen and examined at bedside.  Patient noted to be drowsy but easily arousable.  Patient was slightly more interactive today.  He was able to nod yes or no to questions appropriately but did not verbalize any answers.  Patient was noted to be afebrile again with T-max of 100.6° and was given ibuprofen.  Due to ongoing/worsening encephalopathy yesterday, patient had CT head done which did not reveal any acute findings.  CXR was nonacute.   Retroperitoneal ultrasound showed mild left hydronephrosis but no other abnormalities.  Patient was pan-cultured all of which show no growth to date so far.  Patient denies any complaints today.    ROS (Positive in Bold, otherwise negative)  Unable to obtain as patient is encephalopathic    PEx   Temp:  [97.8 °F (36.6 °C)-102.2 °F (39 °C)]   Pulse:  []   Resp:  [14-18]   BP: (156-169)/(85-93)   SpO2:  [93 %-98 %]      I & O (Last 24H):     Intake/Output Summary (Last 24 hours) at 11/20/2019 1112  Last data filed at 11/20/2019 0500  Gross per 24 hour   Intake 1062.5 ml   Output 2050 ml   Net -987.5 ml       General:  male  in no acute distress. Drowsy but easily arousable.  Able to track.. Resting in bed.  Able to nod yes or no to simple questions but not verbalizing answers   HEENT: NCAT. PERRL. EOMI. Sclera Anicteric.  CVS: RRR. Normal S1 S2. No murmurs  Pulm: CTAB. Normal respiratory effort. No wheezes, rhonchi, or crackles.  Abdomen: Soft. Non-distended. No tenderness to palpation. No rebound or guarding. +BS.  Extremities: No edema. No cyanosis. Full ROM.  Neuro: Alert, oriented x 2, Spont mvt of all extremities with no focal deficits noted.    Recent Results (from the past 24 hour(s))   Blood culture    Collection Time: 11/19/19 11:51 AM   Result Value Ref Range    Blood Culture, Routine No Growth to date    Levetiracetam level    Collection Time: 11/19/19 11:51 AM   Result Value Ref Range    Levetiracetam Lvl 8.2 3.0 - 60.0 ug/mL   Lactic acid, plasma    Collection Time: 11/19/19 11:51 AM   Result Value Ref Range    Lactate (Lactic Acid) 0.9 0.5 - 2.2 mmol/L   POCT glucose    Collection Time: 11/19/19 12:04 PM   Result Value Ref Range    POCT Glucose 105 70 - 110 mg/dL   Urinalysis, Reflex to Urine Culture Urine, Clean Catch    Collection Time: 11/19/19 12:43 PM   Result Value Ref Range    Specimen UA Urine, Catheterized     Color, UA Yellow Yellow, Straw, Elizabeth    Appearance, UA Clear  Clear    pH, UA 8.0 5.0 - 8.0    Specific Gravity, UA 1.010 1.005 - 1.030    Protein, UA Negative Negative    Glucose, UA Negative Negative    Ketones, UA Trace (A) Negative    Bilirubin (UA) Negative Negative    Occult Blood UA Negative Negative    Nitrite, UA Negative Negative    Leukocytes, UA Negative Negative   Culture, Respiratory with Gram Stain    Collection Time: 11/19/19  5:16 PM   Result Value Ref Range    Gram Stain (Respiratory) <10 epithelial cells per low power field.     Gram Stain (Respiratory) Rare WBC's     Gram Stain (Respiratory) Rare Gram positive cocci     Gram Stain (Respiratory) Rare Gram negative rods    POCT glucose    Collection Time: 11/19/19  5:29 PM   Result Value Ref Range    POCT Glucose 126 (H) 70 - 110 mg/dL   POCT glucose    Collection Time: 11/19/19  7:56 PM   Result Value Ref Range    POCT Glucose 103 70 - 110 mg/dL   Comprehensive metabolic panel    Collection Time: 11/20/19  4:10 AM   Result Value Ref Range    Sodium 134 (L) 136 - 145 mmol/L    Potassium 4.1 3.5 - 5.1 mmol/L    Chloride 102 95 - 110 mmol/L    CO2 24 23 - 29 mmol/L    Glucose 138 (H) 70 - 110 mg/dL    BUN, Bld 8 8 - 23 mg/dL    Creatinine 0.8 0.5 - 1.4 mg/dL    Calcium 8.5 (L) 8.7 - 10.5 mg/dL    Total Protein 6.5 6.0 - 8.4 g/dL    Albumin 2.6 (L) 3.5 - 5.2 g/dL    Total Bilirubin 0.7 0.1 - 1.0 mg/dL    Alkaline Phosphatase 69 55 - 135 U/L    AST 29 10 - 40 U/L    ALT 28 10 - 44 U/L    Anion Gap 8 8 - 16 mmol/L    eGFR if African American >60.0 >60 mL/min/1.73 m^2    eGFR if non African American >60.0 >60 mL/min/1.73 m^2   CBC auto differential    Collection Time: 11/20/19  4:10 AM   Result Value Ref Range    WBC 10.37 3.90 - 12.70 K/uL    RBC 3.99 (L) 4.60 - 6.20 M/uL    Hemoglobin 10.2 (L) 14.0 - 18.0 g/dL    Hematocrit 33.3 (L) 40.0 - 54.0 %    Mean Corpuscular Volume 84 82 - 98 fL    Mean Corpuscular Hemoglobin 25.6 (L) 27.0 - 31.0 pg    Mean Corpuscular Hemoglobin Conc 30.6 (L) 32.0 - 36.0 g/dL    RDW 13.4  11.5 - 14.5 %    Platelets 261 150 - 350 K/uL    MPV 10.2 9.2 - 12.9 fL    Immature Granulocytes 0.3 0.0 - 0.5 %    Gran # (ANC) 8.1 (H) 1.8 - 7.7 K/uL    Immature Grans (Abs) 0.03 0.00 - 0.04 K/uL    Lymph # 1.3 1.0 - 4.8 K/uL    Mono # 0.8 0.3 - 1.0 K/uL    Eos # 0.1 0.0 - 0.5 K/uL    Baso # 0.03 0.00 - 0.20 K/uL    nRBC 0 0 /100 WBC    Gran% 78.4 (H) 38.0 - 73.0 %    Lymph% 12.5 (L) 18.0 - 48.0 %    Mono% 8.0 4.0 - 15.0 %    Eosinophil% 0.5 0.0 - 8.0 %    Basophil% 0.3 0.0 - 1.9 %    Differential Method Automated    Magnesium    Collection Time: 11/20/19  4:10 AM   Result Value Ref Range    Magnesium 1.6 1.6 - 2.6 mg/dL   Phosphorus    Collection Time: 11/20/19  4:10 AM   Result Value Ref Range    Phosphorus 2.7 2.7 - 4.5 mg/dL   POCT glucose    Collection Time: 11/20/19  4:43 AM   Result Value Ref Range    POCT Glucose 127 (H) 70 - 110 mg/dL   POCT glucose    Collection Time: 11/20/19  7:29 AM   Result Value Ref Range    POCT Glucose 115 (H) 70 - 110 mg/dL       Recent Labs   Lab 11/19/19  0715 11/19/19  1204 11/19/19  1729 11/19/19  1956 11/20/19  0443 11/20/19  0729   POCTGLUCOSE 93 105 126* 103 127* 115*       Hemoglobin A1C   Date Value Ref Range Status   11/11/2019 6.4 (H) 4.0 - 5.6 % Final     Comment:     ADA Screening Guidelines:  5.7-6.4%  Consistent with prediabetes  >or=6.5%  Consistent with diabetes  High levels of fetal hemoglobin interfere with the HbA1C  assay. Heterozygous hemoglobin variants (HbS, HgC, etc)do  not significantly interfere with this assay.   However, presence of multiple variants may affect accuracy.     08/27/2019 6.4 (H) 4.0 - 5.6 % Final     Comment:     ADA Screening Guidelines:  5.7-6.4%  Consistent with prediabetes  >or=6.5%  Consistent with diabetes  High levels of fetal hemoglobin interfere with the HbA1C  assay. Heterozygous hemoglobin variants (HbS, HgC, etc)do  not significantly interfere with this assay.   However, presence of multiple variants may affect accuracy.           Active Hospital Problems    Diagnosis  POA    *Altered mental status [R41.82]  Yes    Restricted diet [Z71.3]  Not Applicable    Alteration in skin integrity [R23.9]  Yes    Acute encephalopathy [G93.40]  Unknown    Altered mental state [R41.82]  Yes    Leukocytosis [D72.829]  Yes    History of closed head injury [Z87.820]  Not Applicable    On mechanically assisted ventilation [Z99.11]  Not Applicable    UTI (urinary tract infection) [N39.0]  Yes    Bacteremia [R78.81]  Yes    Hypertension [I10]  Yes    Type 2 diabetes mellitus [E11.9]  Yes    History of CVA in adulthood [Z86.73]  Not Applicable    Seizure disorder [G40.909]  Yes      Resolved Hospital Problems   No resolved problems to display.          Assessment and Plan for problems addressed today:      albuterol-ipratropium  3 mL Nebulization Q4H    aspirin  81 mg Oral Daily    atorvastatin  40 mg Oral QHS    clopidogrel  75 mg Oral Daily    ertapenem (INVANZ) IVPB  1 g Intravenous Q24H    heparin (porcine)  5,000 Units Subcutaneous Q8H    levETIRAcetam  1,000 mg Oral BID    losartan  25 mg Oral Daily    polyethylene glycol  17 g Oral Daily    senna-docusate 8.6-50 mg  1 tablet Oral BID    sodium chloride 3%  4 mL Nebulization Q4H    vancomycin (VANCOCIN) IVPB  15 mg/kg (Dosing Weight) Intravenous Q12H     acetaminophen, albuterol-ipratropium, bisacodyl, Dextrose 10% Bolus, Dextrose 10% Bolus, glucagon (human recombinant), glucose, glucose, ibuprofen, insulin aspart U-100, melatonin, ondansetron, sodium chloride 0.9%    Acute metabolic encephalopathy:  Improving  Septic encephalopathy:  -Patient with Hx of frequent falls and UTIs was found down at home, incontinent of urine/feces 11/11.  He was last seen at baseline mental status 11/8.  -patient was intubated for airway protection, successfully extubated on 11/17.  -CT head noted to be nonacute  -MRI brain and c-spine wo contrast unremarkable  -cEEG done in the ICU did not  show acute epileptic activity  -patient was noted to have positive blood cultures with Proteus, positive urine cultures with ESBL E coli which was thought to be the reason for his acute encephalopathy.    -Patient was treated for UTI initially with Zosyn then with ceftriaxone and mentation improved.  Patient was extubated on 11/17 and transitioned to Hospital Medicine on 11/18  -however, on 11/19, patient was noted to be febrile, and more encephalopathic  -patient underwent repeat EEG which did not reveal any acute seizures.  Head CT was nonacute.  CXR was WNL.  Patient was also pancultured which did not show any growth to date.  -continue neuro checks  -fall precautions, aspiration precautions, seizure precautions  -Continue correction of metabolic derangements  -Maintain Delirium precautions: Maintain regular sleep cycle. Early ambulation. Minimal interruptions overnight. Re-orient patient frequently. Maintain adequate bowel regimen, hydration and electrolyte replenishments. Hearing Aids and eye glasses as needed.     Seizure disorder  -patient is a history of seizures several years  -Loaded with Keppra in the ED  -cEEG negative in the ICU  -currently on  Keppra 1g BID. Continue on this dose until able to f/u with Neurology outpatient  -noted to be more encephalopathic and 11/19, repeat EEG did not reveal acute encephalopathic activity     ESBL UTI (urinary tract infection):  -Hx of UTIs, previous urine culture pos. for klebsiella  -UA with many bacteria, UCx w/ ESBL E coli  -patient is being treated with ceftriaxone however, however patient continues to spike fevers  -antibiotics were changed to ertapenem/vancomycin  -will DC vancomycin today  -Infectious Disease consulted  -retroperitoneal ultrasound reveals mild left hydronephrosis otherwise no significant abnormality     Proteus Bacteremia:  -BCx w/ Proteus mirabilis from 11/11.  -blood cultures from 11/12 onwards have shown no growth to date  -Patient was  previously on Zosyn, transition to ceftriaxone based on sensitivity report  -however, patient was noted to be febrile again on 11/18.  Antibiotics were broadened to or dependence/vancomycin.  Blood cultures were repeated which show no growth to date  -will DC vancomycin today  -infectious Disease was consulted     Diabetes Mellitus:  -Last HbA1c:  6.4  -SSI with accuchecks qACHS  -BG goal: Preprandial blood glucose target <140 mg/dL, Random glucoses <180 mg/dL  -ADA diet    Essential Hypertension:  -stable  -Continue PTA  losartan 25 mg daily  -monitor vitals q4h  -SBP goal of <160 in hospital    Hyperlipidemia:  -continue PTA atorvastatin 40 mg    History of CVA in adulthood  -continue Plavix, statin    DVT PPx:  Heparin    Discharge plan and follow up: DC to SNF once medically stable     Esther Lu MD  Hospital Medicine Staff  [phone] pager

## 2019-11-20 NOTE — PLAN OF CARE
Meds given as ordered tolerated well. No complaints ofp ain. No signs or symptoms of distress/discomfort noted. CT scan, ultrasound completed.   Vitals stable. Safety maintained. Will continue to monitor.

## 2019-11-20 NOTE — PT/OT/SLP PROGRESS
Physical Therapy Treatment    Patient Name:  Edwin Lopez Jr.   MRN:  [id number]    Recommendations:     Discharge Recommendations:  nursing facility, skilled   Discharge Equipment Recommendations: bath bench       Assessment:     Edwin Lopez Jr. is a 69 y.o. male admitted with a medical diagnosis of Altered mental status.  He presents with the following impairments/functional limitations:  weakness, impaired endurance, impaired self care skills, impaired functional mobilty, impaired balance, decreased coordination, decreased upper extremity function, decreased lower extremity function, impaired cardiopulmonary response to activity. Pt progressing towards goals, but not at PLOF. Pt tolerated session well. Pt still requires max A for all functional mobility performed on this date. Pt is improving with therapy evidenced by increased activity tolerance and ability to perform sit to stand transfer for the first time with max A x 2 persons.  Recommend d/c to Skilled nursing facility to maximize functional independence.      Rehab Prognosis: Good; patient would benefit from acute skilled PT services to address these deficits and reach maximum level of function.    Recent Surgery: * No surgery found *      Plan:     During this hospitalization, patient to be seen 4 x/week to address the identified rehab impairments via gait training, therapeutic activities, therapeutic exercises, neuromuscular re-education and progress toward the following goals:    · Plan of Care Expires:  12/18/19    Subjective     Chief Complaint: none reported   Patient/Family Comments/goals: to g  Pain/Comfort:  · Pain Rating 1: 0/10  · Pain Rating Post-Intervention 1: 0/10      Objective:     Communicated with RN prior to session.  Patient found HOB elevated with telemetry, peripheral IV upon PT entry to room.     General Precautions: Standard, fall   Orthopedic Precautions:N/A   Braces: N/A     Functional Mobility:  · Bed Mobility:     · Scooting:  maximal assistance  · Supine to Sit: maximal assistance  · Sit to Supine: maximal assistance  · Transfers:     · Sit to Stand:  maximal assistance of 2 persons with no AD from EOB x 3 trials with pt 75% upright   · Gait: not performed 2nd to weakness and impaired standing tolerance      AM-PAC 6 CLICK MOBILITY  Turning over in bed (including adjusting bedclothes, sheets and blankets)?: 2  Sitting down on and standing up from a chair with arms (e.g., wheelchair, bedside commode, etc.): 2  Moving from lying on back to sitting on the side of the bed?: 2  Moving to and from a bed to a chair (including a wheelchair)?: 1  Need to walk in hospital room?: 1  Climbing 3-5 steps with a railing?: 1  Basic Mobility Total Score: 9       Therapeutic Activities and Exercises:  Educated pt on PT role/POC    Sitting EOB x 15 minutes with max A (posterior lean)  · Worked extensively on sitting balance (maintaining midline and correcting to midline)  · Stretched neck flexors, extensors, and lateral rotators  · B LE exercises x 5 reps with AAROM/AROM re: LAQs, marching   · Sit to stand x 3 trials from bed to increase B LE strength and to increase standing tolerance      Patient left HOB elevated with food set up in front of him to eat with all lines intact, call button in reach, bed alarm on and RN notified..    GOALS:   Multidisciplinary Problems     Physical Therapy Goals        Problem: Physical Therapy Goal    Goal Priority Disciplines Outcome Goal Variances Interventions   Physical Therapy Goal     PT, PT/OT Ongoing, Progressing     Description:  Goals to be met by: 2019    Patient will increase functional independence with mobility by performin. Supine to sit with Minimal Assistance  2. Sit to supine with Minimal Assistance  3. Sit to stand transfer with Minimal Assistance  4. Bed to chair transfer with Minimal Assistance using Rolling Walker  5. Gait  x 50 feet with Minimal Assistance using Rolling Walker.   6.  Lower extremity exercise program x15 reps per handout, with independence  *stairs goal to be added pending progress                        Time Tracking:     PT Received On: 11/20/19  PT Start Time: 1330     PT Stop Time: 1400  PT Total Time (min): 30 min     Billable Minutes: Therapeutic Exercise 8 and Neuromuscular Re-education 15    Treatment Type: Treatment  PT/PTA: PT     PTA Visit Number: 0     Iris Akers PT, DPT  11/20/2019  [phone]

## 2019-11-21 LAB
ALBUMIN SERPL BCP-MCNC: 2.5 G/DL (ref 3.5–5.2)
ALP SERPL-CCNC: 68 U/L (ref 55–135)
ALT SERPL W/O P-5'-P-CCNC: 30 U/L (ref 10–44)
ANION GAP SERPL CALC-SCNC: 9 MMOL/L (ref 8–16)
AST SERPL-CCNC: 33 U/L (ref 10–40)
BACTERIA SPEC AEROBE CULT: NORMAL
BACTERIA SPEC AEROBE CULT: NORMAL
BASOPHILS # BLD AUTO: 0.03 K/UL (ref 0–0.2)
BASOPHILS NFR BLD: 0.2 % (ref 0–1.9)
BILIRUB SERPL-MCNC: 0.5 MG/DL (ref 0.1–1)
BUN SERPL-MCNC: 10 MG/DL (ref 8–23)
CALCIUM SERPL-MCNC: 8.1 MG/DL (ref 8.7–10.5)
CHLORIDE SERPL-SCNC: 105 MMOL/L (ref 95–110)
CO2 SERPL-SCNC: 23 MMOL/L (ref 23–29)
CORTIS SERPL-MCNC: 13.9 UG/DL
CREAT SERPL-MCNC: 0.9 MG/DL (ref 0.5–1.4)
DIFFERENTIAL METHOD: ABNORMAL
EOSINOPHIL # BLD AUTO: 0.1 K/UL (ref 0–0.5)
EOSINOPHIL NFR BLD: 0.5 % (ref 0–8)
ERYTHROCYTE [DISTWIDTH] IN BLOOD BY AUTOMATED COUNT: 13.6 % (ref 11.5–14.5)
EST. GFR  (AFRICAN AMERICAN): >60 ML/MIN/1.73 M^2
EST. GFR  (NON AFRICAN AMERICAN): >60 ML/MIN/1.73 M^2
GLUCOSE SERPL-MCNC: 111 MG/DL (ref 70–110)
GRAM STN SPEC: NORMAL
HCT VFR BLD AUTO: 35.7 % (ref 40–54)
HGB BLD-MCNC: 11 G/DL (ref 14–18)
IMM GRANULOCYTES # BLD AUTO: 0.06 K/UL (ref 0–0.04)
IMM GRANULOCYTES NFR BLD AUTO: 0.5 % (ref 0–0.5)
LYMPHOCYTES # BLD AUTO: 1.5 K/UL (ref 1–4.8)
LYMPHOCYTES NFR BLD: 12.3 % (ref 18–48)
MAGNESIUM SERPL-MCNC: 1.6 MG/DL (ref 1.6–2.6)
MCH RBC QN AUTO: 25.9 PG (ref 27–31)
MCHC RBC AUTO-ENTMCNC: 30.8 G/DL (ref 32–36)
MCV RBC AUTO: 84 FL (ref 82–98)
MONOCYTES # BLD AUTO: 0.8 K/UL (ref 0.3–1)
MONOCYTES NFR BLD: 6.2 % (ref 4–15)
NEUTROPHILS # BLD AUTO: 9.8 K/UL (ref 1.8–7.7)
NEUTROPHILS NFR BLD: 80.3 % (ref 38–73)
NRBC BLD-RTO: 0 /100 WBC
PHOSPHATE SERPL-MCNC: 2.3 MG/DL (ref 2.7–4.5)
PLATELET # BLD AUTO: 280 K/UL (ref 150–350)
PMV BLD AUTO: 10 FL (ref 9.2–12.9)
POCT GLUCOSE: 114 MG/DL (ref 70–110)
POCT GLUCOSE: 95 MG/DL (ref 70–110)
POCT GLUCOSE: 99 MG/DL (ref 70–110)
POTASSIUM SERPL-SCNC: 4.6 MMOL/L (ref 3.5–5.1)
PROT SERPL-MCNC: 6.4 G/DL (ref 6–8.4)
RBC # BLD AUTO: 4.25 M/UL (ref 4.6–6.2)
SODIUM SERPL-SCNC: 137 MMOL/L (ref 136–145)
TSH SERPL DL<=0.005 MIU/L-ACNC: 1.35 UIU/ML (ref 0.4–4)
WBC # BLD AUTO: 12.23 K/UL (ref 3.9–12.7)

## 2019-11-21 PROCEDURE — 25000242 PHARM REV CODE 250 ALT 637 W/ HCPCS: Performed by: PSYCHIATRY & NEUROLOGY

## 2019-11-21 PROCEDURE — 83735 ASSAY OF MAGNESIUM: CPT

## 2019-11-21 PROCEDURE — 63600175 PHARM REV CODE 636 W HCPCS: Performed by: STUDENT IN AN ORGANIZED HEALTH CARE EDUCATION/TRAINING PROGRAM

## 2019-11-21 PROCEDURE — 92507 TX SP LANG VOICE COMM INDIV: CPT

## 2019-11-21 PROCEDURE — 99233 PR SUBSEQUENT HOSPITAL CARE,LEVL III: ICD-10-PCS | Mod: ,,, | Performed by: HOSPITALIST

## 2019-11-21 PROCEDURE — 63600175 PHARM REV CODE 636 W HCPCS: Performed by: NURSE PRACTITIONER

## 2019-11-21 PROCEDURE — 97112 NEUROMUSCULAR REEDUCATION: CPT

## 2019-11-21 PROCEDURE — 85025 COMPLETE CBC W/AUTO DIFF WBC: CPT

## 2019-11-21 PROCEDURE — 84100 ASSAY OF PHOSPHORUS: CPT

## 2019-11-21 PROCEDURE — 80053 COMPREHEN METABOLIC PANEL: CPT

## 2019-11-21 PROCEDURE — 94640 AIRWAY INHALATION TREATMENT: CPT

## 2019-11-21 PROCEDURE — 94761 N-INVAS EAR/PLS OXIMETRY MLT: CPT

## 2019-11-21 PROCEDURE — 63600175 PHARM REV CODE 636 W HCPCS: Performed by: HOSPITALIST

## 2019-11-21 PROCEDURE — 84443 ASSAY THYROID STIM HORMONE: CPT

## 2019-11-21 PROCEDURE — 25000003 PHARM REV CODE 250: Performed by: STUDENT IN AN ORGANIZED HEALTH CARE EDUCATION/TRAINING PROGRAM

## 2019-11-21 PROCEDURE — 11000001 HC ACUTE MED/SURG PRIVATE ROOM

## 2019-11-21 PROCEDURE — 25000242 PHARM REV CODE 250 ALT 637 W/ HCPCS: Performed by: NURSE PRACTITIONER

## 2019-11-21 PROCEDURE — 25000003 PHARM REV CODE 250: Performed by: HOSPITALIST

## 2019-11-21 PROCEDURE — 25000003 PHARM REV CODE 250: Performed by: PSYCHIATRY & NEUROLOGY

## 2019-11-21 PROCEDURE — 97530 THERAPEUTIC ACTIVITIES: CPT

## 2019-11-21 PROCEDURE — 99233 SBSQ HOSP IP/OBS HIGH 50: CPT | Mod: ,,, | Performed by: HOSPITALIST

## 2019-11-21 PROCEDURE — 82533 TOTAL CORTISOL: CPT

## 2019-11-21 RX ORDER — SODIUM,POTASSIUM PHOSPHATES 280-250MG
1 POWDER IN PACKET (EA) ORAL ONCE
Status: COMPLETED | OUTPATIENT
Start: 2019-11-21 | End: 2019-11-21

## 2019-11-21 RX ADMIN — LEVETIRACETAM 1000 MG: 500 TABLET ORAL at 09:11

## 2019-11-21 RX ADMIN — SODIUM CHLORIDE SOLN NEBU 3% 4 ML: 3 NEBU SOLN at 09:11

## 2019-11-21 RX ADMIN — ASPIRIN 81 MG CHEWABLE TABLET 81 MG: 81 TABLET CHEWABLE at 10:11

## 2019-11-21 RX ADMIN — ATORVASTATIN CALCIUM 40 MG: 20 TABLET, FILM COATED ORAL at 09:11

## 2019-11-21 RX ADMIN — IPRATROPIUM BROMIDE AND ALBUTEROL SULFATE 3 ML: .5; 3 SOLUTION RESPIRATORY (INHALATION) at 12:11

## 2019-11-21 RX ADMIN — ACETAMINOPHEN 650 MG: 325 TABLET ORAL at 05:11

## 2019-11-21 RX ADMIN — HEPARIN SODIUM 5000 UNITS: 5000 INJECTION, SOLUTION INTRAVENOUS; SUBCUTANEOUS at 10:11

## 2019-11-21 RX ADMIN — SODIUM CHLORIDE SOLN NEBU 3% 4 ML: 3 NEBU SOLN at 12:11

## 2019-11-21 RX ADMIN — ERTAPENEM 1 G: 1 INJECTION INTRAMUSCULAR; INTRAVENOUS at 02:11

## 2019-11-21 RX ADMIN — CLOPIDOGREL BISULFATE 75 MG: 75 TABLET, FILM COATED ORAL at 10:11

## 2019-11-21 RX ADMIN — SODIUM CHLORIDE SOLN NEBU 3% 4 ML: 3 NEBU SOLN at 04:11

## 2019-11-21 RX ADMIN — HEPARIN SODIUM 5000 UNITS: 5000 INJECTION, SOLUTION INTRAVENOUS; SUBCUTANEOUS at 02:11

## 2019-11-21 RX ADMIN — IPRATROPIUM BROMIDE AND ALBUTEROL SULFATE 3 ML: .5; 3 SOLUTION RESPIRATORY (INHALATION) at 09:11

## 2019-11-21 RX ADMIN — SENNOSIDES AND DOCUSATE SODIUM 1 TABLET: 8.6; 5 TABLET ORAL at 09:11

## 2019-11-21 RX ADMIN — HEPARIN SODIUM 5000 UNITS: 5000 INJECTION, SOLUTION INTRAVENOUS; SUBCUTANEOUS at 06:11

## 2019-11-21 RX ADMIN — LOSARTAN POTASSIUM 25 MG: 25 TABLET, FILM COATED ORAL at 10:11

## 2019-11-21 RX ADMIN — IPRATROPIUM BROMIDE AND ALBUTEROL SULFATE 3 ML: .5; 3 SOLUTION RESPIRATORY (INHALATION) at 04:11

## 2019-11-21 RX ADMIN — LEVETIRACETAM 1000 MG: 500 TABLET ORAL at 11:11

## 2019-11-21 RX ADMIN — SODIUM CHLORIDE, SODIUM LACTATE, POTASSIUM CHLORIDE, AND CALCIUM CHLORIDE: .6; .31; .03; .02 INJECTION, SOLUTION INTRAVENOUS at 06:11

## 2019-11-21 RX ADMIN — POTASSIUM & SODIUM PHOSPHATES POWDER PACK 280-160-250 MG 1 PACKET: 280-160-250 PACK at 10:11

## 2019-11-21 RX ADMIN — ACETAMINOPHEN 650 MG: 325 TABLET ORAL at 06:11

## 2019-11-21 NOTE — PLAN OF CARE
Problem: SLP Goal  Goal: SLP Goal  Description  Speech Language Pathology Goals  Goals to be met by (11/25)  1. Pt will tolerate dental soft diet with thin liquids without overt s/s of aspiration or airway compromise.   2. Pt will participate in ongoing assessment of swallow to determine least restrictive diet.  3. Pt will participate in speech/lang/cog evaluation to determine future therapeutic plan. - met  4. The pt will orient x4.  5. The pt will participate in rote verbal tasks with 75% acc given min cues.  6. Pt will answer simple YNQs with 50% acc given mod cues.  7. Pt will participate in confrontation and categorical naming tasks with 50% acc given mod cues.  8. Pt will participate in 1-2 step commands with 75% acc given mod cues.   9. Pt will participate in ongoing assessment of reading, writing, and visuospatial skills.    Outcome: Ongoing, Progressing   Continue POC at this time, all goals remain appropriate.   Cristal Bradley, JESUS-SLP  11/21/2019

## 2019-11-21 NOTE — PT/OT/SLP PROGRESS
Speech Language Pathology Treatment    Patient Name:  Edwin Lopez Jr.   MRN:  [id number]  Admitting Diagnosis: Altered mental status    Recommendations:                 General Recommendations:  Dysphagia therapy and Speech/language therapy  Diet recommendations:  Mechanical soft, Liquid Diet Level: Thin   Aspiration Precautions: 1 bite/sip at a time, Feed only when awake/alert, HOB to 90 degrees and Small bites/sips   General Precautions: Standard, aspiration, fall  Communication strategies:  none    Subjective     Awake/alert    Pain/Comfort:  · Pain Rating 1: 0/10  · Pain Rating Post-Intervention 1: 0/10    Objective:     Has the patient been evaluated by SLP for swallowing?   Yes  Keep patient NPO? No     Pt upright in bed upon entry. Aphasia evident throughout session. Increased time to answer and repetition of questions required throughout session.  He counted 1-10 ind'ly and named 5/7 days of the week. Opposites task completed with 100% accy provided one cue throughout tasks. Divergent categories task completed with 100% accy provided mod cues for pt to name 3 different items per category. NSG and pt denied any difficulty with PO intake, but did endorse consistent cough outside of eating. Pt also noted to have yaunkuer present to occasional remove secretions from oral cavity. Pt educated and encouraged to swallow secretions which pt was able to do without difficulty. Continue POC at this time, all goals remain appropriate at this time. Continue mechanical soft diet/thin liquids at this time.     Assessment:     Edwin Lopez Jr. is a 69 y.o. male with an SLP diagnosis of Aphasia and Dysphagia.      Goals:   Multidisciplinary Problems     SLP Goals        Problem: SLP Goal    Goal Priority Disciplines Outcome   SLP Goal     SLP Ongoing, Progressing   Description:  Speech Language Pathology Goals  Goals to be met by (11/25)  1. Pt will tolerate dental soft diet with thin liquids without overt s/s of aspiration  or airway compromise.   2. Pt will participate in ongoing assessment of swallow to determine least restrictive diet.  3. Pt will participate in speech/lang/cog evaluation to determine future therapeutic plan. - met  4. The pt will orient x4.  5. The pt will participate in rote verbal tasks with 75% acc given min cues.  6. Pt will answer simple YNQs with 50% acc given mod cues.  7. Pt will participate in confrontation and categorical naming tasks with 50% acc given mod cues.  8. Pt will participate in 1-2 step commands with 75% acc given mod cues.   9. Pt will participate in ongoing assessment of reading, writing, and visuospatial skills.                     Plan:     · Patient to be seen:  4 x/week   · Plan of Care expires:  12/17/19  · Plan of Care reviewed with:  patient   · SLP Follow-Up:  Yes       Discharge recommendations:  nursing facility, skilled       Time Tracking:     SLP Treatment Date:   11/21/19  Speech Start Time:  0952  Speech Stop Time:  1002     Speech Total Time (min):  10 min    Billable Minutes: Speech Therapy Individual 10    Cristal Bradley CCC-SLP  11/21/2019

## 2019-11-21 NOTE — PROGRESS NOTES
Hospital Medicine   Progress note      Team: [facility] [place] [facility] HAMRAN Lu MD   Admit Date: 11/11/2019   Hospital Day: 10  NAI: 11/22/2019   Code status: Full Code   Principal Problem: Altered mental status     HPI: Mr Lopez is a 69 yr old male with a PMH of CVA, seizures, CHI, DM II, dementia, HTN, who presents to the ED with AMS. Patient was last heard from by a friend on Friday 11/8 (3 days ago). Found down at home 11/11, incontinent of urine and bowel. CTH negative. MRI c-spine, brain pending. Mild leukocytosis, UA positive for UTI. cEEG pending. Patient intubated in the ED for airway protection. Will admit to [facility] for higher level of care. Will need LP.    ICU Course:   11/11/2019: Intubated in ED. Admit to [facility].  11/12/2019: Switched Ceftriaxone to Zosyn, rechecking BCx, IVF @ 150cc/hr w/ 1L bolus; Tube feeds & dvt ppx initiated. EEG suppressed, propofol disctoninued & fentanyl 50mcg q2hr added.  11/13/2019: Steadily improving since presentation. Continue Keppra & Abx, added 200cc q6 FW to feeds, & dc'd vent.  11/14/2019: mental status continues to improve. Following commands in all 4 ext. FW 200cc q6hr; Extubate today; pending sensitivities of cx's; added ASA/statin  11/15/2019: Mental status improved; secretions persistent. De-escalated Abx to ceftriaxone given susceptibilities; added breathing tx q4. NPO in AM for possible extubation.  11/16/19: start glycopyrrolate for secretions  11/17: Extubated   11/18 transferred to hospital medicine     Interval hx:   Pt was seen and examined at bedside.  Patient was noted to be afebrile yesterday with T-max of 101.4.  Since yesterday, his fever curve has trended down.  Patient was noted to be a lot more awake, alert, able to answer simple questions, follow simple commands.  He was noted to be sitting up, feeding himself yesterday.  Overall appears to have improved significantly.    ROS (Positive in Bold, otherwise negative)  Constitutional: fever, chills, night  sweats  CV: chest pain, edema, palpitations  Resp: SOB, cough, sputum production  GI: changes in appetite, NVDC, pain, melena, hematochezia, GERD, hematemesis  : Dysuria, hematuria, urinary urgency, frequency  MSK: arthralgia/myalgia, joint swelling  Neuro/Psych: anxiety, depression    PEx   Temp:  [98 °F (36.7 °C)-101.4 °F (38.6 °C)]   Pulse:  []   Resp:  [14-20]   BP: (130-164)/(75-89)   SpO2:  [92 %-100 %]      I & O (Last 24H):     Intake/Output Summary (Last 24 hours) at 11/21/2019 1449  Last data filed at 11/21/2019 1000  Gross per 24 hour   Intake 100 ml   Output --   Net 100 ml       General:  male  in no acute distress. Awake, alert, Resting in bed.  Able to answer simple questions and follow simple commands.   HEENT: NCAT. PERRL. EOMI. Sclera Anicteric.  CVS: RRR. Normal S1 S2. No murmurs  Pulm: CTAB. Normal respiratory effort. No wheezes, rhonchi, or crackles.  Abdomen: Soft. Non-distended. No tenderness to palpation. No rebound or guarding. +BS.  Extremities: No edema. No cyanosis. Full ROM.  Neuro: Alert, oriented x 2, Spont mvt of all extremities with no focal deficits noted.    Recent Results (from the past 24 hour(s))   POCT glucose    Collection Time: 11/20/19  5:42 PM   Result Value Ref Range    POCT Glucose 120 (H) 70 - 110 mg/dL   POCT glucose    Collection Time: 11/20/19 10:44 PM   Result Value Ref Range    POCT Glucose 113 (H) 70 - 110 mg/dL   Comprehensive metabolic panel    Collection Time: 11/21/19  5:15 AM   Result Value Ref Range    Sodium 137 136 - 145 mmol/L    Potassium 4.6 3.5 - 5.1 mmol/L    Chloride 105 95 - 110 mmol/L    CO2 23 23 - 29 mmol/L    Glucose 111 (H) 70 - 110 mg/dL    BUN, Bld 10 8 - 23 mg/dL    Creatinine 0.9 0.5 - 1.4 mg/dL    Calcium 8.1 (L) 8.7 - 10.5 mg/dL    Total Protein 6.4 6.0 - 8.4 g/dL    Albumin 2.5 (L) 3.5 - 5.2 g/dL    Total Bilirubin 0.5 0.1 - 1.0 mg/dL    Alkaline Phosphatase 68 55 - 135 U/L    AST 33 10 - 40 U/L    ALT 30 10 - 44  U/L    Anion Gap 9 8 - 16 mmol/L    eGFR if African American >60.0 >60 mL/min/1.73 m^2    eGFR if non African American >60.0 >60 mL/min/1.73 m^2   CBC auto differential    Collection Time: 11/21/19  5:15 AM   Result Value Ref Range    WBC 12.23 3.90 - 12.70 K/uL    RBC 4.25 (L) 4.60 - 6.20 M/uL    Hemoglobin 11.0 (L) 14.0 - 18.0 g/dL    Hematocrit 35.7 (L) 40.0 - 54.0 %    Mean Corpuscular Volume 84 82 - 98 fL    Mean Corpuscular Hemoglobin 25.9 (L) 27.0 - 31.0 pg    Mean Corpuscular Hemoglobin Conc 30.8 (L) 32.0 - 36.0 g/dL    RDW 13.6 11.5 - 14.5 %    Platelets 280 150 - 350 K/uL    MPV 10.0 9.2 - 12.9 fL    Immature Granulocytes 0.5 0.0 - 0.5 %    Gran # (ANC) 9.8 (H) 1.8 - 7.7 K/uL    Immature Grans (Abs) 0.06 (H) 0.00 - 0.04 K/uL    Lymph # 1.5 1.0 - 4.8 K/uL    Mono # 0.8 0.3 - 1.0 K/uL    Eos # 0.1 0.0 - 0.5 K/uL    Baso # 0.03 0.00 - 0.20 K/uL    nRBC 0 0 /100 WBC    Gran% 80.3 (H) 38.0 - 73.0 %    Lymph% 12.3 (L) 18.0 - 48.0 %    Mono% 6.2 4.0 - 15.0 %    Eosinophil% 0.5 0.0 - 8.0 %    Basophil% 0.2 0.0 - 1.9 %    Differential Method Automated    Magnesium    Collection Time: 11/21/19  5:15 AM   Result Value Ref Range    Magnesium 1.6 1.6 - 2.6 mg/dL   Phosphorus    Collection Time: 11/21/19  5:15 AM   Result Value Ref Range    Phosphorus 2.3 (L) 2.7 - 4.5 mg/dL   Cortisol    Collection Time: 11/21/19  5:15 AM   Result Value Ref Range    Cortisol 13.90 ug/dL   TSH    Collection Time: 11/21/19  5:15 AM   Result Value Ref Range    TSH 1.346 0.400 - 4.000 uIU/mL   POCT glucose    Collection Time: 11/21/19  9:40 AM   Result Value Ref Range    POCT Glucose 95 70 - 110 mg/dL   POCT glucose    Collection Time: 11/21/19 12:28 PM   Result Value Ref Range    POCT Glucose 114 (H) 70 - 110 mg/dL       Recent Labs   Lab 11/20/19  0729 11/20/19  1111 11/20/19  1742 11/20/19  2244 11/21/19  0940 11/21/19  1228   POCTGLUCOSE 115* 102 120* 113* 95 114*       Hemoglobin A1C   Date Value Ref Range Status   11/11/2019 6.4 (H)  4.0 - 5.6 % Final     Comment:     ADA Screening Guidelines:  5.7-6.4%  Consistent with prediabetes  >or=6.5%  Consistent with diabetes  High levels of fetal hemoglobin interfere with the HbA1C  assay. Heterozygous hemoglobin variants (HbS, HgC, etc)do  not significantly interfere with this assay.   However, presence of multiple variants may affect accuracy.     08/27/2019 6.4 (H) 4.0 - 5.6 % Final     Comment:     ADA Screening Guidelines:  5.7-6.4%  Consistent with prediabetes  >or=6.5%  Consistent with diabetes  High levels of fetal hemoglobin interfere with the HbA1C  assay. Heterozygous hemoglobin variants (HbS, HgC, etc)do  not significantly interfere with this assay.   However, presence of multiple variants may affect accuracy.          Active Hospital Problems    Diagnosis  POA    *Altered mental status [R41.82]  Yes    Restricted diet [Z71.3]  Not Applicable    Alteration in skin integrity [R23.9]  Yes    Acute encephalopathy [G93.40]  Unknown    Altered mental state [R41.82]  Yes    Leukocytosis [D72.829]  Yes    History of closed head injury [Z87.820]  Not Applicable    On mechanically assisted ventilation [Z99.11]  Not Applicable    UTI (urinary tract infection) [N39.0]  Yes    Bacteremia [R78.81]  Yes    Hypertension [I10]  Yes    Type 2 diabetes mellitus [E11.9]  Yes    History of CVA in adulthood [Z86.73]  Not Applicable    Seizure disorder [G40.909]  Yes      Resolved Hospital Problems   No resolved problems to display.          Assessment and Plan for problems addressed today:      albuterol-ipratropium  3 mL Nebulization Q4H    aspirin  81 mg Oral Daily    atorvastatin  40 mg Oral QHS    clopidogrel  75 mg Oral Daily    ertapenem (INVANZ) IVPB  1 g Intravenous Q24H    heparin (porcine)  5,000 Units Subcutaneous Q8H    levETIRAcetam  1,000 mg Oral BID    losartan  25 mg Oral Daily    polyethylene glycol  17 g Oral Daily    senna-docusate 8.6-50 mg  1 tablet Oral BID     sodium chloride 3%  4 mL Nebulization Q4H     acetaminophen, albuterol-ipratropium, bisacodyl, Dextrose 10% Bolus, Dextrose 10% Bolus, glucagon (human recombinant), glucose, glucose, ibuprofen, insulin aspart U-100, melatonin, ondansetron, sodium chloride 0.9%    Acute metabolic encephalopathy:  Improving  Septic encephalopathy:  -Patient with Hx of frequent falls and UTIs was found down at home, incontinent of urine/feces 11/11.  He was last seen at baseline mental status 11/8.  -patient was intubated for airway protection, successfully extubated on 11/17.  -CT head noted to be nonacute  -MRI brain and c-spine wo contrast unremarkable  -cEEG done in the ICU did not show acute epileptic activity  -patient was noted to have positive blood cultures with Proteus, positive urine cultures with ESBL E coli which was thought to be the reason for his acute encephalopathy.    -Patient was treated for UTI initially with Zosyn then with ceftriaxone and mentation improved.  Patient was extubated on 11/17 and transitioned to Hospital Medicine on 11/18  -however, on 11/19, patient was noted to be febrile, and more encephalopathic  -patient underwent repeat EEG which did not reveal any acute seizures.  Head CT was nonacute.  CXR was WNL.  Patient was also pancultured which did not show any growth to date.  -over the last 2 days, mentation appears to be improving  -continue neuro checks  -fall precautions, aspiration precautions, seizure precautions  -Continue correction of metabolic derangements  -Maintain Delirium precautions: Maintain regular sleep cycle. Early ambulation. Minimal interruptions overnight. Re-orient patient frequently. Maintain adequate bowel regimen, hydration and electrolyte replenishments. Hearing Aids and eye glasses as needed.     Seizure disorder  -patient is a history of seizures several years  -Loaded with Keppra in the ED  -cEEG negative in the ICU  -currently on  Keppra 1g BID. Continue on this dose  until able to f/u with Neurology outpatient  -noted to be more encephalopathic and 11/19, repeat EEG did not reveal acute encephalopathic activity.  Mentation now improving     ESBL UTI (urinary tract infection):  -Hx of UTIs, previous urine culture pos. for klebsiella  -UA with many bacteria, UCx w/ ESBL E coli  -patient is being treated with ceftriaxone however, however patient continues to spike fevers  -antibiotics were changed to ertapenem/vancomycin  -vancomycin DC on 11/20.  -Infectious Disease consulted, continue ertapenem for total 14 day course of antibiotics   -retroperitoneal ultrasound reveals mild left hydronephrosis otherwise no significant abnormality     Proteus Bacteremia:  -BCx w/ Proteus mirabilis from 11/11.  -blood cultures from 11/12 onwards have shown no growth to date  -Patient was previously on Zosyn, transition to ceftriaxone based on sensitivity report  -however, patient was noted to be febrile again on 11/18.  Antibiotics were broadened to ertapenem/vancomycin.  Blood cultures were repeated which show no growth to date  -vancomycin discontinued on 11/20  -infectious Disease was consulted.  Patient to continue ertapenem for total 14 day course of antibiotics    Diabetes Mellitus:  -Last HbA1c:  6.4  -SSI with accuchecks qACHS  -BG goal: Preprandial blood glucose target <140 mg/dL, Random glucoses <180 mg/dL  -ADA diet    Essential Hypertension:  -stable  -Continue PTA  losartan 25 mg daily  -monitor vitals q4h  -SBP goal of <160 in hospital    Hyperlipidemia:  -continue PTA atorvastatin 40 mg    History of CVA in adulthood  -continue Plavix, statin    DVT PPx:  Heparin    Discharge plan and follow up: DC to SNF once medically stable     Esther Lu MD  Hospital Medicine Staff  [phone] pager

## 2019-11-21 NOTE — PT/OT/SLP PROGRESS
Physical Therapy Treatment    Patient Name:  Edwin Lopez Jr.   MRN:  [id number]    Recommendations:     Discharge Recommendations:  nursing facility, skilled   Discharge Equipment Recommendations: bath bench   Barriers to discharge: Inaccessible home and Decreased caregiver support    Assessment:     Edwin Lopez Jr. is a 69 y.o. male admitted with a medical diagnosis of Altered mental status.  He presents with the following impairments/functional limitations:  weakness, impaired endurance, impaired self care skills, impaired functional mobilty, gait instability, impaired balance, decreased coordination, decreased upper extremity function, decreased lower extremity function, impaired cardiopulmonary response to activity. Pt tolerated session well with focus on bed mobility, transfers, and sitting balance. Pt progressing with improved tolerance and decreased assistance for bed mobility and EOB sitting balance. Pt will continue to benefit from therapy services to address impairments listed above.     Rehab Prognosis: Good; patient would benefit from acute skilled PT services to address these deficits and reach maximum level of function.    Recent Surgery: * No surgery found *      Plan:     During this hospitalization, patient to be seen 4 x/week to address the identified rehab impairments via gait training, therapeutic activities, therapeutic exercises, neuromuscular re-education and progress toward the following goals:    · Plan of Care Expires:  12/18/19    Subjective     Chief Complaint: no c/o  Pain/Comfort:  · Pain Rating 1: 0/10  · Pain Rating Post-Intervention 1: 0/10      Objective:     Communicated with NSG prior to session.  Patient found HOB elevated with SCD, telemetry, peripheral IV upon PTA entry to room. NSG present administering medicine.    General Precautions: Standard, aspiration, fall   Orthopedic Precautions:N/A   Braces: N/A     Functional Mobility:  · Bed Mobility:     · Supine to Sit: moderate  assistance  · Sit to Supine: moderate assistance  · Transfers:     · Sit to Stand:  total assistance with no AD  · Balance: Pt sits at EOB with SBA to Min A for static/dynamic balance.       AM-PAC 6 CLICK MOBILITY  Turning over in bed (including adjusting bedclothes, sheets and blankets)?: 2  Sitting down on and standing up from a chair with arms (e.g., wheelchair, bedside commode, etc.): 2  Moving from lying on back to sitting on the side of the bed?: 2  Moving to and from a bed to a chair (including a wheelchair)?: 1  Need to walk in hospital room?: 1  Climbing 3-5 steps with a railing?: 1  Basic Mobility Total Score: 9       Therapeutic Activities and Exercises:  Pt assisted with functional mobility as noted above.   Pt sits at EOB for ~15 minutes with SBA to Min A. Pt works on dynamic reaching with increased assistance required for balance and guiding BUE to target.   Pt attempts single stand with Total A, unable to reach full stand d/t B flexed knees despite knee blocking and quick fatigue.     Patient left HOB elevated with all lines intact, call button in reach and NSG notified.    GOALS:   Multidisciplinary Problems     Physical Therapy Goals        Problem: Physical Therapy Goal    Goal Priority Disciplines Outcome Goal Variances Interventions   Physical Therapy Goal     PT, PT/OT Ongoing, Progressing     Description:  Goals to be met by: 2019    Patient will increase functional independence with mobility by performin. Supine to sit with Minimal Assistance  2. Sit to supine with Minimal Assistance  3. Sit to stand transfer with Minimal Assistance  4. Bed to chair transfer with Minimal Assistance using Rolling Walker  5. Gait  x 50 feet with Minimal Assistance using Rolling Walker.   6. Lower extremity exercise program x15 reps per handout, with independence  *stairs goal to be added pending progress                        Time Tracking:     PT Received On: 19  PT Start Time: 1104     PT  Stop Time: 1129  PT Total Time (min): 25 min     Billable Minutes: Therapeutic Activity 17 and Neuromuscular Re-education 8    Treatment Type: Treatment  PT/PTA: PTA     PTA Visit Number: 1     Carl Smith PTA  11/21/2019

## 2019-11-21 NOTE — PLAN OF CARE
Goals remain appropriate.     Problem: Physical Therapy Goal  Goal: Physical Therapy Goal  Description  Goals to be met by: 2019    Patient will increase functional independence with mobility by performin. Supine to sit with Minimal Assistance  2. Sit to supine with Minimal Assistance  3. Sit to stand transfer with Minimal Assistance  4. Bed to chair transfer with Minimal Assistance using Rolling Walker  5. Gait  x 50 feet with Minimal Assistance using Rolling Walker.   6. Lower extremity exercise program x15 reps per handout, with independence  *stairs goal to be added pending progress       Outcome: Ongoing, Progressing

## 2019-11-22 LAB
ALBUMIN SERPL BCP-MCNC: 2.3 G/DL (ref 3.5–5.2)
ALP SERPL-CCNC: 68 U/L (ref 55–135)
ALT SERPL W/O P-5'-P-CCNC: 24 U/L (ref 10–44)
ANION GAP SERPL CALC-SCNC: 9 MMOL/L (ref 8–16)
AST SERPL-CCNC: 27 U/L (ref 10–40)
BASOPHILS # BLD AUTO: 0.02 K/UL (ref 0–0.2)
BASOPHILS NFR BLD: 0.1 % (ref 0–1.9)
BILIRUB SERPL-MCNC: 0.4 MG/DL (ref 0.1–1)
BUN SERPL-MCNC: 10 MG/DL (ref 8–23)
CALCIUM SERPL-MCNC: 8 MG/DL (ref 8.7–10.5)
CHLORIDE SERPL-SCNC: 103 MMOL/L (ref 95–110)
CO2 SERPL-SCNC: 24 MMOL/L (ref 23–29)
CREAT SERPL-MCNC: 0.8 MG/DL (ref 0.5–1.4)
DIFFERENTIAL METHOD: ABNORMAL
EOSINOPHIL # BLD AUTO: 0.2 K/UL (ref 0–0.5)
EOSINOPHIL NFR BLD: 1 % (ref 0–8)
ERYTHROCYTE [DISTWIDTH] IN BLOOD BY AUTOMATED COUNT: 13.8 % (ref 11.5–14.5)
EST. GFR  (AFRICAN AMERICAN): >60 ML/MIN/1.73 M^2
EST. GFR  (NON AFRICAN AMERICAN): >60 ML/MIN/1.73 M^2
GLUCOSE SERPL-MCNC: 101 MG/DL (ref 70–110)
HCT VFR BLD AUTO: 32.3 % (ref 40–54)
HGB BLD-MCNC: 9.8 G/DL (ref 14–18)
IMM GRANULOCYTES # BLD AUTO: 0.06 K/UL (ref 0–0.04)
IMM GRANULOCYTES NFR BLD AUTO: 0.4 % (ref 0–0.5)
LYMPHOCYTES # BLD AUTO: 2.5 K/UL (ref 1–4.8)
LYMPHOCYTES NFR BLD: 16.9 % (ref 18–48)
MAGNESIUM SERPL-MCNC: 1.7 MG/DL (ref 1.6–2.6)
MCH RBC QN AUTO: 25.5 PG (ref 27–31)
MCHC RBC AUTO-ENTMCNC: 30.3 G/DL (ref 32–36)
MCV RBC AUTO: 84 FL (ref 82–98)
MONOCYTES # BLD AUTO: 1.1 K/UL (ref 0.3–1)
MONOCYTES NFR BLD: 7.2 % (ref 4–15)
NEUTROPHILS # BLD AUTO: 10.9 K/UL (ref 1.8–7.7)
NEUTROPHILS NFR BLD: 74.4 % (ref 38–73)
NRBC BLD-RTO: 0 /100 WBC
PHOSPHATE SERPL-MCNC: 2.9 MG/DL (ref 2.7–4.5)
PLATELET # BLD AUTO: 290 K/UL (ref 150–350)
PMV BLD AUTO: 10.1 FL (ref 9.2–12.9)
POCT GLUCOSE: 103 MG/DL (ref 70–110)
POCT GLUCOSE: 109 MG/DL (ref 70–110)
POCT GLUCOSE: 168 MG/DL (ref 70–110)
POCT GLUCOSE: 205 MG/DL (ref 70–110)
POTASSIUM SERPL-SCNC: 3.7 MMOL/L (ref 3.5–5.1)
PROT SERPL-MCNC: 6.1 G/DL (ref 6–8.4)
RBC # BLD AUTO: 3.84 M/UL (ref 4.6–6.2)
SODIUM SERPL-SCNC: 136 MMOL/L (ref 136–145)
WBC # BLD AUTO: 14.6 K/UL (ref 3.9–12.7)

## 2019-11-22 PROCEDURE — 25000242 PHARM REV CODE 250 ALT 637 W/ HCPCS: Performed by: NURSE PRACTITIONER

## 2019-11-22 PROCEDURE — 99232 PR SUBSEQUENT HOSPITAL CARE,LEVL II: ICD-10-PCS | Mod: ,,, | Performed by: HOSPITALIST

## 2019-11-22 PROCEDURE — 80053 COMPREHEN METABOLIC PANEL: CPT

## 2019-11-22 PROCEDURE — 25000003 PHARM REV CODE 250: Performed by: PHYSICIAN ASSISTANT

## 2019-11-22 PROCEDURE — 99232 SBSQ HOSP IP/OBS MODERATE 35: CPT | Mod: ,,, | Performed by: HOSPITALIST

## 2019-11-22 PROCEDURE — 25000003 PHARM REV CODE 250: Performed by: STUDENT IN AN ORGANIZED HEALTH CARE EDUCATION/TRAINING PROGRAM

## 2019-11-22 PROCEDURE — 94761 N-INVAS EAR/PLS OXIMETRY MLT: CPT

## 2019-11-22 PROCEDURE — 36415 COLL VENOUS BLD VENIPUNCTURE: CPT

## 2019-11-22 PROCEDURE — 97530 THERAPEUTIC ACTIVITIES: CPT

## 2019-11-22 PROCEDURE — 83735 ASSAY OF MAGNESIUM: CPT

## 2019-11-22 PROCEDURE — 25000003 PHARM REV CODE 250: Performed by: HOSPITALIST

## 2019-11-22 PROCEDURE — 84100 ASSAY OF PHOSPHORUS: CPT

## 2019-11-22 PROCEDURE — 94640 AIRWAY INHALATION TREATMENT: CPT

## 2019-11-22 PROCEDURE — 25000242 PHARM REV CODE 250 ALT 637 W/ HCPCS: Performed by: PSYCHIATRY & NEUROLOGY

## 2019-11-22 PROCEDURE — 85025 COMPLETE CBC W/AUTO DIFF WBC: CPT

## 2019-11-22 PROCEDURE — 63600175 PHARM REV CODE 636 W HCPCS: Performed by: NURSE PRACTITIONER

## 2019-11-22 PROCEDURE — 63600175 PHARM REV CODE 636 W HCPCS: Performed by: HOSPITALIST

## 2019-11-22 PROCEDURE — 11000001 HC ACUTE MED/SURG PRIVATE ROOM

## 2019-11-22 RX ADMIN — SENNOSIDES AND DOCUSATE SODIUM 1 TABLET: 8.6; 5 TABLET ORAL at 10:11

## 2019-11-22 RX ADMIN — SODIUM CHLORIDE SOLN NEBU 3% 4 ML: 3 NEBU SOLN at 08:11

## 2019-11-22 RX ADMIN — SODIUM CHLORIDE SOLN NEBU 3% 4 ML: 3 NEBU SOLN at 04:11

## 2019-11-22 RX ADMIN — IPRATROPIUM BROMIDE AND ALBUTEROL SULFATE 3 ML: .5; 3 SOLUTION RESPIRATORY (INHALATION) at 11:11

## 2019-11-22 RX ADMIN — ATORVASTATIN CALCIUM 40 MG: 20 TABLET, FILM COATED ORAL at 10:11

## 2019-11-22 RX ADMIN — POLYETHYLENE GLYCOL 3350 17 G: 17 POWDER, FOR SOLUTION ORAL at 10:11

## 2019-11-22 RX ADMIN — HEPARIN SODIUM 5000 UNITS: 5000 INJECTION, SOLUTION INTRAVENOUS; SUBCUTANEOUS at 04:11

## 2019-11-22 RX ADMIN — SODIUM CHLORIDE SOLN NEBU 3% 4 ML: 3 NEBU SOLN at 12:11

## 2019-11-22 RX ADMIN — INSULIN ASPART 1 UNITS: 100 INJECTION, SOLUTION INTRAVENOUS; SUBCUTANEOUS at 10:11

## 2019-11-22 RX ADMIN — INSULIN ASPART 4 UNITS: 100 INJECTION, SOLUTION INTRAVENOUS; SUBCUTANEOUS at 12:11

## 2019-11-22 RX ADMIN — SODIUM CHLORIDE SOLN NEBU 3% 4 ML: 3 NEBU SOLN at 07:11

## 2019-11-22 RX ADMIN — HEPARIN SODIUM 5000 UNITS: 5000 INJECTION, SOLUTION INTRAVENOUS; SUBCUTANEOUS at 10:11

## 2019-11-22 RX ADMIN — IPRATROPIUM BROMIDE AND ALBUTEROL SULFATE 3 ML: .5; 3 SOLUTION RESPIRATORY (INHALATION) at 08:11

## 2019-11-22 RX ADMIN — LEVETIRACETAM 1000 MG: 500 TABLET ORAL at 10:11

## 2019-11-22 RX ADMIN — LOSARTAN POTASSIUM 25 MG: 25 TABLET, FILM COATED ORAL at 10:11

## 2019-11-22 RX ADMIN — IPRATROPIUM BROMIDE AND ALBUTEROL SULFATE 3 ML: .5; 3 SOLUTION RESPIRATORY (INHALATION) at 12:11

## 2019-11-22 RX ADMIN — ASPIRIN 81 MG CHEWABLE TABLET 81 MG: 81 TABLET CHEWABLE at 10:11

## 2019-11-22 RX ADMIN — ERTAPENEM 1 G: 1 INJECTION INTRAMUSCULAR; INTRAVENOUS at 12:11

## 2019-11-22 RX ADMIN — SODIUM CHLORIDE SOLN NEBU 3% 4 ML: 3 NEBU SOLN at 11:11

## 2019-11-22 RX ADMIN — IPRATROPIUM BROMIDE AND ALBUTEROL SULFATE 3 ML: .5; 3 SOLUTION RESPIRATORY (INHALATION) at 07:11

## 2019-11-22 RX ADMIN — CLOPIDOGREL BISULFATE 75 MG: 75 TABLET, FILM COATED ORAL at 10:11

## 2019-11-22 RX ADMIN — IPRATROPIUM BROMIDE AND ALBUTEROL SULFATE 3 ML: .5; 3 SOLUTION RESPIRATORY (INHALATION) at 04:11

## 2019-11-22 RX ADMIN — HEPARIN SODIUM 5000 UNITS: 5000 INJECTION, SOLUTION INTRAVENOUS; SUBCUTANEOUS at 06:11

## 2019-11-22 NOTE — PT/OT/SLP PROGRESS
Physical Therapy Treatment    Patient Name:  Edwin Lopez Jr.   MRN:  [id number]    Recommendations:     Discharge Recommendations:  nursing facility, skilled   Discharge Equipment Recommendations: bath bench   Barriers to discharge: Inaccessible home and Decreased caregiver support    Assessment:     Edwin Lopez Jr. is a 69 y.o. male admitted with a medical diagnosis of Altered mental status.  He presents with the following impairments/functional limitations:  weakness, impaired endurance, impaired self care skills, impaired functional mobilty, gait instability, impaired balance, decreased coordination, decreased upper extremity function, decreased lower extremity function, impaired cardiopulmonary response to activity. Pt tolerated session well with focus on bed mobility and transfers. Pt stands x 2 trials but is unable to progress to taking steps. Pt continues with flat affect and requires strong encouragement for pt to speak to therapist. Pt will continue to benefit from therapy services to address impairments listed above.     Rehab Prognosis: Good; patient would benefit from acute skilled PT services to address these deficits and reach maximum level of function.    Recent Surgery: * No surgery found *      Plan:     During this hospitalization, patient to be seen 4 x/week to address the identified rehab impairments via gait training, therapeutic activities, therapeutic exercises, neuromuscular re-education and progress toward the following goals:    · Plan of Care Expires:  12/18/19    Subjective     Chief Complaint: no c/o  Pain/Comfort:  · Pain Rating 1: 0/10  · Pain Rating Post-Intervention 1: 0/10      Objective:     Communicated with NSG prior to session.  Patient found HOB elevated with peripheral IV, telemetry, SCD upon PTA entry to room.     General Precautions: Standard, aspiration, fall   Orthopedic Precautions:N/A   Braces: N/A     Functional Mobility:  · Bed Mobility:     · Supine to Sit: maximal  assistance  · Sit to Supine: moderate assistance  · Transfers:     · Sit to Stand:  maximal assistance with no AD  · Gait: unable to side step or weight shift without LOB      AM-PAC 6 CLICK MOBILITY  Turning over in bed (including adjusting bedclothes, sheets and blankets)?: 2  Sitting down on and standing up from a chair with arms (e.g., wheelchair, bedside commode, etc.): 2  Moving from lying on back to sitting on the side of the bed?: 2  Moving to and from a bed to a chair (including a wheelchair)?: 1  Need to walk in hospital room?: 1  Climbing 3-5 steps with a railing?: 1  Basic Mobility Total Score: 9       Therapeutic Activities and Exercises:   Pt assisted with functional mobility as noted above.   Pt quickly fatigues performing EOB balance and endurance activity. Pt with tendency for posterolateral (left) lean.     Patient left HOB elevated with all lines intact, call button in reach and chair alarm on..    GOALS:   Multidisciplinary Problems     Physical Therapy Goals        Problem: Physical Therapy Goal    Goal Priority Disciplines Outcome Goal Variances Interventions   Physical Therapy Goal     PT, PT/OT Ongoing, Progressing     Description:  Goals to be met by: 2019    Patient will increase functional independence with mobility by performin. Supine to sit with Minimal Assistance  2. Sit to supine with Minimal Assistance  3. Sit to stand transfer with Minimal Assistance  4. Bed to chair transfer with Minimal Assistance using Rolling Walker  5. Gait  x 50 feet with Minimal Assistance using Rolling Walker.   6. Lower extremity exercise program x15 reps per handout, with independence  *stairs goal to be added pending progress                        Time Tracking:     PT Received On: 19  PT Start Time: 1052     PT Stop Time: 1115  PT Total Time (min): 23 min     Billable Minutes: Therapeutic Activity 23    Treatment Type: Treatment  PT/PTA: PTA     PTA Visit Number: 2     Carl  Luis, PTA  11/22/2019

## 2019-11-22 NOTE — PT/OT/SLP PROGRESS
Occupational Therapy      Patient Name:  Edwin Lopez Jr.   MRN:  [id number]    Patient not seen today secondary to pt working with PT upon 1st attempt and sleeping at 2nd attempt. OT unable to return at a later time. Will follow-up as able.    Janae Banda OT  11/22/2019

## 2019-11-22 NOTE — PLAN OF CARE
11/22/19 1552   Discharge Reassessment   Assessment Type Discharge Planning Reassessment   Provided patient/caregiver education on the expected discharge date and the discharge plan Yes   Do you have any problems affording any of your prescribed medications? No   Discharge Plan A Skilled Nursing Facility   DME Needed Upon Discharge  other (see comments)  (TBD)   Anticipated Discharge Disposition SNF   Post-Acute Status   Post-Acute Authorization Placement   Post-Acute Placement Status Awaiting Internal Medical Clearance   Discharge Delays None known at this time

## 2019-11-22 NOTE — PROGRESS NOTES
Hospital Medicine   Progress note      Team: [facility] G Esther Lu MD   Admit Date: 11/11/2019   Hospital Day: 11  NAI: 11/22/2019   Code status: Full Code   Principal Problem: Altered mental status     HPI: Mr Lopez is a 69 yr old male with a PMH of CVA, seizures, CHI, DM II, dementia, HTN, who presents to the ED with AMS. Patient was last heard from by a friend on Friday 11/8 (3 days ago). Found down at home 11/11, incontinent of urine and bowel. CTH negative. MRI c-spine, brain pending. Mild leukocytosis, UA positive for UTI. cEEG pending. Patient intubated in the ED for airway protection. Will admit to [facility] for higher level of care. Will need LP.    ICU Course:   11/11/2019: Intubated in ED. Admit to [facility].  11/12/2019: Switched Ceftriaxone to Zosyn, rechecking BCx, IVF @ 150cc/hr w/ 1L bolus; Tube feeds & dvt ppx initiated. EEG suppressed, propofol disctoninued & fentanyl 50mcg q2hr added.  11/13/2019: Steadily improving since presentation. Continue Keppra & Abx, added 200cc q6 FW to feeds, & dc'd vent.  11/14/2019: mental status continues to improve. Following commands in all 4 ext. FW 200cc q6hr; Extubate today; pending sensitivities of cx's; added ASA/statin  11/15/2019: Mental status improved; secretions persistent. De-escalated Abx to ceftriaxone given susceptibilities; added breathing tx q4. NPO in AM for possible extubation.  11/16/19: start glycopyrrolate for secretions  11/17: Extubated   11/18 transferred to hospital medicine     Interval hx:   Pt was seen and examined at bedside.  Patient was noted to be afebrile yesterday with T-max of 101.5. Patient was again noted to be a lot more awake, alert, able to answer simple questions, follow simple commands.  He was noted to be sitting up, feeding himself breakfast.  Overall appears to have improved significantly.    ROS (Positive in Bold, otherwise negative)  Constitutional: fever, chills, night sweats  CV: chest pain, edema, palpitations  Resp:  SOB, cough, sputum production  GI: changes in appetite, NVDC, pain, melena, hematochezia, GERD, hematemesis  : Dysuria, hematuria, urinary urgency, frequency  MSK: arthralgia/myalgia, joint swelling  Neuro/Psych: anxiety, depression    PEx   Temp:  [98.5 °F (36.9 °C)-101.5 °F (38.6 °C)]   Pulse:  [75-98]   Resp:  [12-18]   BP: (113-162)/(72-89)   SpO2:  [94 %-99 %]      I & O (Last 24H):     Intake/Output Summary (Last 24 hours) at 11/22/2019 1136  Last data filed at 11/21/2019 1800  Gross per 24 hour   Intake --   Output 350 ml   Net -350 ml       General:  male  in no acute distress. Awake, alert, Resting in bed.  Able to answer simple questions and follow simple commands.   HEENT: NCAT. PERRL. EOMI. Sclera Anicteric.  CVS: RRR. Normal S1 S2. No murmurs  Pulm: CTAB. Normal respiratory effort. No wheezes, rhonchi, or crackles.  Abdomen: Soft. Non-distended. No tenderness to palpation. No rebound or guarding. +BS.  Extremities: No edema. No cyanosis. Full ROM.  Neuro: Alert, oriented x 2, Spont mvt of all extremities with no focal deficits noted.    Recent Results (from the past 24 hour(s))   POCT glucose    Collection Time: 11/21/19 12:28 PM   Result Value Ref Range    POCT Glucose 114 (H) 70 - 110 mg/dL   POCT glucose    Collection Time: 11/21/19  5:15 PM   Result Value Ref Range    POCT Glucose 99 70 - 110 mg/dL   Comprehensive metabolic panel    Collection Time: 11/22/19  3:49 AM   Result Value Ref Range    Sodium 136 136 - 145 mmol/L    Potassium 3.7 3.5 - 5.1 mmol/L    Chloride 103 95 - 110 mmol/L    CO2 24 23 - 29 mmol/L    Glucose 101 70 - 110 mg/dL    BUN, Bld 10 8 - 23 mg/dL    Creatinine 0.8 0.5 - 1.4 mg/dL    Calcium 8.0 (L) 8.7 - 10.5 mg/dL    Total Protein 6.1 6.0 - 8.4 g/dL    Albumin 2.3 (L) 3.5 - 5.2 g/dL    Total Bilirubin 0.4 0.1 - 1.0 mg/dL    Alkaline Phosphatase 68 55 - 135 U/L    AST 27 10 - 40 U/L    ALT 24 10 - 44 U/L    Anion Gap 9 8 - 16 mmol/L    eGFR if African American  >60.0 >60 mL/min/1.73 m^2    eGFR if non African American >60.0 >60 mL/min/1.73 m^2   CBC auto differential    Collection Time: 11/22/19  3:50 AM   Result Value Ref Range    WBC 14.60 (H) 3.90 - 12.70 K/uL    RBC 3.84 (L) 4.60 - 6.20 M/uL    Hemoglobin 9.8 (L) 14.0 - 18.0 g/dL    Hematocrit 32.3 (L) 40.0 - 54.0 %    Mean Corpuscular Volume 84 82 - 98 fL    Mean Corpuscular Hemoglobin 25.5 (L) 27.0 - 31.0 pg    Mean Corpuscular Hemoglobin Conc 30.3 (L) 32.0 - 36.0 g/dL    RDW 13.8 11.5 - 14.5 %    Platelets 290 150 - 350 K/uL    MPV 10.1 9.2 - 12.9 fL    Immature Granulocytes 0.4 0.0 - 0.5 %    Gran # (ANC) 10.9 (H) 1.8 - 7.7 K/uL    Immature Grans (Abs) 0.06 (H) 0.00 - 0.04 K/uL    Lymph # 2.5 1.0 - 4.8 K/uL    Mono # 1.1 (H) 0.3 - 1.0 K/uL    Eos # 0.2 0.0 - 0.5 K/uL    Baso # 0.02 0.00 - 0.20 K/uL    nRBC 0 0 /100 WBC    Gran% 74.4 (H) 38.0 - 73.0 %    Lymph% 16.9 (L) 18.0 - 48.0 %    Mono% 7.2 4.0 - 15.0 %    Eosinophil% 1.0 0.0 - 8.0 %    Basophil% 0.1 0.0 - 1.9 %    Differential Method Automated    Magnesium    Collection Time: 11/22/19  3:50 AM   Result Value Ref Range    Magnesium 1.7 1.6 - 2.6 mg/dL   Phosphorus    Collection Time: 11/22/19  3:50 AM   Result Value Ref Range    Phosphorus 2.9 2.7 - 4.5 mg/dL   POCT glucose    Collection Time: 11/22/19  8:28 AM   Result Value Ref Range    POCT Glucose 103 70 - 110 mg/dL       Recent Labs   Lab 11/20/19  1742 11/20/19  2244 11/21/19  0940 11/21/19  1228 11/21/19  1715 11/22/19  0828   POCTGLUCOSE 120* 113* 95 114* 99 103       Hemoglobin A1C   Date Value Ref Range Status   11/11/2019 6.4 (H) 4.0 - 5.6 % Final     Comment:     ADA Screening Guidelines:  5.7-6.4%  Consistent with prediabetes  >or=6.5%  Consistent with diabetes  High levels of fetal hemoglobin interfere with the HbA1C  assay. Heterozygous hemoglobin variants (HbS, HgC, etc)do  not significantly interfere with this assay.   However, presence of multiple variants may affect accuracy.     08/27/2019  6.4 (H) 4.0 - 5.6 % Final     Comment:     ADA Screening Guidelines:  5.7-6.4%  Consistent with prediabetes  >or=6.5%  Consistent with diabetes  High levels of fetal hemoglobin interfere with the HbA1C  assay. Heterozygous hemoglobin variants (HbS, HgC, etc)do  not significantly interfere with this assay.   However, presence of multiple variants may affect accuracy.          Active Hospital Problems    Diagnosis  POA    *Altered mental status [R41.82]  Yes    Restricted diet [Z71.3]  Not Applicable    Alteration in skin integrity [R23.9]  Yes    Acute encephalopathy [G93.40]  Unknown    Altered mental state [R41.82]  Yes    Leukocytosis [D72.829]  Yes    History of closed head injury [Z87.820]  Not Applicable    On mechanically assisted ventilation [Z99.11]  Not Applicable    UTI (urinary tract infection) [N39.0]  Yes    Bacteremia [R78.81]  Yes    Hypertension [I10]  Yes    Type 2 diabetes mellitus [E11.9]  Yes    History of CVA in adulthood [Z86.73]  Not Applicable    Seizure disorder [G40.909]  Yes      Resolved Hospital Problems   No resolved problems to display.          Assessment and Plan for problems addressed today:      albuterol-ipratropium  3 mL Nebulization Q4H    aspirin  81 mg Oral Daily    atorvastatin  40 mg Oral QHS    clopidogrel  75 mg Oral Daily    ertapenem (INVANZ) IVPB  1 g Intravenous Q24H    heparin (porcine)  5,000 Units Subcutaneous Q8H    levETIRAcetam  1,000 mg Oral BID    losartan  25 mg Oral Daily    polyethylene glycol  17 g Oral Daily    senna-docusate 8.6-50 mg  1 tablet Oral BID    sodium chloride 3%  4 mL Nebulization Q4H     acetaminophen, albuterol-ipratropium, bisacodyl, Dextrose 10% Bolus, Dextrose 10% Bolus, glucagon (human recombinant), glucose, glucose, ibuprofen, insulin aspart U-100, melatonin, ondansetron, sodium chloride 0.9%    Acute metabolic encephalopathy:  Improving  Septic encephalopathy:  -Patient with Hx of frequent falls and UTIs was  found down at home, incontinent of urine/feces 11/11.  He was last seen at baseline mental status 11/8.  -patient was intubated for airway protection, successfully extubated on 11/17.  -CT head noted to be nonacute  -MRI brain and c-spine wo contrast unremarkable  -cEEG done in the ICU did not show acute epileptic activity  -patient was noted to have positive blood cultures with Proteus, positive urine cultures with ESBL E coli which was thought to be the reason for his acute encephalopathy.    -Patient was treated for UTI initially with Zosyn then with ceftriaxone and mentation improved.  Patient was extubated on 11/17 and transitioned to Hospital Medicine on 11/18  -however, on 11/19, patient was noted to be febrile, and more encephalopathic  -patient underwent repeat EEG which did not reveal any acute seizures.  Head CT was nonacute.  CXR was WNL.  Patient was also pancultured which did not show any growth to date.  -over the last few days, mentation appears to be improving  -continue neuro checks  -fall precautions, aspiration precautions, seizure precautions  -Continue correction of metabolic derangements  -Maintain Delirium precautions    Febrile illness:  -patient continues to spike daily fevers  -cultures show no growth to date  -continue ertapenem, vancomycin now discontinued  -obtain bilateral lower extremity ultrasound to rule out DVT today  -check HIV, RPR, hepatitis panel  -monitor for hemodynamic instability     Seizure disorder  -patient is a history of seizures several years  -Loaded with Keppra in the ED  -cEEG negative in the ICU  -currently on  Keppra 1g BID. Continue on this dose until able to f/u with Neurology outpatient  -noted to be more encephalopathic and 11/19, repeat EEG did not reveal acute encephalopathic activity.  Mentation now improving     ESBL UTI (urinary tract infection):  -Hx of UTIs, previous urine culture pos. for klebsiella  -UA with many bacteria, UCx w/ ESBL E  coli  -patient is being treated with ceftriaxone however, however patient continued to spike fevers  -antibiotics were changed to ertapenem/vancomycin  -vancomycin DC on 11/20.  -Infectious Disease consulted, continue ertapenem for total 14 day course of antibiotics   -retroperitoneal ultrasound reveals mild left hydronephrosis otherwise no significant abnormality     Proteus Bacteremia:  -BCx w/ Proteus mirabilis from 11/11.  -blood cultures from 11/12 onwards have shown no growth to date  -Patient was previously on Zosyn, transition to ceftriaxone based on sensitivity report  -however, patient was noted to be febrile again on 11/18.  Antibiotics were broadened to ertapenem/vancomycin.  Blood cultures were repeated which show no growth to date  -vancomycin discontinued on 11/20  -infectious Disease was consulted.  Patient to continue ertapenem for total 14 day course of antibiotics    Diabetes Mellitus:  -Last HbA1c:  6.4  -SSI with accuchecks qACHS  -BG goal: Preprandial blood glucose target <140 mg/dL, Random glucoses <180 mg/dL  -ADA diet    Essential Hypertension:  -stable  -Continue PTA  losartan 25 mg daily  -monitor vitals q4h  -SBP goal of <160 in hospital    Hyperlipidemia:  -continue PTA atorvastatin 40 mg    History of CVA in adulthood  -continue Plavix, statin    DVT PPx:  Heparin    Discharge plan and follow up: DC to SNF once medically stable     Esther Lu MD  Hospital Medicine Staff  [phone] pager

## 2019-11-22 NOTE — PROGRESS NOTES
AAO X4 Sleeping most of the day condom cath draining clear yellow urine to gravity. Incontinent of BM with incontinent care given. Silicone dsg to the sacral area turned and repositioned q 2 hours cooling blanket discontinued temp monitored, was at 100.6 with tylenol given appetite fair, eats soft foods but this evening he began chewing the chopped meats and not swallowing. Frequently coughs producing thick white phlegm oral suction PRN.

## 2019-11-23 LAB
ALBUMIN SERPL BCP-MCNC: 2.3 G/DL (ref 3.5–5.2)
ALP SERPL-CCNC: 68 U/L (ref 55–135)
ALT SERPL W/O P-5'-P-CCNC: 28 U/L (ref 10–44)
ANION GAP SERPL CALC-SCNC: 10 MMOL/L (ref 8–16)
AST SERPL-CCNC: 32 U/L (ref 10–40)
BACTERIA BLD CULT: NORMAL
BACTERIA BLD CULT: NORMAL
BASOPHILS # BLD AUTO: 0.02 K/UL (ref 0–0.2)
BASOPHILS NFR BLD: 0.2 % (ref 0–1.9)
BILIRUB SERPL-MCNC: 0.4 MG/DL (ref 0.1–1)
BUN SERPL-MCNC: 9 MG/DL (ref 8–23)
CALCIUM SERPL-MCNC: 8.1 MG/DL (ref 8.7–10.5)
CHLORIDE SERPL-SCNC: 106 MMOL/L (ref 95–110)
CO2 SERPL-SCNC: 22 MMOL/L (ref 23–29)
CREAT SERPL-MCNC: 0.7 MG/DL (ref 0.5–1.4)
DIFFERENTIAL METHOD: ABNORMAL
EOSINOPHIL # BLD AUTO: 0.2 K/UL (ref 0–0.5)
EOSINOPHIL NFR BLD: 1.5 % (ref 0–8)
ERYTHROCYTE [DISTWIDTH] IN BLOOD BY AUTOMATED COUNT: 13.6 % (ref 11.5–14.5)
EST. GFR  (AFRICAN AMERICAN): >60 ML/MIN/1.73 M^2
EST. GFR  (NON AFRICAN AMERICAN): >60 ML/MIN/1.73 M^2
GLUCOSE SERPL-MCNC: 115 MG/DL (ref 70–110)
HCT VFR BLD AUTO: 30.5 % (ref 40–54)
HGB BLD-MCNC: 9.3 G/DL (ref 14–18)
IMM GRANULOCYTES # BLD AUTO: 0.06 K/UL (ref 0–0.04)
IMM GRANULOCYTES NFR BLD AUTO: 0.5 % (ref 0–0.5)
LYMPHOCYTES # BLD AUTO: 2.5 K/UL (ref 1–4.8)
LYMPHOCYTES NFR BLD: 22.5 % (ref 18–48)
MAGNESIUM SERPL-MCNC: 1.7 MG/DL (ref 1.6–2.6)
MCH RBC QN AUTO: 25.5 PG (ref 27–31)
MCHC RBC AUTO-ENTMCNC: 30.5 G/DL (ref 32–36)
MCV RBC AUTO: 84 FL (ref 82–98)
MONOCYTES # BLD AUTO: 0.7 K/UL (ref 0.3–1)
MONOCYTES NFR BLD: 6.6 % (ref 4–15)
NEUTROPHILS # BLD AUTO: 7.5 K/UL (ref 1.8–7.7)
NEUTROPHILS NFR BLD: 68.7 % (ref 38–73)
NRBC BLD-RTO: 0 /100 WBC
PHOSPHATE SERPL-MCNC: 2.5 MG/DL (ref 2.7–4.5)
PLATELET # BLD AUTO: 315 K/UL (ref 150–350)
PMV BLD AUTO: 10.2 FL (ref 9.2–12.9)
POCT GLUCOSE: 114 MG/DL (ref 70–110)
POCT GLUCOSE: 124 MG/DL (ref 70–110)
POCT GLUCOSE: 124 MG/DL (ref 70–110)
POCT GLUCOSE: 130 MG/DL (ref 70–110)
POCT GLUCOSE: 149 MG/DL (ref 70–110)
POTASSIUM SERPL-SCNC: 3.9 MMOL/L (ref 3.5–5.1)
PROT SERPL-MCNC: 6.1 G/DL (ref 6–8.4)
RBC # BLD AUTO: 3.64 M/UL (ref 4.6–6.2)
RPR SER QL: NORMAL
SODIUM SERPL-SCNC: 138 MMOL/L (ref 136–145)
WBC # BLD AUTO: 11 K/UL (ref 3.9–12.7)

## 2019-11-23 PROCEDURE — 99232 SBSQ HOSP IP/OBS MODERATE 35: CPT | Mod: ,,, | Performed by: HOSPITALIST

## 2019-11-23 PROCEDURE — 25000003 PHARM REV CODE 250: Performed by: HOSPITALIST

## 2019-11-23 PROCEDURE — 86703 HIV-1/HIV-2 1 RESULT ANTBDY: CPT

## 2019-11-23 PROCEDURE — 63600175 PHARM REV CODE 636 W HCPCS: Performed by: NURSE PRACTITIONER

## 2019-11-23 PROCEDURE — 80074 ACUTE HEPATITIS PANEL: CPT

## 2019-11-23 PROCEDURE — 25000003 PHARM REV CODE 250: Performed by: PHYSICIAN ASSISTANT

## 2019-11-23 PROCEDURE — 83735 ASSAY OF MAGNESIUM: CPT

## 2019-11-23 PROCEDURE — 25000242 PHARM REV CODE 250 ALT 637 W/ HCPCS: Performed by: NURSE PRACTITIONER

## 2019-11-23 PROCEDURE — 99900035 HC TECH TIME PER 15 MIN (STAT)

## 2019-11-23 PROCEDURE — 85025 COMPLETE CBC W/AUTO DIFF WBC: CPT

## 2019-11-23 PROCEDURE — 63600175 PHARM REV CODE 636 W HCPCS: Performed by: HOSPITALIST

## 2019-11-23 PROCEDURE — 99232 PR SUBSEQUENT HOSPITAL CARE,LEVL II: ICD-10-PCS | Mod: ,,, | Performed by: HOSPITALIST

## 2019-11-23 PROCEDURE — 25000242 PHARM REV CODE 250 ALT 637 W/ HCPCS: Performed by: PSYCHIATRY & NEUROLOGY

## 2019-11-23 PROCEDURE — 36415 COLL VENOUS BLD VENIPUNCTURE: CPT

## 2019-11-23 PROCEDURE — 94761 N-INVAS EAR/PLS OXIMETRY MLT: CPT

## 2019-11-23 PROCEDURE — 86592 SYPHILIS TEST NON-TREP QUAL: CPT

## 2019-11-23 PROCEDURE — 11000001 HC ACUTE MED/SURG PRIVATE ROOM

## 2019-11-23 PROCEDURE — 63600175 PHARM REV CODE 636 W HCPCS: Performed by: STUDENT IN AN ORGANIZED HEALTH CARE EDUCATION/TRAINING PROGRAM

## 2019-11-23 PROCEDURE — 80053 COMPREHEN METABOLIC PANEL: CPT

## 2019-11-23 PROCEDURE — 84100 ASSAY OF PHOSPHORUS: CPT

## 2019-11-23 PROCEDURE — 94640 AIRWAY INHALATION TREATMENT: CPT

## 2019-11-23 PROCEDURE — 25000003 PHARM REV CODE 250: Performed by: STUDENT IN AN ORGANIZED HEALTH CARE EDUCATION/TRAINING PROGRAM

## 2019-11-23 RX ORDER — SODIUM,POTASSIUM PHOSPHATES 280-250MG
1 POWDER IN PACKET (EA) ORAL ONCE
Status: COMPLETED | OUTPATIENT
Start: 2019-11-23 | End: 2019-11-23

## 2019-11-23 RX ADMIN — LEVETIRACETAM 1000 MG: 500 TABLET ORAL at 09:11

## 2019-11-23 RX ADMIN — IPRATROPIUM BROMIDE AND ALBUTEROL SULFATE 3 ML: .5; 3 SOLUTION RESPIRATORY (INHALATION) at 11:11

## 2019-11-23 RX ADMIN — SENNOSIDES AND DOCUSATE SODIUM 1 TABLET: 8.6; 5 TABLET ORAL at 09:11

## 2019-11-23 RX ADMIN — ERTAPENEM 1 G: 1 INJECTION INTRAMUSCULAR; INTRAVENOUS at 12:11

## 2019-11-23 RX ADMIN — IPRATROPIUM BROMIDE AND ALBUTEROL SULFATE 3 ML: .5; 3 SOLUTION RESPIRATORY (INHALATION) at 07:11

## 2019-11-23 RX ADMIN — POTASSIUM & SODIUM PHOSPHATES POWDER PACK 280-160-250 MG 1 PACKET: 280-160-250 PACK at 12:11

## 2019-11-23 RX ADMIN — ATORVASTATIN CALCIUM 40 MG: 20 TABLET, FILM COATED ORAL at 09:11

## 2019-11-23 RX ADMIN — ASPIRIN 81 MG CHEWABLE TABLET 81 MG: 81 TABLET CHEWABLE at 09:11

## 2019-11-23 RX ADMIN — IPRATROPIUM BROMIDE AND ALBUTEROL SULFATE 3 ML: .5; 3 SOLUTION RESPIRATORY (INHALATION) at 04:11

## 2019-11-23 RX ADMIN — SODIUM CHLORIDE SOLN NEBU 3% 4 ML: 3 NEBU SOLN at 07:11

## 2019-11-23 RX ADMIN — LOSARTAN POTASSIUM 25 MG: 25 TABLET, FILM COATED ORAL at 09:11

## 2019-11-23 RX ADMIN — SODIUM CHLORIDE SOLN NEBU 3% 4 ML: 3 NEBU SOLN at 11:11

## 2019-11-23 RX ADMIN — POLYETHYLENE GLYCOL 3350 17 G: 17 POWDER, FOR SOLUTION ORAL at 09:11

## 2019-11-23 RX ADMIN — HEPARIN SODIUM 5000 UNITS: 5000 INJECTION, SOLUTION INTRAVENOUS; SUBCUTANEOUS at 09:11

## 2019-11-23 RX ADMIN — SODIUM CHLORIDE SOLN NEBU 3% 4 ML: 3 NEBU SOLN at 04:11

## 2019-11-23 RX ADMIN — HEPARIN SODIUM 5000 UNITS: 5000 INJECTION, SOLUTION INTRAVENOUS; SUBCUTANEOUS at 02:11

## 2019-11-23 RX ADMIN — SODIUM CHLORIDE SOLN NEBU 3% 4 ML: 3 NEBU SOLN at 01:11

## 2019-11-23 RX ADMIN — SODIUM CHLORIDE, SODIUM LACTATE, POTASSIUM CHLORIDE, AND CALCIUM CHLORIDE: .6; .31; .03; .02 INJECTION, SOLUTION INTRAVENOUS at 08:11

## 2019-11-23 RX ADMIN — CLOPIDOGREL BISULFATE 75 MG: 75 TABLET, FILM COATED ORAL at 09:11

## 2019-11-23 RX ADMIN — HEPARIN SODIUM 5000 UNITS: 5000 INJECTION, SOLUTION INTRAVENOUS; SUBCUTANEOUS at 07:11

## 2019-11-23 RX ADMIN — IPRATROPIUM BROMIDE AND ALBUTEROL SULFATE 3 ML: .5; 3 SOLUTION RESPIRATORY (INHALATION) at 09:11

## 2019-11-23 RX ADMIN — IPRATROPIUM BROMIDE AND ALBUTEROL SULFATE 3 ML: .5; 3 SOLUTION RESPIRATORY (INHALATION) at 12:11

## 2019-11-23 NOTE — PLAN OF CARE
AAO X3 today he is much more alert speaking in full sentences. Fed himself meals with supervision chewed meats and swallowed with out signs of aspiration, taking fluids well, does have an occasional cough producing thick clear/white sputum. Condom cath in tact draining clear yellow urine to gravity. Incontinent care given. Turned and repositioned throughout the day.

## 2019-11-23 NOTE — PROGRESS NOTES
Hospital Medicine   Progress note      Team: [facility] [place] [facility] HARMAN Lu MD   Admit Date: 11/11/2019   Hospital Day: 12  NAI: 11/26/2019   Code status: Full Code   Principal Problem: Altered mental status     HPI: Mr Lopez is a 69 yr old male with a PMH of CVA, seizures, CHI, DM II, dementia, HTN, who presents to the ED with AMS. Patient was last heard from by a friend on Friday 11/8 (3 days ago). Found down at home 11/11, incontinent of urine and bowel. CTH negative. MRI c-spine, brain pending. Mild leukocytosis, UA positive for UTI. cEEG pending. Patient intubated in the ED for airway protection. Will admit to [facility] for higher level of care. Will need LP.    ICU Course:   11/11/2019: Intubated in ED. Admit to [facility].  11/12/2019: Switched Ceftriaxone to Zosyn, rechecking BCx, IVF @ 150cc/hr w/ 1L bolus; Tube feeds & dvt ppx initiated. EEG suppressed, propofol disctoninued & fentanyl 50mcg q2hr added.  11/13/2019: Steadily improving since presentation. Continue Keppra & Abx, added 200cc q6 FW to feeds, & dc'd vent.  11/14/2019: mental status continues to improve. Following commands in all 4 ext. FW 200cc q6hr; Extubate today; pending sensitivities of cx's; added ASA/statin  11/15/2019: Mental status improved; secretions persistent. De-escalated Abx to ceftriaxone given susceptibilities; added breathing tx q4. NPO in AM for possible extubation.  11/16/19: start glycopyrrolate for secretions  11/17: Extubated   11/18 transferred to hospital medicine     Interval hx:   Pt was seen and examined at bedside.  Patient is noted to be afebrile and hemodynamically stable yesterday.  He is noted to be a lot more awake, alert, speaking in full sentences.  Patient denies any complaints today.  Reports he was able to eat his breakfast this morning.    ROS (Positive in Bold, otherwise negative)  Constitutional: fever, chills, night sweats  CV: chest pain, edema, palpitations  Resp: SOB, cough, sputum production  GI: changes in  appetite, NVDC, pain, melena, hematochezia, GERD, hematemesis  : Dysuria, hematuria, urinary urgency, frequency  MSK: arthralgia/myalgia, joint swelling  Neuro/Psych: anxiety, depression    PEx   Temp:  [97.3 °F (36.3 °C)-99.7 °F (37.6 °C)]   Pulse:  [68-85]   Resp:  [12-20]   BP: (118-128)/(62-81)   SpO2:  [93 %-100 %]      I & O (Last 24H):     Intake/Output Summary (Last 24 hours) at 11/23/2019 1322  Last data filed at 11/23/2019 0500  Gross per 24 hour   Intake 450 ml   Output 2001 ml   Net -1551 ml       General:  male  in no acute distress. Awake, alert, Resting in bed.  Able to answer simple questions and follow simple commands.   HEENT: NCAT. PERRL. EOMI. Sclera Anicteric.  CVS: RRR. Normal S1 S2. No murmurs  Pulm: CTAB. Normal respiratory effort. No wheezes, rhonchi, or crackles.  Abdomen: Soft. Non-distended. No tenderness to palpation. No rebound or guarding. +BS.  Extremities: No edema. No cyanosis. Full ROM.  Neuro: Alert, oriented x 2, Spont mvt of all extremities with no focal deficits noted.    Recent Results (from the past 24 hour(s))   POCT glucose    Collection Time: 11/22/19  5:37 PM   Result Value Ref Range    POCT Glucose 109 70 - 110 mg/dL   POCT glucose    Collection Time: 11/22/19 10:15 PM   Result Value Ref Range    POCT Glucose 168 (H) 70 - 110 mg/dL   POCT glucose    Collection Time: 11/23/19  1:45 AM   Result Value Ref Range    POCT Glucose 114 (H) 70 - 110 mg/dL   Comprehensive metabolic panel    Collection Time: 11/23/19  3:37 AM   Result Value Ref Range    Sodium 138 136 - 145 mmol/L    Potassium 3.9 3.5 - 5.1 mmol/L    Chloride 106 95 - 110 mmol/L    CO2 22 (L) 23 - 29 mmol/L    Glucose 115 (H) 70 - 110 mg/dL    BUN, Bld 9 8 - 23 mg/dL    Creatinine 0.7 0.5 - 1.4 mg/dL    Calcium 8.1 (L) 8.7 - 10.5 mg/dL    Total Protein 6.1 6.0 - 8.4 g/dL    Albumin 2.3 (L) 3.5 - 5.2 g/dL    Total Bilirubin 0.4 0.1 - 1.0 mg/dL    Alkaline Phosphatase 68 55 - 135 U/L    AST 32 10 -  40 U/L    ALT 28 10 - 44 U/L    Anion Gap 10 8 - 16 mmol/L    eGFR if African American >60.0 >60 mL/min/1.73 m^2    eGFR if non African American >60.0 >60 mL/min/1.73 m^2   CBC auto differential    Collection Time: 11/23/19  3:37 AM   Result Value Ref Range    WBC 11.00 3.90 - 12.70 K/uL    RBC 3.64 (L) 4.60 - 6.20 M/uL    Hemoglobin 9.3 (L) 14.0 - 18.0 g/dL    Hematocrit 30.5 (L) 40.0 - 54.0 %    Mean Corpuscular Volume 84 82 - 98 fL    Mean Corpuscular Hemoglobin 25.5 (L) 27.0 - 31.0 pg    Mean Corpuscular Hemoglobin Conc 30.5 (L) 32.0 - 36.0 g/dL    RDW 13.6 11.5 - 14.5 %    Platelets 315 150 - 350 K/uL    MPV 10.2 9.2 - 12.9 fL    Immature Granulocytes 0.5 0.0 - 0.5 %    Gran # (ANC) 7.5 1.8 - 7.7 K/uL    Immature Grans (Abs) 0.06 (H) 0.00 - 0.04 K/uL    Lymph # 2.5 1.0 - 4.8 K/uL    Mono # 0.7 0.3 - 1.0 K/uL    Eos # 0.2 0.0 - 0.5 K/uL    Baso # 0.02 0.00 - 0.20 K/uL    nRBC 0 0 /100 WBC    Gran% 68.7 38.0 - 73.0 %    Lymph% 22.5 18.0 - 48.0 %    Mono% 6.6 4.0 - 15.0 %    Eosinophil% 1.5 0.0 - 8.0 %    Basophil% 0.2 0.0 - 1.9 %    Differential Method Automated    Magnesium    Collection Time: 11/23/19  3:37 AM   Result Value Ref Range    Magnesium 1.7 1.6 - 2.6 mg/dL   Phosphorus    Collection Time: 11/23/19  3:37 AM   Result Value Ref Range    Phosphorus 2.5 (L) 2.7 - 4.5 mg/dL   POCT glucose    Collection Time: 11/23/19  8:22 AM   Result Value Ref Range    POCT Glucose 124 (H) 70 - 110 mg/dL   POCT glucose    Collection Time: 11/23/19 11:36 AM   Result Value Ref Range    POCT Glucose 124 (H) 70 - 110 mg/dL       Recent Labs   Lab 11/22/19  1145 11/22/19  1737 11/22/19  2215 11/23/19  0145 11/23/19  0822 11/23/19  1136   POCTGLUCOSE 205* 109 168* 114* 124* 124*       Hemoglobin A1C   Date Value Ref Range Status   11/11/2019 6.4 (H) 4.0 - 5.6 % Final     Comment:     ADA Screening Guidelines:  5.7-6.4%  Consistent with prediabetes  >or=6.5%  Consistent with diabetes  High levels of fetal hemoglobin interfere  with the HbA1C  assay. Heterozygous hemoglobin variants (HbS, HgC, etc)do  not significantly interfere with this assay.   However, presence of multiple variants may affect accuracy.     08/27/2019 6.4 (H) 4.0 - 5.6 % Final     Comment:     ADA Screening Guidelines:  5.7-6.4%  Consistent with prediabetes  >or=6.5%  Consistent with diabetes  High levels of fetal hemoglobin interfere with the HbA1C  assay. Heterozygous hemoglobin variants (HbS, HgC, etc)do  not significantly interfere with this assay.   However, presence of multiple variants may affect accuracy.          Active Hospital Problems    Diagnosis  POA    *Altered mental status [R41.82]  Yes    Restricted diet [Z71.3]  Not Applicable    Alteration in skin integrity [R23.9]  Yes    Acute encephalopathy [G93.40]  Unknown    Altered mental state [R41.82]  Yes    Leukocytosis [D72.829]  Yes    History of closed head injury [Z87.820]  Not Applicable    On mechanically assisted ventilation [Z99.11]  Not Applicable    UTI (urinary tract infection) [N39.0]  Yes    Bacteremia [R78.81]  Yes    Hypertension [I10]  Yes    Type 2 diabetes mellitus [E11.9]  Yes    History of CVA in adulthood [Z86.73]  Not Applicable    Seizure disorder [G40.909]  Yes      Resolved Hospital Problems   No resolved problems to display.          Assessment and Plan for problems addressed today:      albuterol-ipratropium  3 mL Nebulization Q4H    aspirin  81 mg Oral Daily    atorvastatin  40 mg Oral QHS    clopidogrel  75 mg Oral Daily    ertapenem (INVANZ) IVPB  1 g Intravenous Q24H    heparin (porcine)  5,000 Units Subcutaneous Q8H    levETIRAcetam  1,000 mg Oral BID    losartan  25 mg Oral Daily    polyethylene glycol  17 g Oral Daily    senna-docusate 8.6-50 mg  1 tablet Oral BID    sodium chloride 3%  4 mL Nebulization Q4H     acetaminophen, albuterol-ipratropium, bisacodyl, Dextrose 10% Bolus, Dextrose 10% Bolus, glucagon (human recombinant), glucose, glucose,  ibuprofen, insulin aspart U-100, melatonin, ondansetron, sodium chloride 0.9%    Acute metabolic encephalopathy:  Improving  Septic encephalopathy:  -Patient with Hx of frequent falls and UTIs was found down at home, incontinent of urine/feces 11/11.  He was last seen at baseline mental status 11/8.  -patient was intubated for airway protection, successfully extubated on 11/17.  -CT head noted to be nonacute  -MRI brain and c-spine wo contrast unremarkable  -cEEG done in the ICU did not show acute epileptic activity  -patient was noted to have positive blood cultures with Proteus, positive urine cultures with ESBL E coli which was thought to be the reason for his acute encephalopathy.    -Patient was treated for UTI initially with Zosyn then with ceftriaxone and mentation improved.  Patient was extubated on 11/17 and transitioned to Hospital Medicine on 11/18  -however, on 11/19, patient was noted to be febrile, and more encephalopathic  -patient underwent repeat EEG which did not reveal any acute seizures.  Head CT was nonacute.  CXR was WNL.  Patient was also pancultured which did not show any growth to date.  -over the last few days, mentation appears to be improving  -continue neuro checks  -fall precautions, aspiration precautions, seizure precautions  -Continue correction of metabolic derangements  -Maintain Delirium precautions    Febrile illness:  Resolved  -patient was continuing to spike daily fevers  -cultures show no growth to date  -continue ertapenem, vancomycin now discontinued  -bilateral lower extremity ultrasound to rule out DVT negative   -pending HIV, RPR, hepatitis panel  -monitor for hemodynamic instability     Seizure disorder  -patient is a history of seizures several years  -Loaded with Keppra in the ED  -cEEG negative in the ICU  -currently on  Keppra 1g BID. Continue on this dose until able to f/u with Neurology outpatient  -noted to be more encephalopathic and 11/19, repeat EEG did not  reveal acute encephalopathic activity.  Mentation now improving     ESBL UTI (urinary tract infection):  -Hx of UTIs, previous urine culture pos. for klebsiella  -UA with many bacteria, UCx w/ ESBL E coli  -patient is being treated with ceftriaxone however, however patient continued to spike fevers  -antibiotics were changed to ertapenem/vancomycin  -vancomycin DC on 11/20.  -Infectious Disease consulted, continue ertapenem for total 14 day course of antibiotics   -retroperitoneal ultrasound reveals mild left hydronephrosis otherwise no significant abnormality     Proteus Bacteremia:  -BCx w/ Proteus mirabilis from 11/11.  -blood cultures from 11/12 onwards have shown no growth to date  -Patient was previously on Zosyn, transition to ceftriaxone based on sensitivity report  -however, patient was noted to be febrile again on 11/18.  Antibiotics were broadened to ertapenem/vancomycin.  Blood cultures were repeated which show no growth to date  -vancomycin discontinued on 11/20  -infectious Disease was consulted.  Patient to continue ertapenem for total 14 day course of antibiotics    Diabetes Mellitus:  -Last HbA1c:  6.4  -SSI with accuchecks qACHS  -BG goal: Preprandial blood glucose target <140 mg/dL, Random glucoses <180 mg/dL  -ADA diet    Essential Hypertension:  -stable  -Continue PTA  losartan 25 mg daily  -monitor vitals q4h  -SBP goal of <160 in hospital    Hyperlipidemia:  -continue PTA atorvastatin 40 mg    History of CVA in adulthood  -continue Plavix, statin    DVT PPx:  Heparin    Discharge plan and follow up: DC to SNF, now medically stable     Esther Lu MD  Hospital Medicine Staff  [phone] pager

## 2019-11-24 LAB
ALBUMIN SERPL BCP-MCNC: 2.3 G/DL (ref 3.5–5.2)
ALP SERPL-CCNC: 65 U/L (ref 55–135)
ALT SERPL W/O P-5'-P-CCNC: 37 U/L (ref 10–44)
ANION GAP SERPL CALC-SCNC: 8 MMOL/L (ref 8–16)
AST SERPL-CCNC: 40 U/L (ref 10–40)
BACTERIA BLD CULT: NORMAL
BASOPHILS # BLD AUTO: 0.02 K/UL (ref 0–0.2)
BASOPHILS NFR BLD: 0.2 % (ref 0–1.9)
BILIRUB SERPL-MCNC: 0.4 MG/DL (ref 0.1–1)
BUN SERPL-MCNC: 9 MG/DL (ref 8–23)
CALCIUM SERPL-MCNC: 8 MG/DL (ref 8.7–10.5)
CHLORIDE SERPL-SCNC: 108 MMOL/L (ref 95–110)
CO2 SERPL-SCNC: 24 MMOL/L (ref 23–29)
CREAT SERPL-MCNC: 0.7 MG/DL (ref 0.5–1.4)
DIFFERENTIAL METHOD: ABNORMAL
EOSINOPHIL # BLD AUTO: 0.2 K/UL (ref 0–0.5)
EOSINOPHIL NFR BLD: 1.7 % (ref 0–8)
ERYTHROCYTE [DISTWIDTH] IN BLOOD BY AUTOMATED COUNT: 13.9 % (ref 11.5–14.5)
EST. GFR  (AFRICAN AMERICAN): >60 ML/MIN/1.73 M^2
EST. GFR  (NON AFRICAN AMERICAN): >60 ML/MIN/1.73 M^2
GLUCOSE SERPL-MCNC: 105 MG/DL (ref 70–110)
HCT VFR BLD AUTO: 31.7 % (ref 40–54)
HGB BLD-MCNC: 9.3 G/DL (ref 14–18)
IMM GRANULOCYTES # BLD AUTO: 0.03 K/UL (ref 0–0.04)
IMM GRANULOCYTES NFR BLD AUTO: 0.3 % (ref 0–0.5)
LYMPHOCYTES # BLD AUTO: 2.4 K/UL (ref 1–4.8)
LYMPHOCYTES NFR BLD: 25.7 % (ref 18–48)
MAGNESIUM SERPL-MCNC: 1.6 MG/DL (ref 1.6–2.6)
MCH RBC QN AUTO: 24.9 PG (ref 27–31)
MCHC RBC AUTO-ENTMCNC: 29.3 G/DL (ref 32–36)
MCV RBC AUTO: 85 FL (ref 82–98)
MONOCYTES # BLD AUTO: 0.8 K/UL (ref 0.3–1)
MONOCYTES NFR BLD: 8.8 % (ref 4–15)
NEUTROPHILS # BLD AUTO: 5.8 K/UL (ref 1.8–7.7)
NEUTROPHILS NFR BLD: 63.3 % (ref 38–73)
NRBC BLD-RTO: 0 /100 WBC
PHOSPHATE SERPL-MCNC: 2.8 MG/DL (ref 2.7–4.5)
PLATELET # BLD AUTO: 370 K/UL (ref 150–350)
PMV BLD AUTO: 10.2 FL (ref 9.2–12.9)
POCT GLUCOSE: 109 MG/DL (ref 70–110)
POCT GLUCOSE: 115 MG/DL (ref 70–110)
POCT GLUCOSE: 116 MG/DL (ref 70–110)
POCT GLUCOSE: 122 MG/DL (ref 70–110)
POCT GLUCOSE: 140 MG/DL (ref 70–110)
POCT GLUCOSE: 96 MG/DL (ref 70–110)
POTASSIUM SERPL-SCNC: 4.2 MMOL/L (ref 3.5–5.1)
PROT SERPL-MCNC: 6.2 G/DL (ref 6–8.4)
RBC # BLD AUTO: 3.73 M/UL (ref 4.6–6.2)
SODIUM SERPL-SCNC: 140 MMOL/L (ref 136–145)
WBC # BLD AUTO: 9.17 K/UL (ref 3.9–12.7)

## 2019-11-24 PROCEDURE — 99232 PR SUBSEQUENT HOSPITAL CARE,LEVL II: ICD-10-PCS | Mod: ,,, | Performed by: HOSPITALIST

## 2019-11-24 PROCEDURE — 94640 AIRWAY INHALATION TREATMENT: CPT

## 2019-11-24 PROCEDURE — 11000001 HC ACUTE MED/SURG PRIVATE ROOM

## 2019-11-24 PROCEDURE — 80053 COMPREHEN METABOLIC PANEL: CPT

## 2019-11-24 PROCEDURE — 63600175 PHARM REV CODE 636 W HCPCS: Performed by: HOSPITALIST

## 2019-11-24 PROCEDURE — 85025 COMPLETE CBC W/AUTO DIFF WBC: CPT

## 2019-11-24 PROCEDURE — 94761 N-INVAS EAR/PLS OXIMETRY MLT: CPT

## 2019-11-24 PROCEDURE — 25000003 PHARM REV CODE 250: Performed by: HOSPITALIST

## 2019-11-24 PROCEDURE — 63600175 PHARM REV CODE 636 W HCPCS: Performed by: NURSE PRACTITIONER

## 2019-11-24 PROCEDURE — 84100 ASSAY OF PHOSPHORUS: CPT

## 2019-11-24 PROCEDURE — 36415 COLL VENOUS BLD VENIPUNCTURE: CPT

## 2019-11-24 PROCEDURE — 25000003 PHARM REV CODE 250: Performed by: PHYSICIAN ASSISTANT

## 2019-11-24 PROCEDURE — 25000242 PHARM REV CODE 250 ALT 637 W/ HCPCS: Performed by: PSYCHIATRY & NEUROLOGY

## 2019-11-24 PROCEDURE — 25000242 PHARM REV CODE 250 ALT 637 W/ HCPCS: Performed by: NURSE PRACTITIONER

## 2019-11-24 PROCEDURE — 99232 SBSQ HOSP IP/OBS MODERATE 35: CPT | Mod: ,,, | Performed by: HOSPITALIST

## 2019-11-24 PROCEDURE — 25000003 PHARM REV CODE 250: Performed by: STUDENT IN AN ORGANIZED HEALTH CARE EDUCATION/TRAINING PROGRAM

## 2019-11-24 PROCEDURE — 83735 ASSAY OF MAGNESIUM: CPT

## 2019-11-24 RX ADMIN — SODIUM CHLORIDE SOLN NEBU 3% 4 ML: 3 NEBU SOLN at 04:11

## 2019-11-24 RX ADMIN — HEPARIN SODIUM 5000 UNITS: 5000 INJECTION, SOLUTION INTRAVENOUS; SUBCUTANEOUS at 02:11

## 2019-11-24 RX ADMIN — SENNOSIDES AND DOCUSATE SODIUM 1 TABLET: 8.6; 5 TABLET ORAL at 09:11

## 2019-11-24 RX ADMIN — HEPARIN SODIUM 5000 UNITS: 5000 INJECTION, SOLUTION INTRAVENOUS; SUBCUTANEOUS at 06:11

## 2019-11-24 RX ADMIN — IPRATROPIUM BROMIDE AND ALBUTEROL SULFATE 3 ML: .5; 3 SOLUTION RESPIRATORY (INHALATION) at 12:11

## 2019-11-24 RX ADMIN — ASPIRIN 81 MG CHEWABLE TABLET 81 MG: 81 TABLET CHEWABLE at 09:11

## 2019-11-24 RX ADMIN — LOSARTAN POTASSIUM 25 MG: 25 TABLET, FILM COATED ORAL at 09:11

## 2019-11-24 RX ADMIN — SODIUM CHLORIDE SOLN NEBU 3% 4 ML: 3 NEBU SOLN at 07:11

## 2019-11-24 RX ADMIN — SODIUM CHLORIDE SOLN NEBU 3% 4 ML: 3 NEBU SOLN at 12:11

## 2019-11-24 RX ADMIN — POLYETHYLENE GLYCOL 3350 17 G: 17 POWDER, FOR SOLUTION ORAL at 09:11

## 2019-11-24 RX ADMIN — LEVETIRACETAM 1000 MG: 500 TABLET ORAL at 09:11

## 2019-11-24 RX ADMIN — SODIUM CHLORIDE SOLN NEBU 3% 4 ML: 3 NEBU SOLN at 08:11

## 2019-11-24 RX ADMIN — ERTAPENEM 1 G: 1 INJECTION INTRAMUSCULAR; INTRAVENOUS at 09:11

## 2019-11-24 RX ADMIN — IPRATROPIUM BROMIDE AND ALBUTEROL SULFATE 3 ML: .5; 3 SOLUTION RESPIRATORY (INHALATION) at 03:11

## 2019-11-24 RX ADMIN — ATORVASTATIN CALCIUM 40 MG: 20 TABLET, FILM COATED ORAL at 09:11

## 2019-11-24 RX ADMIN — IPRATROPIUM BROMIDE AND ALBUTEROL SULFATE 3 ML: .5; 3 SOLUTION RESPIRATORY (INHALATION) at 04:11

## 2019-11-24 RX ADMIN — CLOPIDOGREL BISULFATE 75 MG: 75 TABLET, FILM COATED ORAL at 09:11

## 2019-11-24 RX ADMIN — IPRATROPIUM BROMIDE AND ALBUTEROL SULFATE 3 ML: .5; 3 SOLUTION RESPIRATORY (INHALATION) at 08:11

## 2019-11-24 RX ADMIN — IPRATROPIUM BROMIDE AND ALBUTEROL SULFATE 3 ML: .5; 3 SOLUTION RESPIRATORY (INHALATION) at 07:11

## 2019-11-24 NOTE — PROGRESS NOTES
Hospital Medicine   Progress note      [place] [facility] [place] [facility] [place] Esther Lu MD   Admit Date: 11/11/2019   Hospital Day: 13  NAI: 11/26/2019   Code status: Full Code   Principal Problem: Altered mental status     HPI: Mr Lopez is a 69 yr old male with a PMH of CVA, seizures, CHI, DM II, dementia, HTN, who presents to the ED with AMS. Patient was last heard from by a friend on Friday 11/8 (3 days ago). Found down at home 11/11, incontinent of urine and bowel. CTH negative. MRI c-spine, brain pending. Mild leukocytosis, UA positive for UTI. cEEG pending. Patient intubated in the ED for airway protection. Will admit to [facility] for higher level of care. Will need LP.    ICU Course:   11/11/2019: Intubated in ED. Admit to [facility].  11/12/2019: Switched Ceftriaxone to Zosyn, rechecking BCx, IVF @ 150cc/hr w/ 1L bolus; Tube feeds & dvt ppx initiated. EEG suppressed, propofol disctoninued & fentanyl 50mcg q2hr added.  11/13/2019: Steadily improving since presentation. Continue Keppra & Abx, added 200cc q6 FW to feeds, & dc'd vent.  11/14/2019: mental status continues to improve. Following commands in all 4 ext. FW 200cc q6hr; Extubate today; pending sensitivities of cx's; added ASA/statin  11/15/2019: Mental status improved; secretions persistent. De-escalated Abx to ceftriaxone given susceptibilities; added breathing tx q4. NPO in AM for possible extubation.  11/16/19: start glycopyrrolate for secretions  11/17: Extubated   11/18 transferred to hospital medicine     Interval hx:   Pt was seen and examined at bedside.  Patient is noted to be afebrile and hemodynamically stable yesterday.  Patient was noted to be sleeping but easily arousable.  He was oriented x4, conversant, able to answer simple questions and follow simple commands.  Patient denies any acute complaints at this time.  Patient has remained fever free for the last 2 days.    ROS (Positive in Bold, otherwise negative)  Constitutional: fever, chills, night sweats  CV:  chest pain, edema, palpitations  Resp: SOB, cough, sputum production  GI: changes in appetite, NVDC, pain, melena, hematochezia, GERD, hematemesis  : Dysuria, hematuria, urinary urgency, frequency  MSK: arthralgia/myalgia, joint swelling  Neuro/Psych: anxiety, depression    PEx   Temp:  [98 °F (36.7 °C)-99.6 °F (37.6 °C)]   Pulse:  [64-89]   Resp:  [15-21]   BP: (128-159)/(68-88)   SpO2:  [93 %-100 %]      I & O (Last 24H):     Intake/Output Summary (Last 24 hours) at 11/24/2019 1252  Last data filed at 11/23/2019 1800  Gross per 24 hour   Intake 900 ml   Output 600 ml   Net 300 ml       General:  male  in no acute distress. Awake, alert, Resting in bed.  Able to answer simple questions and follow simple commands.   HEENT: NCAT. PERRL. EOMI. Sclera Anicteric.  CVS: RRR. Normal S1 S2. No murmurs  Pulm: CTAB. Normal respiratory effort. No wheezes, rhonchi, or crackles.  Abdomen: Soft. Non-distended. No tenderness to palpation. No rebound or guarding. +BS.  Extremities: No edema. No cyanosis. Full ROM.  Neuro: Alert, oriented x 2, Spont mvt of all extremities with no focal deficits noted.    Recent Results (from the past 24 hour(s))   POCT glucose    Collection Time: 11/23/19  4:42 PM   Result Value Ref Range    POCT Glucose 149 (H) 70 - 110 mg/dL   POCT glucose    Collection Time: 11/23/19  8:18 PM   Result Value Ref Range    POCT Glucose 130 (H) 70 - 110 mg/dL   POCT glucose    Collection Time: 11/24/19 12:11 AM   Result Value Ref Range    POCT Glucose 140 (H) 70 - 110 mg/dL   Comprehensive metabolic panel    Collection Time: 11/24/19  3:01 AM   Result Value Ref Range    Sodium 140 136 - 145 mmol/L    Potassium 4.2 3.5 - 5.1 mmol/L    Chloride 108 95 - 110 mmol/L    CO2 24 23 - 29 mmol/L    Glucose 105 70 - 110 mg/dL    BUN, Bld 9 8 - 23 mg/dL    Creatinine 0.7 0.5 - 1.4 mg/dL    Calcium 8.0 (L) 8.7 - 10.5 mg/dL    Total Protein 6.2 6.0 - 8.4 g/dL    Albumin 2.3 (L) 3.5 - 5.2 g/dL    Total Bilirubin  0.4 0.1 - 1.0 mg/dL    Alkaline Phosphatase 65 55 - 135 U/L    AST 40 10 - 40 U/L    ALT 37 10 - 44 U/L    Anion Gap 8 8 - 16 mmol/L    eGFR if African American >60.0 >60 mL/min/1.73 m^2    eGFR if non African American >60.0 >60 mL/min/1.73 m^2   CBC auto differential    Collection Time: 11/24/19  3:01 AM   Result Value Ref Range    WBC 9.17 3.90 - 12.70 K/uL    RBC 3.73 (L) 4.60 - 6.20 M/uL    Hemoglobin 9.3 (L) 14.0 - 18.0 g/dL    Hematocrit 31.7 (L) 40.0 - 54.0 %    Mean Corpuscular Volume 85 82 - 98 fL    Mean Corpuscular Hemoglobin 24.9 (L) 27.0 - 31.0 pg    Mean Corpuscular Hemoglobin Conc 29.3 (L) 32.0 - 36.0 g/dL    RDW 13.9 11.5 - 14.5 %    Platelets 370 (H) 150 - 350 K/uL    MPV 10.2 9.2 - 12.9 fL    Immature Granulocytes 0.3 0.0 - 0.5 %    Gran # (ANC) 5.8 1.8 - 7.7 K/uL    Immature Grans (Abs) 0.03 0.00 - 0.04 K/uL    Lymph # 2.4 1.0 - 4.8 K/uL    Mono # 0.8 0.3 - 1.0 K/uL    Eos # 0.2 0.0 - 0.5 K/uL    Baso # 0.02 0.00 - 0.20 K/uL    nRBC 0 0 /100 WBC    Gran% 63.3 38.0 - 73.0 %    Lymph% 25.7 18.0 - 48.0 %    Mono% 8.8 4.0 - 15.0 %    Eosinophil% 1.7 0.0 - 8.0 %    Basophil% 0.2 0.0 - 1.9 %    Differential Method Automated    Magnesium    Collection Time: 11/24/19  3:01 AM   Result Value Ref Range    Magnesium 1.6 1.6 - 2.6 mg/dL   Phosphorus    Collection Time: 11/24/19  3:01 AM   Result Value Ref Range    Phosphorus 2.8 2.7 - 4.5 mg/dL   POCT glucose    Collection Time: 11/24/19  8:15 AM   Result Value Ref Range    POCT Glucose 116 (H) 70 - 110 mg/dL   POCT glucose    Collection Time: 11/24/19 12:11 PM   Result Value Ref Range    POCT Glucose 96 70 - 110 mg/dL       Recent Labs   Lab 11/23/19  1136 11/23/19  1642 11/23/19  2018 11/24/19  0011 11/24/19  0815 11/24/19  1211   POCTGLUCOSE 124* 149* 130* 140* 116* 96       Hemoglobin A1C   Date Value Ref Range Status   11/11/2019 6.4 (H) 4.0 - 5.6 % Final     Comment:     ADA Screening Guidelines:  5.7-6.4%  Consistent with prediabetes  >or=6.5%   Consistent with diabetes  High levels of fetal hemoglobin interfere with the HbA1C  assay. Heterozygous hemoglobin variants (HbS, HgC, etc)do  not significantly interfere with this assay.   However, presence of multiple variants may affect accuracy.     08/27/2019 6.4 (H) 4.0 - 5.6 % Final     Comment:     ADA Screening Guidelines:  5.7-6.4%  Consistent with prediabetes  >or=6.5%  Consistent with diabetes  High levels of fetal hemoglobin interfere with the HbA1C  assay. Heterozygous hemoglobin variants (HbS, HgC, etc)do  not significantly interfere with this assay.   However, presence of multiple variants may affect accuracy.          Active Hospital Problems    Diagnosis  POA    *Altered mental status [R41.82]  Yes    Restricted diet [Z71.3]  Not Applicable    Alteration in skin integrity [R23.9]  Yes    Acute encephalopathy [G93.40]  Unknown    Altered mental state [R41.82]  Yes    Leukocytosis [D72.829]  Yes    History of closed head injury [Z87.820]  Not Applicable    On mechanically assisted ventilation [Z99.11]  Not Applicable    UTI (urinary tract infection) [N39.0]  Yes    Bacteremia [R78.81]  Yes    Hypertension [I10]  Yes    Type 2 diabetes mellitus [E11.9]  Yes    History of CVA in adulthood [Z86.73]  Not Applicable    Seizure disorder [G40.909]  Yes      Resolved Hospital Problems   No resolved problems to display.          Assessment and Plan for problems addressed today:      albuterol-ipratropium  3 mL Nebulization Q4H    aspirin  81 mg Oral Daily    atorvastatin  40 mg Oral QHS    clopidogrel  75 mg Oral Daily    ertapenem (INVANZ) IVPB  1 g Intravenous Q24H    heparin (porcine)  5,000 Units Subcutaneous Q8H    levETIRAcetam  1,000 mg Oral BID    losartan  25 mg Oral Daily    polyethylene glycol  17 g Oral Daily    senna-docusate 8.6-50 mg  1 tablet Oral BID    sodium chloride 3%  4 mL Nebulization Q4H     acetaminophen, albuterol-ipratropium, bisacodyl, Dextrose 10% Bolus,  Dextrose 10% Bolus, glucagon (human recombinant), glucose, glucose, ibuprofen, insulin aspart U-100, melatonin, ondansetron, sodium chloride 0.9%    Acute metabolic encephalopathy:  Improving  Septic encephalopathy:  -Patient with Hx of frequent falls and UTIs was found down at home, incontinent of urine/feces 11/11.  He was last seen at baseline mental status 11/8.  -patient was intubated for airway protection, successfully extubated on 11/17.  -CT head noted to be nonacute  -MRI brain and c-spine wo contrast unremarkable  -cEEG done in the ICU did not show acute epileptic activity  -patient was noted to have positive blood cultures with Proteus, positive urine cultures with ESBL E coli which was thought to be the reason for his acute encephalopathy.    -Patient was treated for UTI initially with Zosyn then with ceftriaxone and mentation improved.  Patient was extubated on 11/17 and transitioned to Hospital Medicine on 11/18  -however, on 11/19, patient was noted to be febrile, and more encephalopathic  -patient underwent repeat EEG which did not reveal any acute seizures.  Head CT was nonacute.  CXR was WNL.  Patient was also pancultured which did not show any growth to date.  -over the last few days, mentation appears to be improving  -continue neuro checks  -fall precautions, aspiration precautions, seizure precautions  -Continue correction of metabolic derangements  -Maintain Delirium precautions    Febrile illness:  Resolved  -patient was continuing to spike daily fevers  -cultures show no growth to date  -continue ertapenem, vancomycin now discontinued  -bilateral lower extremity ultrasound to rule out DVT negative   -pending HIV, RPR, hepatitis panel  -monitor for hemodynamic instability     Seizure disorder  -patient is a history of seizures several years  -Loaded with Keppra in the ED  -cEEG negative in the ICU  -currently on  Keppra 1g BID. Continue on this dose until able to f/u with Neurology  outpatient  -noted to be more encephalopathic and 11/19, repeat EEG did not reveal acute encephalopathic activity.  Mentation now improving     ESBL UTI (urinary tract infection):  -Hx of UTIs, previous urine culture pos. for klebsiella  -UA with many bacteria, UCx w/ ESBL E coli  -patient is being treated with ceftriaxone however, however patient continued to spike fevers  -antibiotics were changed to ertapenem/vancomycin  -vancomycin DC on 11/20.  -Infectious Disease consulted, continue ertapenem for total 14 day course of antibiotics (end date 12/03)  -will need midline placed prior to discharge  -retroperitoneal ultrasound reveals mild left hydronephrosis otherwise no significant abnormality     Proteus Bacteremia:  -BCx w/ Proteus mirabilis from 11/11.  -blood cultures from 11/12 onwards have shown no growth to date  -Patient was previously on Zosyn, transition to ceftriaxone based on sensitivity report  -however, patient was noted to be febrile again on 11/18.  Antibiotics were broadened to ertapenem/vancomycin.  Blood cultures were repeated which show no growth to date  -vancomycin discontinued on 11/20  -infectious Disease was consulted.  Patient to continue ertapenem for total 14 day course of antibiotics    Diabetes Mellitus:  -Last HbA1c:  6.4  -SSI with accuchecks qACHS  -BG goal: Preprandial blood glucose target <140 mg/dL, Random glucoses <180 mg/dL  -ADA diet    Essential Hypertension:  -stable  -Continue PTA  losartan 25 mg daily  -monitor vitals q4h  -SBP goal of <160 in hospital    Hyperlipidemia:  -continue PTA atorvastatin 40 mg    History of CVA in adulthood  -continue Plavix, statin    DVT PPx:  Heparin    Discharge plan and follow up: DC to SNF, now medically stable     Esther Lu MD  Hospital Medicine Staff  [phone] pager

## 2019-11-25 LAB
ALBUMIN SERPL BCP-MCNC: 2.3 G/DL (ref 3.5–5.2)
ALP SERPL-CCNC: 66 U/L (ref 55–135)
ALT SERPL W/O P-5'-P-CCNC: 39 U/L (ref 10–44)
ANION GAP SERPL CALC-SCNC: 8 MMOL/L (ref 8–16)
AST SERPL-CCNC: 34 U/L (ref 10–40)
BASOPHILS # BLD AUTO: 0.05 K/UL (ref 0–0.2)
BASOPHILS NFR BLD: 0.6 % (ref 0–1.9)
BILIRUB SERPL-MCNC: 0.4 MG/DL (ref 0.1–1)
BUN SERPL-MCNC: 9 MG/DL (ref 8–23)
CALCIUM SERPL-MCNC: 8.2 MG/DL (ref 8.7–10.5)
CHLORIDE SERPL-SCNC: 106 MMOL/L (ref 95–110)
CO2 SERPL-SCNC: 24 MMOL/L (ref 23–29)
CREAT SERPL-MCNC: 0.8 MG/DL (ref 0.5–1.4)
DIFFERENTIAL METHOD: ABNORMAL
EOSINOPHIL # BLD AUTO: 0.1 K/UL (ref 0–0.5)
EOSINOPHIL NFR BLD: 1.5 % (ref 0–8)
ERYTHROCYTE [DISTWIDTH] IN BLOOD BY AUTOMATED COUNT: 13.7 % (ref 11.5–14.5)
EST. GFR  (AFRICAN AMERICAN): >60 ML/MIN/1.73 M^2
EST. GFR  (NON AFRICAN AMERICAN): >60 ML/MIN/1.73 M^2
GLUCOSE SERPL-MCNC: 123 MG/DL (ref 70–110)
HAV IGM SERPL QL IA: NEGATIVE
HBV CORE IGM SERPL QL IA: NEGATIVE
HBV SURFACE AG SERPL QL IA: NEGATIVE
HCT VFR BLD AUTO: 31.6 % (ref 40–54)
HCV AB SERPL QL IA: NEGATIVE
HGB BLD-MCNC: 9.5 G/DL (ref 14–18)
HIV 1+2 AB+HIV1 P24 AG SERPL QL IA: NEGATIVE
IMM GRANULOCYTES # BLD AUTO: 0.03 K/UL (ref 0–0.04)
IMM GRANULOCYTES NFR BLD AUTO: 0.3 % (ref 0–0.5)
LYMPHOCYTES # BLD AUTO: 2.4 K/UL (ref 1–4.8)
LYMPHOCYTES NFR BLD: 26 % (ref 18–48)
MAGNESIUM SERPL-MCNC: 1.6 MG/DL (ref 1.6–2.6)
MCH RBC QN AUTO: 25.3 PG (ref 27–31)
MCHC RBC AUTO-ENTMCNC: 30.1 G/DL (ref 32–36)
MCV RBC AUTO: 84 FL (ref 82–98)
MONOCYTES # BLD AUTO: 1.2 K/UL (ref 0.3–1)
MONOCYTES NFR BLD: 13 % (ref 4–15)
NEUTROPHILS # BLD AUTO: 5.3 K/UL (ref 1.8–7.7)
NEUTROPHILS NFR BLD: 58.6 % (ref 38–73)
NRBC BLD-RTO: 0 /100 WBC
PHOSPHATE SERPL-MCNC: 2.6 MG/DL (ref 2.7–4.5)
PLATELET # BLD AUTO: 429 K/UL (ref 150–350)
PMV BLD AUTO: 10 FL (ref 9.2–12.9)
POCT GLUCOSE: 120 MG/DL (ref 70–110)
POCT GLUCOSE: 122 MG/DL (ref 70–110)
POCT GLUCOSE: 135 MG/DL (ref 70–110)
POCT GLUCOSE: 148 MG/DL (ref 70–110)
POCT GLUCOSE: 181 MG/DL (ref 70–110)
POCT GLUCOSE: 193 MG/DL (ref 70–110)
POTASSIUM SERPL-SCNC: 4.2 MMOL/L (ref 3.5–5.1)
PROT SERPL-MCNC: 6.5 G/DL (ref 6–8.4)
RBC # BLD AUTO: 3.76 M/UL (ref 4.6–6.2)
SODIUM SERPL-SCNC: 138 MMOL/L (ref 136–145)
WBC # BLD AUTO: 9.09 K/UL (ref 3.9–12.7)

## 2019-11-25 PROCEDURE — 99232 PR SUBSEQUENT HOSPITAL CARE,LEVL II: ICD-10-PCS | Mod: ,,, | Performed by: HOSPITALIST

## 2019-11-25 PROCEDURE — 92526 ORAL FUNCTION THERAPY: CPT

## 2019-11-25 PROCEDURE — 25000003 PHARM REV CODE 250: Performed by: PHYSICIAN ASSISTANT

## 2019-11-25 PROCEDURE — 25000003 PHARM REV CODE 250: Performed by: STUDENT IN AN ORGANIZED HEALTH CARE EDUCATION/TRAINING PROGRAM

## 2019-11-25 PROCEDURE — 94640 AIRWAY INHALATION TREATMENT: CPT

## 2019-11-25 PROCEDURE — 63600175 PHARM REV CODE 636 W HCPCS: Performed by: HOSPITALIST

## 2019-11-25 PROCEDURE — 25000003 PHARM REV CODE 250: Performed by: HOSPITALIST

## 2019-11-25 PROCEDURE — 83735 ASSAY OF MAGNESIUM: CPT

## 2019-11-25 PROCEDURE — 63600175 PHARM REV CODE 636 W HCPCS: Performed by: NURSE PRACTITIONER

## 2019-11-25 PROCEDURE — C1751 CATH, INF, PER/CENT/MIDLINE: HCPCS

## 2019-11-25 PROCEDURE — 25000242 PHARM REV CODE 250 ALT 637 W/ HCPCS: Performed by: NURSE PRACTITIONER

## 2019-11-25 PROCEDURE — 80053 COMPREHEN METABOLIC PANEL: CPT

## 2019-11-25 PROCEDURE — 11000001 HC ACUTE MED/SURG PRIVATE ROOM

## 2019-11-25 PROCEDURE — 85025 COMPLETE CBC W/AUTO DIFF WBC: CPT

## 2019-11-25 PROCEDURE — 76937 US GUIDE VASCULAR ACCESS: CPT

## 2019-11-25 PROCEDURE — 25000242 PHARM REV CODE 250 ALT 637 W/ HCPCS: Performed by: PSYCHIATRY & NEUROLOGY

## 2019-11-25 PROCEDURE — 99232 SBSQ HOSP IP/OBS MODERATE 35: CPT | Mod: ,,, | Performed by: HOSPITALIST

## 2019-11-25 PROCEDURE — 92507 TX SP LANG VOICE COMM INDIV: CPT

## 2019-11-25 PROCEDURE — 84100 ASSAY OF PHOSPHORUS: CPT

## 2019-11-25 PROCEDURE — 97530 THERAPEUTIC ACTIVITIES: CPT

## 2019-11-25 PROCEDURE — 36573 INSJ PICC RS&I 5 YR+: CPT

## 2019-11-25 PROCEDURE — 94761 N-INVAS EAR/PLS OXIMETRY MLT: CPT

## 2019-11-25 RX ORDER — IPRATROPIUM BROMIDE AND ALBUTEROL SULFATE 2.5; .5 MG/3ML; MG/3ML
3 SOLUTION RESPIRATORY (INHALATION) EVERY 4 HOURS PRN
Qty: 1 BOX | Refills: 0
Start: 2019-11-25 | End: 2020-11-24

## 2019-11-25 RX ORDER — AMOXICILLIN 250 MG
1 CAPSULE ORAL DAILY PRN
Qty: 30 TABLET | Refills: 2
Start: 2019-11-25

## 2019-11-25 RX ORDER — LEVETIRACETAM 1000 MG/1
1000 TABLET ORAL 2 TIMES DAILY
Qty: 30 TABLET | Refills: 2
Start: 2019-11-25

## 2019-11-25 RX ORDER — SODIUM,POTASSIUM PHOSPHATES 280-250MG
1 POWDER IN PACKET (EA) ORAL ONCE
Status: COMPLETED | OUTPATIENT
Start: 2019-11-25 | End: 2019-11-25

## 2019-11-25 RX ADMIN — INSULIN ASPART 1 UNITS: 100 INJECTION, SOLUTION INTRAVENOUS; SUBCUTANEOUS at 12:11

## 2019-11-25 RX ADMIN — SODIUM CHLORIDE SOLN NEBU 3% 4 ML: 3 NEBU SOLN at 04:11

## 2019-11-25 RX ADMIN — POTASSIUM & SODIUM PHOSPHATES POWDER PACK 280-160-250 MG 1 PACKET: 280-160-250 PACK at 10:11

## 2019-11-25 RX ADMIN — IPRATROPIUM BROMIDE AND ALBUTEROL SULFATE 3 ML: .5; 3 SOLUTION RESPIRATORY (INHALATION) at 09:11

## 2019-11-25 RX ADMIN — ASPIRIN 81 MG CHEWABLE TABLET 81 MG: 81 TABLET CHEWABLE at 10:11

## 2019-11-25 RX ADMIN — IPRATROPIUM BROMIDE AND ALBUTEROL SULFATE 3 ML: .5; 3 SOLUTION RESPIRATORY (INHALATION) at 12:11

## 2019-11-25 RX ADMIN — ERTAPENEM 1 G: 1 INJECTION INTRAMUSCULAR; INTRAVENOUS at 10:11

## 2019-11-25 RX ADMIN — SENNOSIDES AND DOCUSATE SODIUM 1 TABLET: 8.6; 5 TABLET ORAL at 10:11

## 2019-11-25 RX ADMIN — INSULIN ASPART 1 UNITS: 100 INJECTION, SOLUTION INTRAVENOUS; SUBCUTANEOUS at 10:11

## 2019-11-25 RX ADMIN — LEVETIRACETAM 1000 MG: 500 TABLET ORAL at 10:11

## 2019-11-25 RX ADMIN — IPRATROPIUM BROMIDE AND ALBUTEROL SULFATE 3 ML: .5; 3 SOLUTION RESPIRATORY (INHALATION) at 04:11

## 2019-11-25 RX ADMIN — HEPARIN SODIUM 5000 UNITS: 5000 INJECTION, SOLUTION INTRAVENOUS; SUBCUTANEOUS at 10:11

## 2019-11-25 RX ADMIN — SODIUM CHLORIDE SOLN NEBU 3% 4 ML: 3 NEBU SOLN at 12:11

## 2019-11-25 RX ADMIN — IPRATROPIUM BROMIDE AND ALBUTEROL SULFATE 3 ML: .5; 3 SOLUTION RESPIRATORY (INHALATION) at 08:11

## 2019-11-25 RX ADMIN — IPRATROPIUM BROMIDE AND ALBUTEROL SULFATE 3 ML: .5; 3 SOLUTION RESPIRATORY (INHALATION) at 11:11

## 2019-11-25 RX ADMIN — ATORVASTATIN CALCIUM 40 MG: 20 TABLET, FILM COATED ORAL at 10:11

## 2019-11-25 RX ADMIN — HEPARIN SODIUM 5000 UNITS: 5000 INJECTION, SOLUTION INTRAVENOUS; SUBCUTANEOUS at 06:11

## 2019-11-25 RX ADMIN — HEPARIN SODIUM 5000 UNITS: 5000 INJECTION, SOLUTION INTRAVENOUS; SUBCUTANEOUS at 02:11

## 2019-11-25 RX ADMIN — IPRATROPIUM BROMIDE AND ALBUTEROL SULFATE 3 ML: .5; 3 SOLUTION RESPIRATORY (INHALATION) at 05:11

## 2019-11-25 RX ADMIN — LOSARTAN POTASSIUM 25 MG: 25 TABLET, FILM COATED ORAL at 10:11

## 2019-11-25 RX ADMIN — CLOPIDOGREL BISULFATE 75 MG: 75 TABLET, FILM COATED ORAL at 10:11

## 2019-11-25 RX ADMIN — SODIUM CHLORIDE SOLN NEBU 3% 4 ML: 3 NEBU SOLN at 09:11

## 2019-11-25 RX ADMIN — POLYETHYLENE GLYCOL 3350 17 G: 17 POWDER, FOR SOLUTION ORAL at 10:11

## 2019-11-25 NOTE — PLAN OF CARE
Problem: SLP Goal  Goal: SLP Goal  Description  Speech Language Pathology Goals  Goals to be met by (12/2)  1. Pt will tolerate mechanical soft diet with thin liquids without overt s/s of aspiration or airway compromise.   2. Pt will participate in ongoing assessment of swallow to determine least restrictive diet.  3. Pt will participate in speech/lang/cog evaluation to determine future therapeutic plan. - met  4. The pt will orient x4.  5. The pt will participate in rote verbal tasks with 75% acc given min cues.  6. Pt will answer simple YNQs with 75% acc given min cues.  7. Pt will participate in confrontation and categorical naming tasks with 75% acc given min cues.  8. Pt will participate in 1-2 step commands with 75% acc given mod cues.   9. Pt will participate in ongoing assessment of reading, writing, and visuospatial skills.       Pt seen this am by ST. All goals remain appropriate. Continue ST per POC.   Outcome: Ongoing, Progressing

## 2019-11-25 NOTE — PLAN OF CARE
Problem: Occupational Therapy Goal  Goal: Occupational Therapy Goal  Description  Goals set 11/12 to be addressed for 14 days with expiration date, 11/26  Patient will increase functional independence with ADLs by performing:    Patient will demonstrate rolling to the right with mod assist.  Not met   Patient will demonstrate rolling to the left with mod assist.   Not met  Patient will demonstrate supine -sit with mod  assist.   Not met  Patient will demonstrate stand pivot transfers with max assist.   Not met  Patient will demonstrate grooming while seated with max assist.   Not met  Patient will demonstrate upper body dressing with max assist while seated EOB.   Not met  Patient will demonstrate lower body dressing with max assist while seated EOB.   Not met  Patient will demonstrate toileting with max assist.   Not met  Patient will demonstrate bathing while seated EOB with max assist.   Not met  Patient will demonstrate ability to follow 3/5 commands.   Not met  Patient's family / caregiver will demonstrate independence and safety with assisting patient with self-care skills and functional mobility.     Not met  Patient's family / caregiver will demonstrate independence with providing ROM and changes in bed positioning.   Not met           Outcome: Ongoing, Progressing     Continue with POC.  Judy Guthrie OT  11/25/2019

## 2019-11-25 NOTE — CONSULTS
Midline placed in right brachial, [id number] Lot# [id number] Removal date on or chkwgm47/24/19. Needle advanced into vessel with real time ultrasound guidance. Image recorded and saved.

## 2019-11-25 NOTE — NURSING
Heparin was given late, mar administration time is different than actual given time. Next scheduled dose at 2200 not given because it was too close to previous administration.

## 2019-11-25 NOTE — PT/OT/SLP PROGRESS
Occupational Therapy   Treatment    Name: Edwin Lopez Jr.  MRN: [id number]  Admitting Diagnosis:  Altered mental status      * No surgery found *      Recommendations:     Discharge Recommendations: nursing facility, skilled  Discharge Equipment Recommendations:  bath bench  Barriers to discharge:  Inaccessible home environment, Decreased caregiver support    Assessment:     Edwin Lopez Jr. is a 69 y.o. male with a medical diagnosis of Altered mental status.  He presents with motivation to participate but exhibits decreased activity tolerance which limits patient's participation. Performance deficits affecting function are weakness, impaired endurance, impaired self care skills, impaired functional mobilty, impaired balance, decreased upper extremity function, decreased lower extremity function, impaired cardiopulmonary response to activity.     Rehab Prognosis:  Good; patient would benefit from acute skilled OT services to address these deficits and reach maximum level of function.       Plan:     Patient to be seen 3 x/week to address the above listed problems via self-care/home management, therapeutic activities, therapeutic exercises  · Plan of Care Expires: 12/10/19  · Plan of Care Reviewed with: patient    Subjective     Pain/Comfort:  · Pain Rating 1: other (see comments)(Did not rate pain)    Objective:     Communicated with: RN prior to session.  Patient found HOB elevated with SCD, telemetry, PRAFO(hep-lock IV) upon OT entry to room.    General Precautions: Standard, aspiration, fall   Orthopedic Precautions:N/A   Braces: N/A     Occupational Performance:     Bed Mobility:    · Patient completed Scooting to EOB for foot placement on floor with maximal assistance  · Patient completed Supine to Sit to L side EOB with maximal assist x2 for trunk and BLE advancement off bed  · Patient completed Sit to Supine with maximal assist x2 for trunk and BLE placement onto bed    Functional  Mobility/Transfers:  · Patient completed Sit <> Stand Transfer x2 trials with maximal assist x2 with hand-held assist to increase patient's standing activity tolerance. Patient unable to attain/maintain full upright standing position either trial and required verbal and tactile cueing throughout.  · Patient completed left lateral Squat Pivot Transfer x2 trials with maximal assist x2 for better positioning to return to supine  · Functional Mobility: Did not observe - unsafe at this time    Activities of Daily Living:  · Grooming: set-up assistance Patient washed his face with a washcloth while sitting EOB with set-up assist.      [facility] 6 Click ADL: 9    Treatment & Education:  Role of OT/POC  Call button for assistance    Patient left HOB elevated with all lines intact, call button in reach and RN notifiedEducation:      GOALS:   Multidisciplinary Problems     Occupational Therapy Goals        Problem: Occupational Therapy Goal    Goal Priority Disciplines Outcome Interventions   Occupational Therapy Goal     OT, PT/OT Ongoing, Progressing    Description:  Goals set 11/12 to be addressed for 14 days with expiration date, 11/26  Patient will increase functional independence with ADLs by performing:    Patient will demonstrate rolling to the right with mod assist.  Not met   Patient will demonstrate rolling to the left with mod assist.   Not met  Patient will demonstrate supine -sit with mod  assist.   Not met  Patient will demonstrate stand pivot transfers with max assist.   Not met  Patient will demonstrate grooming while seated with max assist.   Not met  Patient will demonstrate upper body dressing with max assist while seated EOB.   Not met  Patient will demonstrate lower body dressing with max assist while seated EOB.   Not met  Patient will demonstrate toileting with max assist.   Not met  Patient will demonstrate bathing while seated EOB with max assist.   Not met  Patient will demonstrate ability to follow 3/5  commands.   Not met  Patient's family / caregiver will demonstrate independence and safety with assisting patient with self-care skills and functional mobility.     Not met  Patient's family / caregiver will demonstrate independence with providing ROM and changes in bed positioning.   Not met                            Time Tracking:     OT Date of Treatment: 11/25/19  OT Start Time: 1424  OT Stop Time: 1452  OT Total Time (min): 28 min    Billable Minutes:Therapeutic Activity 28 minutes    Judy Guthrie OT  11/25/2019

## 2019-11-25 NOTE — PT/OT/SLP PROGRESS
"Speech Language Pathology Treatment    Patient Name:  Edwin Lopez Jr.   MRN:  [id number]  Admitting Diagnosis: Altered mental status    Recommendations:                 General Recommendations:  Dysphagia therapy and Speech/language therapy  Diet recommendations:  Mechanical soft, Liquid Diet Level: Thin   Aspiration Precautions: 1 bite/sip at a time, Assistance with meals, Feed only when awake/alert, HOB to 90 degrees, Meds whole 1 at a time, Small bites/sips and Standard aspiration precautions   General Precautions: Standard, aspiration, fall  Communication strategies:  provide increased time to answer    Subjective     Pt awake and alert when ST arrived. RN at bedside providing assistance with meal/meds.     Pain/Comfort:  · Pain Rating 1: 0/10  · Pain Rating Post-Intervention 1: 0/10    Objective:     Has the patient been evaluated by SLP for swallowing?   Yes  Keep patient NPO? No   Current Respiratory Status:        Pt slouched in bed upon ST arrival with wet vocal quality. ST and RN repositioned pt in bed for safest po posture. Once upright, pt coughed clear secretions suctioned independently via yankauer. Vocal quality clear post cough. Pt consumed x2 bites ivette cracker, one pill, and x6 straw sips apple juice maintaining clear vocal quality without overt s/s of aspiration or airway compromise. Pt safe to remain on mechanical soft diet with thin liquids at this time following safe swallow precautions including HOB at 90 degrees and small bites/sips. Meds whole, one at a time acceptable.     Pt named 7/7 MAINE independently and 10/12 SAMEER independently, 12/12 with min cues. Pt achieved 100% acc independently with confrontation naming task and 45% acc with divergent naming task when asked to name 3 members of a given category. Perseveration of "[place]" noted x1. ST educated the pt regarding diet rec, safe swallow precautions, and POC moving forward to which the pt verbalized understanding. Carryover of " precautions questionable, pt would benefit from reinforcement. Continue ST per POC.     Assessment:     Edwin Lopez Jr. is a 69 y.o. male with an SLP diagnosis of Aphasia and Dysphagia.     Goals:   Multidisciplinary Problems     SLP Goals        Problem: SLP Goal    Goal Priority Disciplines Outcome   SLP Goal     SLP Ongoing, Progressing   Description:  Speech Language Pathology Goals  Goals to be met by (12/2)  1. Pt will tolerate mechanical soft diet with thin liquids without overt s/s of aspiration or airway compromise.   2. Pt will participate in ongoing assessment of swallow to determine least restrictive diet.  3. Pt will participate in speech/lang/cog evaluation to determine future therapeutic plan. - met  4. The pt will orient x4.  5. The pt will participate in rote verbal tasks with 75% acc given min cues.  6. Pt will answer simple YNQs with 75% acc given min cues.  7. Pt will participate in confrontation and categorical naming tasks with 75% acc given min cues.  8. Pt will participate in 1-2 step commands with 75% acc given mod cues.   9. Pt will participate in ongoing assessment of reading, writing, and visuospatial skills.                      Plan:     · Patient to be seen:  4 x/week   · Plan of Care expires:  12/17/19  · Plan of Care reviewed with:  patient   · SLP Follow-Up:  Yes       Discharge recommendations:  nursing facility, skilled     Time Tracking:     SLP Treatment Date:   11/25/19  Speech Start Time:  1037  Speech Stop Time:  1053     Speech Total Time (min):  16 min    Billable Minutes: Speech Therapy Individual 8 and Treatment Swallowing Dysfunction 8    Simran Yi, [facility]  11/25/2019

## 2019-11-25 NOTE — PLAN OF CARE
[facility]     Department of Hospital Medicine     [address] [place] [address]     [phone] after hours  [phone] fax       NURSING HOME ORDERS    12/02/2019    Admit to Nursing Home:  Skilled Bed                                                  Diagnoses:  Active Hospital Problems    Diagnosis  POA    *Acute encephalopathy [G93.40]  Yes    Restricted diet [Z71.3]  Not Applicable    Alteration in skin integrity [R23.9]  Yes    Altered mental state [R41.82]  Yes    Leukocytosis [D72.829]  Yes    History of closed head injury [Z87.820]  Not Applicable    On mechanically assisted ventilation [Z99.11]  Not Applicable    UTI (urinary tract infection) [N39.0]  Yes    Bacteremia [R78.81]  Yes    Altered mental status [R41.82]  Yes    Hypertension [I10]  Yes    Type 2 diabetes mellitus [E11.9]  Yes    History of CVA in adulthood [Z86.73]  Not Applicable    Seizure disorder [G40.909]  Yes      Resolved Hospital Problems   No resolved problems to display.       Patient is homebound due to:  Acute encephalopathy    Allergies:Review of patient's allergies indicates:  No Known Allergies    Vitals:  Every shift (Skilled Nursing patients)    Diet: Cardiac/Diabetic Mechanical soft, with thin liquids     Supplement:  1 can every three times a day with meals                         Type:    Glucerna      Acitivities: Per PT   - Up in a chair each morning as tolerated   - Ambulate with assistance to bathroom   - Scheduled walks once each shift (every 8 hours)    LABS:  Per facility protocol    Nursing Precautions:     - Aspiration precautions:             - Total assistance with meals            -  Upright 90 degrees befor during and after meals             -  Suction at bedside          - Fall precautions per nursing home protocol   - Seizure precaution per correction protocol   - Decubitus precautions:        -  for positioning   - Pressure  reducing foam mattress   - Turn patient every two hours. Use wedge pillows to anchor patient    CONSULTS:      Physical Therapy to evaluate and treat     Occupational Therapy to evaluate and treat     Speech Therapy  to evaluate and treat     Nutrition to evaluate and recommend diet     Psychiatry to evaluate and follow patients for delirium      MISCELLANEOUS CARE:     Routine Skin for Bedridden Patients:  Apply moisture barrier cream to all skin folds and wet areas in perineal area daily and after baths and all bowel movements.                  DIABETES CARE:      Check blood sugar:       Fingerstick blood sugar AC and HS   Fingerstick blood sugar every 6 hours if unable to eat      Report CBG < 60 or > 400 to physician.                                          Insulin Sliding Scale          Glucose  Novolog Insulin Subcutaneous        0 - 60   Orange juice or glucose tablet, hold insulin      No insulin   201-250  2 units   251-300  4 units   301-350  6 units   351-400  8 units   >400   10 units then call physician      Medications: Discontinue all previous medication orders, if any. See new list below.     Edwin Lopez Jr.   Home Medication Instructions IZABEL:[id number]    Printed on:11/25/19 1215   Medication Information                      albuterol-ipratropium (DUO-NEB) 2.5 mg-0.5 mg/3 mL nebulizer solution  Take 3 mLs by nebulization every 4 (four) hours as needed for Wheezing or Shortness of Breath. Rescue             aspirin (ECOTRIN) 81 MG EC tablet  Take 81 mg by mouth once daily.             atorvastatin (LIPITOR) 40 MG tablet  Take 1 tablet (40 mg total) by mouth once daily.             b complex vitamins tablet  Take 1 tablet by mouth once daily.             clopidogrel (PLAVIX) 75 mg tablet  Take 75 mg by mouth once daily.             ertapenem sodium (ERTAPENEM, INVANZ, 1 G/100 ML NS, READY TO MIX,)  Inject 100 mLs (1 g total) into the vein once daily. for 8 days (END DATE 12/3/19)              levETIRAcetam (KEPPRA) 1000 MG tablet  Take 1 tablet (1,000 mg total) by mouth 2 (two) times daily.             losartan (COZAAR) 25 MG tablet  Take 25 mg by mouth once daily.             metFORMIN (GLUCOPHAGE-XR) 500 MG 24 hr tablet  Take 500 mg by mouth 2 (two) times daily with meals.             senna-docusate 8.6-50 mg (PERICOLACE) 8.6-50 mg per tablet  Take 1 tablet by mouth daily as needed for Constipation.                       _________________________________  Jus Russell MD  12/02/2019

## 2019-11-25 NOTE — PROGRESS NOTES
Hospital Medicine   Progress note      Team: [facility] HOSP MED G Esther Lu MD   Admit Date: 11/11/2019   Hospital Day: 14  NAI: 11/26/2019   Code status: Full Code   Principal Problem: Altered mental status     HPI: Mr Lopez is a 69 yr old male with a PMH of CVA, seizures, CHI, DM II, dementia, HTN, who presents to the ED with AMS. Patient was last heard from by a friend on Friday 11/8 (3 days ago). Found down at home 11/11, incontinent of urine and bowel. CTH negative. MRI c-spine, brain pending. Mild leukocytosis, UA positive for UTI. cEEG pending. Patient intubated in the ED for airway protection. Will admit to [facility] for higher level of care. Will need LP.    ICU Course:   11/11/2019: Intubated in ED. Admit to [facility].  11/12/2019: Switched Ceftriaxone to Zosyn, rechecking BCx, IVF @ 150cc/hr w/ 1L bolus; Tube feeds & dvt ppx initiated. EEG suppressed, propofol disctoninued & fentanyl 50mcg q2hr added.  11/13/2019: Steadily improving since presentation. Continue Keppra & Abx, added 200cc q6 FW to feeds, & dc'd vent.  11/14/2019: mental status continues to improve. Following commands in all 4 ext. FW 200cc q6hr; Extubate today; pending sensitivities of cx's; added ASA/statin  11/15/2019: Mental status improved; secretions persistent. De-escalated Abx to ceftriaxone given susceptibilities; added breathing tx q4. NPO in AM for possible extubation.  11/16/19: start glycopyrrolate for secretions  11/17: Extubated   11/18 transferred to hospital medicine     Interval hx:   Pt was seen and examined at bedside.  Patient is noted to be afebrile and hemodynamically stable yesterday.  Patient is awake, alert, more conversant, able to answer questions appropriately and follow commands.  He is excited to be discharged from the hospital soon and go to [facility] to get therapy.    ROS (Positive in Bold, otherwise negative)  Constitutional: fever, chills, night sweats  CV: chest pain, edema, palpitations  Resp: SOB, cough, sputum  production  GI: changes in appetite, NVDC, pain, melena, hematochezia, GERD, hematemesis  : Dysuria, hematuria, urinary urgency, frequency  MSK: arthralgia/myalgia, joint swelling  Neuro/Psych: anxiety, depression    PEx   Temp:  [97.8 °F (36.6 °C)-99.8 °F (37.7 °C)]   Pulse:  [73-90]   Resp:  [16-20]   BP: (137-154)/(86-89)   SpO2:  [94 %-98 %]      I & O (Last 24H):     Intake/Output Summary (Last 24 hours) at 11/25/2019 1207  Last data filed at 11/24/2019 2135  Gross per 24 hour   Intake --   Output 350 ml   Net -350 ml       General:  male  in no acute distress. Awake, alert, Resting in bed.  Able to answer simple questions and follow simple commands.   HEENT: NCAT. PERRL. EOMI. Sclera Anicteric.  CVS: RRR. Normal S1 S2. No murmurs  Pulm: CTAB. Normal respiratory effort. No wheezes, rhonchi, or crackles.  Abdomen: Soft. Non-distended. No tenderness to palpation. No rebound or guarding. +BS.  Extremities: No edema. No cyanosis. Full ROM.  Neuro: Alert, oriented x 2, Spont mvt of all extremities with no focal deficits noted.    Recent Results (from the past 24 hour(s))   POCT glucose    Collection Time: 11/24/19 12:11 PM   Result Value Ref Range    POCT Glucose 96 70 - 110 mg/dL   POCT glucose    Collection Time: 11/24/19  4:12 PM   Result Value Ref Range    POCT Glucose 109 70 - 110 mg/dL   POCT glucose    Collection Time: 11/24/19  5:03 PM   Result Value Ref Range    POCT Glucose 115 (H) 70 - 110 mg/dL   POCT glucose    Collection Time: 11/24/19  9:26 PM   Result Value Ref Range    POCT Glucose 122 (H) 70 - 110 mg/dL   POCT glucose    Collection Time: 11/25/19 12:30 AM   Result Value Ref Range    POCT Glucose 181 (H) 70 - 110 mg/dL   Comprehensive metabolic panel    Collection Time: 11/25/19  3:36 AM   Result Value Ref Range    Sodium 138 136 - 145 mmol/L    Potassium 4.2 3.5 - 5.1 mmol/L    Chloride 106 95 - 110 mmol/L    CO2 24 23 - 29 mmol/L    Glucose 123 (H) 70 - 110 mg/dL    BUN, Bld 9 8  - 23 mg/dL    Creatinine 0.8 0.5 - 1.4 mg/dL    Calcium 8.2 (L) 8.7 - 10.5 mg/dL    Total Protein 6.5 6.0 - 8.4 g/dL    Albumin 2.3 (L) 3.5 - 5.2 g/dL    Total Bilirubin 0.4 0.1 - 1.0 mg/dL    Alkaline Phosphatase 66 55 - 135 U/L    AST 34 10 - 40 U/L    ALT 39 10 - 44 U/L    Anion Gap 8 8 - 16 mmol/L    eGFR if African American >60.0 >60 mL/min/1.73 m^2    eGFR if non African American >60.0 >60 mL/min/1.73 m^2   CBC auto differential    Collection Time: 11/25/19  3:37 AM   Result Value Ref Range    WBC 9.09 3.90 - 12.70 K/uL    RBC 3.76 (L) 4.60 - 6.20 M/uL    Hemoglobin 9.5 (L) 14.0 - 18.0 g/dL    Hematocrit 31.6 (L) 40.0 - 54.0 %    Mean Corpuscular Volume 84 82 - 98 fL    Mean Corpuscular Hemoglobin 25.3 (L) 27.0 - 31.0 pg    Mean Corpuscular Hemoglobin Conc 30.1 (L) 32.0 - 36.0 g/dL    RDW 13.7 11.5 - 14.5 %    Platelets 429 (H) 150 - 350 K/uL    MPV 10.0 9.2 - 12.9 fL    Immature Granulocytes 0.3 0.0 - 0.5 %    Gran # (ANC) 5.3 1.8 - 7.7 K/uL    Immature Grans (Abs) 0.03 0.00 - 0.04 K/uL    Lymph # 2.4 1.0 - 4.8 K/uL    Mono # 1.2 (H) 0.3 - 1.0 K/uL    Eos # 0.1 0.0 - 0.5 K/uL    Baso # 0.05 0.00 - 0.20 K/uL    nRBC 0 0 /100 WBC    Gran% 58.6 38.0 - 73.0 %    Lymph% 26.0 18.0 - 48.0 %    Mono% 13.0 4.0 - 15.0 %    Eosinophil% 1.5 0.0 - 8.0 %    Basophil% 0.6 0.0 - 1.9 %    Differential Method Automated    Magnesium    Collection Time: 11/25/19  3:37 AM   Result Value Ref Range    Magnesium 1.6 1.6 - 2.6 mg/dL   Phosphorus    Collection Time: 11/25/19  3:37 AM   Result Value Ref Range    Phosphorus 2.6 (L) 2.7 - 4.5 mg/dL   POCT glucose    Collection Time: 11/25/19  3:46 AM   Result Value Ref Range    POCT Glucose 122 (H) 70 - 110 mg/dL   POCT glucose    Collection Time: 11/25/19  7:32 AM   Result Value Ref Range    POCT Glucose 120 (H) 70 - 110 mg/dL       Recent Labs   Lab 11/24/19  1612 11/24/19  1703 11/24/19  2126 11/25/19  0030 11/25/19  0346 11/25/19  0732   POCTGLUCOSE 109 115* 122* 181* 122* 120*        Hemoglobin A1C   Date Value Ref Range Status   11/11/2019 6.4 (H) 4.0 - 5.6 % Final     Comment:     ADA Screening Guidelines:  5.7-6.4%  Consistent with prediabetes  >or=6.5%  Consistent with diabetes  High levels of fetal hemoglobin interfere with the HbA1C  assay. Heterozygous hemoglobin variants (HbS, HgC, etc)do  not significantly interfere with this assay.   However, presence of multiple variants may affect accuracy.     08/27/2019 6.4 (H) 4.0 - 5.6 % Final     Comment:     ADA Screening Guidelines:  5.7-6.4%  Consistent with prediabetes  >or=6.5%  Consistent with diabetes  High levels of fetal hemoglobin interfere with the HbA1C  assay. Heterozygous hemoglobin variants (HbS, HgC, etc)do  not significantly interfere with this assay.   However, presence of multiple variants may affect accuracy.          Active Hospital Problems    Diagnosis  POA    *Altered mental status [R41.82]  Yes    Restricted diet [Z71.3]  Not Applicable    Alteration in skin integrity [R23.9]  Yes    Acute encephalopathy [G93.40]  Unknown    Altered mental state [R41.82]  Yes    Leukocytosis [D72.829]  Yes    History of closed head injury [Z87.820]  Not Applicable    On mechanically assisted ventilation [Z99.11]  Not Applicable    UTI (urinary tract infection) [N39.0]  Yes    Bacteremia [R78.81]  Yes    Hypertension [I10]  Yes    Type 2 diabetes mellitus [E11.9]  Yes    History of CVA in adulthood [Z86.73]  Not Applicable    Seizure disorder [G40.909]  Yes      Resolved Hospital Problems   No resolved problems to display.          Assessment and Plan for problems addressed today:      albuterol-ipratropium  3 mL Nebulization Q4H    aspirin  81 mg Oral Daily    atorvastatin  40 mg Oral QHS    clopidogrel  75 mg Oral Daily    ertapenem (INVANZ) IVPB  1 g Intravenous Q24H    heparin (porcine)  5,000 Units Subcutaneous Q8H    levETIRAcetam  1,000 mg Oral BID    losartan  25 mg Oral Daily    polyethylene glycol   17 g Oral Daily    senna-docusate 8.6-50 mg  1 tablet Oral BID     acetaminophen, albuterol-ipratropium, bisacodyl, Dextrose 10% Bolus, Dextrose 10% Bolus, glucagon (human recombinant), glucose, glucose, insulin aspart U-100, melatonin, ondansetron, sodium chloride 0.9%    Acute metabolic encephalopathy:  Resolved  Septic encephalopathy:  -Patient with Hx of frequent falls and UTIs was found down at home, incontinent of urine/feces 11/11.  He was last seen at baseline mental status 11/8.  -patient was intubated for airway protection, successfully extubated on 11/17.  -CT head noted to be nonacute  -MRI brain and c-spine wo contrast unremarkable  -cEEG done in the ICU did not show acute epileptic activity  -patient was noted to have positive blood cultures with Proteus, positive urine cultures with ESBL E coli which was thought to be the reason for his acute encephalopathy.    -Patient was treated for UTI initially with Zosyn then with ceftriaxone and mentation improved.  Patient was extubated on 11/17 and transitioned to Hospital Medicine on 11/18  -however, on 11/19, patient was noted to be febrile, and more encephalopathic  -patient underwent repeat EEG which did not reveal any acute seizures.  Head CT was nonacute.  CXR was WNL.  Patient was also pancultured which did not show any growth to date.  -antibiotics were upgraded to ertapenem based on sensitivity report and over the last few days, mentation has improved significantly  -continue neuro checks  -fall precautions, aspiration precautions, seizure precautions  -Continue correction of metabolic derangements  -Maintain Delirium precautions    ESBL UTI (urinary tract infection):  -Hx of UTIs, previous urine culture pos. for klebsiella  -UA with many bacteria, UCx w/ ESBL E coli  -patient is being treated with ceftriaxone however, however patient continued to spike fevers  -antibiotics were changed to ertapenem/vancomycin  -vancomycin DC on 11/20.  -Infectious  Disease consulted, continue ertapenem for total 14 day course of antibiotics (end date 12/03)  -will need midline placed prior to discharge  -retroperitoneal ultrasound reveals mild left hydronephrosis otherwise no significant abnormality    Proteus Bacteremia:  -BCx w/ Proteus mirabilis from 11/11.  -blood cultures from 11/12 onwards have shown no growth to date  -Patient was previously on Zosyn, transition to ceftriaxone based on sensitivity report  -however, patient was noted to be febrile again on 11/18.  Antibiotics were broadened to ertapenem/vancomycin.  Blood cultures were repeated which show no growth to date  -vancomycin discontinued on 11/20  -infectious Disease was consulted.  Patient to continue ertapenem for total 14 day course of antibiotics    Febrile illness:  Resolved  -patient was continuing to spike daily fevers  -cultures show no growth to date  -continue ertapenem, vancomycin now discontinued  -bilateral lower extremity ultrasound to rule out DVT negative   -monitor for hemodynamic instability     Seizure disorder:  -patient is a history of seizures several years  -Loaded with Keppra in the ED  -cEEG negative in the ICU  -currently on  Keppra 1g BID. Continue on this dose until able to f/u with Neurology outpatient  -noted to be more encephalopathic and 11/19, repeat EEG did not reveal acute encephalopathic activity.  Mentation now improving    Diabetes Mellitus:  -Last HbA1c:  6.4  -SSI with accuchecks qACHS  -BG goal: Preprandial blood glucose target <140 mg/dL, Random glucoses <180 mg/dL  -ADA diet    Essential Hypertension:  -stable  -Continue PTA  losartan 25 mg daily  -monitor vitals q4h  -SBP goal of <160 in hospital    Hyperlipidemia:  -continue PTA atorvastatin 40 mg    History of CVA in adulthood  -continue Plavix, statin    DVT PPx:  Heparin    Discharge plan and follow up: DC to SNF, now medically stable and pending placement    Esther Lu MD  [facility] Medicine  Staff  [phone] pager

## 2019-11-26 LAB
ALBUMIN SERPL BCP-MCNC: 2.4 G/DL (ref 3.5–5.2)
ALP SERPL-CCNC: 68 U/L (ref 55–135)
ALT SERPL W/O P-5'-P-CCNC: 48 U/L (ref 10–44)
ANION GAP SERPL CALC-SCNC: 9 MMOL/L (ref 8–16)
AST SERPL-CCNC: 39 U/L (ref 10–40)
BASOPHILS # BLD AUTO: 0.04 K/UL (ref 0–0.2)
BASOPHILS NFR BLD: 0.4 % (ref 0–1.9)
BILIRUB SERPL-MCNC: 0.5 MG/DL (ref 0.1–1)
BUN SERPL-MCNC: 11 MG/DL (ref 8–23)
CALCIUM SERPL-MCNC: 8.4 MG/DL (ref 8.7–10.5)
CHLORIDE SERPL-SCNC: 106 MMOL/L (ref 95–110)
CO2 SERPL-SCNC: 24 MMOL/L (ref 23–29)
CREAT SERPL-MCNC: 0.7 MG/DL (ref 0.5–1.4)
DIFFERENTIAL METHOD: ABNORMAL
EOSINOPHIL # BLD AUTO: 0.2 K/UL (ref 0–0.5)
EOSINOPHIL NFR BLD: 2 % (ref 0–8)
ERYTHROCYTE [DISTWIDTH] IN BLOOD BY AUTOMATED COUNT: 13.6 % (ref 11.5–14.5)
EST. GFR  (AFRICAN AMERICAN): >60 ML/MIN/1.73 M^2
EST. GFR  (NON AFRICAN AMERICAN): >60 ML/MIN/1.73 M^2
GLUCOSE SERPL-MCNC: 104 MG/DL (ref 70–110)
HCT VFR BLD AUTO: 33.3 % (ref 40–54)
HGB BLD-MCNC: 10 G/DL (ref 14–18)
IMM GRANULOCYTES # BLD AUTO: 0.05 K/UL (ref 0–0.04)
IMM GRANULOCYTES NFR BLD AUTO: 0.5 % (ref 0–0.5)
LYMPHOCYTES # BLD AUTO: 3.2 K/UL (ref 1–4.8)
LYMPHOCYTES NFR BLD: 33.8 % (ref 18–48)
MAGNESIUM SERPL-MCNC: 1.7 MG/DL (ref 1.6–2.6)
MCH RBC QN AUTO: 25.4 PG (ref 27–31)
MCHC RBC AUTO-ENTMCNC: 30 G/DL (ref 32–36)
MCV RBC AUTO: 85 FL (ref 82–98)
MONOCYTES # BLD AUTO: 1 K/UL (ref 0.3–1)
MONOCYTES NFR BLD: 10.5 % (ref 4–15)
NEUTROPHILS # BLD AUTO: 4.9 K/UL (ref 1.8–7.7)
NEUTROPHILS NFR BLD: 52.8 % (ref 38–73)
NRBC BLD-RTO: 0 /100 WBC
PHOSPHATE SERPL-MCNC: 2.5 MG/DL (ref 2.7–4.5)
PLATELET # BLD AUTO: 463 K/UL (ref 150–350)
PMV BLD AUTO: 10.1 FL (ref 9.2–12.9)
POCT GLUCOSE: 112 MG/DL (ref 70–110)
POCT GLUCOSE: 121 MG/DL (ref 70–110)
POCT GLUCOSE: 121 MG/DL (ref 70–110)
POCT GLUCOSE: 126 MG/DL (ref 70–110)
POTASSIUM SERPL-SCNC: 4.3 MMOL/L (ref 3.5–5.1)
PROT SERPL-MCNC: 6.8 G/DL (ref 6–8.4)
RBC # BLD AUTO: 3.93 M/UL (ref 4.6–6.2)
SODIUM SERPL-SCNC: 139 MMOL/L (ref 136–145)
WBC # BLD AUTO: 9.34 K/UL (ref 3.9–12.7)

## 2019-11-26 PROCEDURE — 25000242 PHARM REV CODE 250 ALT 637 W/ HCPCS: Performed by: NURSE PRACTITIONER

## 2019-11-26 PROCEDURE — 85025 COMPLETE CBC W/AUTO DIFF WBC: CPT

## 2019-11-26 PROCEDURE — 92526 ORAL FUNCTION THERAPY: CPT

## 2019-11-26 PROCEDURE — 80053 COMPREHEN METABOLIC PANEL: CPT

## 2019-11-26 PROCEDURE — 99231 PR SUBSEQUENT HOSPITAL CARE,LEVL I: ICD-10-PCS | Mod: ,,, | Performed by: INTERNAL MEDICINE

## 2019-11-26 PROCEDURE — 97535 SELF CARE MNGMENT TRAINING: CPT

## 2019-11-26 PROCEDURE — 94640 AIRWAY INHALATION TREATMENT: CPT

## 2019-11-26 PROCEDURE — 94761 N-INVAS EAR/PLS OXIMETRY MLT: CPT

## 2019-11-26 PROCEDURE — 83735 ASSAY OF MAGNESIUM: CPT

## 2019-11-26 PROCEDURE — 92507 TX SP LANG VOICE COMM INDIV: CPT

## 2019-11-26 PROCEDURE — 25000003 PHARM REV CODE 250: Performed by: STUDENT IN AN ORGANIZED HEALTH CARE EDUCATION/TRAINING PROGRAM

## 2019-11-26 PROCEDURE — 97803 MED NUTRITION INDIV SUBSEQ: CPT

## 2019-11-26 PROCEDURE — 63600175 PHARM REV CODE 636 W HCPCS: Performed by: HOSPITALIST

## 2019-11-26 PROCEDURE — 97530 THERAPEUTIC ACTIVITIES: CPT

## 2019-11-26 PROCEDURE — 36415 COLL VENOUS BLD VENIPUNCTURE: CPT

## 2019-11-26 PROCEDURE — 99231 SBSQ HOSP IP/OBS SF/LOW 25: CPT | Mod: ,,, | Performed by: INTERNAL MEDICINE

## 2019-11-26 PROCEDURE — 25000003 PHARM REV CODE 250: Performed by: HOSPITALIST

## 2019-11-26 PROCEDURE — 63600175 PHARM REV CODE 636 W HCPCS: Performed by: NURSE PRACTITIONER

## 2019-11-26 PROCEDURE — 25000003 PHARM REV CODE 250: Performed by: INTERNAL MEDICINE

## 2019-11-26 PROCEDURE — 11000001 HC ACUTE MED/SURG PRIVATE ROOM

## 2019-11-26 PROCEDURE — 25000003 PHARM REV CODE 250: Performed by: PHYSICIAN ASSISTANT

## 2019-11-26 PROCEDURE — 84100 ASSAY OF PHOSPHORUS: CPT

## 2019-11-26 RX ORDER — SODIUM,POTASSIUM PHOSPHATES 280-250MG
1 POWDER IN PACKET (EA) ORAL EVERY 4 HOURS
Status: COMPLETED | OUTPATIENT
Start: 2019-11-26 | End: 2019-11-26

## 2019-11-26 RX ADMIN — LOSARTAN POTASSIUM 25 MG: 25 TABLET, FILM COATED ORAL at 08:11

## 2019-11-26 RX ADMIN — SENNOSIDES AND DOCUSATE SODIUM 1 TABLET: 8.6; 5 TABLET ORAL at 08:11

## 2019-11-26 RX ADMIN — LEVETIRACETAM 1000 MG: 500 TABLET ORAL at 10:11

## 2019-11-26 RX ADMIN — POLYETHYLENE GLYCOL 3350 17 G: 17 POWDER, FOR SOLUTION ORAL at 08:11

## 2019-11-26 RX ADMIN — ASPIRIN 81 MG CHEWABLE TABLET 81 MG: 81 TABLET CHEWABLE at 08:11

## 2019-11-26 RX ADMIN — IPRATROPIUM BROMIDE AND ALBUTEROL SULFATE 3 ML: .5; 3 SOLUTION RESPIRATORY (INHALATION) at 08:11

## 2019-11-26 RX ADMIN — IPRATROPIUM BROMIDE AND ALBUTEROL SULFATE 3 ML: .5; 3 SOLUTION RESPIRATORY (INHALATION) at 03:11

## 2019-11-26 RX ADMIN — HEPARIN SODIUM 5000 UNITS: 5000 INJECTION, SOLUTION INTRAVENOUS; SUBCUTANEOUS at 04:11

## 2019-11-26 RX ADMIN — ERTAPENEM 1 G: 1 INJECTION INTRAMUSCULAR; INTRAVENOUS at 10:11

## 2019-11-26 RX ADMIN — IPRATROPIUM BROMIDE AND ALBUTEROL SULFATE 3 ML: .5; 3 SOLUTION RESPIRATORY (INHALATION) at 11:11

## 2019-11-26 RX ADMIN — POTASSIUM & SODIUM PHOSPHATES POWDER PACK 280-160-250 MG 1 PACKET: 280-160-250 PACK at 08:11

## 2019-11-26 RX ADMIN — HEPARIN SODIUM 5000 UNITS: 5000 INJECTION, SOLUTION INTRAVENOUS; SUBCUTANEOUS at 10:11

## 2019-11-26 RX ADMIN — LEVETIRACETAM 1000 MG: 500 TABLET ORAL at 08:11

## 2019-11-26 RX ADMIN — ATORVASTATIN CALCIUM 40 MG: 20 TABLET, FILM COATED ORAL at 10:11

## 2019-11-26 RX ADMIN — CLOPIDOGREL BISULFATE 75 MG: 75 TABLET, FILM COATED ORAL at 08:11

## 2019-11-26 RX ADMIN — SENNOSIDES AND DOCUSATE SODIUM 1 TABLET: 8.6; 5 TABLET ORAL at 10:11

## 2019-11-26 RX ADMIN — IPRATROPIUM BROMIDE AND ALBUTEROL SULFATE 3 ML: .5; 3 SOLUTION RESPIRATORY (INHALATION) at 06:11

## 2019-11-26 RX ADMIN — POTASSIUM & SODIUM PHOSPHATES POWDER PACK 280-160-250 MG 1 PACKET: 280-160-250 PACK at 10:11

## 2019-11-26 NOTE — PLAN OF CARE
Goals addressed and unmet.  Cont with POC  Marilia Gayle, KEYSHA  11/26/2019      Problem: Occupational Therapy Goal  Goal: Occupational Therapy Goal  Description  Goals with expiration date, 12/6  Patient will increase functional independence with ADLs by performing:    Patient will demonstrate rolling to the right with mod assist.  Not met   Patient will demonstrate rolling to the left with mod assist.   Not met  Patient will demonstrate supine -sit with mod  assist.   Not met  Patient will demonstrate stand pivot transfers with max assist.   Not met  Patient will demonstrate grooming while seated with max assist.   Not met  Patient will demonstrate upper body dressing with max assist while seated EOB.   Not met  Patient will demonstrate lower body dressing with max assist while seated EOB.   Not met  Patient will demonstrate toileting with max assist.   Not met  Patient will demonstrate bathing while seated EOB with max assist.   Not met  Patient will demonstrate ability to follow 3/5 commands.   Not met  Patient's family / caregiver will demonstrate independence and safety with assisting patient with self-care skills and functional mobility.     Not met  Patient's family / caregiver will demonstrate independence with providing ROM and changes in bed positioning.   Not met           Outcome: Ongoing, Progressing

## 2019-11-26 NOTE — PT/OT/SLP PROGRESS
"Speech Language Pathology Treatment    Patient Name:  Edwin Lopez Jr.   MRN:  [id number]  Admitting Diagnosis: Acute encephalopathy    Recommendations:                 General Recommendations:  Dysphagia therapy and Cognitive-linguistic therapy  Diet recommendations:  Mechanical soft, Liquid Diet Level: Thin   Aspiration Precautions: 1 bite/sip at a time, Avoid talking while eating, HOB to 90 degrees, Monitor for s/s of aspiration and Standard aspiration precautions   General Precautions: Standard, fall  Communication strategies:  none    Subjective     "Back to the house" when asked where he was going today  Patient goals: to go home    Pain/Comfort:  · Pain Rating 1: 0/10  · Pain Rating Post-Intervention 1: 0/10    Objective:     Has the patient been evaluated by SLP for swallowing?   Yes  Keep patient NPO? No   Current Respiratory Status:        Pt seen bedside while eating his lunch. Pt had dropped his pie on his bed. Pt feeding himself using his fingers. Pt also drank coke via a straw. Pt asking for more pie but agreed to ivette crackers. Pt with fair mastication given increased time. Oral stasis noted but able to clear with liquid wash. No overt s/s of aspiration noted. Pt was oriented to month with cues. He did recall the year and place. He named objects with 100% acc. He named 3 members in a category with 67% acc and with cues 100% acc. He followed one and two step commands with 100% acc. He was able to answer simple problem solving questions. Confusion noted at times during session. White board updated.     Assessment:     Edwin Lopez Jr. is a 69 y.o. male with an SLP diagnosis of Dysphagia and Cognitive-Linguistic Impairment.  He presents with progress toward goals.    Goals:   Multidisciplinary Problems     SLP Goals        Problem: SLP Goal    Goal Priority Disciplines Outcome   SLP Goal     SLP Ongoing, Progressing   Description:  Speech Language Pathology Goals  Goals to be met by (12/2)  1. Pt will " tolerate mechanical soft diet with thin liquids without overt s/s of aspiration or airway compromise.   2. Pt will participate in ongoing assessment of swallow to determine least restrictive diet.  3. Pt will participate in speech/lang/cog evaluation to determine future therapeutic plan. - met  4. The pt will orient x4.  5. The pt will participate in rote verbal tasks with 75% acc given min cues.  6. Pt will answer simple YNQs with 75% acc given min cues.  7. Pt will participate in confrontation and categorical naming tasks with 75% acc given min cues.  8. Pt will participate in 1-2 step commands with 75% acc given mod cues.   9. Pt will participate in ongoing assessment of reading, writing, and visuospatial skills.                      Plan:     · Patient to be seen:  4 x/week   · Plan of Care expires:  12/17/19  · Plan of Care reviewed with:  patient   · SLP Follow-Up:  Yes       Discharge recommendations:  nursing facility, skilled       Time Tracking:     SLP Treatment Date:   11/26/19  Speech Start Time:  1314  Speech Stop Time:  1332     Speech Total Time (min):  18 min    Billable Minutes: Speech Therapy Individual 9 and Treatment Swallowing Dysfunction 9    EUSEBIO Boudreaux, CCC-SLP  11/26/2019

## 2019-11-26 NOTE — PLAN OF CARE
Pt would benefit from continued skilled acute PT services to improve functional mobility. POC is to continue towards current goals set to progress towards PLOF.     Problem: Physical Therapy Goal  Goal: Physical Therapy Goal  Description  Goals to be met by: 2019    Patient will increase functional independence with mobility by performin. Supine to sit with Minimal Assistance  2. Sit to supine with Minimal Assistance  3. Sit to stand transfer with Minimal Assistance -Met 2019   4. Bed to chair transfer with Minimal Assistance using Rolling Walker  5. Gait  x 50 feet with Minimal Assistance using Rolling Walker.   6. Lower extremity exercise program x15 reps per handout, with independence  *stairs goal to be added pending progress        Outcome: Ongoing, Progressing

## 2019-11-26 NOTE — PROGRESS NOTES
"[facility]  Adult Nutrition  Progress Note    SUMMARY       Recommendations    Recommendation:   1. Continue cardiac/ diabetic dysphagia diet, ADAT texture per SLP   2. Continue boost glucose TID to increase intake   3. RD to monitor and follow up     Goals: tolerate consuming >50% of meals by next RD follow-up  Nutrition Goal Status: goal met  Communication of RD Recs: other (comment)(POC)    Reason for Assessment    Reason For Assessment: RD follow-up  Diagnosis: other (see comments)  Relevant Medical History: CVA, seizures, T2DM, dementia  Interdisciplinary Rounds: did not attend  General Information Comments: Pt continues mildly confused. Pt with good intake, % of meals, tolerating well. Receiving boost glucose TID to increase intake. Per chart pt wt stable since admit. NFPE completed, pt with moderate to severe wasting in temporals, clavicle, and triceps. Unable to confirm criteria for malnutrition due to altered mental status of pt. New wt to be taken on admission. Pt with good PO since admit.   Nutrition Discharge Planning: adequate intake via cardiac/ DM diet texture per SLP     Nutrition Risk Screen    Nutrition Risk Screen: no indicators present    Nutrition/Diet History    Spiritual, Cultural Beliefs, Sabianist Practices, Values that Affect Care: no  Food Allergies: NKFA  Factors Affecting Nutritional Intake: difficulty/impaired swallowing, chewing difficulties/inability to chew food    Anthropometrics    Temp: 98.2 °F (36.8 °C)  Height: 5' 6" (167.6 cm)  Height (inches): 66 in  Weight Method: Bed Scale  Weight: 64 kg (141 lb)  Weight (lb): 141 lb  Ideal Body Weight (IBW), Male: 142 lb  % Ideal Body Weight, Male (lb): 99.3 lb  BMI (Calculated): 22.8  BMI Grade: 18.5-24.9 - normal     Lab/Procedures/Meds    Pertinent Labs Reviewed: reviewed  Pertinent Labs Comments: Ca 8.4; Phos 2.5  Pertinent Medications Reviewed: reviewed  Pertinent Medications Comments: aspirin; statin; " heparin; LRs    Estimated/Assessed Needs    Weight Used For Calorie Calculations: 64 kg (141 lb 1.5 oz)  Energy Calorie Requirements (kcal): 1657  Energy Need Method: [place]  Protein Requirements: 77-96g(1.2-1.5g/kg)  Weight Used For Protein Calculations: 64 kg (141 lb 1.5 oz)  Fluid Requirements (mL): 1mL/kcal or per MD  RDA Method (mL): 1657  CHO Requirement: 207g CHO daily    Nutrition Prescription Ordered    Current Diet Order: Cardiac/ Diabetic dsyphagia soft diet   Nutrition Order Comments: Tolerating Diet.   Oral Nutrition Supplement: boost glucose     Evaluation of Received Nutrient/Fluid Intake    I/O: +6.4 L since admit   Energy Calories Required: meeting needs  Protein Required: meeting needs  Fluid Required: other (see comments)(per MD)  Comments: LBM 11/26  % Intake of Estimated Energy Needs: 75 - 100 %  % Meal Intake: 75 - 100 %    Nutrition Risk    Level of Risk/Frequency of Follow-up: high     Assessment and Plan    Nutrition Problem  Inadequate oral intake     Related to (etiology):   NPO     Signs and Symptoms (as evidenced by):   Pt requiring alternative means of nutrition to meet at least 85% EEN and EPN.      Interventions(treatment strategy):  Collaboration of care with providers     Nutrition Diagnosis Status:   Resolved        Monitor and Evaluation    Food and Nutrient Intake: energy intake, food and beverage intake  Food and Nutrient Adminstration: diet order  Anthropometric Measurements: weight, weight change, body mass index  Biochemical Data, Medical Tests and Procedures: electrolyte and renal panel, gastrointestinal profile, glucose/endocrine profile, inflammatory profile, lipid profile  Nutrition-Focused Physical Findings: overall appearance     Malnutrition Assessment    Weight Loss (Malnutrition): (unknown)  Energy Intake (Malnutrition): (unknown)   Orbital Region (Subcutaneous Fat Loss): moderate depletion   [place] Region (Muscle Loss): moderate depletion  Clavicle Bone Region  (Muscle Loss): severe depletion  Clavicle and Acromion Bone Region (Muscle Loss): severe depletion  Scapular Bone Region (Muscle Loss): severe depletion  Dorsal Hand (Muscle Loss): moderate depletion  Patellar Region (Muscle Loss): (SHINE)  Anterior Thigh Region (Muscle Loss): (SHINE)  Posterior Calf Region (Muscle Loss): (SHINE)     Nutrition Follow-Up    RD Follow-up?: Yes

## 2019-11-26 NOTE — PLAN OF CARE
Pt aaox4. V/s stable. Responses delayed but appropriate. No complaints pf pain or discomfort. Blood glucose wdl. Incontinence care provided. will continue to monitor and interventions as appropriate.

## 2019-11-26 NOTE — PLAN OF CARE
Recommendations    Recommendation:   1. Continue cardiac/ diabetic dysphagia diet, ADAT texture per SLP   2. Continue boost glucose TID to increase intake   3. RD to monitor and follow up     Goals: tolerate consuming >50% of meals by next RD follow-up  Nutrition Goal Status: goal met  Communication of RD Recs: other (comment)(POC)

## 2019-11-26 NOTE — PLAN OF CARE
11/26/19 1528   Discharge Reassessment   Assessment Type Discharge Planning Reassessment   Provided patient/caregiver education on the expected discharge date and the discharge plan Yes   Do you have any problems affording any of your prescribed medications? No   Discharge Plan A Skilled Nursing Facility  ([facility])   Discharge Plan B Home Health   DME Needed Upon Discharge  other (see comments)   Anticipated Discharge Disposition SNF   Post-Acute Status   Post-Acute Authorization Placement   Post-Acute Placement Status Set-up Complete   Discharge Delays (!) Other  ([place]ing on 142 from the state[place]

## 2019-11-26 NOTE — PROGRESS NOTES
Hospital Medicine   Progress note      Team: [facility] HOSP MED G Jus Russell MD   Admit Date: 11/11/2019   Hospital Day: 15  NAI: 11/26/2019   Code status: Full Code   Principal Problem: Altered mental status     HPI: Mr Lopez is a 69 yr old male with a PMH of CVA, seizures, CHI, DM II, dementia, HTN, who presents to the ED with AMS. Patient was last heard from by a friend on Friday 11/8 (3 days ago). Found down at home 11/11, incontinent of urine and bowel. CTH negative. MRI c-spine, brain pending. Mild leukocytosis, UA positive for UTI. cEEG pending. Patient intubated in the ED for airway protection. Admitted to [facility] for higher level of care. Will need LP.    ICU Course:   Intubated in ED. Admit to [facility] on 11/11.  11/12/2019: Switched Ceftriaxone to Zosyn, rechecking BCx, IVF @ 150cc/hr w/ 1L bolus; Tube feeds & dvt ppx initiated. EEG suppressed, propofol disctoninued & fentanyl 50mcg q2hr added.  11/13/2019: Steadily improving since presentation. Continue Keppra & Abx, added 200cc q6 FW to feeds, & dc'd vent.  11/14/2019: mental status continues to improve. Following commands in all 4 ext. FW 200cc q6hr; Extubate today; pending sensitivities of cx's; added ASA/statin  11/15/2019: Mental status improved; secretions persistent. De-escalated Abx to ceftriaxone given susceptibilities; added breathing tx q4. NPO in AM for possible extubation.  11/16/19: start glycopyrrolate for secretions  11/17: Extubated   11/18: transferred to hospital medicine     Hospital course:  69 yr old male with a PMH of CVA, seizures, CHI, DM II, dementia, HTN, who presents to the ED with admitted for acute encephalopathy.  patient was intubated for airway protection, successfully extubated on 11/17.Initially thought to be secondary to seizures.  Seen by Neurology, had CT head, MRI brain and C-spine, continues EEG all of which were negative for any intracranial abnormalities or seizures.  Neuro rec continuing Keppra.  Hospitalization  complicated by ESBL UTI and Proteus bacteremia.  Seen by Infectious Disease. Currently on ertapenem till end date of 12/03/2019.  Midline placed 11/25. Planned for discharge to SNF. Medically stable at this time for discharge, pending. Will need outpatient follow-up with Neurology, ID and PCP.       Interval hx:   Pt was seen and examined at bedside.  Patient is afebrile and hemodynamically stable.  Patient is awake, alert, conversant, able to answer questions appropriately and follow commands. Wants to leave hospital and start rehab at SNF soon as possible.    ROS (Positive in Bold, otherwise negative)  Constitutional: fever, chills, night sweats  CV: chest pain, edema, palpitations  Resp: SOB, cough, sputum production  GI: changes in appetite, NVDC, pain, melena, hematochezia, GERD, hematemesis  : Dysuria, hematuria, urinary urgency, frequency  MSK: arthralgia/myalgia, joint swelling  Neuro/Psych: anxiety, depression    PEx   Temp:  [98.2 °F (36.8 °C)-98.8 °F (37.1 °C)]   Pulse:  []   Resp:  [14-19]   BP: (122-154)/(70-87)   SpO2:  [93 %-99 %]      I & O (Last 24H):     Intake/Output Summary (Last 24 hours) at 11/26/2019 1309  Last data filed at 11/25/2019 1800  Gross per 24 hour   Intake 750 ml   Output 200 ml   Net 550 ml       General:  male  in no acute distress. Awake, alert, Resting in bed.    HEENT: NCAT. PERRL. EOMI. Sclera Anicteric.  CVS: RRR. Normal S1 S2. No murmurs  Pulm: CTAB. Normal respiratory effort. No wheezes, rhonchi, or crackles.  Abdomen: Soft. Non-distended. No tenderness to palpation. No rebound or guarding. +BS.  Extremities: No edema. No cyanosis. Full ROM.  Neuro: Alert, oriented x 3, Spont mvt of all extremities with no focal deficits noted.    All labs, imaging and nursing notes reviewed in the past 24 hrs.      Active Hospital Problems    Diagnosis  POA    *Altered mental status [R41.82]  Yes    Restricted diet [Z71.3]  Not Applicable    Alteration in skin  integrity [R23.9]  Yes    Acute encephalopathy [G93.40]  Yes    Altered mental state [R41.82]  Yes    Leukocytosis [D72.829]  Yes    History of closed head injury [Z87.820]  Not Applicable    On mechanically assisted ventilation [Z99.11]  Not Applicable    UTI (urinary tract infection) [N39.0]  Yes    Bacteremia [R78.81]  Yes    Hypertension [I10]  Yes    Type 2 diabetes mellitus [E11.9]  Yes    History of CVA in adulthood [Z86.73]  Not Applicable    Seizure disorder [G40.909]  Yes      Resolved Hospital Problems   No resolved problems to display.          Assessment and Plan for problems addressed today:      albuterol-ipratropium  3 mL Nebulization Q4H    aspirin  81 mg Oral Daily    atorvastatin  40 mg Oral QHS    clopidogrel  75 mg Oral Daily    ertapenem (INVANZ) IVPB  1 g Intravenous Q24H    heparin (porcine)  5,000 Units Subcutaneous Q8H    levETIRAcetam  1,000 mg Oral BID    losartan  25 mg Oral Daily    polyethylene glycol  17 g Oral Daily    senna-docusate 8.6-50 mg  1 tablet Oral BID     acetaminophen, albuterol-ipratropium, bisacodyl, Dextrose 10% Bolus, Dextrose 10% Bolus, glucagon (human recombinant), glucose, glucose, insulin aspart U-100, melatonin, ondansetron, sodium chloride 0.9%      Acute metabolic encephalopathy:  Resolved  Septic encephalopathy:  -Patient with Hx of frequent falls and UTIs was found down at home, incontinent of urine/feces 11/11.  He was last seen at baseline mental status 11/8.  -patient was intubated for airway protection, successfully extubated on 11/17.  -CT head noted to be nonacute  -MRI brain and c-spine wo contrast unremarkable  -cEEG done in the ICU did not show acute epileptic activity  -patient was noted to have positive blood cultures with Proteus, positive urine cultures with ESBL E coli which was thought to be the reason for his acute encephalopathy.    -Patient was treated for UTI initially with Zosyn then with ceftriaxone and mentation  improved.  Patient was extubated on 11/17 and transitioned to Hospital Medicine on 11/18  -however, on 11/19, patient was noted to be febrile, and more encephalopathic  -patient underwent repeat EEG which did not reveal any acute seizures.  Head CT was nonacute.  CXR was WNL.  Patient was also pancultured which did not show any growth to date.  -antibiotics were upgraded to ertapenem based on sensitivity report and over the last few days, mentation has improved significantly  -continue neuro checks  -fall precautions, aspiration precautions, seizure precautions  -Continue correction of metabolic derangements  -Maintain Delirium precautions    ESBL UTI (urinary tract infection):  -Hx of UTIs, previous urine culture pos. for klebsiella  -UA with many bacteria, UCx w/ ESBL E coli  -patient is being treated with ceftriaxone however, however patient continued to spike fevers  -antibiotics were changed to ertapenem/vancomycin  -vancomycin DC on 11/20.  -Infectious Disease consulted, continue ertapenem for total 14 day course of antibiotics (end date 12/03)  -will need midline placed prior to discharge  -retroperitoneal ultrasound reveals mild left hydronephrosis otherwise no significant abnormality    Proteus Bacteremia:  -BCx w/ Proteus mirabilis from 11/11.  -blood cultures from 11/12 onwards have shown no growth to date  -Patient was previously on Zosyn, transition to ceftriaxone based on sensitivity report  -however, patient was noted to be febrile again on 11/18.  Antibiotics were broadened to ertapenem/vancomycin.  Blood cultures were repeated which show no growth to date  -vancomycin discontinued on 11/20  -infectious Disease was consulted.  Patient to continue ertapenem for total 14 day course of antibiotics    Febrile illness:  Resolved  -patient was continuing to spike daily fevers  -cultures show no growth to date  -continue ertapenem, vancomycin now discontinued  -bilateral lower extremity ultrasound to rule  out DVT negative   -monitor for hemodynamic instability     Seizure disorder:  -patient is a history of seizures several years  -Loaded with Keppra in the ED  -cEEG negative in the ICU  -currently on  Keppra 1g BID. Continue on this dose until able to f/u with Neurology outpatient  -noted to be more encephalopathic and 11/19, repeat EEG did not reveal acute encephalopathic activity.  Mentation now improving    Diabetes Mellitus:  -Last HbA1c:  6.4  -SSI with accuchecks qACHS  -BG goal: Preprandial blood glucose target <140 mg/dL, Random glucoses <180 mg/dL  -ADA diet    Essential Hypertension:  -stable  -Continue PTA  losartan 25 mg daily  -monitor vitals q4h  -SBP goal of <160 in hospital    Hyperlipidemia:  -continue PTA atorvastatin 40 mg    History of CVA in adulthood  -continue Plavix, statin    DVT PPx:  Heparin    Discharge plan and follow up: DC to SNF, now medically stable and pending placement    Jus Russell MD  Hospital Medicine Staff  [phone] pager

## 2019-11-26 NOTE — PT/OT/SLP PROGRESS
Physical Therapy Treatment    Patient Name:  Edwin Lopez Jr.   MRN:  [id number]    Recommendations:     Discharge Recommendations:  nursing facility, skilled   Discharge Equipment Recommendations: bath bench   Barriers to discharge: decreased functional mobility requiring increased assist      Assessment:     Edwin Lopez Jr. is a 69 y.o. male admitted with a medical diagnosis of Altered mental status.  He presents with the following impairments/functional limitations:  weakness, impaired functional mobilty, impaired endurance, gait instability, impaired balance, impaired self care skills. Pt tolerating session well on this date initially performing bed mobility for pericare and linen change following a BM. Pt then performed EOB sitting Mod/Max A and standing with ranging assist levels from CGA-Mod A. Pt would benefit from continued skilled acute PT 3x/wk to improve functional mobility.  Recommending pt receive PT services in SNF setting following discharge from hospital once medically cleared.     Rehab Prognosis: Fair; patient would benefit from acute skilled PT services to address these deficits and reach maximum level of function.    Recent Surgery: * No surgery found *      Plan:     During this hospitalization, patient to be seen 3 x/week to address the identified rehab impairments via gait training, therapeutic activities, therapeutic exercises, neuromuscular re-education and progress toward the following goals:    · Plan of Care Expires:  12/18/19    Subjective     Chief Complaint: none noted   Patient/Family Comments/goals: Pt pleasant and willing to participate with therapy on this date.    Pain/Comfort:  · Pain Rating 1: 0/10      Objective:     Communicated with NSG prior to session.  Patient found supine with SCD, telemetry, pressure relief boots upon PT entry to room.     General Precautions: Standard, aspiration, fall   Orthopedic Precautions:N/A   Braces: N/A     Functional Mobility:  · Bed Mobility:      · Rolling Left:  minimum assistance  · Rolling Right: minimum assistance  · Scooting: moderate assistance  · Supine to Sit: Mod/Min A  · Sit to Supine: Mod/Min A\  · Transfers:     · Sit to Stand:  Min A on 1st trial. Pt then performed a trial of 4 sit-to-stand without rest requiring CGA, CGA, Min A, and Mod A respectively   · Balance: Sitting: CGA     Standing: Min/Mod A      AM-PAC 6 CLICK MOBILITY  Turning over in bed (including adjusting bedclothes, sheets and blankets)?: 2  Sitting down on and standing up from a chair with arms (e.g., wheelchair, bedside commode, etc.): 3  Moving from lying on back to sitting on the side of the bed?: 2  Moving to and from a bed to a chair (including a wheelchair)?: 1  Need to walk in hospital room?: 1  Climbing 3-5 steps with a railing?: 1  Basic Mobility Total Score: 10       Therapeutic Activities and Exercises:  - Bed Mobility: Rolling to R and L x2 for pericare  - EOB sitting 5mins CGA   - Pt educated on:   -PT roles, expectations, and POC    -Safety with mobility   -Benefits of OOB activities to increase strength and functional mobility    -Performing ther ex for increasing LE ROM and strength   -Discharge recommendations     Patient left HOB elevated with call button in reach..    GOALS:   Multidisciplinary Problems     Physical Therapy Goals        Problem: Physical Therapy Goal    Goal Priority Disciplines Outcome Goal Variances Interventions   Physical Therapy Goal     PT, PT/OT Ongoing, Progressing     Description:  Goals to be met by: 2019    Patient will increase functional independence with mobility by performin. Supine to sit with Minimal Assistance  2. Sit to supine with Minimal Assistance  3. Sit to stand transfer with Minimal Assistance -Met 2019   4. Bed to chair transfer with Minimal Assistance using Rolling Walker  5. Gait  x 50 feet with Minimal Assistance using Rolling Walker.   6. Lower extremity exercise program x15 reps per  handout, with independence  *stairs goal to be added pending progress                         Time Tracking:     PT Received On: 11/26/19  PT Start Time: 0843     PT Stop Time: 0906  PT Total Time (min): 23 min     Billable Minutes: Therapeutic Activity 23    Treatment Type: Treatment  PT/PTA: PT     PTA Visit Number: 0     Cameron Meza, PT  11/26/2019

## 2019-11-26 NOTE — PT/OT/SLP PROGRESS
Occupational Therapy   Treatment    Name: Edwin Lopez Jr.  MRN: [id number]  Admitting Diagnosis:  Acute encephalopathy       Recommendations:     Discharge Recommendations: nursing facility, skilled  Discharge Equipment Recommendations:  bath bench  Barriers to discharge:  Inaccessible home environment, Decreased caregiver support    Assessment:     Edwin Lopez Jr. is a 69 y.o. male with a medical diagnosis of Acute encephalopathy.  He presents supine in bed and requiring toileting and bedding change. Mod A for rolling Left and Right and max A for pericare and toilet hygiene.  Pt pleasant and agreeable to session and desires increased mobility but with poor tolerance and ability.  Potential for gain with consistent and continued intervention. Able to sit edge of bed with intermittent support and repositioning Standing attempts with dependent standing for several seconds.  Noted dementia.  Performance deficits affecting function are weakness, impaired endurance, impaired self care skills, impaired functional mobilty, gait instability, impaired balance, impaired cognition.     Rehab Prognosis:  Fair; patient would benefit from acute skilled OT services to address these deficits and reach maximum level of function.       Plan:     Patient to be seen 3 x/week to address the above listed problems via self-care/home management, therapeutic activities, therapeutic exercises  · Plan of Care Expires: 12/10/19  · Plan of Care Reviewed with: patient    Subjective     Pain/Comfort:  · Pain Rating 1: 0/10    Objective:     Communicated with: RN prior to session.  Patient found supine with SCD, telemetry, pressure relief boots, peripheral IV upon OT entry to room.    General Precautions: Standard, fall   Orthopedic Precautions:N/A   Braces: N/A     Occupational Performance:     Bed Mobility:    · Patient completed Rolling/Turning to Left with  moderate assistance  · Patient completed Rolling/Turning to Right with moderate  assistance  · Patient completed Scooting/Bridging with moderate assistance and maximal assistance  · Patient completed Supine to Sit with maximal assistance  · Patient completed Sit to Supine with maximal assistance     Functional Mobility/Transfers:  · Patient completed Sit <> Stand Transfer with dependence  with  rolling walker   · Functional Mobility: Poor tolerance but good motivation for attempts    Activities of Daily Living:  · Upper Body Dressing: moderate assistance    · Toileting: total assistance        AMPAC 6 Click ADL: 10    Treatment & Education:  Pt educated on safety, role of OT, importance of increased participation in self care for gains , expectations for participation, expectations for gains, POC, energy conservation, caregiver strain. White board updated.   - ADL training  - bed mobility training     Patient left supine with all lines intactEducation:      GOALS:   Multidisciplinary Problems     Occupational Therapy Goals        Problem: Occupational Therapy Goal    Goal Priority Disciplines Outcome Interventions   Occupational Therapy Goal     OT, PT/OT Ongoing, Progressing    Description:  Goals set 11/12 to be addressed for 14 days with expiration date, 11/26  Patient will increase functional independence with ADLs by performing:    Patient will demonstrate rolling to the right with mod assist.  Not met   Patient will demonstrate rolling to the left with mod assist.   Not met  Patient will demonstrate supine -sit with mod  assist.   Not met  Patient will demonstrate stand pivot transfers with max assist.   Not met  Patient will demonstrate grooming while seated with max assist.   Not met  Patient will demonstrate upper body dressing with max assist while seated EOB.   Not met  Patient will demonstrate lower body dressing with max assist while seated EOB.   Not met  Patient will demonstrate toileting with max assist.   Not met  Patient will demonstrate bathing while seated EOB with max  assist.   Not met  Patient will demonstrate ability to follow 3/5 commands.   Not met  Patient's family / caregiver will demonstrate independence and safety with assisting patient with self-care skills and functional mobility.     Not met  Patient's family / caregiver will demonstrate independence with providing ROM and changes in bed positioning.   Not met                            Time Tracking:     OT Date of Treatment: 11/26/19  OT Start Time: 0840  OT Stop Time: 0910  OT Total Time (min): 30 min    Billable Minutes:Self Care/Home Management 15  Therapeutic Activity 15    Marilia Gayle, KEYSHA  11/26/2019

## 2019-11-26 NOTE — PLAN OF CARE
Problem: Fall Injury Risk  Goal: Absence of Fall and Fall-Related Injury  Outcome: Ongoing, Progressing     Problem: Adult Inpatient Plan of Care  Goal: Plan of Care Review  Outcome: Ongoing, Progressing  Goal: Patient-Specific Goal (Individualization)  Description  Admit Date: 11/11    Admit Dx: Altered mental status    Past Medical History:  No date: CHI (closed head injury)  No date: Dementia  No date: Diabetes mellitus  No date: Hyperlipidemia  No date: Seizures    History reviewed. No pertinent surgical history.    Individualization:   1. Frequent weight shifts for comfort    Restraints: (date/time/why or N/A) n/a       Outcome: Ongoing, Progressing  Goal: Absence of Hospital-Acquired Illness or Injury  Outcome: Ongoing, Progressing  Goal: Optimal Comfort and Wellbeing  Outcome: Ongoing, Progressing  Goal: Readiness for Transition of Care  Outcome: Ongoing, Progressing  Goal: Rounds/Family Conference  Outcome: Ongoing, Progressing     Problem: Infection  Goal: Infection Symptom Resolution  Outcome: Ongoing, Progressing     Problem: Diabetes Comorbidity  Goal: Blood Glucose Level Within Desired Range  Outcome: Ongoing, Progressing     Problem: Skin Injury Risk Increased  Goal: Skin Health and Integrity  Outcome: Ongoing, Progressing

## 2019-11-26 NOTE — PLAN OF CARE
Problem: SLP Goal  Goal: SLP Goal  Description  Speech Language Pathology Goals  Goals to be met by (12/2)  1. Pt will tolerate mechanical soft diet with thin liquids without overt s/s of aspiration or airway compromise.   2. Pt will participate in ongoing assessment of swallow to determine least restrictive diet.  3. Pt will participate in speech/lang/cog evaluation to determine future therapeutic plan. - met  4. The pt will orient x4.  5. The pt will participate in rote verbal tasks with 75% acc given min cues.  6. Pt will answer simple YNQs with 75% acc given min cues.  7. Pt will participate in confrontation and categorical naming tasks with 75% acc given min cues.  8. Pt will participate in 1-2 step commands with 75% acc given mod cues.   9. Pt will participate in ongoing assessment of reading, writing, and visuospatial skills.     Outcome: Ongoing, Progressing     Pt tolerated lunch without difficulty.     EUSEBIO Boudreaux, CCC-SLP  11/26/2019

## 2019-11-26 NOTE — PLAN OF CARE
Pt able to make some needs known, BM x1, midline placed by PICC team, no distress noted, will continue to monitor.

## 2019-11-27 LAB
POCT GLUCOSE: 105 MG/DL (ref 70–110)
POCT GLUCOSE: 128 MG/DL (ref 70–110)

## 2019-11-27 PROCEDURE — 25000003 PHARM REV CODE 250: Performed by: PHYSICIAN ASSISTANT

## 2019-11-27 PROCEDURE — 25000242 PHARM REV CODE 250 ALT 637 W/ HCPCS: Performed by: NURSE PRACTITIONER

## 2019-11-27 PROCEDURE — 63600175 PHARM REV CODE 636 W HCPCS: Performed by: NURSE PRACTITIONER

## 2019-11-27 PROCEDURE — 25000003 PHARM REV CODE 250: Performed by: INTERNAL MEDICINE

## 2019-11-27 PROCEDURE — 99231 SBSQ HOSP IP/OBS SF/LOW 25: CPT | Mod: ,,, | Performed by: INTERNAL MEDICINE

## 2019-11-27 PROCEDURE — 94761 N-INVAS EAR/PLS OXIMETRY MLT: CPT

## 2019-11-27 PROCEDURE — 92507 TX SP LANG VOICE COMM INDIV: CPT

## 2019-11-27 PROCEDURE — 25000003 PHARM REV CODE 250: Performed by: STUDENT IN AN ORGANIZED HEALTH CARE EDUCATION/TRAINING PROGRAM

## 2019-11-27 PROCEDURE — 11000001 HC ACUTE MED/SURG PRIVATE ROOM

## 2019-11-27 PROCEDURE — 94640 AIRWAY INHALATION TREATMENT: CPT

## 2019-11-27 PROCEDURE — 63600175 PHARM REV CODE 636 W HCPCS: Performed by: HOSPITALIST

## 2019-11-27 PROCEDURE — 99231 PR SUBSEQUENT HOSPITAL CARE,LEVL I: ICD-10-PCS | Mod: ,,, | Performed by: INTERNAL MEDICINE

## 2019-11-27 PROCEDURE — 25000003 PHARM REV CODE 250: Performed by: HOSPITALIST

## 2019-11-27 PROCEDURE — 92526 ORAL FUNCTION THERAPY: CPT

## 2019-11-27 RX ORDER — SODIUM,POTASSIUM PHOSPHATES 280-250MG
1 POWDER IN PACKET (EA) ORAL EVERY 4 HOURS
Status: COMPLETED | OUTPATIENT
Start: 2019-11-27 | End: 2019-11-27

## 2019-11-27 RX ADMIN — LEVETIRACETAM 1000 MG: 500 TABLET ORAL at 08:11

## 2019-11-27 RX ADMIN — IPRATROPIUM BROMIDE AND ALBUTEROL SULFATE 3 ML: .5; 3 SOLUTION RESPIRATORY (INHALATION) at 03:11

## 2019-11-27 RX ADMIN — CLOPIDOGREL BISULFATE 75 MG: 75 TABLET, FILM COATED ORAL at 08:11

## 2019-11-27 RX ADMIN — ASPIRIN 81 MG CHEWABLE TABLET 81 MG: 81 TABLET CHEWABLE at 08:11

## 2019-11-27 RX ADMIN — IPRATROPIUM BROMIDE AND ALBUTEROL SULFATE 3 ML: .5; 3 SOLUTION RESPIRATORY (INHALATION) at 12:11

## 2019-11-27 RX ADMIN — LOSARTAN POTASSIUM 25 MG: 25 TABLET, FILM COATED ORAL at 08:11

## 2019-11-27 RX ADMIN — POLYETHYLENE GLYCOL 3350 17 G: 17 POWDER, FOR SOLUTION ORAL at 08:11

## 2019-11-27 RX ADMIN — IPRATROPIUM BROMIDE AND ALBUTEROL SULFATE 3 ML: .5; 3 SOLUTION RESPIRATORY (INHALATION) at 08:11

## 2019-11-27 RX ADMIN — IPRATROPIUM BROMIDE AND ALBUTEROL SULFATE 3 ML: .5; 3 SOLUTION RESPIRATORY (INHALATION) at 11:11

## 2019-11-27 RX ADMIN — LEVETIRACETAM 1000 MG: 500 TABLET ORAL at 09:11

## 2019-11-27 RX ADMIN — ERTAPENEM 1 G: 1 INJECTION INTRAMUSCULAR; INTRAVENOUS at 09:11

## 2019-11-27 RX ADMIN — HEPARIN SODIUM 5000 UNITS: 5000 INJECTION, SOLUTION INTRAVENOUS; SUBCUTANEOUS at 03:11

## 2019-11-27 RX ADMIN — HEPARIN SODIUM 5000 UNITS: 5000 INJECTION, SOLUTION INTRAVENOUS; SUBCUTANEOUS at 10:11

## 2019-11-27 RX ADMIN — IPRATROPIUM BROMIDE AND ALBUTEROL SULFATE 3 ML: .5; 3 SOLUTION RESPIRATORY (INHALATION) at 07:11

## 2019-11-27 RX ADMIN — POTASSIUM & SODIUM PHOSPHATES POWDER PACK 280-160-250 MG 1 PACKET: 280-160-250 PACK at 10:11

## 2019-11-27 RX ADMIN — POTASSIUM & SODIUM PHOSPHATES POWDER PACK 280-160-250 MG 1 PACKET: 280-160-250 PACK at 02:11

## 2019-11-27 RX ADMIN — IPRATROPIUM BROMIDE AND ALBUTEROL SULFATE 3 ML: .5; 3 SOLUTION RESPIRATORY (INHALATION) at 04:11

## 2019-11-27 RX ADMIN — HEPARIN SODIUM 5000 UNITS: 5000 INJECTION, SOLUTION INTRAVENOUS; SUBCUTANEOUS at 06:11

## 2019-11-27 RX ADMIN — SENNOSIDES AND DOCUSATE SODIUM 1 TABLET: 8.6; 5 TABLET ORAL at 09:11

## 2019-11-27 RX ADMIN — ATORVASTATIN CALCIUM 40 MG: 20 TABLET, FILM COATED ORAL at 09:11

## 2019-11-27 NOTE — PLAN OF CARE
Pt aaox4. V/s stable. Responses delayed but appropriate. No complaints pf pain or discomfort. Blood glucose wdl. Incontinence care provided and linens changed. will continue to monitor and interventions as appropriate.

## 2019-11-27 NOTE — PLAN OF CARE
Problem: SLP Goal  Goal: SLP Goal  Description  Speech Language Pathology Goals  Goals to be met by (12/2)  1. Pt will tolerate mechanical soft diet with thin liquids without overt s/s of aspiration or airway compromise.   2. Pt will participate in ongoing assessment of swallow to determine least restrictive diet.  3. Pt will participate in speech/lang/cog evaluation to determine future therapeutic plan. - met  4. The pt will orient x4.  5. The pt will participate in rote verbal tasks with 75% acc given min cues.  6. Pt will answer simple YNQs with 75% acc given min cues.  7. Pt will participate in confrontation and categorical naming tasks with 75% acc given min cues.  8. Pt will participate in 1-2 step commands with 75% acc given mod cues.   9. Pt will participate in ongoing assessment of reading, writing, and visuospatial skills.     Outcome: Ongoing, Progressing   Continue POC at this time, all goals remain appropriate.   Cristal Bradley, JESUS-SLP  11/27/2019

## 2019-11-27 NOTE — PT/OT/SLP PROGRESS
Speech Language Pathology Treatment    Patient Name:  Edwin Lopez Jr.   MRN:  [id number]  Admitting Diagnosis: Acute encephalopathy    Recommendations:                 General Recommendations:  Dysphagia therapy and Cognitive-linguistic therapy  Diet recommendations:  Mechanical soft, Liquid Diet Level: Thin   Aspiration Precautions: 1 bite/sip at a time, Feed only when awake/alert, HOB to 90 degrees, Small bites/sips and Standard aspiration precautions   General Precautions: Standard, fall  Communication strategies:  none    Subjective     Awake/alert  Pain/Comfort:  Pain Rating 1: 0/10  Pain Rating Post-Intervention 1: 0/10    Objective:     Has the patient been evaluated by SLP for swallowing?   Yes  Keep patient NPO? No   Current Respiratory Status: room air      Pt repositioned upright in bed for breakfast. He tolerated pills whole x5 at once, thin liquids via cup x4, and large bites of grits x6 with timely oral clearance and adequate bolus control. SLP educated pt on aspiration precautions with emphasis on small bites/sips. He verbalized understanding of information. He was oriented to person, place, and month ind'ly. Complex yes/no questions answered with 100% accy ind'ly. Pt progressing with goals at this time. Continue mechanical soft diet/thin liquids at this time.     Assessment:     Edwin Lopez Jr. is a 69 y.o. male with an SLP diagnosis of Dysphagia and Cognitive-Linguistic Impairment.      Goals:   Multidisciplinary Problems     SLP Goals        Problem: SLP Goal    Goal Priority Disciplines Outcome   SLP Goal     SLP Ongoing, Progressing   Description:  Speech Language Pathology Goals  Goals to be met by (12/2)  1. Pt will tolerate mechanical soft diet with thin liquids without overt s/s of aspiration or airway compromise.   2. Pt will participate in ongoing assessment of swallow to determine least restrictive diet.  3. Pt will participate in speech/lang/cog evaluation to determine future therapeutic  plan. - met  4. The pt will orient x4.  5. The pt will participate in rote verbal tasks with 75% acc given min cues.  6. Pt will answer simple YNQs with 75% acc given min cues.  7. Pt will participate in confrontation and categorical naming tasks with 75% acc given min cues.  8. Pt will participate in 1-2 step commands with 75% acc given mod cues.   9. Pt will participate in ongoing assessment of reading, writing, and visuospatial skills.                      Plan:     · Patient to be seen:  3 x/week   · Plan of Care expires:  12/17/19  · Plan of Care reviewed with:  patient   · SLP Follow-Up:  Yes       Discharge recommendations:  nursing facility, skilled       Time Tracking:     SLP Treatment Date:   11/27/19  Speech Start Time:  0833  Speech Stop Time:  0849     Speech Total Time (min):  16 min    Billable Minutes: Speech Therapy Individual 8 and Treatment Swallowing Dysfunction 8    Cristal Bradley CCC-SLP  11/27/2019

## 2019-11-27 NOTE — PLAN OF CARE
ALEKS spoke with Micki at [facility] regarding placement/transfer and was told that they have submitted to [facility].  Micki also stated that she has all required documents from [facility] and that family is coming to fill out paperwork today.  ALEKS researched and discovered that the [facility] CM is Katrina Fito ([phone], ext [phone]) and a call was made, but vm was received.  ALEKS left a detailed message and asked for a return call as soon as possible in order to facilitate timely transfer.  ALEKS will continue to F/U.        Kailee Alexander LMSW

## 2019-11-27 NOTE — PROGRESS NOTES
Hospital Medicine   Progress note      Team: [facility] HOSP MED G Jus Russell MD   Admit Date: 11/11/2019   Hospital Day: 16  NAI: 11/27/2019   Code status: Full Code   Principal Problem: Acute encephalopathy     HPI: Mr Lopez is a 69 yr old male with a PMH of CVA, seizures, CHI, DM II, dementia, HTN, who presents to the ED with AMS. Patient was last heard from by a friend on Friday 11/8 (3 days ago). Found down at home 11/11, incontinent of urine and bowel. CTH negative. MRI c-spine, brain pending. Mild leukocytosis, UA positive for UTI. cEEG pending. Patient intubated in the ED for airway protection. Admitted to [facility] for higher level of care. Will need LP.    ICU Course:   Intubated in ED. Admit to [facility] on 11/11.  11/12/2019: Switched Ceftriaxone to Zosyn, rechecking BCx, IVF @ 150cc/hr w/ 1L bolus; Tube feeds & dvt ppx initiated. EEG suppressed, propofol disctoninued & fentanyl 50mcg q2hr added.  11/13/2019: Steadily improving since presentation. Continue Keppra & Abx, added 200cc q6 FW to feeds, & dc'd vent.  11/14/2019: mental status continues to improve. Following commands in all 4 ext. FW 200cc q6hr; Extubate today; pending sensitivities of cx's; added ASA/statin  11/15/2019: Mental status improved; secretions persistent. De-escalated Abx to ceftriaxone given susceptibilities; added breathing tx q4. NPO in AM for possible extubation.  11/16/19: start glycopyrrolate for secretions  11/17: Extubated   11/18: transferred to hospital medicine     Hospital course:  69 yr old male with a PMH of CVA, seizures, CHI, DM II, dementia, HTN, who presents to the ED with admitted for acute encephalopathy.  patient was intubated for airway protection, successfully extubated on 11/17.Initially thought to be secondary to seizures.  Seen by Neurology, had CT head, MRI brain and C-spine, continues EEG all of which were negative for any intracranial abnormalities or seizures.  Neuro rec continuing Keppra.  Hospitalization  complicated by ESBL UTI and Proteus bacteremia.  Seen by Infectious Disease. Currently on ertapenem till end date of 12/03/2019.  Midline placed 11/25. Planned for discharge to SNF. Medically stable at this time for discharge, pending. Will need outpatient follow-up with Neurology, ID and PCP.       Interval hx:   Pt was seen and examined at bedside.  Patient is afebrile and hemodynamically stable.  No complaints. Resting in bed. Medically stable for discharge but still pending 142 from the state.    ROS (Positive in Bold, otherwise negative)  Constitutional: fever, chills, night sweats  CV: chest pain, edema, palpitations  Resp: SOB, cough, sputum production  GI: changes in appetite, NVDC, pain, melena, hematochezia, GERD, hematemesis  : Dysuria, hematuria, urinary urgency, frequency  MSK: arthralgia/myalgia, joint swelling  Neuro/Psych: anxiety, depression    PEx   Temp:  [98.1 °F (36.7 °C)-98.6 °F (37 °C)]   Pulse:  [77-95]   Resp:  [16-20]   BP: (122-133)/(70-83)   SpO2:  [95 %-100 %]      I & O (Last 24H):   No intake or output data in the 24 hours ending 11/27/19 1238    General:  male  in no acute distress. Awake, alert, Resting in bed.    HEENT: NCAT. PERRL. EOMI. Sclera Anicteric.  CVS: RRR. Normal S1 S2. No murmurs  Pulm: CTAB. Normal respiratory effort. No wheezes, rhonchi, or crackles.  Abdomen: Soft. Non-distended. No tenderness to palpation. No rebound or guarding. +BS.  Extremities: No edema. No cyanosis. Full ROM.  Neuro: Alert, oriented x 3, Spont mvt of all extremities with no focal deficits noted.    All labs, imaging and nursing notes reviewed in the past 24 hrs.      Active Hospital Problems    Diagnosis  POA    *Acute encephalopathy [G93.40]  Yes    Restricted diet [Z71.3]  Not Applicable    Alteration in skin integrity [R23.9]  Yes    Altered mental state [R41.82]  Yes    Leukocytosis [D72.829]  Yes    History of closed head injury [Z87.820]  Not Applicable    On  mechanically assisted ventilation [Z99.11]  Not Applicable    UTI (urinary tract infection) [N39.0]  Yes    Bacteremia [R78.81]  Yes    Altered mental status [R41.82]  Yes    Hypertension [I10]  Yes    Type 2 diabetes mellitus [E11.9]  Yes    History of CVA in adulthood [Z86.73]  Not Applicable    Seizure disorder [G40.909]  Yes      Resolved Hospital Problems   No resolved problems to display.          Assessment and Plan for problems addressed today:      albuterol-ipratropium  3 mL Nebulization Q4H    aspirin  81 mg Oral Daily    atorvastatin  40 mg Oral QHS    clopidogrel  75 mg Oral Daily    ertapenem (INVANZ) IVPB  1 g Intravenous Q24H    heparin (porcine)  5,000 Units Subcutaneous Q8H    levETIRAcetam  1,000 mg Oral BID    losartan  25 mg Oral Daily    polyethylene glycol  17 g Oral Daily    potassium, sodium phosphates  1 packet Oral Q4H    senna-docusate 8.6-50 mg  1 tablet Oral BID     acetaminophen, albuterol-ipratropium, bisacodyl, Dextrose 10% Bolus, Dextrose 10% Bolus, glucagon (human recombinant), glucose, glucose, insulin aspart U-100, melatonin, ondansetron, sodium chloride 0.9%      Acute metabolic encephalopathy:  Resolved  Septic encephalopathy:  -Patient with Hx of frequent falls and UTIs was found down at home, incontinent of urine/feces 11/11.  He was last seen at baseline mental status 11/8.  -patient was intubated for airway protection, successfully extubated on 11/17.  -CT head noted to be nonacute  -MRI brain and c-spine wo contrast unremarkable  -cEEG done in the ICU did not show acute epileptic activity  -patient was noted to have positive blood cultures with Proteus, positive urine cultures with ESBL E coli which was thought to be the reason for his acute encephalopathy.    -Patient was treated for UTI initially with Zosyn then with ceftriaxone and mentation improved.  Patient was extubated on 11/17 and transitioned to Hospital Medicine on 11/18  -however, on 11/19,  patient was noted to be febrile, and more encephalopathic  -patient underwent repeat EEG which did not reveal any acute seizures.  Head CT was nonacute.  CXR was WNL.  Patient was also pancultured which did not show any growth to date.  -antibiotics were upgraded to ertapenem based on sensitivity report and over the last few days, mentation has improved significantly  -continue neuro checks  -fall precautions, aspiration precautions, seizure precautions  -Continue correction of metabolic derangements  -Maintain Delirium precautions    ESBL UTI (urinary tract infection):  -Hx of UTIs, previous urine culture pos. for klebsiella  -UA with many bacteria, UCx w/ ESBL E coli  -patient is being treated with ceftriaxone however, however patient continued to spike fevers  -antibiotics were changed to ertapenem/vancomycin  -vancomycin DC on 11/20.  -Infectious Disease consulted, continue ertapenem for total 14 day course of antibiotics (end date 12/03)  -will need midline placed prior to discharge  -retroperitoneal ultrasound reveals mild left hydronephrosis otherwise no significant abnormality    Proteus Bacteremia:  -BCx w/ Proteus mirabilis from 11/11.  -blood cultures from 11/12 onwards have shown no growth to date  -Patient was previously on Zosyn, transition to ceftriaxone based on sensitivity report  -however, patient was noted to be febrile again on 11/18.  Antibiotics were broadened to ertapenem/vancomycin.  Blood cultures were repeated which show no growth to date  -vancomycin discontinued on 11/20  -infectious Disease was consulted.  Patient to continue ertapenem for total 14 day course of antibiotics    Febrile illness:  Resolved  -patient was continuing to spike daily fevers  -cultures show no growth to date  -continue ertapenem, vancomycin now discontinued  -bilateral lower extremity ultrasound to rule out DVT negative   -monitor for hemodynamic instability     Seizure disorder:  -patient is a history of  seizures several years  -Loaded with Keppra in the ED  -cEEG negative in the ICU  -currently on  Keppra 1g BID. Continue on this dose until able to f/u with Neurology outpatient  -noted to be more encephalopathic and 11/19, repeat EEG did not reveal acute encephalopathic activity.  Mentation now improving    Diabetes Mellitus:  -Last HbA1c:  6.4  -SSI with accuchecks qACHS  -BG goal: Preprandial blood glucose target <140 mg/dL, Random glucoses <180 mg/dL  -ADA diet    Essential Hypertension:  -stable  -Continue PTA  losartan 25 mg daily  -monitor vitals q4h  -SBP goal of <160 in hospital    Hyperlipidemia:  -continue PTA atorvastatin 40 mg    History of CVA in adulthood  -continue Plavix, statin    DVT PPx:  Heparin    Discharge plan and follow up: DC to SNF, now medically stable and pending placement    Jus Russell MD  [facility] Medicine Staff  [phone] pager

## 2019-11-28 LAB
POCT GLUCOSE: 100 MG/DL (ref 70–110)
POCT GLUCOSE: 122 MG/DL (ref 70–110)
POCT GLUCOSE: 135 MG/DL (ref 70–110)
POCT GLUCOSE: 137 MG/DL (ref 70–110)
POCT GLUCOSE: 228 MG/DL (ref 70–110)

## 2019-11-28 PROCEDURE — 25000003 PHARM REV CODE 250: Performed by: HOSPITALIST

## 2019-11-28 PROCEDURE — 99231 PR SUBSEQUENT HOSPITAL CARE,LEVL I: ICD-10-PCS | Mod: ,,, | Performed by: INTERNAL MEDICINE

## 2019-11-28 PROCEDURE — 94761 N-INVAS EAR/PLS OXIMETRY MLT: CPT

## 2019-11-28 PROCEDURE — 63600175 PHARM REV CODE 636 W HCPCS: Performed by: HOSPITALIST

## 2019-11-28 PROCEDURE — 99231 SBSQ HOSP IP/OBS SF/LOW 25: CPT | Mod: ,,, | Performed by: INTERNAL MEDICINE

## 2019-11-28 PROCEDURE — 63600175 PHARM REV CODE 636 W HCPCS: Performed by: NURSE PRACTITIONER

## 2019-11-28 PROCEDURE — 11000001 HC ACUTE MED/SURG PRIVATE ROOM

## 2019-11-28 PROCEDURE — 99900035 HC TECH TIME PER 15 MIN (STAT)

## 2019-11-28 PROCEDURE — 25000003 PHARM REV CODE 250: Performed by: STUDENT IN AN ORGANIZED HEALTH CARE EDUCATION/TRAINING PROGRAM

## 2019-11-28 PROCEDURE — 94640 AIRWAY INHALATION TREATMENT: CPT

## 2019-11-28 PROCEDURE — 25000003 PHARM REV CODE 250: Performed by: PHYSICIAN ASSISTANT

## 2019-11-28 PROCEDURE — 25000242 PHARM REV CODE 250 ALT 637 W/ HCPCS: Performed by: NURSE PRACTITIONER

## 2019-11-28 RX ADMIN — IPRATROPIUM BROMIDE AND ALBUTEROL SULFATE 3 ML: .5; 3 SOLUTION RESPIRATORY (INHALATION) at 11:11

## 2019-11-28 RX ADMIN — IPRATROPIUM BROMIDE AND ALBUTEROL SULFATE 3 ML: .5; 3 SOLUTION RESPIRATORY (INHALATION) at 12:11

## 2019-11-28 RX ADMIN — IPRATROPIUM BROMIDE AND ALBUTEROL SULFATE 3 ML: .5; 3 SOLUTION RESPIRATORY (INHALATION) at 03:11

## 2019-11-28 RX ADMIN — HEPARIN SODIUM 5000 UNITS: 5000 INJECTION, SOLUTION INTRAVENOUS; SUBCUTANEOUS at 02:11

## 2019-11-28 RX ADMIN — ATORVASTATIN CALCIUM 40 MG: 20 TABLET, FILM COATED ORAL at 10:11

## 2019-11-28 RX ADMIN — CLOPIDOGREL BISULFATE 75 MG: 75 TABLET, FILM COATED ORAL at 09:11

## 2019-11-28 RX ADMIN — LOSARTAN POTASSIUM 25 MG: 25 TABLET, FILM COATED ORAL at 09:11

## 2019-11-28 RX ADMIN — SENNOSIDES AND DOCUSATE SODIUM 1 TABLET: 8.6; 5 TABLET ORAL at 09:11

## 2019-11-28 RX ADMIN — POLYETHYLENE GLYCOL 3350 17 G: 17 POWDER, FOR SOLUTION ORAL at 09:11

## 2019-11-28 RX ADMIN — IPRATROPIUM BROMIDE AND ALBUTEROL SULFATE 3 ML: .5; 3 SOLUTION RESPIRATORY (INHALATION) at 08:11

## 2019-11-28 RX ADMIN — LEVETIRACETAM 1000 MG: 500 TABLET ORAL at 09:11

## 2019-11-28 RX ADMIN — IPRATROPIUM BROMIDE AND ALBUTEROL SULFATE 3 ML: .5; 3 SOLUTION RESPIRATORY (INHALATION) at 09:11

## 2019-11-28 RX ADMIN — INSULIN ASPART 2 UNITS: 100 INJECTION, SOLUTION INTRAVENOUS; SUBCUTANEOUS at 10:11

## 2019-11-28 RX ADMIN — SENNOSIDES AND DOCUSATE SODIUM 1 TABLET: 8.6; 5 TABLET ORAL at 10:11

## 2019-11-28 RX ADMIN — ERTAPENEM 1 G: 1 INJECTION INTRAMUSCULAR; INTRAVENOUS at 10:11

## 2019-11-28 RX ADMIN — HEPARIN SODIUM 5000 UNITS: 5000 INJECTION, SOLUTION INTRAVENOUS; SUBCUTANEOUS at 10:11

## 2019-11-28 RX ADMIN — HEPARIN SODIUM 5000 UNITS: 5000 INJECTION, SOLUTION INTRAVENOUS; SUBCUTANEOUS at 06:11

## 2019-11-28 RX ADMIN — IPRATROPIUM BROMIDE AND ALBUTEROL SULFATE 3 ML: .5; 3 SOLUTION RESPIRATORY (INHALATION) at 04:11

## 2019-11-28 RX ADMIN — ASPIRIN 81 MG CHEWABLE TABLET 81 MG: 81 TABLET CHEWABLE at 09:11

## 2019-11-28 RX ADMIN — LEVETIRACETAM 1000 MG: 500 TABLET ORAL at 10:11

## 2019-11-28 NOTE — PLAN OF CARE
Problem: Fall Injury Risk  Goal: Absence of Fall and Fall-Related Injury  Outcome: Ongoing, Progressing  Intervention: Identify and Manage Contributors to Fall Injury Risk  Flowsheets (Taken 11/28/2019 1443)  Self-Care Promotion: meal setup provided; BADL personal objects within reach; BADL personal routines maintained; independence encouraged  Medication Review/Management: medications reviewed  Intervention: Promote Injury-Free Environment  Flowsheets (Taken 11/28/2019 1443)  Safety Promotion/Fall Prevention: assistive device/personal item within reach; diversional activities provided; Fall Risk reviewed with patient/family; Fall Risk signage in place; medications reviewed; lighting adjusted; nonskid shoes/socks when out of bed; side rails raised x 3; instructed to call staff for mobility  Environmental Safety Modification: assistive device/personal items within reach; clutter free environment maintained; lighting adjusted

## 2019-11-28 NOTE — NURSING
Patient is lying in bed with eyes closed.  Breathing is even and unlabored, and patient is easily awakened when name is called.  Bed is low and locked, and call light is within reach.  Will continue to monitor until arrival of day shift.

## 2019-11-28 NOTE — PROGRESS NOTES
Hospital Medicine   Progress note      Team: [facility] HOSP MED G Jus Russell MD   Admit Date: 11/11/2019   Hospital Day: 17  NAI: 11/27/2019   Code status: Full Code   Principal Problem: Acute encephalopathy     HPI: Mr Lopez is a 69 yr old male with a PMH of CVA, seizures, CHI, DM II, dementia, HTN, who presents to the ED with AMS. Patient was last heard from by a friend on Friday 11/8 (3 days ago). Found down at home 11/11, incontinent of urine and bowel. CTH negative. MRI c-spine, brain pending. Mild leukocytosis, UA positive for UTI. cEEG pending. Patient intubated in the ED for airway protection. Admitted to [facility] for higher level of care. Will need LP.    ICU Course:   Intubated in ED. Admit to [facility] on 11/11.  11/12/2019: Switched Ceftriaxone to Zosyn, rechecking BCx, IVF @ 150cc/hr w/ 1L bolus; Tube feeds & dvt ppx initiated. EEG suppressed, propofol disctoninued & fentanyl 50mcg q2hr added.  11/13/2019: Steadily improving since presentation. Continue Keppra & Abx, added 200cc q6 FW to feeds, & dc'd vent.  11/14/2019: mental status continues to improve. Following commands in all 4 ext. FW 200cc q6hr; Extubate today; pending sensitivities of cx's; added ASA/statin  11/15/2019: Mental status improved; secretions persistent. De-escalated Abx to ceftriaxone given susceptibilities; added breathing tx q4. NPO in AM for possible extubation.  11/16/19: start glycopyrrolate for secretions  11/17: Extubated   11/18: transferred to hospital medicine     Hospital course:  69 yr old male with a PMH of CVA, seizures, CHI, DM II, dementia, HTN, who presents to the ED with admitted for acute encephalopathy.  patient was intubated for airway protection, successfully extubated on 11/17.Initially thought to be secondary to seizures.  Seen by Neurology, had CT head, MRI brain and C-spine, continues EEG all of which were negative for any intracranial abnormalities or seizures.  Neuro rec continuing Keppra.  Hospitalization  complicated by ESBL UTI and Proteus bacteremia.  Seen by Infectious Disease. Currently on ertapenem till end date of 12/03/2019.  Midline placed 11/25. Planned for discharge to SNF. Medically stable at this time for discharge, pending placement. Will need outpatient follow-up with Neurology, ID and PCP.       Interval hx:   Pt was seen and examined at bedside.  Patient is afebrile and hemodynamically stable.  No complaints his AM. Eating well. Resting in bed. Medically stable for discharge but still pending 142 from the state.    ROS (Positive in Bold, otherwise negative)  Constitutional: fever, chills, night sweats  CV: chest pain, edema, palpitations  Resp: SOB, cough, sputum production  GI: changes in appetite, NVDC, pain, melena, hematochezia, GERD, hematemesis  : Dysuria, hematuria, urinary urgency, frequency  MSK: arthralgia/myalgia, joint swelling  Neuro/Psych: anxiety, depression    PEx   Temp:  [96.8 °F (36 °C)-98.4 °F (36.9 °C)]   Pulse:  [76-97]   Resp:  [14-20]   BP: (129-146)/(79-85)   SpO2:  [95 %-99 %]      I & O (Last 24H):     Intake/Output Summary (Last 24 hours) at 11/28/2019 1205  Last data filed at 11/27/2019 1600  Gross per 24 hour   Intake 140 ml   Output --   Net 140 ml       General:  male  in no acute distress. Awake, alert, Resting in bed.    HEENT: NCAT. PERRL. EOMI. Sclera Anicteric.  CVS: RRR. Normal S1 S2. No murmurs  Pulm: CTAB. Normal respiratory effort. No wheezes, rhonchi, or crackles.  Abdomen: Soft. Non-distended. No tenderness to palpation. No rebound or guarding. +BS.  Extremities: No edema. No cyanosis. Full ROM.  Neuro: Alert, oriented x 3, Spont mvt of all extremities with no focal deficits noted.    All labs, imaging and nursing notes reviewed in the past 24 hrs.      Active Hospital Problems    Diagnosis  POA    *Acute encephalopathy [G93.40]  Yes    Restricted diet [Z71.3]  Not Applicable    Alteration in skin integrity [R23.9]  Yes    Altered mental  state [R41.82]  Yes    Leukocytosis [D72.829]  Yes    History of closed head injury [Z87.820]  Not Applicable    On mechanically assisted ventilation [Z99.11]  Not Applicable    UTI (urinary tract infection) [N39.0]  Yes    Bacteremia [R78.81]  Yes    Altered mental status [R41.82]  Yes    Hypertension [I10]  Yes    Type 2 diabetes mellitus [E11.9]  Yes    History of CVA in adulthood [Z86.73]  Not Applicable    Seizure disorder [G40.909]  Yes      Resolved Hospital Problems   No resolved problems to display.          Assessment and Plan for problems addressed today:      albuterol-ipratropium  3 mL Nebulization Q4H    aspirin  81 mg Oral Daily    atorvastatin  40 mg Oral QHS    clopidogrel  75 mg Oral Daily    ertapenem (INVANZ) IVPB  1 g Intravenous Q24H    heparin (porcine)  5,000 Units Subcutaneous Q8H    levETIRAcetam  1,000 mg Oral BID    losartan  25 mg Oral Daily    polyethylene glycol  17 g Oral Daily    senna-docusate 8.6-50 mg  1 tablet Oral BID     acetaminophen, albuterol-ipratropium, bisacodyl, Dextrose 10% Bolus, Dextrose 10% Bolus, glucagon (human recombinant), glucose, glucose, insulin aspart U-100, melatonin, ondansetron, sodium chloride 0.9%      Acute metabolic encephalopathy:  Resolved  Septic encephalopathy:  -Patient with Hx of frequent falls and UTIs was found down at home, incontinent of urine/feces 11/11.  He was last seen at baseline mental status 11/8.  -patient was intubated for airway protection, successfully extubated on 11/17.  -CT head noted to be nonacute  -MRI brain and c-spine wo contrast unremarkable  -cEEG done in the ICU did not show acute epileptic activity  -patient was noted to have positive blood cultures with Proteus, positive urine cultures with ESBL E coli which was thought to be the reason for his acute encephalopathy.    -Patient was treated for UTI initially with Zosyn then with ceftriaxone and mentation improved.  Patient was extubated on 11/17 and  transitioned to Hospital Medicine on 11/18  -however, on 11/19, patient was noted to be febrile, and more encephalopathic  -patient underwent repeat EEG which did not reveal any acute seizures.  Head CT was nonacute.  CXR was WNL.  Patient was also pancultured which did not show any growth to date.  -antibiotics were upgraded to ertapenem based on sensitivity report and over the last few days, mentation has improved significantly  -continue neuro checks  -fall precautions, aspiration precautions, seizure precautions  -Continue correction of metabolic derangements  -Maintain Delirium precautions    ESBL UTI (urinary tract infection):  -Hx of UTIs, previous urine culture pos. for klebsiella  -UA with many bacteria, UCx w/ ESBL E coli  -patient is being treated with ceftriaxone however, however patient continued to spike fevers  -antibiotics were changed to ertapenem/vancomycin  -vancomycin DC on 11/20.  -Infectious Disease consulted, continue ertapenem for total 14 day course of antibiotics (end date 12/03)  -will need midline placed prior to discharge  -retroperitoneal ultrasound reveals mild left hydronephrosis otherwise no significant abnormality    Proteus Bacteremia:  -BCx w/ Proteus mirabilis from 11/11  -blood cultures from 11/12 onwards have shown no growth to date  -Patient was previously on Zosyn, transition to ceftriaxone based on sensitivity report  -however, patient was noted to be febrile again on 11/18.  Antibiotics were broadened to ertapenem/vancomycin.  Blood cultures were repeated which show no growth to date  -vancomycin discontinued on 11/20  -infectious Disease was consulted.  Patient to continue ertapenem for total 14 day course of antibiotics    Febrile illness:  Resolved  -patient was continuing to spike daily fevers  -cultures show no growth to date  -continue ertapenem, vancomycin now discontinued  -bilateral lower extremity ultrasound to rule out DVT negative   -monitor for hemodynamic  instability     Seizure disorder:  -patient is a history of seizures several years  -Loaded with Keppra in the ED  -cEEG negative in the ICU  -currently on  Keppra 1g BID. Continue on this dose until able to f/u with Neurology outpatient  -noted to be more encephalopathic and 11/19, repeat EEG did not reveal acute encephalopathic activity.  Mentation now back to baseline.    Diabetes Mellitus:  -Last HbA1c:  6.4  -SSI with accuchecks qACHS  -BG goal: Preprandial blood glucose target <140 mg/dL, Random glucoses <180 mg/dL  -ADA diet    Essential Hypertension:  -stable  -Continue PTA  losartan 25 mg daily  -monitor vitals q4h  -SBP goal of <160 in hospital    Hyperlipidemia:  -continue PTA atorvastatin 40 mg    History of CVA in adulthood  -continue Plavix, statin    DVT PPx:  Heparin    Discharge plan and follow up: DC to SNF, now medically stable and pending placement    Jus Russell MD  [facility] Medicine Staff  [phone] pager

## 2019-11-29 LAB
POCT GLUCOSE: 103 MG/DL (ref 70–110)
POCT GLUCOSE: 105 MG/DL (ref 70–110)
POCT GLUCOSE: 133 MG/DL (ref 70–110)
POCT GLUCOSE: 140 MG/DL (ref 70–110)
POCT GLUCOSE: 153 MG/DL (ref 70–110)
POCT GLUCOSE: 168 MG/DL (ref 70–110)

## 2019-11-29 PROCEDURE — 11000001 HC ACUTE MED/SURG PRIVATE ROOM

## 2019-11-29 PROCEDURE — 63600175 PHARM REV CODE 636 W HCPCS: Performed by: HOSPITALIST

## 2019-11-29 PROCEDURE — 99231 SBSQ HOSP IP/OBS SF/LOW 25: CPT | Mod: ,,, | Performed by: INTERNAL MEDICINE

## 2019-11-29 PROCEDURE — 25000242 PHARM REV CODE 250 ALT 637 W/ HCPCS: Performed by: NURSE PRACTITIONER

## 2019-11-29 PROCEDURE — 25000003 PHARM REV CODE 250: Performed by: HOSPITALIST

## 2019-11-29 PROCEDURE — 25000003 PHARM REV CODE 250: Performed by: STUDENT IN AN ORGANIZED HEALTH CARE EDUCATION/TRAINING PROGRAM

## 2019-11-29 PROCEDURE — 25000003 PHARM REV CODE 250: Performed by: PHYSICIAN ASSISTANT

## 2019-11-29 PROCEDURE — 63600175 PHARM REV CODE 636 W HCPCS: Performed by: NURSE PRACTITIONER

## 2019-11-29 PROCEDURE — 94640 AIRWAY INHALATION TREATMENT: CPT

## 2019-11-29 PROCEDURE — 97530 THERAPEUTIC ACTIVITIES: CPT

## 2019-11-29 PROCEDURE — 94761 N-INVAS EAR/PLS OXIMETRY MLT: CPT

## 2019-11-29 PROCEDURE — 92507 TX SP LANG VOICE COMM INDIV: CPT

## 2019-11-29 PROCEDURE — 99231 PR SUBSEQUENT HOSPITAL CARE,LEVL I: ICD-10-PCS | Mod: ,,, | Performed by: INTERNAL MEDICINE

## 2019-11-29 RX ORDER — IPRATROPIUM BROMIDE AND ALBUTEROL SULFATE 2.5; .5 MG/3ML; MG/3ML
SOLUTION RESPIRATORY (INHALATION)
Status: DISPENSED
Start: 2019-11-29 | End: 2019-11-29

## 2019-11-29 RX ADMIN — IPRATROPIUM BROMIDE AND ALBUTEROL SULFATE 3 ML: .5; 3 SOLUTION RESPIRATORY (INHALATION) at 04:11

## 2019-11-29 RX ADMIN — ATORVASTATIN CALCIUM 40 MG: 20 TABLET, FILM COATED ORAL at 09:11

## 2019-11-29 RX ADMIN — IPRATROPIUM BROMIDE AND ALBUTEROL SULFATE 3 ML: .5; 3 SOLUTION RESPIRATORY (INHALATION) at 08:11

## 2019-11-29 RX ADMIN — INSULIN ASPART 2 UNITS: 100 INJECTION, SOLUTION INTRAVENOUS; SUBCUTANEOUS at 04:11

## 2019-11-29 RX ADMIN — LEVETIRACETAM 1000 MG: 500 TABLET ORAL at 09:11

## 2019-11-29 RX ADMIN — IPRATROPIUM BROMIDE AND ALBUTEROL SULFATE 3 ML: .5; 3 SOLUTION RESPIRATORY (INHALATION) at 01:11

## 2019-11-29 RX ADMIN — CLOPIDOGREL BISULFATE 75 MG: 75 TABLET, FILM COATED ORAL at 09:11

## 2019-11-29 RX ADMIN — Medication 6 MG: at 09:11

## 2019-11-29 RX ADMIN — LOSARTAN POTASSIUM 25 MG: 25 TABLET, FILM COATED ORAL at 09:11

## 2019-11-29 RX ADMIN — HEPARIN SODIUM 5000 UNITS: 5000 INJECTION, SOLUTION INTRAVENOUS; SUBCUTANEOUS at 09:11

## 2019-11-29 RX ADMIN — ERTAPENEM 1 G: 1 INJECTION INTRAMUSCULAR; INTRAVENOUS at 09:11

## 2019-11-29 RX ADMIN — HEPARIN SODIUM 5000 UNITS: 5000 INJECTION, SOLUTION INTRAVENOUS; SUBCUTANEOUS at 02:11

## 2019-11-29 RX ADMIN — IPRATROPIUM BROMIDE AND ALBUTEROL SULFATE 3 ML: .5; 3 SOLUTION RESPIRATORY (INHALATION) at 03:11

## 2019-11-29 RX ADMIN — SENNOSIDES AND DOCUSATE SODIUM 1 TABLET: 8.6; 5 TABLET ORAL at 09:11

## 2019-11-29 RX ADMIN — HEPARIN SODIUM 5000 UNITS: 5000 INJECTION, SOLUTION INTRAVENOUS; SUBCUTANEOUS at 06:11

## 2019-11-29 RX ADMIN — ASPIRIN 81 MG CHEWABLE TABLET 81 MG: 81 TABLET CHEWABLE at 09:11

## 2019-11-29 NOTE — PLAN OF CARE
Goals addressed and unmet.  Cont with POC  Marilia Gayle, OT  ,11/29/2019    Problem: Occupational Therapy Goal  Goal: Occupational Therapy Goal  Description  Goals expiration date, 12/10  Patient will increase functional independence with ADLs by performing:    Patient will demonstrate rolling to the right with mod assist.  Not met   Patient will demonstrate rolling to the left with mod assist.   Not met  Patient will demonstrate supine -sit with mod  assist.   Not met  Patient will demonstrate stand pivot transfers with max assist.   Not met  Patient will demonstrate grooming while seated with max assist.   Not met  Patient will demonstrate upper body dressing with max assist while seated EOB.   Not met  Patient will demonstrate lower body dressing with max assist while seated EOB.   Not met  Patient will demonstrate toileting with max assist.   Not met  Patient will demonstrate bathing while seated EOB with max assist.   Not met  Patient will demonstrate ability to follow 3/5 commands.   Not met  Patient's family / caregiver will demonstrate independence and safety with assisting patient with self-care skills and functional mobility.     Not met  Patient's family / caregiver will demonstrate independence with providing ROM and changes in bed positioning.   Not met            Outcome: Ongoing, Progressing

## 2019-11-29 NOTE — PLAN OF CARE
11/29/19 1309   Discharge Reassessment   Assessment Type Discharge Planning Reassessment   Discharge Plan [facility] pending [facility] authorization.  NAI moved until 12/2 as [facility] closed for the holidays.

## 2019-11-29 NOTE — PROGRESS NOTES
Hospital Medicine   Progress note      Team: [facility] G Jus Russell MD   Admit Date: 11/11/2019   Hospital Day: 18  NAI: 11/27/2019   Code status: Full Code   Principal Problem: Acute encephalopathy     HPI: Mr Lopez is a 69 yr old male with a PMH of CVA, seizures, CHI, DM II, dementia, HTN, who presents to the ED with AMS. Patient was last heard from by a friend on Friday 11/8 (3 days ago). Found down at home 11/11, incontinent of urine and bowel. CTH negative. MRI c-spine, brain pending. Mild leukocytosis, UA positive for UTI. cEEG pending. Patient intubated in the ED for airway protection. Admitted to [facility] for higher level of care. Will need LP.    ICU Course:   Intubated in ED. Admit to [facility] on 11/11.  11/12/2019: Switched Ceftriaxone to Zosyn, rechecking BCx, IVF @ 150cc/hr w/ 1L bolus; Tube feeds & dvt ppx initiated. EEG suppressed, propofol disctoninued & fentanyl 50mcg q2hr added.  11/13/2019: Steadily improving since presentation. Continue Keppra & Abx, added 200cc q6 FW to feeds, & dc'd vent.  11/14/2019: mental status continues to improve. Following commands in all 4 ext. FW 200cc q6hr; Extubate today; pending sensitivities of cx's; added ASA/statin  11/15/2019: Mental status improved; secretions persistent. De-escalated Abx to ceftriaxone given susceptibilities; added breathing tx q4. NPO in AM for possible extubation.  11/16/19: start glycopyrrolate for secretions  11/17: Extubated   11/18: transferred to hospital medicine     Hospital course:  69 yr old male with a PMH of CVA, seizures, CHI, DM II, dementia, HTN, who presents to the ED with admitted for acute encephalopathy.  patient was intubated for airway protection, successfully extubated on 11/17.Initially thought to be secondary to seizures.  Seen by Neurology, had CT head, MRI brain and C-spine, continues EEG all of which were negative for any intracranial abnormalities or seizures.  Neuro rec continuing Keppra.  Hospitalization  complicated by ESBL UTI and Proteus bacteremia.  Seen by Infectious Disease. Currently on ertapenem till end date of 12/03/2019.  Midline placed 11/25. Planned for discharge to SNF. Medically stable at this time for discharge, pending placement. Will need outpatient follow-up with Neurology, ID and PCP.       Interval hx:   Pt was seen and examined at bedside.  Patient is afebrile and hemodynamically stable. No complaints. Stable. Asking for pudding. Medically stable for discharge but pending [employer] auth (wont be done until Monday, office closed till monday)    ROS (Positive in Bold, otherwise negative)  Constitutional: fever, chills, night sweats  CV: chest pain, edema, palpitations  Resp: SOB, cough, sputum production  GI: changes in appetite, NVDC, pain, melena, hematochezia, GERD, hematemesis  : Dysuria, hematuria, urinary urgency, frequency  MSK: arthralgia/myalgia, joint swelling  Neuro/Psych: anxiety, depression    PEx   Temp:  [97.4 °F (36.3 °C)-99 °F (37.2 °C)]   Pulse:  [78-90]   Resp:  [14-20]   BP: (108-145)/(63-91)   SpO2:  [95 %-99 %]      I & O (Last 24H):     Intake/Output Summary (Last 24 hours) at 11/29/2019 1053  Last data filed at 11/28/2019 2213  Gross per 24 hour   Intake 100 ml   Output --   Net 100 ml       General:  male  in no acute distress. Awake, alert, Resting in bed.    HEENT: NCAT. PERRL. EOMI. Sclera Anicteric.  CVS: RRR. Normal S1 S2. No murmurs  Pulm: CTAB. Normal respiratory effort. No wheezes, rhonchi, or crackles.  Abdomen: Soft. Non-distended. No tenderness to palpation. No rebound or guarding. +BS.  Extremities: No edema. No cyanosis. Full ROM.  Neuro: Alert, oriented x 3, Spont mvt of all extremities with no focal deficits noted.    All labs, imaging and nursing notes reviewed in the past 24 hrs.      Active Hospital Problems    Diagnosis  POA    *Acute encephalopathy [G93.40]  Yes    Restricted diet [Z71.3]  Not Applicable    Alteration in skin integrity  [R23.9]  Yes    Altered mental state [R41.82]  Yes    Leukocytosis [D72.829]  Yes    History of closed head injury [Z87.820]  Not Applicable    On mechanically assisted ventilation [Z99.11]  Not Applicable    UTI (urinary tract infection) [N39.0]  Yes    Bacteremia [R78.81]  Yes    Altered mental status [R41.82]  Yes    Hypertension [I10]  Yes    Type 2 diabetes mellitus [E11.9]  Yes    History of CVA in adulthood [Z86.73]  Not Applicable    Seizure disorder [G40.909]  Yes      Resolved Hospital Problems   No resolved problems to display.          Assessment and Plan for problems addressed today:      albuterol-ipratropium        albuterol-ipratropium  3 mL Nebulization Q4H    aspirin  81 mg Oral Daily    atorvastatin  40 mg Oral QHS    clopidogrel  75 mg Oral Daily    ertapenem (INVANZ) IVPB  1 g Intravenous Q24H    heparin (porcine)  5,000 Units Subcutaneous Q8H    levETIRAcetam  1,000 mg Oral BID    losartan  25 mg Oral Daily    polyethylene glycol  17 g Oral Daily    senna-docusate 8.6-50 mg  1 tablet Oral BID     acetaminophen, albuterol-ipratropium, bisacodyl, Dextrose 10% Bolus, Dextrose 10% Bolus, glucagon (human recombinant), glucose, glucose, insulin aspart U-100, melatonin, ondansetron, sodium chloride 0.9%      Acute metabolic encephalopathy:  Resolved  Septic encephalopathy:  -Patient with Hx of frequent falls and UTIs was found down at home, incontinent of urine/feces 11/11.  He was last seen at baseline mental status 11/8.  -patient was intubated for airway protection, successfully extubated on 11/17.  -CT head noted to be nonacute  -MRI brain and c-spine wo contrast unremarkable  -cEEG done in the ICU did not show acute epileptic activity  -patient was noted to have positive blood cultures with Proteus, positive urine cultures with ESBL E coli which was thought to be the reason for his acute encephalopathy.    -Patient was treated for UTI initially with Zosyn then with  ceftriaxone and mentation improved.  Patient was extubated on 11/17 and transitioned to Hospital Medicine on 11/18  -however, on 11/19, patient was noted to be febrile, and more encephalopathic  -patient underwent repeat EEG which did not reveal any acute seizures.  Head CT was nonacute.  CXR was WNL.  Patient was also pancultured which did not show any growth to date.  -antibiotics were upgraded to ertapenem based on sensitivity report and over the last few days, mentation has improved significantly  -continue neuro checks  -fall precautions, aspiration precautions, seizure precautions  -Continue correction of metabolic derangements  -Maintain Delirium precautions    ESBL UTI (urinary tract infection):  -Hx of UTIs, previous urine culture pos. for klebsiella  -UA with many bacteria, UCx w/ ESBL E coli  -patient is being treated with ceftriaxone however, however patient continued to spike fevers  -antibiotics were changed to ertapenem/vancomycin  -vancomycin DC on 11/20.  -Infectious Disease consulted, continue ertapenem for total 14 day course of antibiotics (end date 12/03)  -will need midline placed prior to discharge  -retroperitoneal ultrasound reveals mild left hydronephrosis otherwise no significant abnormality    Proteus Bacteremia:  -BCx w/ Proteus mirabilis from 11/11  -blood cultures from 11/12 onwards have shown no growth to date  -Patient was previously on Zosyn, transition to ceftriaxone based on sensitivity report  -however, patient was noted to be febrile again on 11/18.  Antibiotics were broadened to ertapenem/vancomycin.  Blood cultures were repeated which show no growth to date  -vancomycin discontinued on 11/20  -infectious Disease was consulted.  Patient to continue ertapenem for total 14 day course of antibiotics    Febrile illness:  Resolved  -patient was continuing to spike daily fevers  -cultures show no growth to date  -continue ertapenem, vancomycin now discontinued  -bilateral lower  extremity ultrasound to rule out DVT negative   -monitor for hemodynamic instability     Seizure disorder:  -patient is a history of seizures several years  -Loaded with Keppra in the ED  -cEEG negative in the ICU  -currently on  Keppra 1g BID. Continue on this dose until able to f/u with Neurology outpatient  -noted to be more encephalopathic and 11/19, repeat EEG did not reveal acute encephalopathic activity.  Mentation now back to baseline.    Diabetes Mellitus:  -Last HbA1c:  6.4  -SSI with accuchecks qACHS  -BG goal: Preprandial blood glucose target <140 mg/dL, Random glucoses <180 mg/dL  -ADA diet    Essential Hypertension:  -stable  -Continue PTA  losartan 25 mg daily  -monitor vitals q4h  -SBP goal of <160 in hospital    Hyperlipidemia:  -continue PTA atorvastatin 40 mg    History of CVA in adulthood  -continue Plavix, statin    DVT PPx:  Heparin    Discharge plan and follow up: DC to SNF, now medically stable and pending placement    Jus Russell MD  Hospital Medicine Staff  [phone] pager

## 2019-11-29 NOTE — PT/OT/SLP PROGRESS
"Speech Language Pathology Treatment    Patient Name:  Edwin Lopez Jr.   MRN:  [id number]  Admitting Diagnosis: Acute encephalopathy    Recommendations:                 General Recommendations:  Dysphagia therapy and Cognitive-linguistic therapy  Diet recommendations:  Mechanical soft, Liquid Diet Level: Thin   Aspiration Precautions: 1 bite/sip at a time, Assistance with meals, Feed only when awake/alert, HOB to 90 degrees, Small bites/sips and Standard aspiration precautions   General Precautions: Standard, aspiration, fall  Communication strategies:  go to room if call light pushed    Subjective     SLP reviewed Pt with RN, RN cleared for tx  Pt presents calm  He explains, "I don't want to fall when I walk"  He denies pain pre/post session    Objective:     Has the patient been evaluated by SLP for swallowing?   Yes  Keep patient NPO? No   Current Respiratory Status: room air      Pt seen for cognitive-linguistic therapy. He was awake and alert in bed upon SLP entrance to room. He was oriented to person and place. He recalled SAMEER and year 1/3 attempts for immediate recall provided moderate verbal and visual cues. He was DEP for fx writing tasks for name and address. He was educated on ongiong SLP POC, orientation strategies and safety precautions (call light.) He verbalized partial understanding and would benefit from ongoing review. No questions noted. Whiteboard updated. Pt remained in bed with call light in reach upon SLP exit from room.     Assessment:     Edwin Lopez Jr. is a 69 y.o. male with an SLP diagnosis of Dysphagia and Cognitive-Linguistic Impairment. Current goals remain appropriate. He would benefit from ongoing ST upon d/c from acute to continue to improve cognition.    Goals:   Multidisciplinary Problems     SLP Goals        Problem: SLP Goal    Goal Priority Disciplines Outcome   SLP Goal     SLP Ongoing, Progressing   Description:  Speech Language Pathology Goals  Goals to be met by (12/2)  1. " Pt will tolerate mechanical soft diet with thin liquids without overt s/s of aspiration or airway compromise.   2. Pt will participate in ongoing assessment of swallow to determine least restrictive diet.  3. Pt will participate in speech/lang/cog evaluation to determine future therapeutic plan. - met  4. The pt will orient x4.  5. The pt will participate in rote verbal tasks with 75% acc given min cues.  6. Pt will answer simple YNQs with 75% acc given min cues.  7. Pt will participate in confrontation and categorical naming tasks with 75% acc given min cues.  8. Pt will participate in 1-2 step commands with 75% acc given mod cues.   9. Pt will participate in ongoing assessment of reading, writing, and visuospatial skills.                      Plan:     · Patient to be seen:  3 x/week   · Plan of Care expires:  12/17/19  · Plan of Care reviewed with:  patient   · SLP Follow-Up:  Yes       Discharge recommendations:  nursing facility, skilled     Time Tracking:     SLP Treatment Date:   11/29/19  Speech Start Time:  1120  Speech Stop Time:  1140     Speech Total Time (min):  20 min    Billable Minutes: Speech Therapy Individual 20    HERRERA De Souza, [facility]-SLP  Speech-Language Pathology  Pager: [phone]      11/29/2019

## 2019-11-29 NOTE — PLAN OF CARE
Problem: SLP Goal  Goal: SLP Goal  Description  Speech Language Pathology Goals  Goals to be met by (12/2)  1. Pt will tolerate mechanical soft diet with thin liquids without overt s/s of aspiration or airway compromise.   2. Pt will participate in ongoing assessment of swallow to determine least restrictive diet.  3. Pt will participate in speech/lang/cog evaluation to determine future therapeutic plan. - met  4. The pt will orient x4.  5. The pt will participate in rote verbal tasks with 75% acc given min cues.  6. Pt will answer simple YNQs with 75% acc given min cues.  7. Pt will participate in confrontation and categorical naming tasks with 75% acc given min cues.  8. Pt will participate in 1-2 step commands with 75% acc given mod cues.   9. Pt will participate in ongoing assessment of reading, writing, and visuospatial skills.     Outcome: Ongoing, Progressing    Pt seen for cognitive therapy.  Pt ongoing with current goals.     .BARRETT De Souza., [facility]SLP  Speech-Language Pathology  Pager: [phone]  11/29/2019

## 2019-11-29 NOTE — PT/OT/SLP PROGRESS
Occupational Therapy   Treatment    Name: Edwin Lopez Jr.  MRN: [id number]  Admitting Diagnosis:  Acute encephalopathy       Recommendations:     Discharge Recommendations: nursing facility, skilled  Discharge Equipment Recommendations:  bath bench  Barriers to discharge:  Inaccessible home environment, Decreased caregiver support    Assessment:     Edwin Lopez Jr. is a 69 y.o. male with a medical diagnosis of Acute encephalopathy.  He presents supine and requiring encouragement for participation due to fatigue .  Bed mobility training with improved particpation but with continued mod/max A for rolling.  Max A for supine to sit edge of bed and return to supine.  Poor trunk control but with verbal cues for maintaining upright with and without support. .   Performance deficits affecting function are weakness, impaired endurance, impaired self care skills, impaired functional mobilty, gait instability.     Rehab Prognosis:  Fair; patient would benefit from acute skilled OT services to address these deficits and reach maximum level of function.       Plan:     Patient to be seen 3 x/week to address the above listed problems via self-care/home management, therapeutic activities, therapeutic exercises  · Plan of Care Expires: 12/10/19  · Plan of Care Reviewed with: patient    Subjective     Pain/Comfort:  · Pain Rating 1: 0/10    Objective:     Communicated with: RN prior to session.  Patient found supine with telemetry, pressure relief boots, peripheral IV, SCD upon OT entry to room.    General Precautions: Standard, fall   Orthopedic Precautions:N/A   Braces: N/A     Occupational Performance:     Bed Mobility:    · Patient completed Rolling/Turning to Right with moderate assistance  · Patient completed Scooting/Bridging with maximal assistance  · Patient completed Supine to Sit with maximal assistance  · Patient completed Sit to Supine with maximal assistance     Activities of Daily Living:  · Pt with set up for self  feeding.  Max A for toileting and hygiene.  Set up and verbal cues for grooming in bed       [facility] 6 Click ADL: 10    Treatment & Education:  Pt educated on safety, role of OT, importance of increased participation in self care for gains , expectations for participation, expectations for gains, POC, energy conservation, caregiver strain. White board updated.   - Bed mobility training     Patient left supine with all lines intactEducation:      GOALS:   Multidisciplinary Problems     Occupational Therapy Goals        Problem: Occupational Therapy Goal    Goal Priority Disciplines Outcome Interventions   Occupational Therapy Goal     OT, PT/OT Ongoing, Progressing    Description:  Goals expiration date, 12/10  Patient will increase functional independence with ADLs by performing:    Patient will demonstrate rolling to the right with mod assist.  Not met   Patient will demonstrate rolling to the left with mod assist.   Not met  Patient will demonstrate supine -sit with mod  assist.   Not met  Patient will demonstrate stand pivot transfers with max assist.   Not met  Patient will demonstrate grooming while seated with max assist.   Not met  Patient will demonstrate upper body dressing with max assist while seated EOB.   Not met  Patient will demonstrate lower body dressing with max assist while seated EOB.   Not met  Patient will demonstrate toileting with max assist.   Not met  Patient will demonstrate bathing while seated EOB with max assist.   Not met  Patient will demonstrate ability to follow 3/5 commands.   Not met  Patient's family / caregiver will demonstrate independence and safety with assisting patient with self-care skills and functional mobility.     Not met  Patient's family / caregiver will demonstrate independence with providing ROM and changes in bed positioning.   Not met                             Time Tracking:     OT Date of Treatment: 11/29/19  OT Start Time: 0930  OT Stop Time: 0945  OT Total  Time (min): 15 min    Billable Minutes:Therapeutic Activity 15    Marilia Gayle, OT  11/29/2019

## 2019-11-30 LAB
ALBUMIN SERPL BCP-MCNC: 2.5 G/DL (ref 3.5–5.2)
ALP SERPL-CCNC: 78 U/L (ref 55–135)
ALT SERPL W/O P-5'-P-CCNC: 71 U/L (ref 10–44)
ANION GAP SERPL CALC-SCNC: 9 MMOL/L (ref 8–16)
AST SERPL-CCNC: 43 U/L (ref 10–40)
BASOPHILS # BLD AUTO: 0.08 K/UL (ref 0–0.2)
BASOPHILS NFR BLD: 0.8 % (ref 0–1.9)
BILIRUB SERPL-MCNC: 0.3 MG/DL (ref 0.1–1)
BUN SERPL-MCNC: 18 MG/DL (ref 8–23)
CALCIUM SERPL-MCNC: 8.7 MG/DL (ref 8.7–10.5)
CHLORIDE SERPL-SCNC: 109 MMOL/L (ref 95–110)
CO2 SERPL-SCNC: 22 MMOL/L (ref 23–29)
CREAT SERPL-MCNC: 0.9 MG/DL (ref 0.5–1.4)
DIFFERENTIAL METHOD: ABNORMAL
EOSINOPHIL # BLD AUTO: 0.3 K/UL (ref 0–0.5)
EOSINOPHIL NFR BLD: 2.9 % (ref 0–8)
ERYTHROCYTE [DISTWIDTH] IN BLOOD BY AUTOMATED COUNT: 13.9 % (ref 11.5–14.5)
EST. GFR  (AFRICAN AMERICAN): >60 ML/MIN/1.73 M^2
EST. GFR  (NON AFRICAN AMERICAN): >60 ML/MIN/1.73 M^2
GLUCOSE SERPL-MCNC: 115 MG/DL (ref 70–110)
HCT VFR BLD AUTO: 32.3 % (ref 40–54)
HGB BLD-MCNC: 9.3 G/DL (ref 14–18)
IMM GRANULOCYTES # BLD AUTO: 0.08 K/UL (ref 0–0.04)
IMM GRANULOCYTES NFR BLD AUTO: 0.8 % (ref 0–0.5)
LYMPHOCYTES # BLD AUTO: 3.1 K/UL (ref 1–4.8)
LYMPHOCYTES NFR BLD: 31.3 % (ref 18–48)
MCH RBC QN AUTO: 24.9 PG (ref 27–31)
MCHC RBC AUTO-ENTMCNC: 28.8 G/DL (ref 32–36)
MCV RBC AUTO: 87 FL (ref 82–98)
MONOCYTES # BLD AUTO: 0.9 K/UL (ref 0.3–1)
MONOCYTES NFR BLD: 8.6 % (ref 4–15)
NEUTROPHILS # BLD AUTO: 5.5 K/UL (ref 1.8–7.7)
NEUTROPHILS NFR BLD: 55.6 % (ref 38–73)
NRBC BLD-RTO: 0 /100 WBC
PLATELET # BLD AUTO: 554 K/UL (ref 150–350)
PMV BLD AUTO: 9.6 FL (ref 9.2–12.9)
POCT GLUCOSE: 109 MG/DL (ref 70–110)
POCT GLUCOSE: 113 MG/DL (ref 70–110)
POCT GLUCOSE: 153 MG/DL (ref 70–110)
POCT GLUCOSE: 158 MG/DL (ref 70–110)
POCT GLUCOSE: 97 MG/DL (ref 70–110)
POTASSIUM SERPL-SCNC: 4.4 MMOL/L (ref 3.5–5.1)
PROT SERPL-MCNC: 6.7 G/DL (ref 6–8.4)
RBC # BLD AUTO: 3.73 M/UL (ref 4.6–6.2)
SODIUM SERPL-SCNC: 140 MMOL/L (ref 136–145)
WBC # BLD AUTO: 9.91 K/UL (ref 3.9–12.7)

## 2019-11-30 PROCEDURE — 63600175 PHARM REV CODE 636 W HCPCS: Performed by: HOSPITALIST

## 2019-11-30 PROCEDURE — 63600175 PHARM REV CODE 636 W HCPCS: Performed by: NURSE PRACTITIONER

## 2019-11-30 PROCEDURE — 94640 AIRWAY INHALATION TREATMENT: CPT

## 2019-11-30 PROCEDURE — 11000001 HC ACUTE MED/SURG PRIVATE ROOM

## 2019-11-30 PROCEDURE — 80053 COMPREHEN METABOLIC PANEL: CPT

## 2019-11-30 PROCEDURE — 25000003 PHARM REV CODE 250: Performed by: STUDENT IN AN ORGANIZED HEALTH CARE EDUCATION/TRAINING PROGRAM

## 2019-11-30 PROCEDURE — 25000003 PHARM REV CODE 250: Performed by: PHYSICIAN ASSISTANT

## 2019-11-30 PROCEDURE — 36415 COLL VENOUS BLD VENIPUNCTURE: CPT

## 2019-11-30 PROCEDURE — 99231 PR SUBSEQUENT HOSPITAL CARE,LEVL I: ICD-10-PCS | Mod: ,,, | Performed by: INTERNAL MEDICINE

## 2019-11-30 PROCEDURE — 25000242 PHARM REV CODE 250 ALT 637 W/ HCPCS: Performed by: PHYSICIAN ASSISTANT

## 2019-11-30 PROCEDURE — 99900035 HC TECH TIME PER 15 MIN (STAT)

## 2019-11-30 PROCEDURE — 25000003 PHARM REV CODE 250: Performed by: HOSPITALIST

## 2019-11-30 PROCEDURE — 85025 COMPLETE CBC W/AUTO DIFF WBC: CPT

## 2019-11-30 PROCEDURE — 25000242 PHARM REV CODE 250 ALT 637 W/ HCPCS: Performed by: NURSE PRACTITIONER

## 2019-11-30 PROCEDURE — 94761 N-INVAS EAR/PLS OXIMETRY MLT: CPT

## 2019-11-30 PROCEDURE — 25000003 PHARM REV CODE 250: Performed by: PSYCHIATRY & NEUROLOGY

## 2019-11-30 PROCEDURE — 99231 SBSQ HOSP IP/OBS SF/LOW 25: CPT | Mod: ,,, | Performed by: INTERNAL MEDICINE

## 2019-11-30 RX ADMIN — ATORVASTATIN CALCIUM 40 MG: 20 TABLET, FILM COATED ORAL at 08:11

## 2019-11-30 RX ADMIN — IPRATROPIUM BROMIDE AND ALBUTEROL SULFATE 3 ML: .5; 3 SOLUTION RESPIRATORY (INHALATION) at 07:11

## 2019-11-30 RX ADMIN — ASPIRIN 81 MG CHEWABLE TABLET 81 MG: 81 TABLET CHEWABLE at 09:11

## 2019-11-30 RX ADMIN — ACETAMINOPHEN 650 MG: 325 TABLET ORAL at 09:11

## 2019-11-30 RX ADMIN — IPRATROPIUM BROMIDE AND ALBUTEROL SULFATE 3 ML: .5; 3 SOLUTION RESPIRATORY (INHALATION) at 03:11

## 2019-11-30 RX ADMIN — IPRATROPIUM BROMIDE AND ALBUTEROL SULFATE 3 ML: .5; 3 SOLUTION RESPIRATORY (INHALATION) at 11:11

## 2019-11-30 RX ADMIN — HEPARIN SODIUM 5000 UNITS: 5000 INJECTION, SOLUTION INTRAVENOUS; SUBCUTANEOUS at 08:11

## 2019-11-30 RX ADMIN — ERTAPENEM 1 G: 1 INJECTION INTRAMUSCULAR; INTRAVENOUS at 08:11

## 2019-11-30 RX ADMIN — LEVETIRACETAM 1000 MG: 500 TABLET ORAL at 09:11

## 2019-11-30 RX ADMIN — SENNOSIDES AND DOCUSATE SODIUM 1 TABLET: 8.6; 5 TABLET ORAL at 09:11

## 2019-11-30 RX ADMIN — POLYETHYLENE GLYCOL 3350 17 G: 17 POWDER, FOR SOLUTION ORAL at 09:11

## 2019-11-30 RX ADMIN — IPRATROPIUM BROMIDE AND ALBUTEROL SULFATE 3 ML: .5; 3 SOLUTION RESPIRATORY (INHALATION) at 04:11

## 2019-11-30 RX ADMIN — SENNOSIDES AND DOCUSATE SODIUM 1 TABLET: 8.6; 5 TABLET ORAL at 08:11

## 2019-11-30 RX ADMIN — LEVETIRACETAM 1000 MG: 500 TABLET ORAL at 08:11

## 2019-11-30 RX ADMIN — Medication 6 MG: at 08:11

## 2019-11-30 RX ADMIN — CLOPIDOGREL BISULFATE 75 MG: 75 TABLET, FILM COATED ORAL at 09:11

## 2019-11-30 RX ADMIN — HEPARIN SODIUM 5000 UNITS: 5000 INJECTION, SOLUTION INTRAVENOUS; SUBCUTANEOUS at 05:11

## 2019-11-30 RX ADMIN — LOSARTAN POTASSIUM 25 MG: 25 TABLET, FILM COATED ORAL at 09:11

## 2019-11-30 RX ADMIN — IPRATROPIUM BROMIDE AND ALBUTEROL SULFATE 3 ML: .5; 3 SOLUTION RESPIRATORY (INHALATION) at 08:11

## 2019-11-30 NOTE — PLAN OF CARE
Problem: Fall Injury Risk  Goal: Absence of Fall and Fall-Related Injury  Outcome: Ongoing, Progressing     Problem: Adult Inpatient Plan of Care  Goal: Plan of Care Review  Outcome: Ongoing, Progressing  Goal: Patient-Specific Goal (Individualization)  Description  Admit Date: 11/11    Admit Dx: Altered mental status    Past Medical History:  No date: CHI (closed head injury)  No date: Dementia  No date: Diabetes mellitus  No date: Hyperlipidemia  No date: Seizures    History reviewed. No pertinent surgical history.    Individualization:   1. Frequent weight shifts for comfort    Restraints: (date/time/why or N/A) n/a       Outcome: Ongoing, Progressing  Goal: Absence of Hospital-Acquired Illness or Injury  Outcome: Ongoing, Progressing  Goal: Optimal Comfort and Wellbeing  Outcome: Ongoing, Progressing  Goal: Readiness for Transition of Care  Outcome: Ongoing, Progressing  Goal: Rounds/Family Conference  Outcome: Ongoing, Progressing

## 2019-11-30 NOTE — PROGRESS NOTES
Hospital Medicine   Progress note      Team: [facility] MED G Jus Russell MD   Admit Date: 11/11/2019   Hospital Day: 19  NAI: 12/2/2019   Code status: Full Code   Principal Problem: Acute encephalopathy     HPI: Mr Lopez is a 69 yr old male with a PMH of CVA, seizures, CHI, DM II, dementia, HTN, who presents to the ED with AMS. Patient was last heard from by a friend on Friday 11/8 (3 days ago). Found down at home 11/11, incontinent of urine and bowel. CTH negative. MRI c-spine, brain pending. Mild leukocytosis, UA positive for UTI. cEEG pending. Patient intubated in the ED for airway protection. Admitted to [facility] for higher level of care. Will need LP.    ICU Course:   Intubated in ED. Admit to [facility] on 11/11.  11/12/2019: Switched Ceftriaxone to Zosyn, rechecking BCx, IVF @ 150cc/hr w/ 1L bolus; Tube feeds & dvt ppx initiated. EEG suppressed, propofol disctoninued & fentanyl 50mcg q2hr added.  11/13/2019: Steadily improving since presentation. Continue Keppra & Abx, added 200cc q6 FW to feeds, & dc'd vent.  11/14/2019: mental status continues to improve. Following commands in all 4 ext. FW 200cc q6hr; Extubate today; pending sensitivities of cx's; added ASA/statin  11/15/2019: Mental status improved; secretions persistent. De-escalated Abx to ceftriaxone given susceptibilities; added breathing tx q4. NPO in AM for possible extubation.  11/16/19: start glycopyrrolate for secretions  11/17: Extubated   11/18: transferred to hospital medicine     Hospital course:  69 yr old male with a PMH of CVA, seizures, CHI, DM II, dementia, HTN, who presents to the ED with admitted for acute encephalopathy.  patient was intubated for airway protection, successfully extubated on 11/17.Initially thought to be secondary to seizures.  Seen by Neurology, had CT head, MRI brain and C-spine, continues EEG all of which were negative for any intracranial abnormalities or seizures.  Neuro rec continuing Keppra.  Hospitalization  complicated by ESBL UTI and Proteus bacteremia.  Seen by Infectious Disease. Currently on ertapenem till end date of 12/03/2019.  Midline placed 11/25. Planned for discharge to SNF. Medically stable at this time for discharge, pending placement. Will need outpatient follow-up with Neurology, ID and PCP.       Interval hx:   Pt was seen and examined at bedside.  Patient is afebrile and hemodynamically stable. No complaints. Stable. Pending placement    ROS (Positive in Bold, otherwise negative)  Constitutional: fever, chills, night sweats  CV: chest pain, edema, palpitations  Resp: SOB, cough, sputum production  GI: changes in appetite, NVDC, pain, melena, hematochezia, GERD, hematemesis  : Dysuria, hematuria, urinary urgency, frequency  MSK: arthralgia/myalgia, joint swelling  Neuro/Psych: anxiety, depression    PEx   Temp:  [97.6 °F (36.4 °C)-98.7 °F (37.1 °C)]   Pulse:  [71-96]   Resp:  [16-24]   BP: ()/(62-74)   SpO2:  [95 %-100 %]      I & O (Last 24H):     Intake/Output Summary (Last 24 hours) at 11/30/2019 1221  Last data filed at 11/30/2019 0555  Gross per 24 hour   Intake 460 ml   Output --   Net 460 ml       General:  male  in no acute distress. Awake, alert, Resting in bed.    HEENT: NCAT. PERRL. EOMI. Sclera Anicteric.  CVS: RRR. Normal S1 S2. No murmurs  Pulm: CTAB. Normal respiratory effort. No wheezes, rhonchi, or crackles.  Abdomen: Soft. Non-distended. No tenderness to palpation. No rebound or guarding. +BS.  Extremities: No edema. No cyanosis. Full ROM.  Neuro: Alert, oriented x 3, Spont mvt of all extremities with no focal deficits noted.    All labs, imaging and nursing notes reviewed in the past 24 hrs.      Active Hospital Problems    Diagnosis  POA    *Acute encephalopathy [G93.40]  Yes    Restricted diet [Z71.3]  Not Applicable    Alteration in skin integrity [R23.9]  Yes    Altered mental state [R41.82]  Yes    Leukocytosis [D72.829]  Yes    History of closed  head injury [Z87.820]  Not Applicable    On mechanically assisted ventilation [Z99.11]  Not Applicable    UTI (urinary tract infection) [N39.0]  Yes    Bacteremia [R78.81]  Yes    Altered mental status [R41.82]  Yes    Hypertension [I10]  Yes    Type 2 diabetes mellitus [E11.9]  Yes    History of CVA in adulthood [Z86.73]  Not Applicable    Seizure disorder [G40.909]  Yes      Resolved Hospital Problems   No resolved problems to display.          Assessment and Plan for problems addressed today:      albuterol-ipratropium  3 mL Nebulization Q4H    aspirin  81 mg Oral Daily    atorvastatin  40 mg Oral QHS    clopidogrel  75 mg Oral Daily    ertapenem (INVANZ) IVPB  1 g Intravenous Q24H    heparin (porcine)  5,000 Units Subcutaneous Q8H    levETIRAcetam  1,000 mg Oral BID    losartan  25 mg Oral Daily    polyethylene glycol  17 g Oral Daily    senna-docusate 8.6-50 mg  1 tablet Oral BID     acetaminophen, albuterol-ipratropium, bisacodyl, Dextrose 10% Bolus, Dextrose 10% Bolus, glucagon (human recombinant), glucose, glucose, insulin aspart U-100, melatonin, ondansetron, sodium chloride 0.9%      Acute metabolic encephalopathy:  Resolved  Septic encephalopathy:  -Patient with Hx of frequent falls and UTIs was found down at home, incontinent of urine/feces 11/11.  He was last seen at baseline mental status 11/8.  -patient was intubated for airway protection, successfully extubated on 11/17.  -CT head noted to be nonacute  -MRI brain and c-spine wo contrast unremarkable  -cEEG done in the ICU did not show acute epileptic activity  -patient was noted to have positive blood cultures with Proteus, positive urine cultures with ESBL E coli which was thought to be the reason for his acute encephalopathy.    -Patient was treated for UTI initially with Zosyn then with ceftriaxone and mentation improved.  Patient was extubated on 11/17 and transitioned to Hospital Medicine on 11/18  -however, on 11/19, patient  was noted to be febrile, and more encephalopathic  -patient underwent repeat EEG which did not reveal any acute seizures.  Head CT was nonacute.  CXR was WNL.  Patient was also pancultured which did not show any growth to date.  -antibiotics were upgraded to ertapenem based on sensitivity report and over the last few days, mentation has improved significantly  -continue neuro checks  -fall precautions, aspiration precautions, seizure precautions  -Continue correction of metabolic derangements  -Maintain Delirium precautions    ESBL UTI (urinary tract infection):  -Hx of UTIs, previous urine culture pos. for klebsiella  -UA with many bacteria, UCx w/ ESBL E coli  -patient is being treated with ceftriaxone however, however patient continued to spike fevers  -antibiotics were changed to ertapenem/vancomycin  -vancomycin DC on 11/20.  -Infectious Disease consulted, continue ertapenem for total 14 day course of antibiotics (end date 12/03)  -will need midline placed prior to discharge  -retroperitoneal ultrasound reveals mild left hydronephrosis otherwise no significant abnormality    Proteus Bacteremia:  -BCx w/ Proteus mirabilis from 11/11  -blood cultures from 11/12 onwards have shown no growth to date  -Patient was previously on Zosyn, transition to ceftriaxone based on sensitivity report  -however, patient was noted to be febrile again on 11/18.  Antibiotics were broadened to ertapenem/vancomycin.  Blood cultures were repeated which show no growth to date  -vancomycin discontinued on 11/20  -infectious Disease was consulted.  Patient to continue ertapenem for total 14 day course of antibiotics    Febrile illness:  Resolved  -patient was continuing to spike daily fevers  -cultures show no growth to date  -continue ertapenem, vancomycin now discontinued  -bilateral lower extremity ultrasound to rule out DVT negative   -monitor for hemodynamic instability     Seizure disorder:  -patient is a history of seizures several  years  -Loaded with Keppra in the ED  -cEEG negative in the ICU  -currently on  Keppra 1g BID. Continue on this dose until able to f/u with Neurology outpatient  -noted to be more encephalopathic and 11/19, repeat EEG did not reveal acute encephalopathic activity.  Mentation now back to baseline.    Diabetes Mellitus:  -Last HbA1c:  6.4  -SSI with accuchecks qACHS  -BG goal: Preprandial blood glucose target <140 mg/dL, Random glucoses <180 mg/dL  -ADA diet    Essential Hypertension:  -stable  -Continue PTA  losartan 25 mg daily  -monitor vitals q4h  -SBP goal of <160 in hospital    Hyperlipidemia:  -continue PTA atorvastatin 40 mg    History of CVA in adulthood  -continue Plavix, statin    DVT PPx:  Heparin    Discharge plan and follow up: DC to SNF, now medically stable and pending placement    Jus Russell MD  [facility] Medicine Staff  [phone] pager

## 2019-12-01 LAB
POCT GLUCOSE: 160 MG/DL (ref 70–110)
POCT GLUCOSE: 199 MG/DL (ref 70–110)
POCT GLUCOSE: 69 MG/DL (ref 70–110)
POCT GLUCOSE: 90 MG/DL (ref 70–110)

## 2019-12-01 PROCEDURE — 63600175 PHARM REV CODE 636 W HCPCS: Performed by: NURSE PRACTITIONER

## 2019-12-01 PROCEDURE — 25000242 PHARM REV CODE 250 ALT 637 W/ HCPCS: Performed by: NURSE PRACTITIONER

## 2019-12-01 PROCEDURE — 25000003 PHARM REV CODE 250: Performed by: STUDENT IN AN ORGANIZED HEALTH CARE EDUCATION/TRAINING PROGRAM

## 2019-12-01 PROCEDURE — 99231 SBSQ HOSP IP/OBS SF/LOW 25: CPT | Mod: ,,, | Performed by: INTERNAL MEDICINE

## 2019-12-01 PROCEDURE — 25000003 PHARM REV CODE 250: Performed by: HOSPITALIST

## 2019-12-01 PROCEDURE — 94761 N-INVAS EAR/PLS OXIMETRY MLT: CPT

## 2019-12-01 PROCEDURE — 63600175 PHARM REV CODE 636 W HCPCS: Performed by: HOSPITALIST

## 2019-12-01 PROCEDURE — 11000001 HC ACUTE MED/SURG PRIVATE ROOM

## 2019-12-01 PROCEDURE — 25000003 PHARM REV CODE 250: Performed by: PHYSICIAN ASSISTANT

## 2019-12-01 PROCEDURE — 99231 PR SUBSEQUENT HOSPITAL CARE,LEVL I: ICD-10-PCS | Mod: ,,, | Performed by: INTERNAL MEDICINE

## 2019-12-01 PROCEDURE — 94640 AIRWAY INHALATION TREATMENT: CPT

## 2019-12-01 PROCEDURE — 25000003 PHARM REV CODE 250: Performed by: PSYCHIATRY & NEUROLOGY

## 2019-12-01 RX ADMIN — IPRATROPIUM BROMIDE AND ALBUTEROL SULFATE 3 ML: .5; 3 SOLUTION RESPIRATORY (INHALATION) at 12:12

## 2019-12-01 RX ADMIN — LEVETIRACETAM 1000 MG: 500 TABLET ORAL at 09:12

## 2019-12-01 RX ADMIN — ACETAMINOPHEN 650 MG: 325 TABLET ORAL at 08:12

## 2019-12-01 RX ADMIN — IPRATROPIUM BROMIDE AND ALBUTEROL SULFATE 3 ML: .5; 3 SOLUTION RESPIRATORY (INHALATION) at 03:12

## 2019-12-01 RX ADMIN — HEPARIN SODIUM 5000 UNITS: 5000 INJECTION, SOLUTION INTRAVENOUS; SUBCUTANEOUS at 08:12

## 2019-12-01 RX ADMIN — SENNOSIDES AND DOCUSATE SODIUM 1 TABLET: 8.6; 5 TABLET ORAL at 08:12

## 2019-12-01 RX ADMIN — ASPIRIN 81 MG CHEWABLE TABLET 81 MG: 81 TABLET CHEWABLE at 09:12

## 2019-12-01 RX ADMIN — ATORVASTATIN CALCIUM 40 MG: 20 TABLET, FILM COATED ORAL at 08:12

## 2019-12-01 RX ADMIN — CLOPIDOGREL BISULFATE 75 MG: 75 TABLET, FILM COATED ORAL at 09:12

## 2019-12-01 RX ADMIN — Medication 6 MG: at 08:12

## 2019-12-01 RX ADMIN — IPRATROPIUM BROMIDE AND ALBUTEROL SULFATE 3 ML: .5; 3 SOLUTION RESPIRATORY (INHALATION) at 08:12

## 2019-12-01 RX ADMIN — LOSARTAN POTASSIUM 25 MG: 25 TABLET, FILM COATED ORAL at 09:12

## 2019-12-01 RX ADMIN — LEVETIRACETAM 1000 MG: 500 TABLET ORAL at 08:12

## 2019-12-01 RX ADMIN — HEPARIN SODIUM 5000 UNITS: 5000 INJECTION, SOLUTION INTRAVENOUS; SUBCUTANEOUS at 06:12

## 2019-12-01 RX ADMIN — IPRATROPIUM BROMIDE AND ALBUTEROL SULFATE 3 ML: .5; 3 SOLUTION RESPIRATORY (INHALATION) at 11:12

## 2019-12-01 RX ADMIN — HEPARIN SODIUM 5000 UNITS: 5000 INJECTION, SOLUTION INTRAVENOUS; SUBCUTANEOUS at 03:12

## 2019-12-01 RX ADMIN — ERTAPENEM 1 G: 1 INJECTION INTRAMUSCULAR; INTRAVENOUS at 08:12

## 2019-12-01 RX ADMIN — IPRATROPIUM BROMIDE AND ALBUTEROL SULFATE 3 ML: .5; 3 SOLUTION RESPIRATORY (INHALATION) at 09:12

## 2019-12-01 NOTE — PROGRESS NOTES
Hospital Medicine   Progress note      Team: [facility] MED G Jus Russell MD   Admit Date: 11/11/2019   Hospital Day: 20  NAI: 12/2/2019   Code status: Full Code   Principal Problem: Acute encephalopathy     HPI: Mr Lopez is a 69 yr old male with a PMH of CVA, seizures, CHI, DM II, dementia, HTN, who presents to the ED with AMS. Patient was last heard from by a friend on Friday 11/8 (3 days ago). Found down at home 11/11, incontinent of urine and bowel. CTH negative. MRI c-spine, brain pending. Mild leukocytosis, UA positive for UTI. cEEG pending. Patient intubated in the ED for airway protection. Admitted to [facility] for higher level of care. Will need LP.    ICU Course:   Intubated in ED. Admit to [facility] on 11/11.  11/12/2019: Switched Ceftriaxone to Zosyn, rechecking BCx, IVF @ 150cc/hr w/ 1L bolus; Tube feeds & dvt ppx initiated. EEG suppressed, propofol disctoninued & fentanyl 50mcg q2hr added.  11/13/2019: Steadily improving since presentation. Continue Keppra & Abx, added 200cc q6 FW to feeds, & dc'd vent.  11/14/2019: mental status continues to improve. Following commands in all 4 ext. FW 200cc q6hr; Extubate today; pending sensitivities of cx's; added ASA/statin  11/15/2019: Mental status improved; secretions persistent. De-escalated Abx to ceftriaxone given susceptibilities; added breathing tx q4. NPO in AM for possible extubation.  11/16/19: start glycopyrrolate for secretions  11/17: Extubated   11/18: transferred to hospital medicine     Hospital course:  69 yr old male with a PMH of CVA, seizures, CHI, DM II, dementia, HTN, who presents to the ED with admitted for acute encephalopathy.  patient was intubated for airway protection, successfully extubated on 11/17.Initially thought to be secondary to seizures.  Seen by Neurology, had CT head, MRI brain and C-spine, continues EEG all of which were negative for any intracranial abnormalities or seizures.  Neuro rec continuing Keppra.  Hospitalization  complicated by ESBL UTI and Proteus bacteremia.  Seen by Infectious Disease. Currently on ertapenem till end date of 12/03/2019.  Midline placed 11/25. Planned for discharge to SNF. Medically stable at this time for discharge, pending placement. Will need outpatient follow-up with Neurology, ID and PCP.       Interval hx:   Pt was seen and examined at bedside.  Afebrile. HDS. No complaints. Stable. Pending placement.    ROS (Positive in Bold, otherwise negative)  Constitutional: fever, chills, night sweats  CV: chest pain, edema, palpitations  Resp: SOB, cough, sputum production  GI: changes in appetite, NVDC, pain, melena, hematochezia, GERD, hematemesis  : Dysuria, hematuria, urinary urgency, frequency  MSK: arthralgia/myalgia, joint swelling  Neuro/Psych: anxiety, depression    PEx   Temp:  [97.5 °F (36.4 °C)-98.1 °F (36.7 °C)]   Pulse:  [72-92]   Resp:  [16-19]   BP: (103-144)/(67-91)   SpO2:  [94 %-99 %]      I & O (Last 24H):     Intake/Output Summary (Last 24 hours) at 12/1/2019 1045  Last data filed at 11/30/2019 2043  Gross per 24 hour   Intake 340 ml   Output --   Net 340 ml       General:  male  in no acute distress. Awake, alert, Resting in bed.    HEENT: NCAT. PERRL. EOMI. Sclera Anicteric.  CVS: RRR. Normal S1 S2. No murmurs  Pulm: CTAB. Normal respiratory effort. No wheezes, rhonchi, or crackles.  Abdomen: Soft. Non-distended. No tenderness to palpation. No rebound or guarding. +BS.  Extremities: No edema. No cyanosis. Full ROM.  Neuro: Alert, oriented x 3, Spont mvt of all extremities with no focal deficits noted.    All labs, imaging and nursing notes reviewed in the past 24 hrs.      Active Hospital Problems    Diagnosis  POA    *Acute encephalopathy [G93.40]  Yes    Restricted diet [Z71.3]  Not Applicable    Alteration in skin integrity [R23.9]  Yes    Altered mental state [R41.82]  Yes    Leukocytosis [D72.829]  Yes    History of closed head injury [Z87.820]  Not  Applicable    On mechanically assisted ventilation [Z99.11]  Not Applicable    UTI (urinary tract infection) [N39.0]  Yes    Bacteremia [R78.81]  Yes    Altered mental status [R41.82]  Yes    Hypertension [I10]  Yes    Type 2 diabetes mellitus [E11.9]  Yes    History of CVA in adulthood [Z86.73]  Not Applicable    Seizure disorder [G40.909]  Yes      Resolved Hospital Problems   No resolved problems to display.          Assessment and Plan for problems addressed today:      albuterol-ipratropium  3 mL Nebulization Q4H    aspirin  81 mg Oral Daily    atorvastatin  40 mg Oral QHS    clopidogrel  75 mg Oral Daily    ertapenem (INVANZ) IVPB  1 g Intravenous Q24H    heparin (porcine)  5,000 Units Subcutaneous Q8H    levETIRAcetam  1,000 mg Oral BID    losartan  25 mg Oral Daily    polyethylene glycol  17 g Oral Daily    senna-docusate 8.6-50 mg  1 tablet Oral BID     acetaminophen, albuterol-ipratropium, bisacodyl, Dextrose 10% Bolus, Dextrose 10% Bolus, glucagon (human recombinant), glucose, glucose, insulin aspart U-100, melatonin, ondansetron, sodium chloride 0.9%      Acute metabolic encephalopathy:  Resolved  Septic encephalopathy: Resolved  -Patient with Hx of frequent falls and UTIs was found down at home, incontinent of urine/feces 11/11.  He was last seen at baseline mental status 11/8.  -patient was intubated for airway protection, successfully extubated on 11/17.  -CT head noted to be nonacute  -MRI brain and c-spine wo contrast unremarkable  -cEEG done in the ICU did not show acute epileptic activity  -patient was noted to have positive blood cultures with Proteus, positive urine cultures with ESBL E coli which was thought to be the reason for his acute encephalopathy.    -Patient was treated for UTI initially with Zosyn then with ceftriaxone and mentation improved.  Patient was extubated on 11/17 and transitioned to Hospital Medicine on 11/18  -however, on 11/19, patient was noted to be  febrile, and more encephalopathic  -patient underwent repeat EEG which did not reveal any acute seizures.  Head CT was nonacute.  CXR was WNL.  Patient was also pancultured which did not show any growth to date.  -antibiotics were upgraded to ertapenem based on sensitivity report and over the last few days, mentation has improved significantly since, now back to baseline  -continue neuro checks  -fall precautions, aspiration precautions, seizure precautions  -Continue correction of metabolic derangements  -Maintain Delirium precautions    ESBL UTI (urinary tract infection):  -Hx of UTIs, previous urine culture pos. for klebsiella  -UA with many bacteria, UCx w/ ESBL E coli  -patient is being treated with ceftriaxone however, however patient continued to spike fevers  -antibiotics were changed to ertapenem/vancomycin  -vancomycin DC on 11/20.  -Infectious Disease consulted, continue ertapenem for total 14 day course of antibiotics (end date 12/03)  -will need midline placed prior to discharge  -retroperitoneal ultrasound reveals mild left hydronephrosis otherwise no significant abnormality    Proteus Bacteremia:  -BCx w/ Proteus mirabilis from 11/11  -blood cultures from 11/12 onwards have shown no growth to date  -Patient was previously on Zosyn, transition to ceftriaxone based on sensitivity report  -however, patient was noted to be febrile again on 11/18.  Antibiotics were broadened to ertapenem/vancomycin.  Blood cultures were repeated which show no growth to date  -vancomycin discontinued on 11/20  -infectious Disease was consulted.  Patient to continue ertapenem for total 14 day course of antibiotics    Febrile illness:  Resolved  -patient was continuing to spike daily fevers  -cultures show no growth to date  -continue ertapenem, vancomycin now discontinued  -bilateral lower extremity ultrasound to rule out DVT negative   -monitor for hemodynamic instability     Seizure disorder:  -patient is a history of  seizures several years  -Loaded with Keppra in the ED  -cEEG negative in the ICU  -currently on  Keppra 1g BID. Continue on this dose until able to f/u with Neurology outpatient  -noted to be more encephalopathic and 11/19, repeat EEG did not reveal acute encephalopathic activity.  Mentation now back to baseline.    Diabetes Mellitus:  -Last HbA1c:  6.4  -SSI with accuchecks qACHS  -BG goal: Preprandial blood glucose target <140 mg/dL, Random glucoses <180 mg/dL  -ADA diet    Essential Hypertension:  -stable  -Continue PTA  losartan 25 mg daily  -monitor vitals q4h  -SBP goal of <160 in hospital    Hyperlipidemia:  -continue PTA atorvastatin 40 mg    History of CVA in adulthood  -continue Plavix, statin    DVT PPx:  Heparin    Discharge plan and follow up: DC to SNF, now medically stable and pending placement    Jus Russell MD  [facility] Medicine Staff  [phone] pager

## 2019-12-02 VITALS
TEMPERATURE: 97 F | WEIGHT: 138.69 LBS | BODY MASS INDEX: 22.29 KG/M2 | SYSTOLIC BLOOD PRESSURE: 116 MMHG | HEART RATE: 89 BPM | RESPIRATION RATE: 18 BRPM | OXYGEN SATURATION: 95 % | HEIGHT: 66 IN | DIASTOLIC BLOOD PRESSURE: 70 MMHG

## 2019-12-02 LAB — POCT GLUCOSE: 105 MG/DL (ref 70–110)

## 2019-12-02 PROCEDURE — 97110 THERAPEUTIC EXERCISES: CPT

## 2019-12-02 PROCEDURE — 63600175 PHARM REV CODE 636 W HCPCS: Performed by: NURSE PRACTITIONER

## 2019-12-02 PROCEDURE — 94761 N-INVAS EAR/PLS OXIMETRY MLT: CPT

## 2019-12-02 PROCEDURE — 99239 PR HOSPITAL DISCHARGE DAY,>30 MIN: ICD-10-PCS | Mod: ,,, | Performed by: INTERNAL MEDICINE

## 2019-12-02 PROCEDURE — 25000003 PHARM REV CODE 250: Performed by: HOSPITALIST

## 2019-12-02 PROCEDURE — 94640 AIRWAY INHALATION TREATMENT: CPT

## 2019-12-02 PROCEDURE — 97530 THERAPEUTIC ACTIVITIES: CPT

## 2019-12-02 PROCEDURE — 25000242 PHARM REV CODE 250 ALT 637 W/ HCPCS: Performed by: NURSE PRACTITIONER

## 2019-12-02 PROCEDURE — 99239 HOSP IP/OBS DSCHRG MGMT >30: CPT | Mod: ,,, | Performed by: INTERNAL MEDICINE

## 2019-12-02 PROCEDURE — 25000003 PHARM REV CODE 250: Performed by: STUDENT IN AN ORGANIZED HEALTH CARE EDUCATION/TRAINING PROGRAM

## 2019-12-02 RX ADMIN — IPRATROPIUM BROMIDE AND ALBUTEROL SULFATE 3 ML: .5; 3 SOLUTION RESPIRATORY (INHALATION) at 04:12

## 2019-12-02 RX ADMIN — IPRATROPIUM BROMIDE AND ALBUTEROL SULFATE 3 ML: .5; 3 SOLUTION RESPIRATORY (INHALATION) at 01:12

## 2019-12-02 RX ADMIN — IPRATROPIUM BROMIDE AND ALBUTEROL SULFATE 3 ML: .5; 3 SOLUTION RESPIRATORY (INHALATION) at 08:12

## 2019-12-02 RX ADMIN — ASPIRIN 81 MG CHEWABLE TABLET 81 MG: 81 TABLET CHEWABLE at 08:12

## 2019-12-02 RX ADMIN — LOSARTAN POTASSIUM 25 MG: 25 TABLET, FILM COATED ORAL at 08:12

## 2019-12-02 RX ADMIN — LEVETIRACETAM 1000 MG: 500 TABLET ORAL at 08:12

## 2019-12-02 RX ADMIN — HEPARIN SODIUM 5000 UNITS: 5000 INJECTION, SOLUTION INTRAVENOUS; SUBCUTANEOUS at 01:12

## 2019-12-02 RX ADMIN — IPRATROPIUM BROMIDE AND ALBUTEROL SULFATE 3 ML: .5; 3 SOLUTION RESPIRATORY (INHALATION) at 11:12

## 2019-12-02 RX ADMIN — CLOPIDOGREL BISULFATE 75 MG: 75 TABLET, FILM COATED ORAL at 08:12

## 2019-12-02 NOTE — PLAN OF CARE
1 goal met. Goals remain appropriate.   Problem: Physical Therapy Goal  Goal: Physical Therapy Goal  Description  Goals to be met by: 2019    Patient will increase functional independence with mobility by performin. Supine to sit with Minimal Assistance - met  2. Sit to supine with Minimal Assistance  3. Sit to stand transfer with Minimal Assistance -Met 2019   4. Bed to chair transfer with Minimal Assistance using Rolling Walker  5. Gait  x 50 feet with Minimal Assistance using Rolling Walker.   6. Lower extremity exercise program x15 reps per handout, with independence  *stairs goal to be added pending progress         Outcome: Ongoing, Progressing

## 2019-12-02 NOTE — NURSING
Midline removed prior to primary RN being notified that patient was to be discharged on Invanz (treatment complete tomorrow, 12/3).  Spoke with Dr. Russell to notify him that midline had been removed.  Per MD, ok for patient to have PIV inserted as patient has x1 day until his course is complete. RN currently re-inserting PIV, will enter on flowsheet when access obtained.

## 2019-12-02 NOTE — PT/OT/SLP PROGRESS
Occupational Therapy   Treatment    Name: Edwin Lopez Jr.  MRN: [id number]  Admitting Diagnosis:  Acute encephalopathy       Recommendations:     Discharge Recommendations: nursing facility, skilled  Discharge Equipment Recommendations:  bath bench  Barriers to discharge:  Inaccessible home environment, Decreased caregiver support    Assessment:     Edwin Lopez Jr. is a 69 y.o. male with a medical diagnosis of Acute encephalopathy.  He presents supine in no distress and willing to participate.  Pt with reported fatigue but able to participate with UE therex for improved functional performance.  Positioning and scooting in bed for joint and skin integrity.  . Performance deficits affecting function are weakness, impaired endurance, impaired self care skills, impaired functional mobilty, gait instability, impaired balance, impaired cognition.     Rehab Prognosis:  Fair; patient would benefit from acute skilled OT services to address these deficits and reach maximum level of function.       Plan:     Patient to be seen 3 x/week to address the above listed problems via self-care/home management, therapeutic activities, therapeutic exercises  · Plan of Care Expires: 12/10/19  · Plan of Care Reviewed with: patient    Subjective     Pain/Comfort:  · Pain Rating 1: 0/10    Objective:     Communicated with: RN prior to session.  Patient found supine with peripheral IV, telemetry upon OT entry to room.    General Precautions: Standard, fall   Orthopedic Precautions:N/A   Braces: N/A     Occupational Performance:     Bed Mobility:    · Patient completed Rolling/Turning to Right with moderate assistance  · Patient completed Scooting/Bridging with moderate assistance     Activities of Daily Living:  · Feeding:  stand by assistance        [facility] 6 Click ADL: 13    Treatment & Education:  Pt educated on safety, role of OT, importance of increased participation in self care for gains , expectations for participation, expectations  for gains, POC, energy conservation, caregiver strain. White board updated.   - Upper extremity exercises provided for improved functional performance.  15 forward punches, 15 upward punches, 15 lateral extended arm circles x2    Patient left supine with all lines intactEducation:      GOALS:   Multidisciplinary Problems     Occupational Therapy Goals        Problem: Occupational Therapy Goal    Goal Priority Disciplines Outcome Interventions   Occupational Therapy Goal     OT, PT/OT Ongoing, Progressing    Description:  Goals expiration date, 12/10  Patient will increase functional independence with ADLs by performing:    Patient will demonstrate rolling to the right with mod assist.  Not met   Patient will demonstrate rolling to the left with mod assist.   Not met  Patient will demonstrate supine -sit with mod  assist.   Not met  Patient will demonstrate stand pivot transfers with max assist.   Not met  Patient will demonstrate grooming while seated with max assist.   Not met  Patient will demonstrate upper body dressing with max assist while seated EOB.   Not met  Patient will demonstrate lower body dressing with max assist while seated EOB.   Not met  Patient will demonstrate toileting with max assist.   Not met  Patient will demonstrate bathing while seated EOB with max assist.   Not met  Patient will demonstrate ability to follow 3/5 commands.   Not met  Patient's family / caregiver will demonstrate independence and safety with assisting patient with self-care skills and functional mobility.     Not met  Patient's family / caregiver will demonstrate independence with providing ROM and changes in bed positioning.   Not met                             Time Tracking:     OT Date of Treatment: 12/02/19  OT Start Time: 1145  OT Stop Time: 1200  OT Total Time (min): 15 min    Billable Minutes:Therapeutic Exercise 15    Marilia Gayle, OT  12/2/2019

## 2019-12-02 NOTE — PT/OT/SLP PROGRESS
Physical Therapy Treatment    Patient Name:  Edwin Lopez Jr.   MRN:  [id number]    Recommendations:     Discharge Recommendations:  nursing facility, skilled  Discharge Equipment Recommendations: bath bench   Barriers to discharge: decreased functional mobility requiring increased assistance    Assessment:     Edwin Lopez Jr. is a 69 y.o. male admitted with a medical diagnosis of Acute encephalopathy.  He presents with the following impairments/functional limitations:  weakness, impaired endurance, impaired self care skills, impaired functional mobilty, gait instability, impaired balance, decreased coordination, decreased lower extremity function, decreased safety awareness, impaired coordination. Pt tolerated session well with focus on bed mobility, transfers, and therex. Pt progressing slowly with focus on bed mobility and transfers. Pt with Mod to Max A for standing this day after initial trial requiring Min A. Pt with constant posterior lean limiting balance and pt steps forward when therapist attempts to assist pt to proper balanced upright posture causing pt to lean further posteriorly and require return to sitting. Pt will continue to benefit from therapy services to address impairments listed above.     Rehab Prognosis: Fair; patient would benefit from acute skilled PT services to address these deficits and reach maximum level of function.    Recent Surgery: * No surgery found *      Plan:     During this hospitalization, patient to be seen 3 x/week to address the identified rehab impairments via gait training, therapeutic activities, therapeutic exercises, neuromuscular re-education and progress toward the following goals:    · Plan of Care Expires:  12/18/19    Subjective     Chief Complaint: no c/o  Pain/Comfort:  · Pain Rating 1: 0/10  · Pain Rating Post-Intervention 1: 0/10      Objective:     Communicated with NSG prior to session.  Patient found HOB elevated with telemetry upon PTA entry to room.      General Precautions: Standard, aspiration, fall  Orthopedic Precautions:N/A   Braces: N/A    Functional Mobility:  · Bed Mobility:     · Supine to Sit: minimum assistance  · Transfers:     · Sit to Stand:  maximal assistance with rolling walker  · Bed to Chair: maximal assistance with  rolling walker  using  Stand Pivot      AM-PAC 6 CLICK MOBILITY  Turning over in bed (including adjusting bedclothes, sheets and blankets)?: 3  Sitting down on and standing up from a chair with arms (e.g., wheelchair, bedside commode, etc.): 2  Moving from lying on back to sitting on the side of the bed?: 3  Moving to and from a bed to a chair (including a wheelchair)?: 2  Need to walk in hospital room?: 1  Climbing 3-5 steps with a railing?: 1  Basic Mobility Total Score: 12       Therapeutic Activities and Exercises:   Pt assisted with functional mobility as noted above.   Pt with 5 standing trials ranging from Mod to Max A. Single initial trial requiring Min A.   Max A to maintain standing d/t posterior lean.     Patient left up in chair with all lines intact, call button in reach and chair alarm on.    GOALS:   Multidisciplinary Problems     Physical Therapy Goals        Problem: Physical Therapy Goal    Goal Priority Disciplines Outcome Goal Variances Interventions   Physical Therapy Goal     PT, PT/OT Ongoing, Progressing     Description:  Goals to be met by: 2019    Patient will increase functional independence with mobility by performin. Supine to sit with Minimal Assistance - met  2. Sit to supine with Minimal Assistance  3. Sit to stand transfer with Minimal Assistance -Met 2019   4. Bed to chair transfer with Minimal Assistance using Rolling Walker  5. Gait  x 50 feet with Minimal Assistance using Rolling Walker.   6. Lower extremity exercise program x15 reps per handout, with independence  *stairs goal to be added pending progress                          Time Tracking:     PT Received On:  12/02/19  PT Start Time: 0930     PT Stop Time: 0954  PT Total Time (min): 24 min     Billable Minutes: Therapeutic Activity 16 and Therapeutic Exercise 8    Treatment Type: Treatment  PT/PTA: PTA     PTA Visit Number: 1     Carl Smith PTA  12/02/2019

## 2019-12-02 NOTE — PROGRESS NOTES
Hospital Medicine   Progress note      Team: [facility] G Jus Russell MD   Admit Date: 11/11/2019   Hospital Day: 21  NAI: 12/2/2019   Code status: Full Code   Principal Problem: Acute encephalopathy     HPI: Mr Lopez is a 69 yr old male with a PMH of CVA, seizures, CHI, DM II, dementia, HTN, who presents to the ED with AMS. Patient was last heard from by a friend on Friday 11/8 (3 days ago). Found down at home 11/11, incontinent of urine and bowel. CTH negative. MRI c-spine, brain pending. Mild leukocytosis, UA positive for UTI. cEEG pending. Patient intubated in the ED for airway protection. Admitted to [facility] for higher level of care. Will need LP.    ICU Course:   Intubated in ED. Admit to [facility] on 11/11.  11/12/2019: Switched Ceftriaxone to Zosyn, rechecking BCx, IVF @ 150cc/hr w/ 1L bolus; Tube feeds & dvt ppx initiated. EEG suppressed, propofol disctoninued & fentanyl 50mcg q2hr added.  11/13/2019: Steadily improving since presentation. Continue Keppra & Abx, added 200cc q6 FW to feeds, & dc'd vent.  11/14/2019: mental status continues to improve. Following commands in all 4 ext. FW 200cc q6hr; Extubate today; pending sensitivities of cx's; added ASA/statin  11/15/2019: Mental status improved; secretions persistent. De-escalated Abx to ceftriaxone given susceptibilities; added breathing tx q4. NPO in AM for possible extubation.  11/16/19: start glycopyrrolate for secretions  11/17: Extubated   11/18: transferred to hospital medicine     Hospital course:  69 yr old male with a PMH of CVA, seizures, CHI, DM II, dementia, HTN, who presents to the ED with admitted for acute encephalopathy.  patient was intubated for airway protection, successfully extubated on 11/17.Initially thought to be secondary to seizures.  Seen by Neurology, had CT head, MRI brain and C-spine, continues EEG all of which were negative for any intracranial abnormalities or seizures.  Neuro rec continuing Keppra.  Hospitalization  complicated by ESBL UTI and Proteus bacteremia.  Seen by Infectious Disease. Currently on ertapenem till end date of 12/03/2019.  Midline placed 11/25. Planned for discharge to SNF. Medically stable at this time for discharge, pending placement. Will need outpatient follow-up with Neurology, ID and PCP.       Interval hx:   Pt was seen and examined at bedside.  Afebrile. HDS. Stable. No complaints. Pending placement.    ROS (Positive in Bold, otherwise negative)  Constitutional: fever, chills, night sweats  CV: chest pain, edema, palpitations  Resp: SOB, cough, sputum production  GI: changes in appetite, NVDC, pain, melena, hematochezia, GERD, hematemesis  : Dysuria, hematuria, urinary urgency, frequency  MSK: arthralgia/myalgia, joint swelling  Neuro/Psych: anxiety, depression    PEx   Temp:  [96.6 °F (35.9 °C)-97.7 °F (36.5 °C)]   Pulse:  [63-98]   Resp:  [16-18]   BP: (116-151)/(68-87)   SpO2:  [93 %-100 %]      I & O (Last 24H):     Intake/Output Summary (Last 24 hours) at 12/2/2019 1348  Last data filed at 12/1/2019 2045  Gross per 24 hour   Intake 340 ml   Output --   Net 340 ml       General:  male  in no acute distress. Awake, alert, Resting in bed.    HEENT: NCAT. PERRL. EOMI. Sclera Anicteric.  CVS: RRR. Normal S1 S2. No murmurs  Pulm: CTAB. Normal respiratory effort. No wheezes, rhonchi, or crackles.  Abdomen: Soft. Non-distended. No tenderness to palpation. No rebound or guarding. +BS.  Extremities: No edema. No cyanosis. Full ROM.  Neuro: Alert, oriented x 3, Spont mvt of all extremities with no focal deficits noted.    All labs, imaging and nursing notes reviewed in the past 24 hrs.      Active Hospital Problems    Diagnosis  POA    *Acute encephalopathy [G93.40]  Yes    Restricted diet [Z71.3]  Not Applicable    Alteration in skin integrity [R23.9]  Yes    Altered mental state [R41.82]  Yes    Leukocytosis [D72.829]  Yes    History of closed head injury [Z87.820]  Not  Applicable    On mechanically assisted ventilation [Z99.11]  Not Applicable    UTI (urinary tract infection) [N39.0]  Yes    Bacteremia [R78.81]  Yes    Altered mental status [R41.82]  Yes    Hypertension [I10]  Yes    Type 2 diabetes mellitus [E11.9]  Yes    History of CVA in adulthood [Z86.73]  Not Applicable    Seizure disorder [G40.909]  Yes      Resolved Hospital Problems   No resolved problems to display.          Assessment and Plan for problems addressed today:      albuterol-ipratropium  3 mL Nebulization Q4H    aspirin  81 mg Oral Daily    atorvastatin  40 mg Oral QHS    clopidogrel  75 mg Oral Daily    ertapenem (INVANZ) IVPB  1 g Intravenous Q24H    heparin (porcine)  5,000 Units Subcutaneous Q8H    levETIRAcetam  1,000 mg Oral BID    losartan  25 mg Oral Daily    polyethylene glycol  17 g Oral Daily    senna-docusate 8.6-50 mg  1 tablet Oral BID     acetaminophen, albuterol-ipratropium, bisacodyl, Dextrose 10% Bolus, Dextrose 10% Bolus, glucagon (human recombinant), glucose, glucose, insulin aspart U-100, melatonin, ondansetron, sodium chloride 0.9%      Acute metabolic encephalopathy:  Resolved  Septic encephalopathy: Resolved  -Patient with Hx of frequent falls and UTIs was found down at home, incontinent of urine/feces 11/11.  He was last seen at baseline mental status 11/8.  -patient was intubated for airway protection, successfully extubated on 11/17.  -CT head noted to be nonacute  -MRI brain and c-spine wo contrast unremarkable  -cEEG done in the ICU did not show acute epileptic activity  -patient was noted to have positive blood cultures with Proteus, positive urine cultures with ESBL E coli which was thought to be the reason for his acute encephalopathy.    -Patient was treated for UTI initially with Zosyn then with ceftriaxone and mentation improved.  Patient was extubated on 11/17 and transitioned to Hospital Medicine on 11/18  -however, on 11/19, patient was noted to be  febrile, and more encephalopathic  -patient underwent repeat EEG which did not reveal any acute seizures.  Head CT was nonacute.  CXR was WNL.  Patient was also pancultured which did not show any growth to date.  -antibiotics were upgraded to ertapenem based on sensitivity report and over the last few days, mentation has improved significantly since, now back to baseline  -continue neuro checks  -fall precautions, aspiration precautions, seizure precautions  -Continue correction of metabolic derangements  -Maintain Delirium precautions    ESBL UTI (urinary tract infection):  -Hx of UTIs, previous urine culture pos. for klebsiella  -UA with many bacteria, UCx w/ ESBL E coli  -patient is being treated with ceftriaxone however, however patient continued to spike fevers  -antibiotics were changed to ertapenem/vancomycin  -vancomycin DC on 11/20.  -Infectious Disease consulted, continue ertapenem for total 14 day course of antibiotics (end date 12/03)  -will need midline placed prior to discharge  -retroperitoneal ultrasound reveals mild left hydronephrosis otherwise no significant abnormality    Proteus Bacteremia:  -BCx w/ Proteus mirabilis from 11/11  -blood cultures from 11/12 onwards have shown no growth to date  -Patient was previously on Zosyn, transition to ceftriaxone based on sensitivity report  -however, patient was noted to be febrile again on 11/18.  Antibiotics were broadened to ertapenem/vancomycin.  Blood cultures were repeated which show no growth to date  -vancomycin discontinued on 11/20  -infectious Disease was consulted.  Patient to continue ertapenem for total 14 day course of antibiotics    Febrile illness:  Resolved  -patient was continuing to spike daily fevers  -cultures show no growth to date  -continue ertapenem, vancomycin now discontinued  -bilateral lower extremity ultrasound to rule out DVT negative   -monitor for hemodynamic instability     Seizure disorder:  -patient is a history of  seizures several years  -Loaded with Keppra in the ED  -cEEG negative in the ICU  -currently on  Keppra 1g BID. Continue on this dose until able to f/u with Neurology outpatient  -noted to be more encephalopathic and 11/19, repeat EEG did not reveal acute encephalopathic activity.  Mentation now back to baseline.    Diabetes Mellitus:  -Last HbA1c:  6.4  -SSI with accuchecks qACHS  -BG goal: Preprandial blood glucose target <140 mg/dL, Random glucoses <180 mg/dL  -ADA diet    Essential Hypertension:  -stable  -Continue PTA  losartan 25 mg daily  -monitor vitals q4h  -SBP goal of <160 in hospital    Hyperlipidemia:  -continue PTA atorvastatin 40 mg    History of CVA in adulthood  -continue Plavix, statin    DVT PPx:  Heparin    Discharge plan and follow up: DC to SNF, now medically stable and pending placement    Jus Russell MD  [facility] Medicine Staff  [phone] pager

## 2019-12-03 NOTE — PLAN OF CARE
12/03/19 0931   Post-Acute Status   Post-Acute Authorization Placement   Post-Acute Placement Status Set-up Complete  ([facility])   Discharge Delays None known at this time

## 2019-12-03 NOTE — PLAN OF CARE
Future Appointments   Date Time Provider Department Center   12/19/2019 10:30 AM Zan Self MD [facility] ID [address]          12/03/19 0930   Final Note   Assessment Type Final Discharge Note   Anticipated Discharge Disposition SNF   What phone number can be called within the next 1-3 days to see how you are doing after discharge? [phone]   Hospital Follow Up  Appt(s) scheduled? Yes   Right Care Referral Info   Post Acute Recommendation SNF / Sub-Acute Rehab   Facility Name [facility]

## 2019-12-04 ENCOUNTER — DOCUMENTATION ONLY (OUTPATIENT)
Dept: HEPATOLOGY | Facility: HOSPITAL | Age: 69
End: 2019-12-04

## 2019-12-04 NOTE — PROGRESS NOTES
[facility]                                 Skilled Nursing Facility                   Progress Note     Admit Date:12/2/19  NAI   Principal Problem:  Debility related to recent sepsis/UTI  HPI obtained from patient interview and chart review     Visit Date: 12/4/2019    Chief Complaint: Establish Care/ Initial Visit S/P hospitalization for sepsis/UTI    HPI:   Mr. Lopez is a 70 y/o male with a PMHx of CVA, seizures, CHI, DM II, dementia, HTN.  He was found down at home, diagnosed with sepsis and UTI.  Id was consulted and patient treated with IV antibiotics, discharged to SNF with 1 more day of ertapenem till end date of 12/03/2019 via midline.  He was intubated during his stay for airway protection.  Treated with Keppra for possible seizures, all neurology workup was negative including EEG.  PT/OT recommended skilled for continued therapy and medical care.  Of note, patient was discharged in September from this facility as skilled nursing due to being found down and diagnosed with sepsis/rhabdo, returned home until this recent admit for similar issue.    Patient will be treated at [facility] with PT and OT to improve functional status and ability to perform ADLs.     Past Medical History: Patient has a past medical history of CHI (closed head injury), Dementia, Diabetes mellitus, Hyperlipidemia, and Seizures.    Past Surgical History:  No pertinent surgical history reported    Social History: Patient reports that he has never smoked. He has never used smokeless tobacco. He reports that he does not drink alcohol or use drugs.    Family History:  No pertinent family history reported    Allergies: Patient has No Known Allergies.    ROS  Constitutional: Negative for fever or fatigue.   Eyes: Negative for blurred vision, double vision and discharge.   Respiratory: Negative for cough, shortness of breath and wheezing.     Cardiovascular: Negative for chest pain, palpitations, and leg swelling.   Gastrointestinal: Negative for abdominal pain, constipation, diarrhea, nausea and vomiting.   Genitourinary: Negative for dysuria, frequency and urgency.   Musculoskeletal:  + generalized weakness. Negative for back pain and myalgias.   Skin: Negative for itching and rash.   Neurological: Negative for dizziness, speech change, and headaches.   Psychiatric/Behavioral: Negative for depression. The patient is not nervous/anxious.      PEx     Constitutional: Patient appears well-developed and in no distress   Head: Normocephalic and atraumatic.   Eyes: Pupils are equal, round, and reactive to light.   Neck: Normal range of motion. Neck supple.   Cardiovascular: Normal rate, regular rhythm and normal heart sounds.    Pulmonary/Chest: Effort normal and breath sounds are clear  Abdominal: Soft. Bowel sounds are normal.   Musculoskeletal: Normal range of motion.   Neurological: Alert and oriented to person, place, and time.   Psychiatric: Normal mood and affect. Behavior is normal.   Skin: Skin is warm and dry. Full skin assessment completed during visit, no concerns noted      Assessment and Plan:     Debility r/t sepsis, encephalopathy, ongoing  After care of recent respiratory failure, improving   - Continue with PT/OT for gait training and strengthening and restoration of ADL's   - Encourage mobility, OOB in chair, and early ambulation as appropriate  - Fall precautions   - Monitor for bowel and bladder dysfunction  - Monitor for and prevent skin breakdown and pressure ulcers  -continue DuoNeb treatments q.4 hours p.r.n. Wheezing  -BM regulation:  Continue senna-Colace p.r.n. daily    After care ESBL UTI (urinary tract infection) improved  -Hx of UTIs, previous urine culture pos. for klebsiella   -retroperitoneal ultrasound reveals mild left hydronephrosis otherwise no significant abnormality  -Infectious Disease consulted, continue ertapenem  for total 14 day course of antibiotics (end date 12/03)     After care Proteus Bacteremia, improved  -BCx w/ Proteus mirabilis from 11/11, repeat blood cultures no growth to date-final  -infectious Disease was consulted.  Patient to continue ertapenem for total 14 day course of antibiotics    Malnutrition, new  -pre-albumin 19, albumin 2.8 on admit to facility  -RD to follow    Hx of Anemia, stable  -Continue B complex vitamin     Seizure disorder, chronic  -patient is a history of seizures several years  -currently on  Keppra 1g BID. Continue on this dose until able to f/u with Neurology outpatient    Diabetes Mellitus type 2  -Last HbA1c:  6.4  -SSI with accuchecks qACHS  -ADA diet, metformin plus SSI     Essential Hypertension with HLD  Remote History of CVA   -continue Plavix, statin  -Continue  losartan 25 mg daily, atorvastatin 40 mg, ASA        Febrile illness:  Resolved  -continue ertapenem, vancomycin now discontinued  -bilateral lower extremity ultrasound to rule out DVT negative     Acute metabolic encephalopathy:  Resolved  Septic encephalopathy: Resolved  -patient was intubated for airway protection, successfully extubated on 11/17.  -CT head noted to be nonacute  -MRI brain and c-spine wo contrast unremarkable  -cEEG done in the ICU did not show acute epileptic activity       Future Appointments   Date Time Provider Department Center   12/19/2019 10:30 AM Zan Self MD [facility] ID [address]         I certify that SNF services are required to be given on an inpatient basis because Edwin Lopez Jr. needs for skilled nursing care and/or skilled rehabilitation are required on a daily basis and such services can only practically be provided in a skilled nursing facility setting and are for an ongoing condition for which she received inpatient care in the hospital.     Total time of the visit 112 minutes  Non physical exam/ non charting time:  80  minutes   Start Time:  12:00 p.m.  Stop Time:   1352  Description of non physical exam/non charting time: counseling patient on clinical conditions and therapies provided regarding management of chronic conditions, therapy plan of care, pain control, facility protocol.  Extensive chart review completed including all consultation notes.  All pertinent laboratory and radiographical images reviewed.        Harleen Travis NP          Patient note was created using odal Dictation.  Any errors in syntax or even information may not have been identified and edited on initial review prior to signing this note.

## 2019-12-04 NOTE — DISCHARGE SUMMARY
[facility]  Discharge Summary  [facility] Medicine    Patient Name: Edwin Lopez Jr.  YOB: 1950    Admit Date: 11/11/2019    Discharge Date and Time: 12/2/2019  7:00 PM    Discharge Attending Physician: Jus Russell MD     Team: [facility] MED G    Reason for Admission:   Chief Complaint   Patient presents with    Altered Mental Status     arrived via NO EMS with c/o altered mental status, found on floor at home by family, LSN Friday, hx of stroke, found lying in malodorous urine on EMS arrival, responsive to pain on scene, not responsive with repeated verbal stimuli       Active Hospital Problems    Diagnosis  POA    *Acute encephalopathy [G93.40]  Yes    Restricted diet [Z71.3]  Not Applicable    Alteration in skin integrity [R23.9]  Yes    Altered mental state [R41.82]  Yes    Leukocytosis [D72.829]  Yes    History of closed head injury [Z87.820]  Not Applicable    On mechanically assisted ventilation [Z99.11]  Not Applicable    UTI (urinary tract infection) [N39.0]  Yes    Bacteremia [R78.81]  Yes    Altered mental status [R41.82]  Yes    Hypertension [I10]  Yes    Type 2 diabetes mellitus [E11.9]  Yes    History of CVA in adulthood [Z86.73]  Not Applicable    Seizure disorder [G40.909]  Yes      Resolved Hospital Problems   No resolved problems to display.       HPI: Mr Lopez is a 69 yr old male with a PMH of CVA, seizures, CHI, DM II, dementia, HTN, who presents to the ED with AMS. Patient was last heard from by a friend on Friday 11/8 (3 days ago). Found down at home 11/11, incontinent of urine and bowel. CTH negative. MRI c-spine, brain pending. Mild leukocytosis, UA positive for UTI. cEEG pending. Patient intubated in the ED for airway protection. Admitted to [facility] for higher level of care. Will need LP.     ICU Course:   Intubated in ED. Admit to [facility] on 11/11.  11/12/2019: Switched Ceftriaxone to Zosyn, rechecking BCx, IVF @ 150cc/hr w/ 1L bolus; Tube feeds & dvt  ppx initiated. EEG suppressed, propofol disctoninued & fentanyl 50mcg q2hr added.  11/13/2019: Steadily improving since presentation. Continue Keppra & Abx, added 200cc q6 FW to feeds, & dc'd vent.  11/14/2019: mental status continues to improve. Following commands in all 4 ext. FW 200cc q6hr; Extubate today; pending sensitivities of cx's; added ASA/statin  11/15/2019: Mental status improved; secretions persistent. De-escalated Abx to ceftriaxone given susceptibilities; added breathing tx q4. NPO in AM for possible extubation.  11/16/19: start glycopyrrolate for secretions  11/17: Extubated   11/18: transferred to hospital medicine      Hospital course:  69 yr old male with a PMH of CVA, seizures, CHI, DM II, dementia, HTN, who presents to the ED with admitted for acute encephalopathy.  patient was intubated for airway protection, successfully extubated on 11/17.Initially thought to be secondary to seizures.  Seen by Neurology, had CT head, MRI brain and C-spine, continues EEG all of which were negative for any intracranial abnormalities or seizures.  Neuro rec continuing Keppra.  Hospitalization complicated by ESBL UTI and Proteus bacteremia.  Seen by Infectious Disease. Currently on ertapenem till end date of 12/03/2019.  Midline placed 11/25. Planned for discharge to SNF. Medically stable at this time for discharge. Discharged to SNF. Will need outpatient follow-up with Neurology, ID and PCP.        Principal Problem: Acute encephalopathy    Other Problems Addressed:    Acute metabolic encephalopathy:  Resolved  Septic encephalopathy: Resolved  ESBL UTI (urinary tract infection) - active, last abx day 12/3  Proteus Bacteremia - treated  Febrile illness:  Resolved   Seizure disorder  Diabetes Mellitus  Essential Hypertension  Hyperlipidemia  History of CVA in adulthood      Procedures Performed: * No surgery found *    Special Care, Treatment, and Services Provided: none    Consults: ID, wound care,  "PMNR    Significant Diagnostic Studies:  No results found for: EF  Hemoglobin A1C   Date Value Ref Range Status   11/11/2019 6.4 (H) 4.0 - 5.6 % Final     Comment:     ADA Screening Guidelines:  5.7-6.4%  Consistent with prediabetes  >or=6.5%  Consistent with diabetes  High levels of fetal hemoglobin interfere with the HbA1C  assay. Heterozygous hemoglobin variants (HbS, HgC, etc)do  not significantly interfere with this assay.   However, presence of multiple variants may affect accuracy.     08/27/2019 6.4 (H) 4.0 - 5.6 % Final     Comment:     ADA Screening Guidelines:  5.7-6.4%  Consistent with prediabetes  >or=6.5%  Consistent with diabetes  High levels of fetal hemoglobin interfere with the HbA1C  assay. Heterozygous hemoglobin variants (HbS, HgC, etc)do  not significantly interfere with this assay.   However, presence of multiple variants may affect accuracy.       All labs and imaging reviewed    Final Diagnoses: Same as principal problem.    Discharged Condition: stable  Face to face services were provided on 12/3/2019   Time Spent:  I spent > 30 minutes on the discharge, which included reviewing hospital course with patient/family, reviewing discharge medications, and arranging follow-up care.  Physical Exam on 12/3/2019:  /70 (BP Location: Right arm, Patient Position: Sitting)   Pulse 89   Temp 96.7 °F (35.9 °C)   Resp 18   Ht 5' 6" (1.676 m)   Wt 62.9 kg (138 lb 10.7 oz)   SpO2 95%   BMI 22.38 kg/m²   General:  male  in no acute distress. Awake, alert, Resting in bed.    HEENT: NCAT. PERRL. EOMI. Sclera Anicteric.  CVS: RRR. Normal S1 S2. No murmurs  Pulm: CTAB. Normal respiratory effort. No wheezes, rhonchi, or crackles.  Abdomen: Soft. Non-distended. No tenderness to palpation. No rebound or guarding. +BS.  Extremities: No edema. No cyanosis. Full ROM.  Neuro: Alert, oriented x 3, Spont mvt of all extremities with no focal deficits noted.    Disposition: Skilled Nursing " Facility    Follow Up Instructions:   Follow-up Information     Robina Burroughs NP. Schedule an appointment as soon as possible for a visit in 1 week.    Specialty:  Urgent Care  Contact information:  [address]  [facility]  [facility] [id number]  [phone]             [facility].    Specialty:  Emergency Medicine  Why:  As needed, If symptoms worsen  Contact information:  [address]  [facility] [id number] [phone]           [facility] NEUROLOGY. Schedule an appointment as soon as possible for a visit in 2 weeks.    Specialty:  Neurology  Contact information:  [address]  [facility] [address]  [phone]               Future Appointments   Date Time Provider Department Center   12/19/2019 10:30 AM Zan Self MD [facility] ID [facility]io [facility]       Medications:    Discharge Medication List as of 12/2/2019  4:26 PM      START taking these medications    Details   albuterol-ipratropium (DUO-NEB) 2.5 mg-0.5 mg/3 mL nebulizer solution Take 3 mLs by nebulization every 4 (four) hours as needed for Wheezing or Shortness of Breath. Rescue, Starting Mon 11/25/2019, Until Tue 11/24/2020, No Print      ertapenem sodium (ERTAPENEM, INVANZ, 1 G/100 ML NS, READY TO MIX,) Inject 100 mLs (1 g total) into the vein once daily. for 8 days, Starting Mon 11/25/2019, Until Tue 12/3/2019, No Print      senna-docusate 8.6-50 mg (PERICOLACE) 8.6-50 mg per tablet Take 1 tablet by mouth daily as needed for Constipation., Starting Mon 11/25/2019, No Print         CONTINUE these medications which have CHANGED    Details   levETIRAcetam (KEPPRA) 1000 MG tablet Take 1 tablet (1,000 mg total) by mouth 2 (two) times daily., Starting Mon 11/25/2019, No Print         CONTINUE these medications which have NOT CHANGED    Details   clopidogrel (PLAVIX) 75 mg tablet Take 75 mg by mouth once daily., Historical Med      aspirin (ECOTRIN) 81 MG EC tablet Take 81 mg by mouth once daily.,  Historical Med      atorvastatin (LIPITOR) 40 MG tablet Take 1 tablet (40 mg total) by mouth once daily., Starting Thu 9/5/2019, Until Fri 9/4/2020, Normal      b complex vitamins tablet Take 1 tablet by mouth once daily., Historical Med      losartan (COZAAR) 25 MG tablet Take 25 mg by mouth once daily., Historical Med      metFORMIN (GLUCOPHAGE-XR) 500 MG 24 hr tablet Take 500 mg by mouth 2 (two) times daily with meals., Historical Med             Discharge Instructions:  Discharge Procedure Orders   Ambulatory referral to Neurology   Referral Priority: Routine Referral Type: Consultation   Referral Reason: Specialty Services Required   Requested Specialty: Neurology   Number of Visits Requested: 1     Diet Cardiac     Diet diabetic     Notify your health care provider if you experience any of the following:  temperature >100.4     Notify your health care provider if you experience any of the following:  persistent nausea and vomiting or diarrhea     Notify your health care provider if you experience any of the following:  severe uncontrolled pain     Notify your health care provider if you experience any of the following:  redness, tenderness, or signs of infection (pain, swelling, redness, odor or green/yellow discharge around incision site)     Notify your health care provider if you experience any of the following:  difficulty breathing or increased cough     Notify your health care provider if you experience any of the following:  severe persistent headache     Notify your health care provider if you experience any of the following:  worsening rash     Notify your health care provider if you experience any of the following:  persistent dizziness, light-headedness, or visual disturbances     Notify your health care provider if you experience any of the following:  increased confusion or weakness     Activity as tolerated         Jus Russell MD  Department of Hospital Medicine

## 2019-12-11 ENCOUNTER — DOCUMENTATION ONLY (OUTPATIENT)
Dept: HEPATOLOGY | Facility: HOSPITAL | Age: 69
End: 2019-12-11

## 2019-12-11 NOTE — PROGRESS NOTES
[facility]                                 Skilled Nursing Facility                   Progress Note     Admit Date:12/2/19  NAI   Principal Problem:  Debility related to recent sepsis/UTI  HPI obtained from patient interview and chart review     Visit Date: 12/11/2019    Chief Complaint:  BP/HR/blood sugar monitoring, re-evaluation of dizziness, PT/OT progression    HPI:   Mr. Lopez is a 70 y/o male with a PMHx of CVA, seizures, CHI, DM II, dementia, HTN.  He was found down at home, diagnosed with sepsis and UTI.  Id was consulted and patient treated with IV antibiotics, discharged to SNF with 1 more day of ertapenem till end date of 12/03/2019 via midline.  He was intubated during his stay for airway protection.  Treated with Keppra for possible seizures, all neurology workup was negative including EEG.  PT/OT recommended skilled for continued therapy and medical care.  Of note, patient was discharged in September from this facility as skilled nursing due to being found down and diagnosed with sepsis/rhabdo, returned home until this recent admit for similar issue.    Patient will be treated at [facility] with PT and OT to improve functional status and ability to perform ADLs.     Interval history:  /74, HR 91, controlled on current regimen.  -186, controlled on current regimen.  No changes needed to plan of care.  MD saw patient on 12/9-added meclizine t.i.d. p.r.n. plus encouraged to drink water due to dizziness.  Patient reporting improvement in symptoms today.  No other complaints during visit.  Patient medically stable.  Will continue to monitor and treat of chronic conditions.  Progressing with PT/OT.    Past Medical History: Patient has a past medical history of CHI (closed head injury), Dementia, Diabetes mellitus, Hyperlipidemia, and Seizures.    Past Surgical History:  No pertinent surgical history  reported    Social History: Patient reports that he has never smoked. He has never used smokeless tobacco. He reports that he does not drink alcohol or use drugs.    Family History:  No pertinent family history reported    Allergies: Patient has No Known Allergies.    ROS  Constitutional: Negative for fever or fatigue.   Eyes: Negative for blurred vision, double vision and discharge.   Respiratory: Negative for cough, shortness of breath and wheezing.    Cardiovascular: Negative for chest pain, palpitations, and leg swelling.   Gastrointestinal: Negative for abdominal pain, constipation, diarrhea, nausea and vomiting.   Genitourinary: Negative for dysuria, frequency and urgency.   Musculoskeletal:  + generalized weakness. Negative for back pain and myalgias.   Skin: Negative for itching and rash.   Neurological: Negative for dizziness, speech change, and headaches.   Psychiatric/Behavioral: Negative for depression. The patient is not nervous/anxious.      PEx     Constitutional: Patient appears well-developed and in no distress   Head: Normocephalic and atraumatic.   Eyes: Pupils are equal, round, and reactive to light.   Neck: Normal range of motion. Neck supple.   Cardiovascular: Normal rate, regular rhythm and normal heart sounds.    Pulmonary/Chest: Effort normal and breath sounds are clear  Abdominal: Soft. Bowel sounds are normal.   Musculoskeletal: Normal range of motion.   Neurological: Alert and oriented to person, place, and time.   Psychiatric: Normal mood and affect. Behavior is normal.   Skin: Skin is warm and dry.       Assessment and Plan:     Debility r/t sepsis, encephalopathy, ongoing  After care of recent respiratory failure, improving   - Continue with PT/OT for gait training and strengthening and restoration of ADL's   - Encourage mobility, OOB in chair, and early ambulation as appropriate  - Fall precautions   - Monitor for bowel and bladder dysfunction  - Monitor for and prevent skin breakdown  and pressure ulcers  -continue DuoNeb treatments q.4 hours p.r.n. Wheezing  -BM regulation:  Continue senna-Colace p.r.n. Daily  -12/11 continue PT/OT    Diabetes Mellitus type 2  -Last HbA1c:  6.4  -SSI with accuchecks qACHS  -ADA diet, metformin plus SSI  -12/11 continue current regimen     Essential Hypertension with HLD  Remote History of CVA   -continue Plavix, statin  -Continue  losartan 25 mg daily, atorvastatin 40 mg, ASA  -12/11 continue current regimen    Continued    After care ESBL UTI (urinary tract infection) improved  -Hx of UTIs, previous urine culture pos. for klebsiella   -retroperitoneal ultrasound reveals mild left hydronephrosis otherwise no significant abnormality  -Infectious Disease consulted, continue ertapenem for total 14 day course of antibiotics (end date 12/03)     After care Proteus Bacteremia, improved  -BCx w/ Proteus mirabilis from 11/11, repeat blood cultures no growth to date-final  -infectious Disease was consulted.  Patient to continue ertapenem for total 14 day course of antibiotics    Malnutrition, new  -pre-albumin 19, albumin 2.8 on admit to facility  -RD to follow    Hx of Anemia, stable  -Continue B complex vitamin     Seizure disorder, chronic  -patient is a history of seizures several years  -currently on  Keppra 1g BID. Continue on this dose until able to f/u with Neurology outpatient        Febrile illness:  Resolved  -continue ertapenem, vancomycin now discontinued  -bilateral lower extremity ultrasound to rule out DVT negative     Acute metabolic encephalopathy:  Resolved  Septic encephalopathy: Resolved  -patient was intubated for airway protection, successfully extubated on 11/17.  -CT head noted to be nonacute  -MRI brain and c-spine wo contrast unremarkable  -cEEG done in the ICU did not show acute epileptic activity       Future Appointments   Date Time Provider Department Center   12/19/2019 10:30 AM Zan Self MD [facility] ID [facility]  RIGOBERTO Travis NP          Patient note was created using MModal Dictation.  Any errors in syntax or even information may not have been identified and edited on initial review prior to signing this note.

## 2019-12-18 ENCOUNTER — DOCUMENTATION ONLY (OUTPATIENT)
Dept: HEPATOLOGY | Facility: HOSPITAL | Age: 69
End: 2019-12-18

## 2019-12-18 NOTE — PROGRESS NOTES
[facility]                                 Skilled Nursing Facility                   Progress Note     Admit Date:12/2/19  NAI   Principal Problem:  Debility related to recent sepsis/UTI  HPI obtained from patient interview and chart review     Visit Date: 12/18/2019    Chief Complaint:  BS monitoring, BP/HR monitoring, PT/OT progression    HPI:   Mr. Lopez is a 68 y/o male with a PMHx of CVA, seizures, CHI, DM II, dementia, HTN.  He was found down at home, diagnosed with sepsis and UTI.  Id was consulted and patient treated with IV antibiotics, discharged to SNF with 1 more day of ertapenem till end date of 12/03/2019 via midline.  He was intubated during his stay for airway protection.  Treated with Keppra for possible seizures, all neurology workup was negative including EEG.  PT/OT recommended skilled for continued therapy and medical care.  Of note, patient was discharged in September from this facility as skilled nursing due to being found down and diagnosed with sepsis/rhabdo, returned home until this recent admit for similar issue.    Patient will be treated at [facility] with PT and OT to improve functional status and ability to perform ADLs.     Interval history:  /71, HR 89, O2 sats 97% on RA, stable on current regimen.  Blood sugar ranging , controlled on current regimen.  No complaints during visit today.  Patient medically stable. Progressing well with PT/OT.  Will continue to monitor and treat of chronic conditions.     Past Medical History: Patient has a past medical history of CHI (closed head injury), Dementia, Diabetes mellitus, Hyperlipidemia, and Seizures.    Past Surgical History:  No pertinent surgical history reported    Social History: Patient reports that he has never smoked. He has never used smokeless tobacco. He reports that he does not drink alcohol or use drugs.    Family History:   No pertinent family history reported    Allergies: Patient has No Known Allergies.    ROS  Constitutional: Negative for fever or fatigue.   Eyes: Negative for blurred vision, double vision and discharge.   Respiratory: Negative for cough, shortness of breath and wheezing.    Cardiovascular: Negative for chest pain, palpitations, and leg swelling.   Gastrointestinal: Negative for abdominal pain, constipation, diarrhea, nausea and vomiting.   Genitourinary: Negative for dysuria, frequency and urgency.   Musculoskeletal:  + generalized weakness. Negative for back pain and myalgias.   Skin: Negative for itching and rash.   Neurological: Negative for dizziness, speech change, and headaches.   Psychiatric/Behavioral: Negative for depression. The patient is not nervous/anxious.      PEx     Constitutional: Patient appears well-developed and in no distress   Head: Normocephalic and atraumatic.   Eyes: Pupils are equal, round, and reactive to light.   Neck: Normal range of motion. Neck supple.   Cardiovascular: Normal rate, regular rhythm and normal heart sounds.    Pulmonary/Chest: Effort normal and breath sounds are clear  Abdominal: Soft. Bowel sounds are normal.   Musculoskeletal: Normal range of motion.   Neurological: Alert and oriented to person, place, and time.   Psychiatric: Normal mood and affect. Behavior is normal.   Skin: Skin is warm and dry.       Assessment and Plan:     Debility r/t sepsis, encephalopathy, ongoing  After care of recent respiratory failure, improving   - Continue with PT/OT for gait training and strengthening and restoration of ADL's   - Encourage mobility, OOB in chair, and early ambulation as appropriate  - Fall precautions   - Monitor for bowel and bladder dysfunction  - Monitor for and prevent skin breakdown and pressure ulcers  -continue DuoNeb treatments q.4 hours p.r.n. Wheezing  -BM regulation:  Continue senna-Colace p.r.n. Daily  -12/18 continue PT/OT    Diabetes Mellitus type 2,  ongoing  -Last HbA1c:  6.4  -SSI with accuchecks qACHS  -ADA diet, metformin plus SSI  -12/18 continue current regimen     Essential Hypertension with HLD, ongoing  Remote History of CVA   -continue Plavix, statin  -Continue  losartan 25 mg daily, atorvastatin 40 mg, ASA  -12/18 continue current regimen    Continued    After care ESBL UTI (urinary tract infection) resolved  -Hx of UTIs, previous urine culture pos. for klebsiella   -retroperitoneal ultrasound reveals mild left hydronephrosis otherwise no significant abnormality  -Infectious Disease consulted, continue ertapenem for total 14 day course of antibiotics (end date 12/03)     After care Proteus Bacteremia, resolved  -BCx w/ Proteus mirabilis from 11/11, repeat blood cultures no growth to date-final  -infectious Disease was consulted.  Patient to continue ertapenem for total 14 day course of antibiotics    Malnutrition, new  -pre-albumin 19, albumin 2.8 on admit to facility  -RD to follow    Hx of Anemia, stable  -Continue B complex vitamin     Seizure disorder, chronic  -patient is a history of seizures several years  -currently on  Keppra 1g BID. Continue on this dose until able to f/u with Neurology outpatient        Febrile illness:  Resolved  -continue ertapenem, vancomycin now discontinued  -bilateral lower extremity ultrasound to rule out DVT negative     Acute metabolic encephalopathy:  Resolved  Septic encephalopathy: Resolved  -patient was intubated for airway protection, successfully extubated on 11/17.  -CT head noted to be nonacute  -MRI brain and c-spine wo contrast unremarkable  -cEEG done in the ICU did not show acute epileptic activity       Future Appointments   Date Time Provider Department Center   12/19/2019 10:30 AM Zan Self MD [facility] ID Fabricio Travis NP          Patient note was created using MModal Dictation.  Any errors in syntax or even information may not have been identified and edited on  initial review prior to signing this note.

## 2019-12-22 NOTE — PROCEDURES
DATE OF SERVICE:  11/13/2019 and 11/14/2019    ICU EEG/VIDEO MONITORING REPORT    METHODOLOGY:  Electroencephalographic (EEG) is recorded with electrodes placed   according to the International 10-20 placement system.  Thirty two (32) channels   of digital signal (sampling rate of 512/sec), including T1 and T2, were   simultaneously recorded from the scalp and may include EKG, EMG, and/or eye   monitors.  Recording band pass was 0.1 to 512 Hz.  Digital video recording of   the patient is simultaneously recorded with the EEG.  The patient is instructed   to report clinical symptoms which may occur during the recording session.  EEG   and video recording are stored and archived in digital format.  Activation   procedures, which include photic stimulation, hyperventilation and instructing   patients to perform simple tasks, are done in selected patients.    The EEG is displayed on a monitor screen and can be reviewed using different   montages.  Computer-assisted analysis is employed to detect spike and   electrographic seizure activity.   The entire record is submitted for computer   analysis.  The entire recording is visually reviewed, and the times identified   by computer analysis as being spikes or seizures are reviewed again.    Compressed spectral analysis (CSA) is also performed on the activity recorded   from each individual channel.  This is displayed as a power display of   frequencies from 0 to 30 Hz over time.   The CSA is reviewed looking for   asymmetries in power between homologous areas of the scalp, then compared with   the original EEG recording.    [facility] software was also utilized in the review of this study.  This software   suite analyzes the EEG recording in multiple domains.  Coherence and rhythmicity   are computed to identify EEG sections which may contain organized seizures.    Each channel undergoes analysis to detect the presence of spike and sharp waves   which have special and  morphological characteristics of epileptic activity.  The   routine EEG recording is converted from special into frequency domain.  This is   then displayed comparing homologous areas to identify areas of significant   asymmetry.  Algorithm to identify non-cortically generated artifact is used to   separate artifact from the EEG.    RECORDING TIMES:  Start on 11/13/2019 at 0700.  Stop on 11/14/2019 at 1342.  A total of 31 hours and 38 minutes of EEG were recorded.    EEG FINDINGS:  This is a medium amplitude mixed frequency study with alpha and   beta activity seen during wakefulness with also theta frequencies seen often   during wakefulness.  Posterior dominant rhythm is not seen.  Eye blink artifact   is seen.  There are quiescent portions of the record, which reveal a lower   amplitude background with delta and theta frequencies, but no clearly defined   sleep architecture.  The record is symmetrical with no epileptiform discharges   and no seizures.  There is page to page variability, but no major evolution in   this study.    INTERPRETATION:  Abnormal EEG due to mild slowing and disorganization of the   waking background and lack of normal features suggesting mild encephalopathy.    There is no evidence of seizures or focal dysfunction, however.      NBB/HN  dd: 12/21/2019 17:52:43 (CST)  td: 12/21/2019 18:07:11 (CST)  Doc ID   #[id number]  Job ID #[id number]    CC:

## 2019-12-22 NOTE — PROCEDURES
DATE OF STUDY:  11/11/2019, to 11/12/2019    ICU EEG AND VIDEO MONITORING REPORT    METHODOLOGY:  Electroencephalographic (EEG) is recorded with electrodes placed   according to the International 10-20 placement system.  Thirty two (32) channels   of digital signal (sampling rate of 512/sec), including T1 and T2, were   simultaneously recorded from the scalp and may include EKG, EMG, and/or eye   monitors.  Recording band pass was 0.1 to 512 Hz.  Digital video recording of   the patient is simultaneously recorded with the EEG.  The patient is instructed   to report clinical symptoms which may occur during the recording session.  EEG   and video recording are stored and archived in digital format.  Activation   procedures, which include photic stimulation, hyperventilation and instructing   patients to perform simple tasks, are done in selected patients.    The EEG is displayed on a monitor screen and can be reviewed using different   montages.  Computer-assisted analysis is employed to detect spike and   electrographic seizure activity.  The entire record is submitted for computer   analysis.  The entire recording is visually reviewed, and the times identified   by computer analysis as being spikes or seizures are reviewed again.    Compressed spectral analysis (CSA) is also performed on the activity recorded   from each individual channel.  This is displayed as a power display of   frequencies from 0 to 30 Hz over time.  The CSA is reviewed looking for   asymmetries in power between homologous areas of the scalp, then compared with   the original EEG recording.    [employer] software was also utilized in the review of this study.  This software   suite analyzes the EEG recording in multiple domains.  Coherence and rhythmicity   are computed to identify EEG sections which may contain organized seizures.    Each channel undergoes analysis to detect the presence of spike and sharp waves   which have special and  morphological characteristics of epileptic activity.  The   routine EEG recording is converted from special into frequency domain.  This is   then displayed comparing homologous areas to identify areas of significant   asymmetry.  Algorithm to identify non-cortically generated artifact is used to   separate artifact from the EEG.    RECORDING TIMES:  Start on 11/11/2019, at 20:19.  Stop on 11/13/2019, at 07:00.  A total of ____ of EEG were recorded.  The first portion of the study is done   with electrode cap and concludes on 11/12/2019, at 06:12.  The second portion of   the study is full hookup and after a pause begins on 11/12/2019, at 11:01.    EEG FINDINGS:  The initial electrode cap portion of the study is of moderately   poor technical quality, but basically reveals bilaterally symmetrical diffusely   slow rhythms in the delta and theta range.  Finer features of the recording were   untrustworthy, but no seizures were seen.    The full hookup is of much better technical quality and reveals relatively low   amplitude background of delta activity with low amplitude beta activity variably   superimposed over the first few hours of the record.  Later in the day on   11/12/2019, increased EMG artifact begins to emerge along with some faster   cortical frequencies in the alpha range.  As the record progresses, more pages   emerge, which demonstrate an alpha mixed with theta background and less delta   activity is seen during the more stimulated portions of the study.  Overnight, a   lower amplitude slower background tends to be observed with some delta   activity.    There were no epileptiform discharges and no seizures seen at any point.  There   was no focal slowing.    INTERPRETATION:  Abnormal EEG due to mild diffuse slowing of the waking   background as described above suggesting an encephalopathy.      NBB/IN  dd: 12/21/2019 17:45:24 (CST)  td: 12/21/2019 18:10:02 (CST)  Doc ID   #[id number]  Job ID  #[id number]    CC:

## 2019-12-27 ENCOUNTER — DOCUMENTATION ONLY (OUTPATIENT)
Dept: HEPATOLOGY | Facility: HOSPITAL | Age: 69
End: 2019-12-27

## 2019-12-27 NOTE — PROGRESS NOTES
[facility]                                 Skilled Nursing Facility                   Progress Note     Admit Date:12/2/19  NAI   Principal Problem:  Debility related to recent sepsis/UTI  HPI obtained from patient interview and chart review     Visit Date: 12/27/2019    Chief Complaint:  Lab review, hypomagnesemia, evaluation after fall, loose stools, BS monitoring, BP/HR monitoring, PT/OT progression    HPI:   Mr. Lopez is a 70 y/o male with a PMHx of CVA, seizures, CHI, DM II, dementia, HTN.  He was found down at home, diagnosed with sepsis and UTI.  Id was consulted and patient treated with IV antibiotics, discharged to SNF with 1 more day of ertapenem till end date of 12/03/2019 via midline.  He was intubated during his stay for airway protection.  Treated with Keppra for possible seizures, all neurology workup was negative including EEG.  PT/OT recommended skilled for continued therapy and medical care.  Of note, patient was discharged in September from this facility as skilled nursing due to being found down and diagnosed with sepsis/rhabdo, returned home until this recent admit for similar issue.    Patient will be treated at [facility] with PT and OT to improve functional status and ability to perform ADLs.     Interval history:  Nursing called on 12/24 to report patient experience mechanical fall that was unwitnessed in room.  No injuries noted, neuro checks completed per facility protocol.  Most recent /79, HR 78, O2 sats 99% on room air, stable on current regimen.  Blood sugar ranging , stable on current regimen.  Most recent lab work reviewed today, H&H 10/32, anemia stable, BUN/CR 12/0.80, fasting blood glucose 70, albumin 3.0-improve malnutrition, magnesium 1.5-hyponatremia, new, not on any supplementation at this time.  Nursing reporting loose stools that have been ongoing since admission, he has  been treated with multiple antibiotics recently for infection, and could benefit from probiotic. Patient is also on Metformin, which could be lending to loose stool, BS stable on current dose, but may titrate medication if symptoms persist.  Patient medically stable. Patient medically stable. Progressing well with PT/OT.  Will continue to monitor and treat of chronic conditions.     Past Medical History: Patient has a past medical history of CHI (closed head injury), Dementia, Diabetes mellitus, Hyperlipidemia, and Seizures.    Past Surgical History:  No pertinent surgical history reported    Social History: Patient reports that he has never smoked. He has never used smokeless tobacco. He reports that he does not drink alcohol or use drugs.    Family History:  No pertinent family history reported    Allergies: Patient has No Known Allergies.    ROS  Constitutional: Negative for fever or fatigue.   Eyes: Negative for blurred vision, double vision and discharge.   Respiratory: Negative for cough, shortness of breath and wheezing.    Cardiovascular: Negative for chest pain, palpitations, and leg swelling.   Gastrointestinal: Negative for abdominal pain, constipation, +diarrhea,- nausea and vomiting.   Genitourinary: Negative for dysuria, frequency and urgency.   Musculoskeletal:  + generalized weakness. Negative for back pain and myalgias.   Skin: Negative for itching and rash.   Neurological: Negative for dizziness, speech change, and headaches.   Psychiatric/Behavioral: Negative for depression. The patient is not nervous/anxious.      PEx     Constitutional: Patient appears well-developed and in no distress   Head: Normocephalic and atraumatic.   Eyes: Pupils are equal, round, and reactive to light.   Neck: Normal range of motion. Neck supple.   Cardiovascular: Normal rate, regular rhythm and normal heart sounds.    Pulmonary/Chest: Effort normal and breath sounds are clear  Abdominal: Soft. Bowel sounds are normal,  + loose stools.   Musculoskeletal: Normal range of motion.   Neurological: Alert and oriented to person, place, and time.   Psychiatric: Normal mood and affect. Behavior is normal.   Skin: Skin is warm and dry.       Assessment and Plan:     Loose stools, worsened  Recent history of mechanical fall on 12/24, new  Debility r/t sepsis, encephalopathy, ongoing  After care of recent respiratory failure, improving   - Continue with PT/OT for gait training and strengthening and restoration of ADL's   - Encourage mobility, OOB in chair, and early ambulation as appropriate  - Fall precautions   - Monitor for bowel and bladder dysfunction  - Monitor for and prevent skin breakdown and pressure ulcers  -continue DuoNeb treatments q.4 hours p.r.n. Wheezing  -BM regulation:  Continue senna-Colace p.r.n. Daily  -12/27 Initiate Lactinex po TID, continue PT/OT, continue fall precautions    Diabetes Mellitus type 2, ongoing  -Last HbA1c:  6.4  -SSI with accuchecks qACHS  -ADA diet, metformin plus SSI  -12/27 continue current regimen     Hypomagnesemia, new  Essential Hypertension with HLD, ongoing  Remote History of CVA   -continue Plavix, statin  -Continue  losartan 25 mg daily, atorvastatin 40 mg, ASA  -12/27 initiate Mag-Ox 800 mg p.o. x1 dose, continue current regimen    Malnutrition, new, ongoing  -pre-albumin 19, albumin 2.8 on admit to facility  -RD to follow  -12/27 recent albumin 3.0, improving    Hx of Anemia, stable  -Continue B complex vitamin  -12/27 recent H&H 10/32    Continued    Seizure disorder, chronic  -patient is a history of seizures several years  -currently on  Keppra 1g BID. Continue on this dose until able to f/u with Neurology outpatient          After care ESBL UTI (urinary tract infection) resolved  -Hx of UTIs, previous urine culture pos. for klebsiella   -retroperitoneal ultrasound reveals mild left hydronephrosis otherwise no significant abnormality  -Infectious Disease consulted, continue ertapenem  for total 14 day course of antibiotics (end date 12/03)     After care Proteus Bacteremia, resolved  -BCx w/ Proteus mirabilis from 11/11, repeat blood cultures no growth to date-final  -infectious Disease was consulted.  Patient to continue ertapenem for total 14 day course of antibiotics    Febrile illness:  Resolved  -continue ertapenem, vancomycin now discontinued  -bilateral lower extremity ultrasound to rule out DVT negative     Acute metabolic encephalopathy:  Resolved  Septic encephalopathy: Resolved  -patient was intubated for airway protection, successfully extubated on 11/17.  -CT head noted to be nonacute  -MRI brain and c-spine wo contrast unremarkable  -cEEG done in the ICU did not show acute epileptic activity       No future appointments.            Harleen Travis NP          Patient note was created using MModal Dictation.  Any errors in syntax or even information may not have been identified and edited on initial review prior to signing this note.

## 2020-01-02 NOTE — PROGRESS NOTES
[facility]                                 Skilled Nursing Facility                   Progress Note     Admit Date:12/2/19  NAI   Principal Problem:  Debility related to recent sepsis/UTI  HPI obtained from patient interview and chart review     Visit Date: 1/3/2020    Chief Complaint:  Re-evaluation of loose stools, BS monitoring, BP/HR monitoring, UA with reflex review, PT/OT progression    HPI:   Mr. Lopez is a 70 y/o male with a PMHx of CVA, seizures, CHI, DM II, dementia, HTN.  He was found down at home, diagnosed with sepsis and UTI.  Id was consulted and patient treated with IV antibiotics, discharged to SNF with 1 more day of ertapenem till end date of 12/03/2019 via midline.  He was intubated during his stay for airway protection.  Treated with Keppra for possible seizures, all neurology workup was negative including EEG.  PT/OT recommended skilled for continued therapy and medical care.  Of note, patient was discharged in September from this facility as skilled nursing due to being found down and diagnosed with sepsis/rhabdo, returned home until this recent admit for similar issue.    Patient will be treated at [facility] with PT and OT to improve functional status and ability to perform ADLs.     Interval history:   /74, HR 74, most recent weight 130 lb, stable.  Blood sugar ranging 109-162, controlled on metformin.  Nursing had previously reported patient having diarrhea during last visit on 12/27, lactobacillus t.i.d. added, nursing reported some improvement in symptoms, will continue on current regimen.  Nursing also called after last visit to report some increasing confusion, UA with reflex ordered at that time in light of recent complicated UTI, UA negative for infection, no further treatment indicated.  No reports of urinary symptoms per patient today.  No other complaints during visit today.  Patient  medically stable. Patient medically stable. Progressing well with PT/OT.  Will continue to monitor and treat of chronic conditions.     Past Medical History: Patient has a past medical history of CHI (closed head injury), Dementia, Diabetes mellitus, Hyperlipidemia, and Seizures.    Past Surgical History:  No pertinent surgical history reported    Social History: Patient reports that he has never smoked. He has never used smokeless tobacco. He reports that he does not drink alcohol or use drugs.    Family History:  No pertinent family history reported    Allergies: Patient has No Known Allergies.    ROS  Constitutional: Negative for fever or fatigue.   Eyes: Negative for blurred vision, double vision and discharge.   Respiratory: Negative for cough, shortness of breath and wheezing.    Cardiovascular: Negative for chest pain, palpitations, and leg swelling.   Gastrointestinal: Negative for abdominal pain, constipation, +diarrhea,- nausea and vomiting.   Genitourinary: Negative for dysuria, frequency and urgency.   Musculoskeletal:  + generalized weakness. Negative for back pain and myalgias.   Skin: Negative for itching and rash.   Neurological: Negative for dizziness, speech change, and headaches.   Psychiatric/Behavioral: Negative for depression. The patient is not nervous/anxious.      PEx     Constitutional: Patient appears well-developed and in no distress   Head: Normocephalic and atraumatic.   Eyes: Pupils are equal, round, and reactive to light.   Neck: Normal range of motion. Neck supple.   Cardiovascular: Normal rate, regular rhythm and normal heart sounds.    Pulmonary/Chest: Effort normal and breath sounds are clear  Abdominal: Soft. Bowel sounds are normal, + loose stools, improving.   Musculoskeletal: Normal range of motion.   Neurological: Alert and oriented to person, place, and time.   Psychiatric: Normal mood and affect. Behavior is normal.   Skin: Skin is warm and dry.       Assessment and  Plan:     Loose stools, improved  Recent history of mechanical fall on 12/24, new, resolved  Debility r/t sepsis, encephalopathy, ongoing  After care of recent respiratory failure, improving   - Continue with PT/OT for gait training and strengthening and restoration of ADL's   - Encourage mobility, OOB in chair, and early ambulation as appropriate  - Fall precautions   - Monitor for bowel and bladder dysfunction  - Monitor for and prevent skin breakdown and pressure ulcers  -continue DuoNeb treatments q.4 hours p.r.n. Wheezing  -BM regulation:  Continue senna-Colace p.r.n. Daily  -12/27 Initiate Lactinex po TID, continue PT/OT, continue fall precautions  -1/3 continue Lactinex p.o. t.i.d., continue PT/OT, fall precautions    Recent history of confusion, improving  -1/3 UA with reflex recently initiated, negative for infection, no further treatment indicated    Diabetes Mellitus type 2, ongoing  -Last HbA1c:  6.4  -SSI with accuchecks qACHS  -ADA diet, metformin plus SSI  -1/3 continue current regimen     Hypomagnesemia, new  Essential Hypertension with HLD, ongoing  Remote History of CVA   -continue Plavix, statin  -Continue  losartan 25 mg daily, atorvastatin 40 mg, ASA  -12/27 initiate Mag-Ox 800 mg p.o. x1 dose, continue current regimen  -1/3 continue current regimen    Continued    Malnutrition, new, ongoing  -pre-albumin 19, albumin 2.8 on admit to facility  -RD to follow  -12/27 recent albumin 3.0, improving    Hx of Anemia, stable  -Continue B complex vitamin  -12/27 recent H&H 10/32    Seizure disorder, chronic  -patient is a history of seizures several years  -currently on  Keppra 1g BID. Continue on this dose until able to f/u with Neurology outpatient          After care ESBL UTI (urinary tract infection) resolved  -Hx of UTIs, previous urine culture pos. for klebsiella   -retroperitoneal ultrasound reveals mild left hydronephrosis otherwise no significant abnormality  -Infectious Disease consulted,  continue ertapenem for total 14 day course of antibiotics (end date 12/03)     After care Proteus Bacteremia, resolved  -BCx w/ Proteus mirabilis from 11/11, repeat blood cultures no growth to date-final  -infectious Disease was consulted.  Patient to continue ertapenem for total 14 day course of antibiotics    Febrile illness:  Resolved  -continue ertapenem, vancomycin now discontinued  -bilateral lower extremity ultrasound to rule out DVT negative     Acute metabolic encephalopathy:  Resolved  Septic encephalopathy: Resolved  -patient was intubated for airway protection, successfully extubated on 11/17.  -CT head noted to be nonacute  -MRI brain and c-spine wo contrast unremarkable  -cEEG done in the ICU did not show acute epileptic activity       No future appointments.            Harleen Travis NP          Patient note was created using MModal Dictation.  Any errors in syntax or even information may not have been identified and edited on initial review prior to signing this note.

## 2020-01-03 ENCOUNTER — DOCUMENTATION ONLY (OUTPATIENT)
Dept: HEPATOLOGY | Facility: HOSPITAL | Age: 70
End: 2020-01-03

## 2020-01-08 ENCOUNTER — DOCUMENTATION ONLY (OUTPATIENT)
Dept: HEPATOLOGY | Facility: HOSPITAL | Age: 70
End: 2020-01-08

## 2020-01-08 NOTE — PROGRESS NOTES
[facility]                                 Skilled Nursing Facility                   Progress Note     Admit Date:12/2/19  NAI   Principal Problem:  Debility related to recent sepsis/UTI  HPI obtained from patient interview and chart review     Visit Date: 1/8/2020    Chief Complaint:  Lab review, UC review, BS monitoring, BP/HR monitoring, PT/OT progression    HPI:   Mr. Lopez is a 68 y/o male with a PMHx of CVA, seizures, CHI, DM II, dementia, HTN.  He was found down at home, diagnosed with sepsis and UTI.  Id was consulted and patient treated with IV antibiotics, discharged to SNF with 1 more day of ertapenem till end date of 12/03/2019 via midline.  He was intubated during his stay for airway protection.  Treated with Keppra for possible seizures, all neurology workup was negative including EEG.  PT/OT recommended skilled for continued therapy and medical care.  Of note, patient was discharged in September from this facility as skilled nursing due to being found down and diagnosed with sepsis/rhabdo, returned home until this recent admit for similar issue.    Patient will be treated at [facility] with PT and OT to improve functional status and ability to perform ADLs.     Interval history:   Lab work from 01/07 reviewed today, H&H 10/32, BUN/CR 16/0.77, fasting blood glucose 83, K 4.6, albumin 3.1-improved malnutrition, mg 1.6-persistent hypomagnesemia, continuing oral supplementation.  Urine culture previously ordered for confusion resulted, positive for Proteus mirabilis, patient had a recent history of Proteus mirabilis bacteremia which has resolved, likely need antibiotic coverage.  /69, HR 78, O2 sats 97% on room air, stable on current regimen.  Blood sugar ranging , stable on current regimen.  He has had no further diarrhea.  No other complaints during visit today.  Patient medically stable. Patient  medically stable. Progressing well with PT/OT.  Will continue to monitor and treat of chronic conditions.     Past Medical History: Patient has a past medical history of CHI (closed head injury), Dementia, Diabetes mellitus, Hyperlipidemia, and Seizures.    Past Surgical History:  No pertinent surgical history reported    Social History: Patient reports that he has never smoked. He has never used smokeless tobacco. He reports that he does not drink alcohol or use drugs.    Family History:  No pertinent family history reported    Allergies: Patient has No Known Allergies.    ROS  Constitutional: Negative for fever or fatigue.   Eyes: Negative for blurred vision, double vision and discharge.   Respiratory: Negative for cough, shortness of breath and wheezing.    Cardiovascular: Negative for chest pain, palpitations, and leg swelling.   Gastrointestinal: Negative for abdominal pain, constipation, +diarrhea,improved, - nausea and vomiting.   Genitourinary: Negative for dysuria, frequency and urgency.   Musculoskeletal:  + generalized weakness. Negative for back pain and myalgias.   Skin: Negative for itching and rash.   Neurological: Negative for dizziness, speech change, and headaches.   Psychiatric/Behavioral: Negative for depression. The patient is not nervous/anxious.      PEx     Constitutional: Patient appears well-developed and in no distress   Head: Normocephalic and atraumatic.   Eyes: Pupils are equal, round, and reactive to light.   Neck: Normal range of motion. Neck supple.   Cardiovascular: Normal rate, regular rhythm and normal heart sounds.    Pulmonary/Chest: Effort normal and breath sounds are clear  Abdominal: Soft. Bowel sounds are normal, + loose stools, improved.   Musculoskeletal: Normal range of motion.   Neurological: Alert and oriented to person, place, and time.   Psychiatric: Normal mood and affect. Behavior is normal.   Skin: Skin is warm and dry.       Assessment and Plan:     Loose stools,  improved  Recent history of mechanical fall on 12/24, new, resolved  Debility r/t sepsis, encephalopathy, ongoing  After care of recent respiratory failure, improving   - Continue with PT/OT for gait training and strengthening and restoration of ADL's   - Encourage mobility, OOB in chair, and early ambulation as appropriate  - Fall precautions   - Monitor for bowel and bladder dysfunction  - Monitor for and prevent skin breakdown and pressure ulcers  -continue DuoNeb treatments q.4 hours p.r.n. Wheezing  -BM regulation:  Continue senna-Colace p.r.n. Daily  -12/27 Initiate Lactinex po TID, continue PT/OT, continue fall precautions  -1/3 continue Lactinex p.o. t.i.d., continue PT/OT, fall precautions    Proteus Mirabilis UTI, new  Recent history of confusion, improving  Recent Hx of Proteus Mirabilis bacteremia, resolved  -1/3 UA with reflex recently initiated, negative for infection, no further treatment indicated  -1/8 initiate Augmentin 500-125 mg PO Q12H x 7 days + initiate Lactobacillus PO TID x 7 days for UTI    Diabetes Mellitus type 2, ongoing  -Last HbA1c:  6.4  -SSI with accuchecks qACHS  -ADA diet, metformin plus SSI  -1/8 continue current regimen     Hypomagnesemia, ongoing  Essential Hypertension with HLD, ongoing  Remote History of CVA   -continue Plavix, statin  -Continue  losartan 25 mg daily, atorvastatin 40 mg, ASA  -12/27 initiate Mag-Ox 800 mg p.o. x1 dose, continue current regimen  -1/8 initiate Mg Ox 800 mg PO x 1 dose, continue current regimen    Malnutrition, new, ongoing  -pre-albumin 19, albumin 2.8 on admit to facility  -RD to follow  -12/27 recent albumin 3.0, improving  -1/8 Albumin 3.1, improving    Hx of Anemia, stable  -Continue B complex vitamin  -12/27 recent H&H 10/32  -1/8 H&H 10/32    Continued    Seizure disorder, chronic  -patient is a history of seizures several years  -currently on  Keppra 1g BID. Continue on this dose until able to f/u with Neurology outpatient          After  care ESBL UTI (urinary tract infection) resolved  -Hx of UTIs, previous urine culture pos. for klebsiella   -retroperitoneal ultrasound reveals mild left hydronephrosis otherwise no significant abnormality  -Infectious Disease consulted, continue ertapenem for total 14 day course of antibiotics (end date 12/03)     After care Proteus Bacteremia, resolved  -BCx w/ Proteus mirabilis from 11/11, repeat blood cultures no growth to date-final  -infectious Disease was consulted.  Patient to continue ertapenem for total 14 day course of antibiotics    Febrile illness:  Resolved  -continue ertapenem, vancomycin now discontinued  -bilateral lower extremity ultrasound to rule out DVT negative     Acute metabolic encephalopathy:  Resolved  Septic encephalopathy: Resolved  -patient was intubated for airway protection, successfully extubated on 11/17.  -CT head noted to be nonacute  -MRI brain and c-spine wo contrast unremarkable  -cEEG done in the ICU did not show acute epileptic activity       No future appointments.            Harleen Travis NP          Patient note was created using MModal Dictation.  Any errors in syntax or even information may not have been identified and edited on initial review prior to signing this note.

## 2020-01-15 ENCOUNTER — DOCUMENTATION ONLY (OUTPATIENT)
Dept: HEPATOLOGY | Facility: HOSPITAL | Age: 70
End: 2020-01-15

## 2020-01-30 ENCOUNTER — HOSPITAL ENCOUNTER (EMERGENCY)
Facility: HOSPITAL | Age: 70
Discharge: HOSPICE | End: 2020-01-31
Attending: EMERGENCY MEDICINE | Admitting: INTERNAL MEDICINE
Payer: MEDICARE

## 2020-01-30 VITALS
BODY MASS INDEX: 19.85 KG/M2 | OXYGEN SATURATION: 94 % | TEMPERATURE: 97 F | RESPIRATION RATE: 40 BRPM | SYSTOLIC BLOOD PRESSURE: 149 MMHG | HEART RATE: 130 BPM | WEIGHT: 123 LBS | DIASTOLIC BLOOD PRESSURE: 98 MMHG

## 2020-01-30 DIAGNOSIS — N17.9 AKI (ACUTE KIDNEY INJURY): ICD-10-CM

## 2020-01-30 DIAGNOSIS — R00.0 TACHYCARDIA: ICD-10-CM

## 2020-01-30 DIAGNOSIS — R06.03 RESPIRATORY DISTRESS: Primary | ICD-10-CM

## 2020-01-30 PROBLEM — Z51.5 COMFORT MEASURES ONLY STATUS: Status: ACTIVE | Noted: 2020-01-30

## 2020-01-30 PROBLEM — Z71.89 GOALS OF CARE, COUNSELING/DISCUSSION: Status: ACTIVE | Noted: 2020-01-30

## 2020-01-30 LAB
ALBUMIN SERPL BCP-MCNC: 2.8 G/DL (ref 3.5–5.2)
ALLENS TEST: ABNORMAL
ALP SERPL-CCNC: 98 U/L (ref 55–135)
ALT SERPL W/O P-5'-P-CCNC: 57 U/L (ref 10–44)
ANION GAP SERPL CALC-SCNC: 17 MMOL/L (ref 8–16)
ANISOCYTOSIS BLD QL SMEAR: SLIGHT
AST SERPL-CCNC: 134 U/L (ref 10–40)
BACTERIA #/AREA URNS AUTO: ABNORMAL /HPF
BASOPHILS # BLD AUTO: 0.04 K/UL (ref 0–0.2)
BASOPHILS NFR BLD: 0.5 % (ref 0–1.9)
BILIRUB SERPL-MCNC: 0.3 MG/DL (ref 0.1–1)
BILIRUB UR QL STRIP: NEGATIVE
BNP SERPL-MCNC: 46 PG/ML (ref 0–99)
BUN SERPL-MCNC: 69 MG/DL (ref 8–23)
BURR CELLS BLD QL SMEAR: ABNORMAL
CALCIUM SERPL-MCNC: 9.2 MG/DL (ref 8.7–10.5)
CHLORIDE SERPL-SCNC: 107 MMOL/L (ref 95–110)
CLARITY UR REFRACT.AUTO: ABNORMAL
CO2 SERPL-SCNC: 21 MMOL/L (ref 23–29)
COLOR UR AUTO: ABNORMAL
CREAT SERPL-MCNC: 3.2 MG/DL (ref 0.5–1.4)
DELSYS: ABNORMAL
DIFFERENTIAL METHOD: ABNORMAL
EOSINOPHIL # BLD AUTO: 0 K/UL (ref 0–0.5)
EOSINOPHIL NFR BLD: 0 % (ref 0–8)
EP: 7
ERYTHROCYTE [DISTWIDTH] IN BLOOD BY AUTOMATED COUNT: 14.1 % (ref 11.5–14.5)
ERYTHROCYTE [SEDIMENTATION RATE] IN BLOOD BY WESTERGREN METHOD: 14 MM/H
EST. GFR  (AFRICAN AMERICAN): 21.7 ML/MIN/1.73 M^2
EST. GFR  (NON AFRICAN AMERICAN): 18.7 ML/MIN/1.73 M^2
FIO2: 100
GLUCOSE SERPL-MCNC: 179 MG/DL (ref 70–110)
GLUCOSE UR QL STRIP: NEGATIVE
HCO3 UR-SCNC: 22.2 MMOL/L (ref 24–28)
HCT VFR BLD AUTO: 48.3 % (ref 40–54)
HGB BLD-MCNC: 14.6 G/DL (ref 14–18)
HGB UR QL STRIP: ABNORMAL
HYALINE CASTS UR QL AUTO: 0 /LPF
IMM GRANULOCYTES # BLD AUTO: 0.03 K/UL (ref 0–0.04)
IMM GRANULOCYTES NFR BLD AUTO: 0.4 % (ref 0–0.5)
INR PPP: 1.1 (ref 0.8–1.2)
IP: 15
KETONES UR QL STRIP: NEGATIVE
LACTATE SERPL-SCNC: 2.5 MMOL/L (ref 0.5–2.2)
LEUKOCYTE ESTERASE UR QL STRIP: ABNORMAL
LYMPHOCYTES # BLD AUTO: 1.4 K/UL (ref 1–4.8)
LYMPHOCYTES NFR BLD: 18.3 % (ref 18–48)
MCH RBC QN AUTO: 25.2 PG (ref 27–31)
MCHC RBC AUTO-ENTMCNC: 30.2 G/DL (ref 32–36)
MCV RBC AUTO: 83 FL (ref 82–98)
MICROSCOPIC COMMENT: ABNORMAL
MIN VOL: 30
MODE: ABNORMAL
MONOCYTES # BLD AUTO: 0.6 K/UL (ref 0.3–1)
MONOCYTES NFR BLD: 7.7 % (ref 4–15)
NEUTROPHILS # BLD AUTO: 5.7 K/UL (ref 1.8–7.7)
NEUTROPHILS NFR BLD: 73.1 % (ref 38–73)
NITRITE UR QL STRIP: NEGATIVE
NRBC BLD-RTO: 0 /100 WBC
PCO2 BLDA: 38.9 MMHG (ref 35–45)
PH SMN: 7.37 [PH] (ref 7.35–7.45)
PH UR STRIP: 5 [PH] (ref 5–8)
PLATELET # BLD AUTO: 336 K/UL (ref 150–350)
PLATELET BLD QL SMEAR: ABNORMAL
PMV BLD AUTO: 10.7 FL (ref 9.2–12.9)
PO2 BLDA: 255 MMHG (ref 80–100)
POC BE: -3 MMOL/L
POC SATURATED O2: 100 % (ref 95–100)
POC TCO2: 23 MMOL/L (ref 23–27)
POIKILOCYTOSIS BLD QL SMEAR: SLIGHT
POTASSIUM SERPL-SCNC: 5.8 MMOL/L (ref 3.5–5.1)
PROT SERPL-MCNC: 8.5 G/DL (ref 6–8.4)
PROT UR QL STRIP: ABNORMAL
PROTHROMBIN TIME: 11.2 SEC (ref 9–12.5)
RBC # BLD AUTO: 5.79 M/UL (ref 4.6–6.2)
RBC #/AREA URNS AUTO: 2 /HPF (ref 0–4)
SAMPLE: ABNORMAL
SITE: ABNORMAL
SODIUM SERPL-SCNC: 145 MMOL/L (ref 136–145)
SP GR UR STRIP: 1.02 (ref 1–1.03)
SPONT RATE: 34
TROPONIN I SERPL DL<=0.01 NG/ML-MCNC: 0.04 NG/ML (ref 0–0.03)
URN SPEC COLLECT METH UR: ABNORMAL
WBC # BLD AUTO: 7.8 K/UL (ref 3.9–12.7)
WBC #/AREA URNS AUTO: 5 /HPF (ref 0–5)

## 2020-01-30 PROCEDURE — 25000242 PHARM REV CODE 250 ALT 637 W/ HCPCS: Performed by: EMERGENCY MEDICINE

## 2020-01-30 PROCEDURE — 83605 ASSAY OF LACTIC ACID: CPT

## 2020-01-30 PROCEDURE — 96361 HYDRATE IV INFUSION ADD-ON: CPT

## 2020-01-30 PROCEDURE — 93010 EKG 12-LEAD: ICD-10-PCS | Mod: GW,,, | Performed by: INTERNAL MEDICINE

## 2020-01-30 PROCEDURE — 63600175 PHARM REV CODE 636 W HCPCS: Performed by: EMERGENCY MEDICINE

## 2020-01-30 PROCEDURE — 85610 PROTHROMBIN TIME: CPT

## 2020-01-30 PROCEDURE — 94660 CPAP INITIATION&MGMT: CPT

## 2020-01-30 PROCEDURE — 27000190 HC CPAP FULL FACE MASK W/VALVE

## 2020-01-30 PROCEDURE — 36600 WITHDRAWAL OF ARTERIAL BLOOD: CPT

## 2020-01-30 PROCEDURE — 99900035 HC TECH TIME PER 15 MIN (STAT)

## 2020-01-30 PROCEDURE — 82803 BLOOD GASES ANY COMBINATION: CPT

## 2020-01-30 PROCEDURE — 99291 CRITICAL CARE FIRST HOUR: CPT | Mod: 25

## 2020-01-30 PROCEDURE — 87040 BLOOD CULTURE FOR BACTERIA: CPT

## 2020-01-30 PROCEDURE — 93010 ELECTROCARDIOGRAM REPORT: CPT | Mod: GW,,, | Performed by: INTERNAL MEDICINE

## 2020-01-30 PROCEDURE — 87186 SC STD MICRODIL/AGAR DIL: CPT | Mod: 59

## 2020-01-30 PROCEDURE — 83880 ASSAY OF NATRIURETIC PEPTIDE: CPT

## 2020-01-30 PROCEDURE — 94761 N-INVAS EAR/PLS OXIMETRY MLT: CPT

## 2020-01-30 PROCEDURE — 99291 CRITICAL CARE FIRST HOUR: CPT | Mod: GW,,, | Performed by: EMERGENCY MEDICINE

## 2020-01-30 PROCEDURE — 81001 URINALYSIS AUTO W/SCOPE: CPT

## 2020-01-30 PROCEDURE — 25000003 PHARM REV CODE 250: Performed by: EMERGENCY MEDICINE

## 2020-01-30 PROCEDURE — 96375 TX/PRO/DX INJ NEW DRUG ADDON: CPT

## 2020-01-30 PROCEDURE — 12000002 HC ACUTE/MED SURGE SEMI-PRIVATE ROOM

## 2020-01-30 PROCEDURE — 80053 COMPREHEN METABOLIC PANEL: CPT

## 2020-01-30 PROCEDURE — 94640 AIRWAY INHALATION TREATMENT: CPT

## 2020-01-30 PROCEDURE — 85025 COMPLETE CBC W/AUTO DIFF WBC: CPT

## 2020-01-30 PROCEDURE — 87077 CULTURE AEROBIC IDENTIFY: CPT

## 2020-01-30 PROCEDURE — 93005 ELECTROCARDIOGRAM TRACING: CPT

## 2020-01-30 PROCEDURE — 96366 THER/PROPH/DIAG IV INF ADDON: CPT | Mod: 59

## 2020-01-30 PROCEDURE — 96365 THER/PROPH/DIAG IV INF INIT: CPT

## 2020-01-30 PROCEDURE — 99291 PR CRITICAL CARE, E/M 30-74 MINUTES: ICD-10-PCS | Mod: GW,,, | Performed by: EMERGENCY MEDICINE

## 2020-01-30 PROCEDURE — 63600175 PHARM REV CODE 636 W HCPCS: Performed by: STUDENT IN AN ORGANIZED HEALTH CARE EDUCATION/TRAINING PROGRAM

## 2020-01-30 PROCEDURE — 84484 ASSAY OF TROPONIN QUANT: CPT

## 2020-01-30 RX ORDER — LORAZEPAM 2 MG/ML
1 INJECTION INTRAMUSCULAR EVERY 30 MIN PRN
Status: DISCONTINUED | OUTPATIENT
Start: 2020-01-30 | End: 2020-01-31 | Stop reason: HOSPADM

## 2020-01-30 RX ORDER — MORPHINE SULFATE 2 MG/ML
1 INJECTION, SOLUTION INTRAMUSCULAR; INTRAVENOUS
Status: DISCONTINUED | OUTPATIENT
Start: 2020-01-30 | End: 2020-01-31 | Stop reason: HOSPADM

## 2020-01-30 RX ORDER — SODIUM CHLORIDE 0.9 % (FLUSH) 0.9 %
10 SYRINGE (ML) INJECTION
Status: DISCONTINUED | OUTPATIENT
Start: 2020-01-30 | End: 2020-01-31 | Stop reason: HOSPADM

## 2020-01-30 RX ORDER — VANCOMYCIN 2 GRAM/500 ML IN 0.9 % SODIUM CHLORIDE INTRAVENOUS
2000
Status: COMPLETED | OUTPATIENT
Start: 2020-01-30 | End: 2020-01-30

## 2020-01-30 RX ORDER — MORPHINE SULFATE 2 MG/ML
2 INJECTION, SOLUTION INTRAMUSCULAR; INTRAVENOUS
Status: DISCONTINUED | OUTPATIENT
Start: 2020-01-30 | End: 2020-01-31 | Stop reason: HOSPADM

## 2020-01-30 RX ORDER — IPRATROPIUM BROMIDE AND ALBUTEROL SULFATE 2.5; .5 MG/3ML; MG/3ML
3 SOLUTION RESPIRATORY (INHALATION)
Status: COMPLETED | OUTPATIENT
Start: 2020-01-30 | End: 2020-01-30

## 2020-01-30 RX ADMIN — MORPHINE SULFATE 1 MG: 2 INJECTION, SOLUTION INTRAMUSCULAR; INTRAVENOUS at 10:01

## 2020-01-30 RX ADMIN — SODIUM CHLORIDE, SODIUM LACTATE, POTASSIUM CHLORIDE, AND CALCIUM CHLORIDE 1000 ML: .6; .31; .03; .02 INJECTION, SOLUTION INTRAVENOUS at 07:01

## 2020-01-30 RX ADMIN — VANCOMYCIN HYDROCHLORIDE 2000 MG: 100 INJECTION, POWDER, LYOPHILIZED, FOR SOLUTION INTRAVENOUS at 07:01

## 2020-01-30 RX ADMIN — IPRATROPIUM BROMIDE AND ALBUTEROL SULFATE 3 ML: .5; 3 SOLUTION RESPIRATORY (INHALATION) at 07:01

## 2020-01-30 RX ADMIN — PIPERACILLIN AND TAZOBACTAM 4.5 G: 4; .5 INJECTION, POWDER, LYOPHILIZED, FOR SOLUTION INTRAVENOUS; PARENTERAL at 07:01

## 2020-01-30 RX ADMIN — SODIUM CHLORIDE 1674 ML: 0.9 INJECTION, SOLUTION INTRAVENOUS at 07:01

## 2020-01-31 NOTE — HPI
Mr. Lopez is a 69 M with prior CVA, seizures, CHI, DM II, dementia, HTN who was recently discharged from SNF to hospice on 1/25 who was transferred from home for evaluation of respiratory distress. Pt is unresponsive on BiPAP, HPI obtained from pt's niece and POA at bedside. Pt was discharged from [facility] to SNF on 12/3 after a prolonged stay for encephalopathy complicated by ESBL UTI. On discharge from SNF, [facility]. Per niece, pt was adamant that he did not wish to go back in and out of the hospital and instead wanted to focus on being comfortable at home. Over the weekend pt was alert and conversant at his normal baseline. Beginning Tuesday, pt became increasingly somnolent and less responsive. Family from out of town advised pt's niece to have him brought to the ED to be evaluated.  Niece called EMS today and pt was transported to [facility] ED.  Pt's niece has her POA documentation and [facility] enrollment paperwork with her. She reports that she knows that the patient wants to be made comfortable and had made his peace with the fact that if he became ill again, he did not wish to go through a prolonged recovery and instead would prefer to be comfortable at home, but felt that his adult children were not comfortable with that idea.     In the ED, pt was tachypneic in apparent respiratory distress with minimal responsiveness. ED physicians placed pt on BiPAP and critical care was consulted for further evaluation of respiratory distress.

## 2020-01-31 NOTE — DISCHARGE INSTRUCTIONS
You will be discharged in the care of the hospice team.  Please refer to them for further questions.

## 2020-01-31 NOTE — ASSESSMENT & PLAN NOTE
Pt with significant tachypnea, oxygenating well on BiPAP. Given goals of care, would not escalate care further nor pursue aggressive investigations. Recommend morphine for symptomatic treatment of dyspnea.

## 2020-01-31 NOTE — CONSULTS
[facility]  Critical Care Medicine  Consult Note    Patient Name: Edwin Lopez Jr.  MRN: [id number]  Admission Date: 1/30/2020  Hospital Length of Stay: 0 days  Code Status: DNR  Attending Physician: Janine Bauer MD   Primary Care Provider: Robina Burroughs NP   Principal Problem: <principal problem not specified>    Inpatient consult to Critical Care Medicine  Consult performed by: Jimmy Christy DO  Consult ordered by: Janine Bauer MD        Subjective:     HPI:  Mr. Lopez is a 69 M with prior CVA, seizures, CHI, DM II, dementia, HTN who was recently discharged from SNF to hospice on 1/25 who was transferred from home for evaluation of respiratory distress. Pt is unresponsive on BiPAP, HPI obtained from pt's niece and POA at bedside. Pt was discharged from [facility] to SNF on 12/3 after a prolonged stay for encephalopathy complicated by ESBL UTI. On discharge from SNF, pt enrolled with [facility] hospice. Per niece, pt was adamant that he did not wish to go back in and out of the hospital and instead wanted to focus on being comfortable at home. Over the weekend pt was alert and conversant at his normal baseline. Beginning Tuesday, pt became increasingly somnolent and less responsive. Family from out of town advised pt's niece to have him brought to the ED to be evaluated.  Niece called EMS today and pt was transported to [facility] ED.  Pt's niece has her POA documentation and [facility] enrollment paperwork with her. She reports that she knows that the patient wants to be made comfortable and had made his peace with the fact that if he became ill again, he did not wish to go through a prolonged recovery and instead would prefer to be comfortable at home, but felt that his adult children were not comfortable with that idea.     In the ED, pt was tachypneic in apparent respiratory distress with minimal responsiveness. ED physicians placed pt on BiPAP and critical care was consulted for further  evaluation of respiratory distress.     Hospital/ICU Course:  No notes on file    Past Medical History:   Diagnosis Date    CHI (closed head injury)     Dementia     Diabetes mellitus     Hyperlipidemia     Seizures        No past surgical history on file.    Review of patient's allergies indicates:  No Known Allergies    Family History     None        Tobacco Use    Smoking status: Never Smoker    Smokeless tobacco: Never Used   Substance and Sexual Activity    Alcohol use: No     Frequency: Never    Drug use: No    Sexual activity: Never      Review of Systems   Unable to perform ROS: Mental status change     Objective:     Vital Signs (Most Recent):  Temp: 97 °F (36.1 °C) (01/30/20 1803)  Pulse: (!) 123 (01/30/20 2147)  Resp: (!) 40 (01/30/20 1924)  BP: (!) 162/105 (01/30/20 2147)  SpO2: (!) 94 % (01/30/20 2147) Vital Signs (24h Range):  Temp:  [97 °F (36.1 °C)] 97 °F (36.1 °C)  Pulse:  [] 123  Resp:  [40-48] 40  SpO2:  [83 %-100 %] 94 %  BP: (127-162)/() 162/105   Weight: 55.8 kg (123 lb)  Body mass index is 19.85 kg/m².      Intake/Output Summary (Last 24 hours) at 1/30/2020 2223  Last data filed at 1/30/2020 2156  Gross per 24 hour   Intake 1600 ml   Output --   Net 1600 ml       Physical Exam   Constitutional: He appears cachectic. He appears ill. He appears distressed. Face mask in place.   HENT:   Head: Normocephalic and atraumatic.   Mouth/Throat: Oropharynx is clear and moist.   Eyes: Right eye exhibits no discharge. Left eye exhibits no discharge.   Cardiovascular: Normal rate, regular rhythm and normal heart sounds.   Pulmonary/Chest: Accessory muscle usage present. He is in respiratory distress.   Abdominal: Soft. Bowel sounds are normal. He exhibits no distension. There is no tenderness. There is no guarding.   Musculoskeletal: He exhibits no edema, tenderness or deformity.   Neurological: He is unresponsive. GCS eye subscore is 1. GCS verbal subscore is 1. GCS motor subscore is  2.   Skin: Skin is warm and dry. No rash noted. No erythema.       Vents:  Oxygen Concentration (%): 50 (01/30/20 2134)  Lines/Drains/Airways     Peripheral Intravenous Line                 Peripheral IV - Single Lumen 12/02/19 1721 20 G;1 3/4 in Right Forearm 59 days         Peripheral IV - Single Lumen 01/30/20 18 G Left Antecubital less than 1 day         Peripheral IV - Single Lumen 01/30/20 1841 18 G Right Forearm less than 1 day              Significant Labs:    CBC/Anemia Profile:  Recent Labs   Lab 01/30/20  1841   WBC 7.80   HGB 14.6   HCT 48.3      MCV 83   RDW 14.1        Chemistries:  Recent Labs   Lab 01/30/20  1841      K 5.8*      CO2 21*   BUN 69*   CREATININE 3.2*   CALCIUM 9.2   ALBUMIN 2.8*   PROT 8.5*   BILITOT 0.3   ALKPHOS 98   ALT 57*   *       All pertinent labs within the past 24 hours have been reviewed.    Significant Imaging: I have reviewed all pertinent imaging results/findings within the past 24 hours.      ABG  Recent Labs   Lab 01/30/20 1939   PH 7.365   PO2 255*   PCO2 38.9   HCO3 22.2*   BE -3     Assessment/Plan:     Pulmonary  Respiratory distress  Pt with significant tachypnea, oxygenating well on BiPAP. Given goals of care, would not escalate care further nor pursue aggressive investigations. Recommend morphine for symptomatic treatment of dyspnea.     Other  Goals of care, counseling/discussion  Discussed pt's care with Niece who is legal POA at pt's bedside. Pt had previously expressed his wishes to be made comfortable at home. He has previously voiced that he wanted to go home with hospice, which he has been with for the past 5 days. He has children who want aggressive measures and wanted him to be rehospitalized today. She agrees that intubation and CPR would be inconsistent with his wishes. DNR order entered in [place].    Critical care team contacted Mr. Lopez' daughters Valerie ([phone])  & Carolee ([phone]) and his son Jerome  ([phone]) to discuss his care. They all are in agreement that honoring his wishes and pursuing a comfort-based approach to his care is the priority. His daughter Carolee has suggested having him transported back home tonight with hospice. [facility] has been called and can help facilitate transport. All questions were answered for the family. We will discuss with the ED making arrangements to continue his care until he is able to be discharged home.            Critical care was time spent personally by me on the following activities: development of treatment plan with patient or surrogate and bedside caregivers, discussions with consultants, evaluation of patient's response to treatment, examination of patient, ordering and performing treatments and interventions, ordering and review of laboratory studies, ordering and review of radiographic studies, pulse oximetry, re-evaluation of patient's condition. This critical care time did not overlap with that of any other provider or involve time for any procedures.    Thank you for your consult. I will sign off. Please contact us if you have any additional questions.     Jimmy Christy, DO  Critical Care Medicine  [facility]

## 2020-01-31 NOTE — PLAN OF CARE
Discussed the patient's care with his niece and POA (paperwork at bedside) who has been his caretaker. He has previously voiced that he wanted to go home with hospice, which he has been with for the past 5 days. He has children who want aggressive measures and wanted him to be rehospitalized today, but the patient has voiced that he wants to be comfortable and avoid repeat hospitalizations. His POA has been trying to advocate for his wishes and feels pulled by his family, but is adamant that he wanted comfort-based care at this phase in his life. She agrees that intubation and CPR would be inconsistent with his wishes. DNR order entered in Epic. I will update the children who's information that the niece has provided for me. [facility] is aware that he is reintubated and the on call nurse is coming to the hospital to provide support for the niece.     PRN morphine ordered for air hunger and dyspnea.   Discontinue BiPAP. Will use supplemental oxygen via [place].    Vanesa Marcos 01/30/2020 9:28 PM  [facility] Fellow      I called Mr. Lopez' daughters Valerie ([phone])  & Carolee ([phone]) and his son Jerome ([phone]) to discuss his care. They all are in agreement that honoring his wishes and pursuing a comfort-based approach to his care is the priority. His daughter Carolee has suggested having him transported back home tonight with hospice. [facility] has been called and can help facilitate transport. All questions were answered for the family. We will discuss with the ED making arrangements to continue his care until he is able to be discharged home. His children are aware of my concern that he is likely to pass in the next few days.    Vanesa Marcos 01/30/2020 10:11 PM  [facility] Pul[facility] & Critical Care Fellow

## 2020-01-31 NOTE — SUBJECTIVE & OBJECTIVE
Past Medical History:   Diagnosis Date    CHI (closed head injury)     Dementia     Diabetes mellitus     Hyperlipidemia     Seizures        No past surgical history on file.    Review of patient's allergies indicates:  No Known Allergies    Family History     None        Tobacco Use    Smoking status: Never Smoker    Smokeless tobacco: Never Used   Substance and Sexual Activity    Alcohol use: No     Frequency: Never    Drug use: No    Sexual activity: Never      Review of Systems   Unable to perform ROS: Mental status change     Objective:     Vital Signs (Most Recent):  Temp: 97 °F (36.1 °C) (01/30/20 1803)  Pulse: (!) 123 (01/30/20 2147)  Resp: (!) 40 (01/30/20 1924)  BP: (!) 162/105 (01/30/20 2147)  SpO2: (!) 94 % (01/30/20 2147) Vital Signs (24h Range):  Temp:  [97 °F (36.1 °C)] 97 °F (36.1 °C)  Pulse:  [] 123  Resp:  [40-48] 40  SpO2:  [83 %-100 %] 94 %  BP: (127-162)/() 162/105   Weight: 55.8 kg (123 lb)  Body mass index is 19.85 kg/m².      Intake/Output Summary (Last 24 hours) at 1/30/2020 2223  Last data filed at 1/30/2020 2156  Gross per 24 hour   Intake 1600 ml   Output --   Net 1600 ml       Physical Exam   Constitutional: He appears cachectic. He appears ill. He appears distressed. Face mask in place.   HENT:   Head: Normocephalic and atraumatic.   Mouth/Throat: Oropharynx is clear and moist.   Eyes: Right eye exhibits no discharge. Left eye exhibits no discharge.   Cardiovascular: Normal rate, regular rhythm and normal heart sounds.   Pulmonary/Chest: Accessory muscle usage present. He is in respiratory distress.   Abdominal: Soft. Bowel sounds are normal. He exhibits no distension. There is no tenderness. There is no guarding.   Musculoskeletal: He exhibits no edema, tenderness or deformity.   Neurological: He is unresponsive. GCS eye subscore is 1. GCS verbal subscore is 1. GCS motor subscore is 2.   Skin: Skin is warm and dry. No rash noted. No erythema.       Vents:  Oxygen  Concentration (%): 50 (01/30/20 2134)  Lines/Drains/Airways     Peripheral Intravenous Line                 Peripheral IV - Single Lumen 12/02/19 1721 20 G;1 3/4 in Right Forearm 59 days         Peripheral IV - Single Lumen 01/30/20 18 G Left Antecubital less than 1 day         Peripheral IV - Single Lumen 01/30/20 1841 18 G Right Forearm less than 1 day              Significant Labs:    CBC/Anemia Profile:  Recent Labs   Lab 01/30/20  1841   WBC 7.80   HGB 14.6   HCT 48.3      MCV 83   RDW 14.1        Chemistries:  Recent Labs   Lab 01/30/20  1841      K 5.8*      CO2 21*   BUN 69*   CREATININE 3.2*   CALCIUM 9.2   ALBUMIN 2.8*   PROT 8.5*   BILITOT 0.3   ALKPHOS 98   ALT 57*   *       All pertinent labs within the past 24 hours have been reviewed.    Significant Imaging: I have reviewed all pertinent imaging results/findings within the past 24 hours.

## 2020-01-31 NOTE — ED NOTES
Edwin Lopez Jr., a 69 y.o. male presents to the ED w/ complaint of transfer from hospice since saturday for respiratory distress and seizures, niece wanted reversal of DNR. Pt presents with GCS of 3, tachycardic, abd retractions, nonrebreather on 10lpm sats 90-94%.     Triage note:  Chief Complaint   Patient presents with    Altered Mental Status    Respiratory Distress     Review of patient's allergies indicates:  No Known Allergies  Past Medical History:   Diagnosis Date    CHI (closed head injury)     Dementia     Diabetes mellitus     Hyperlipidemia     Seizures      Adult Physical Assessment  LOC: Edwin Lopez Jr., 69 y.o. male verified via two identifiers.  The patient is not awake, not alert, and not speaking at this time. Pt nonverbal. GCS 3.  APPEARANCE: Patient not resting comfortably and appears to be in acute distress at this time. Patient is clean and well groomed, patient's clothing is properly fastened.  SKIN:The skin is warm and dry, color consistent with ethnicity, patient has normal skin turgor and moist mucus membranes, skin intact, no breakdown or brusing noted.  MUSCULOSKELETAL: Patient not moving extremities, no obvious swelling or deformities noted.  RESPIRATORY: Airway is open and patent, respirations are spontaneous, patient has a tachypneic effort and rate, accessory muscle use noted.  CARDIAC: Patient has a tachycardic rate and rhythm, no periphreal edema noted in any extremity, capillary refill < 3 seconds in all extremities  ABDOMEN: Soft and non tender to palpation, no abdominal distention noted. Bowel sounds present in all four quadrants.  NEUROLOGIC: GCS 3

## 2020-01-31 NOTE — ED NOTES
Patient to discharge home with hospice care. Services set up in home already. Will arrange transport with [facility]

## 2020-01-31 NOTE — ED PROVIDER NOTES
Encounter Date: 1/30/2020       History     Chief Complaint   Patient presents with    Altered Mental Status    Respiratory Distress     Mr. Lopez is a 69 M PMHx of CVA, seizures, CHI, DM II, dementia, HTN transferred from home for evaluation of respiratory distress.  Hx obtained by EMS.  Pt was placed on hospice since Saturday 1/25/2020, has been altered since Tuesday.  Yuece called EMS today and wanted to revoke hospice care.  Family is not present at the bedside.  There are no forms that were brought in by EMS.          Review of patient's allergies indicates:  No Known Allergies  Past Medical History:   Diagnosis Date    CHI (closed head injury)     Dementia     Diabetes mellitus     Hyperlipidemia     Seizures      No past surgical history on file.  No family history on file.  Social History     Tobacco Use    Smoking status: Never Smoker    Smokeless tobacco: Never Used   Substance Use Topics    Alcohol use: No     Frequency: Never    Drug use: No     Review of Systems   Unable to perform ROS: Mental status change       Physical Exam     Initial Vitals [01/30/20 1803]   BP Pulse Resp Temp SpO2   127/84 (!) 56 (!) 40 97 °F (36.1 °C) (!) 94 %      MAP       --         Physical Exam    Constitutional:   Thin, cachectic male   HENT:   Dry mucous membranes   Eyes: Conjunctivae are normal. Pupils are equal, round, and reactive to light.   Neck: Neck supple.   Cardiovascular:   Tachycardic   Pulmonary/Chest: Accessory muscle usage present. No stridor. Tachypnea noted. He is in respiratory distress. He has wheezes. He has rhonchi.   Abdominal: Soft. Bowel sounds are normal. There is no tenderness.   Musculoskeletal: He exhibits no edema.   Contracted lower extremities   Neurological: He is unresponsive. GCS eye subscore is 1. GCS verbal subscore is 1. GCS motor subscore is 1.   Skin: Skin is warm. Capillary refill takes less than 2 seconds.         ED Course   Procedures  Labs Reviewed   CBC W/ AUTO  DIFFERENTIAL - Abnormal; Notable for the following components:       Result Value    Mean Corpuscular Hemoglobin 25.2 (*)     Mean Corpuscular Hemoglobin Conc 30.2 (*)     Gran% 73.1 (*)     All other components within normal limits   COMPREHENSIVE METABOLIC PANEL - Abnormal; Notable for the following components:    Potassium 5.8 (*)     CO2 21 (*)     Glucose 179 (*)     BUN, Bld 69 (*)     Creatinine 3.2 (*)     Total Protein 8.5 (*)     Albumin 2.8 (*)      (*)     ALT 57 (*)     Anion Gap 17 (*)     eGFR if  21.7 (*)     eGFR if non  18.7 (*)     All other components within normal limits   LACTIC ACID, PLASMA - Abnormal; Notable for the following components:    Lactate (Lactic Acid) 2.5 (*)     All other components within normal limits   URINALYSIS, REFLEX TO URINE CULTURE - Abnormal; Notable for the following components:    Appearance, UA Cloudy (*)     Protein, UA 1+ (*)     Occult Blood UA 3+ (*)     Leukocytes, UA Trace (*)     All other components within normal limits    Narrative:     Preferred Collection Type->Urine, Clean Catch   URINALYSIS MICROSCOPIC - Abnormal; Notable for the following components:    Bacteria Many (*)     All other components within normal limits    Narrative:     Preferred Collection Type->Urine, Clean Catch   ISTAT PROCEDURE - Abnormal; Notable for the following components:    POC PO2 255 (*)     POC HCO3 22.2 (*)     All other components within normal limits   CULTURE, BLOOD   CULTURE, BLOOD   PROTIME-INR   B-TYPE NATRIURETIC PEPTIDE   TROPONIN I   B-TYPE NATRIURETIC PEPTIDE   TROPONIN I     EKG Readings: (Independently Interpreted)   EKG:  Sinus tachycardia at 139., biatrial enlargement, no ST elevations or depressions       Imaging Results          X-Ray Chest AP Portable (Final result)  Result time 01/30/20 19:04:46    Final result by Joss Brooke MD (01/30/20 19:04:46)                 Impression:      As above.      Electronically  signed by: Joss Brooke MD  Date:    01/30/2020  Time:    19:04             Narrative:    EXAMINATION:  XR CHEST AP PORTABLE    CLINICAL HISTORY:  Sepsis;    TECHNIQUE:  Single frontal view of the chest was performed.    COMPARISON:  Chest radiograph 11/19/2019    FINDINGS:  Monitoring leads, tubing and pacer pads overlie the chest, particularly obscuring the left lung base.  Patient is somewhat rotated.    Cardiomediastinal silhouette is midline and similar to prior.  Interval increased nonspecific interstitial coarsening throughout the left lung which could reflect sequela of asymmetric pulmonary edema, interstitial pneumonia or aspiration.  No large consolidation, pleural effusion or pneumothorax seen.  No acute osseous process seen.  Otherwise grossly stable chest.                              X-Rays:   Independently Interpreted Readings:   Other Readings:  Chest x-ray:  Left sided infiltrate versus fluid    Medical Decision Making:   History:   I obtained history from: EMS provider.  Old Medical Records: I decided to obtain old medical records.  Old Records Summarized: records from previous admission(s).  Initial Assessment:   Emergent evaluation a 69-year-old male brought in by EMS for evaluation of altered mental status, respiratory distress.    7:15 pm  Daughter that lives in [facility].  States she and her family want full code status.  She is aware of patient's grave condition at this time.    Differential Diagnosis:   Pneumonia, aspiration pneumonia, end of life, failure to thrive, renal failure, sepsis  Clinical Tests:   Lab Tests: Reviewed and Ordered  Radiological Study: Ordered and Reviewed  Medical Tests: Reviewed and Ordered  ED Management:  - Full code  - Bipap for respiratory support  - nebs  - abx     ABG on BiPAP: 7.36/38/255.  Will continue support.     CXR with left sided infiltrate.  Labs with no leukocytosis.  Lactate 2.5.  CMP with acute renal failure with creatinine of 3.2 up from  0.9.  Will continue IV fluids    I also had a long discussion with niece at bedside, she is updated with patient's critical condition.    8:15 PM  Case discussed with critical care, they will evaluate the patient.    10:40 PM  ICU has evaluated patient, they have had multiple discussions with family and power of .  Final plan is to discharge with home hospice.  [place] POLST form signed.  Hospice at bedside.    Other:   I have discussed this case with another health care provider.       <> Summary of the Discussion: ICU, hospice              Attending Attestation:         Attending Critical Care:   Critical Care Times:   ==============================================================  · Total Critical Care Time - exclusive of procedural time: 45 minutes.  ==============================================================  Critical care was necessary to treat or prevent imminent or life-threatening deterioration of the following conditions: respiratory failure.   Critical care was time spent personally by me on the following activities: examination of patient, review of x-rays / CT sent with the patient, review of old charts, ordering lab, x-rays, and/or EKG, end of life discussions, discussion with consultants and evaluation of patient's response to treatment.   Critical Care Condition: critical                             Clinical Impression:       ICD-10-CM ICD-9-CM   1. Respiratory distress R06.03 786.09   2. Tachycardia R00.0 785.0   3. LISHA (acute kidney injury) N17.9 584.9         Disposition:   Disposition: Discharged  Condition: Critical                     Janine Bauer MD  01/30/20 3037       Janine Bauer MD  01/30/20 5659

## 2020-01-31 NOTE — ASSESSMENT & PLAN NOTE
Discussed pt's care with Niece who is legal POA at pt's bedside. Pt had previously expressed his wishes to be made comfortable at home. He has previously voiced that he wanted to go home with hospice, which he has been with for the past 5 days. He has children who want aggressive measures and wanted him to be rehospitalized today. She agrees that intubation and CPR would be inconsistent with his wishes. DNR order entered in Epic.    Critical care team contacted Mr. Lopez' daughters Valerie ([phone])  & Carolee ([phone]) and his son Jerome ([phone]) to discuss his care. They all are in agreement that honoring his wishes and pursuing a comfort-based approach to his care is the priority. His daughter Carolee has suggested having him transported back home tonight with hospice. [facility] has been called and can help facilitate transport. All questions were answered for the family. We will discuss with the ED making arrangements to continue his care until he is able to be discharged home.

## 2020-02-02 LAB
BACTERIA BLD CULT: ABNORMAL

## 2020-02-03 LAB
BACTERIA BLD CULT: ABNORMAL

## 2020-08-02 NOTE — PROGRESS NOTES
[facility]                                 Skilled Nursing Facility                   Progress Note     Admit Date:12/2/19  NAI   Principal Problem:  Debility related to recent sepsis/UTI  HPI obtained from patient interview and chart review     Visit Date: 1/15/2020    Chief Complaint: re evaluation of UTI tx, discharge planning, BS monitoring, BP/HR monitoring, PT/OT progression    HPI:   Mr. Lopez is a 68 y/o male with a PMHx of CVA, seizures, CHI, DM II, dementia, HTN.  He was found down at home, diagnosed with sepsis and UTI.  Id was consulted and patient treated with IV antibiotics, discharged to SNF with 1 more day of ertapenem till end date of 12/03/2019 via midline.  He was intubated during his stay for airway protection.  Treated with Keppra for possible seizures, all neurology workup was negative including EEG.  PT/OT recommended skilled for continued therapy and medical care.  Of note, patient was discharged in September from this facility as skilled nursing due to being found down and diagnosed with sepsis/rhabdo, returned home until this recent admit for similar issue.    Patient will be treated at [facility] with PT and OT to improve functional status and ability to perform ADLs.     Interval history:   /66, HR 70, O2 sats 97% on RA, most recent weight 123 lbs, stable on current regimen. BS ranging , stable.   Started patient on Augmentin during last visit 1/8 for + UC, completing today, no further treatment indicated. SW reported that patient is planning to discharge home on Saturday, will coordinate care for safe discharge home. Apparently patient's niece will provide 24/7 care at home. Medications reviewed, patient will need HH, and therapy at home for continued care.  No complaints during visit today.  Patient medically stable. Progressing well with PT/OT.  Will continue to monitor and treat  of chronic conditions.     Past Medical History: Patient has a past medical history of CHI (closed head injury), Dementia, Diabetes mellitus, Hyperlipidemia, and Seizures.    Past Surgical History:  No pertinent surgical history reported    Social History: Patient reports that he has never smoked. He has never used smokeless tobacco. He reports that he does not drink alcohol or use drugs.    Family History:  No pertinent family history reported    Allergies: Patient has No Known Allergies.    ROS  Constitutional: Negative for fever or fatigue.   Eyes: Negative for blurred vision, double vision and discharge.   Respiratory: Negative for cough, shortness of breath and wheezing.    Cardiovascular: Negative for chest pain, palpitations, and leg swelling.   Gastrointestinal: Negative for abdominal pain, constipation, +diarrhea,improved, - nausea and vomiting.   Genitourinary: Negative for dysuria, frequency and urgency.   Musculoskeletal:  + generalized weakness. Negative for back pain and myalgias.   Skin: Negative for itching and rash.   Neurological: Negative for dizziness, speech change, and headaches.   Psychiatric/Behavioral: Negative for depression. The patient is not nervous/anxious.      PEx     Constitutional: Patient appears well-developed and in no distress   Head: Normocephalic and atraumatic.   Eyes: Pupils are equal, round, and reactive to light.   Neck: Normal range of motion. Neck supple.   Cardiovascular: Normal rate, regular rhythm and normal heart sounds.    Pulmonary/Chest: Effort normal and breath sounds are clear  Abdominal: Soft. Bowel sounds are normal, + loose stools, improved.   Musculoskeletal: Normal range of motion.   Neurological: Alert and oriented to person, place, and time.   Psychiatric: Normal mood and affect. Behavior is normal.   Skin: Skin is warm and dry.       Assessment and Plan:    Discharge Planning, ongoing  -1/15 Initiate HH, PT/OT, nursing, DME needed for dc  home     Debility r/t sepsis, encephalopathy, ongoing  After care of recent respiratory failure, improving  Loose stools, improved  Recent history of mechanical fall on 12/24, new, resolved   - Continue with PT/OT for gait training and strengthening and restoration of ADL's   - Encourage mobility, OOB in chair, and early ambulation as appropriate  - Fall precautions   - Monitor for bowel and bladder dysfunction  - Monitor for and prevent skin breakdown and pressure ulcers  -continue DuoNeb treatments q.4 hours p.r.n. Wheezing  -BM regulation:  Continue senna-Colace p.r.n. Daily  -12/27 Initiate Lactinex po TID, continue PT/OT, continue fall precautions  -1/3 continue Lactinex p.o. T.i.d.  -1/15 continue PT/OT, fall precautions    Proteus Mirabilis UTI, new, improved  Recent history of confusion, improving  Recent Hx of Proteus Mirabilis bacteremia, resolved  -1/3 UA with reflex recently initiated, negative for infection, no further treatment indicated  -1/8 initiate Augmentin 500-125 mg PO Q12H x 7 days + initiate Lactobacillus PO TID x 7 days for UTI  -1/15 complete Augmentin today, no further treatment indicated    Diabetes Mellitus type 2, ongoing  -Last HbA1c:  6.4  -SSI with accuchecks qACHS  -ADA diet, metformin plus SSI  -1/15 continue current regimen     Hypomagnesemia, ongoing  Essential Hypertension with HLD, ongoing  Remote History of CVA   -continue Plavix, statin  -Continue  losartan 25 mg daily, atorvastatin 40 mg, ASA  -12/27 initiate Mag-Ox 800 mg p.o. x1 dose, continue current regimen  -1/8 initiate Mg Ox 800 mg PO x 1 dose, continue current regimen  -1/15 Continue current regimen    Continued    Malnutrition, new, ongoing  -pre-albumin 19, albumin 2.8 on admit to facility  -RD to follow  -12/27 recent albumin 3.0, improving  -1/8 Albumin 3.1, improving    Hx of Anemia, stable  -Continue B complex vitamin  -12/27 recent H&H 10/32  -1/8 H&H 10/32    Seizure disorder, chronic  -patient is a history of  seizures several years  -currently on  Keppra 1g BID. Continue on this dose until able to f/u with Neurology outpatient          After care ESBL UTI (urinary tract infection) resolved  -Hx of UTIs, previous urine culture pos. for klebsiella   -retroperitoneal ultrasound reveals mild left hydronephrosis otherwise no significant abnormality  -Infectious Disease consulted, continue ertapenem for total 14 day course of antibiotics (end date 12/03)     After care Proteus Bacteremia, resolved  -BCx w/ Proteus mirabilis from 11/11, repeat blood cultures no growth to date-final  -infectious Disease was consulted.  Patient to continue ertapenem for total 14 day course of antibiotics    Febrile illness:  Resolved  -continue ertapenem, vancomycin now discontinued  -bilateral lower extremity ultrasound to rule out DVT negative     Acute metabolic encephalopathy:  Resolved  Septic encephalopathy: Resolved  -patient was intubated for airway protection, successfully extubated on 11/17.  -CT head noted to be nonacute  -MRI brain and c-spine wo contrast unremarkable  -cEEG done in the ICU did not show acute epileptic activity       No future appointments.        Extended time visit:  Total time: 46 minutes  Start Time: 0810  End Time:0856  Non face-to-face time: 30 minutes  Description of time:D/C planning, coordination of care for HH, therapy, medication reconciliation, DME ordering    Harleen Travis NP          Patient note was created using MModal Dictation.  Any errors in syntax or even information may not have been identified and edited on initial review prior to signing this note.     136

## 2022-04-08 NOTE — PROGRESS NOTES
"[facility]  Adult Nutrition  Progress Note    SUMMARY       Recommendations    Recommendation: 1.Continue on IDDSI level 6 diet. 2. If tolerating current diet, recommend advancing to easy to chew foods in IDDSI level 7 diet per SLP instructions. 3. Add boost TID with meals.  Goals: tolerate consuming >50% of meals by next RD follow-up  Nutrition Goal Status: new  Communication of RD Recs: other (comment)(POC)    Reason for Assessment    Reason For Assessment: RD follow-up  Diagnosis: other (see comments)  Relevant Medical History: CVA, seizures, T2DM, dementia  Interdisciplinary Rounds: did not attend  General Information Comments: Pt unable to respond with more than nods. Reported he was tolerating IDDSI level 6 diet and eating around 50% of meals. Per previous RD note, NFPE found moderate to severe wasting in temporals, clavicles, and triceps. Unable to confirm criteria for malnutrition at this time.  Nutrition Discharge Planning: unable to determine at this time    Nutrition Risk Screen    Nutrition Risk Screen: dysphagia or difficulty swallowing    Nutrition/Diet History    Spiritual, Cultural Beliefs, Jewish Practices, Values that Affect Care: no  Factors Affecting Nutritional Intake: difficulty/impaired swallowing, chewing difficulties/inability to chew food    Anthropometrics    Temp: (!) 102.2 °F (39 °C)  Height: 5' 6" (167.6 cm)  Height (inches): 66 in  Weight Method: Bed Scale  Weight: 64 kg (141 lb)  Weight (lb): 141 lb  Ideal Body Weight (IBW), Male: 142 lb  % Ideal Body Weight, Male (lb): 99.3 lb  BMI (Calculated): 22.8  BMI Grade: 18.5-24.9 - normal       Lab/Procedures/Meds    Pertinent Labs Reviewed: reviewed  Pertinent Labs Comments: Na 137, POCT Glu 93,   Pertinent Medications Reviewed: reviewed  Pertinent Medications Comments: heparin, clopidrogel, statin     Estimated/Assessed Needs    Weight Used For Calorie Calculations: 64 kg (141 lb 1.5 oz)  Energy Calorie Requirements " (kcal): 1657  Energy Need Method: [facility]  Protein Requirements: 77-96g(1.2-1.5g/kg)  Weight Used For Protein Calculations: 64 kg (141 lb 1.5 oz)  Fluid Requirements (mL): 1mL/kcal or per MD  RDA Method (mL): 1657  CHO Requirement: 207g CHO daily      Nutrition Prescription Ordered    Current Diet Order: Soft Dysphagia Diet (IDDSI Level 6)  Nutrition Order Comments: Tolerating Diet. Consuming about 50%    Evaluation of Received Nutrient/Fluid Intake  Energy Calories Required: meeting needs  Protein Required: meeting needs  Fluid Required: other (see comments)(per MD)  Comments: 0mL residuals 11/13  % Intake of Estimated Energy Needs: 25 - 50 %  % Meal Intake: 25 - 50 %    Nutrition Risk    Level of Risk/Frequency of Follow-up: high     Assessment and Plan    Nutrition Problem  Inadequate oral intake     Related to (etiology):   NPO     Signs and Symptoms (as evidenced by):   Pt requiring alternative means of nutrition to meet at least 85% EEN and EPN.      Interventions(treatment strategy):  Collaboration of care with providers     Nutrition Diagnosis Status:   Continues    Monitor and Evaluation    Food and Nutrient Intake: energy intake, food and beverage intake  Food and Nutrient Adminstration: diet order  Anthropometric Measurements: weight, weight change, body mass index  Biochemical Data, Medical Tests and Procedures: electrolyte and renal panel, gastrointestinal profile, glucose/endocrine profile, inflammatory profile, lipid profile  Nutrition-Focused Physical Findings: overall appearance     Malnutrition Assessment     Weight Loss (Malnutrition): (unknown)  Energy Intake (Malnutrition): (unknown)   Orbital Region (Subcutaneous Fat Loss): moderate depletion   [place] Region (Muscle Loss): moderate depletion  Clavicle Bone Region (Muscle Loss): severe depletion  Clavicle and Acromion Bone Region (Muscle Loss): severe depletion  Scapular Bone Region (Muscle Loss): severe depletion  Dorsal Hand (Muscle Loss):  moderate depletion  Patellar Region (Muscle Loss): (SHINE)  Anterior Thigh Region (Muscle Loss): (SHINE)  Posterior Calf Region (Muscle Loss): (SHINE)                 Nutrition Follow-Up    RD Follow-up?: Yes     Pt is a 39 yo M co back pain. Pt states that for the past week he has been having back pain. Pt states that he notices that the back pain occurs when he is bent over at work and feels better when he stands up straight. no numbness/weakness. no bowel/bladder dysfunction. PT also states that separately he has had some chest pains over the past couple of weeks that will come out of nowhere and will last ten minutes and will resolve with belching or when he has a BM. no n/v. no fever/chills. no other complaints.

## 2024-04-27 NOTE — PLAN OF CARE
Diagnostic wire inserted. amplatz POC reviewed with pt at 0500. Pt is unable to verbalize understanding, but can nod appropriately. A line in place. Condom cath in place. Bath complete. LR gtt continued. LUE restraint continued. Patient remained free from injury all night. Patient has a lot of thick secretions. Needs to be suctioned very frequently. Questions and concerns addressed. No acute events overnight. Pt progressing toward goals. Will continue to monitor. See flowsheets for full assessment and VS info

## 2024-09-17 NOTE — ASSESSMENT & PLAN NOTE
With AMS on admission. Admitted to ICU for further monitoring. Likely 2/2 to Sepsis. Now significantly better than on admission.   Plan:  - Now resolved  -neuro checks q4hrs   For information on Fall & Injury Prevention, visit: [email or web address] OR  [email or web address] OR  https://www.cdc.gov/steadi/patient.html